# Patient Record
Sex: FEMALE | Race: WHITE | NOT HISPANIC OR LATINO | Employment: OTHER | URBAN - METROPOLITAN AREA
[De-identification: names, ages, dates, MRNs, and addresses within clinical notes are randomized per-mention and may not be internally consistent; named-entity substitution may affect disease eponyms.]

---

## 2017-02-07 ENCOUNTER — ALLSCRIPTS OFFICE VISIT (OUTPATIENT)
Dept: OTHER | Facility: OTHER | Age: 77
End: 2017-02-07

## 2017-03-06 ENCOUNTER — APPOINTMENT (OUTPATIENT)
Dept: LAB | Facility: HOSPITAL | Age: 77
End: 2017-03-06
Attending: FAMILY MEDICINE
Payer: MEDICARE

## 2017-03-06 ENCOUNTER — TRANSCRIBE ORDERS (OUTPATIENT)
Dept: ADMINISTRATIVE | Facility: HOSPITAL | Age: 77
End: 2017-03-06

## 2017-03-06 DIAGNOSIS — E03.9 UNSPECIFIED HYPOTHYROIDISM: Primary | ICD-10-CM

## 2017-03-06 DIAGNOSIS — E78.00 PURE HYPERCHOLESTEROLEMIA: ICD-10-CM

## 2017-03-06 DIAGNOSIS — E03.9 UNSPECIFIED HYPOTHYROIDISM: ICD-10-CM

## 2017-03-06 LAB
ALT SERPL W P-5'-P-CCNC: 22 U/L (ref 12–78)
TSH SERPL DL<=0.05 MIU/L-ACNC: 3.71 UIU/ML (ref 0.36–3.74)

## 2017-03-06 PROCEDURE — 84443 ASSAY THYROID STIM HORMONE: CPT

## 2017-03-06 PROCEDURE — 36415 COLL VENOUS BLD VENIPUNCTURE: CPT

## 2017-03-06 PROCEDURE — 84460 ALANINE AMINO (ALT) (SGPT): CPT

## 2017-12-05 ENCOUNTER — GENERIC CONVERSION - ENCOUNTER (OUTPATIENT)
Dept: OTHER | Facility: OTHER | Age: 77
End: 2017-12-05

## 2017-12-05 ENCOUNTER — TRANSCRIBE ORDERS (OUTPATIENT)
Dept: ADMINISTRATIVE | Facility: HOSPITAL | Age: 77
End: 2017-12-05

## 2017-12-05 ENCOUNTER — HOSPITAL ENCOUNTER (OUTPATIENT)
Dept: RADIOLOGY | Facility: HOSPITAL | Age: 77
Discharge: HOME | End: 2017-12-05
Attending: ALLERGY & IMMUNOLOGY
Payer: MEDICARE

## 2017-12-05 DIAGNOSIS — R05.9 COUGH: Primary | ICD-10-CM

## 2017-12-05 PROCEDURE — 71020 HB CHEST X-RAY 2VW FRONTAL&LATL: CPT

## 2017-12-11 ENCOUNTER — TRANSCRIBE ORDERS (OUTPATIENT)
Dept: ADMINISTRATIVE | Facility: HOSPITAL | Age: 77
End: 2017-12-11

## 2017-12-11 ENCOUNTER — LAB (OUTPATIENT)
Dept: LAB | Facility: HOSPITAL | Age: 77
End: 2017-12-11
Attending: FAMILY MEDICINE
Payer: MEDICARE

## 2017-12-11 DIAGNOSIS — Z11.59 SCREENING EXAMINATION FOR POLIOMYELITIS: ICD-10-CM

## 2017-12-11 DIAGNOSIS — Z13.9 SCREENING FOR CONDITION: ICD-10-CM

## 2017-12-11 DIAGNOSIS — Z13.9 SCREENING FOR CONDITION: Primary | ICD-10-CM

## 2017-12-11 LAB
25(OH)D3 SERPL-MCNC: 30.5 NG/ML (ref 30–100)
ALBUMIN SERPL BCP-MCNC: 3.3 G/DL (ref 3.5–5)
ALP SERPL-CCNC: 85 U/L (ref 46–116)
ALT SERPL W P-5'-P-CCNC: 37 U/L (ref 12–78)
ANION GAP SERPL CALCULATED.3IONS-SCNC: 11 MMOL/L (ref 4–13)
AST SERPL W P-5'-P-CCNC: 25 U/L (ref 5–45)
BASOPHILS # BLD AUTO: 0 THOUSANDS/ΜL (ref 0–0.1)
BASOPHILS NFR BLD AUTO: 1 % (ref 0–1)
BILIRUB SERPL-MCNC: 0.6 MG/DL (ref 0.2–1)
BUN SERPL-MCNC: 15 MG/DL (ref 5–25)
CALCIUM SERPL-MCNC: 8.6 MG/DL (ref 8.3–10.1)
CHLORIDE SERPL-SCNC: 106 MMOL/L (ref 100–108)
CHOLEST SERPL-MCNC: 161 MG/DL (ref 50–200)
CO2 SERPL-SCNC: 26 MMOL/L (ref 21–32)
CREAT SERPL-MCNC: 0.66 MG/DL (ref 0.6–1.3)
EOSINOPHIL # BLD AUTO: 0.4 THOUSAND/ΜL (ref 0–0.61)
EOSINOPHIL NFR BLD AUTO: 4 % (ref 0–6)
ERYTHROCYTE [DISTWIDTH] IN BLOOD BY AUTOMATED COUNT: 15.3 % (ref 11.6–15.1)
GFR SERPL CREATININE-BSD FRML MDRD: 87 ML/MIN/1.73SQ M
GLUCOSE P FAST SERPL-MCNC: 83 MG/DL (ref 65–99)
HCT VFR BLD AUTO: 43.4 % (ref 37–47)
HDLC SERPL-MCNC: 65 MG/DL (ref 40–60)
HGB BLD-MCNC: 14.2 G/DL (ref 12–16)
LDLC SERPL CALC-MCNC: 77 MG/DL (ref 0–100)
LYMPHOCYTES # BLD AUTO: 1.9 THOUSANDS/ΜL (ref 0.6–4.47)
LYMPHOCYTES NFR BLD AUTO: 22 % (ref 14–44)
MCH RBC QN AUTO: 29.5 PG (ref 27–31)
MCHC RBC AUTO-ENTMCNC: 32.7 G/DL (ref 31.4–37.4)
MCV RBC AUTO: 90 FL (ref 82–98)
MONOCYTES # BLD AUTO: 0.9 THOUSAND/ΜL (ref 0.17–1.22)
MONOCYTES NFR BLD AUTO: 10 % (ref 4–12)
NEUTROPHILS # BLD AUTO: 5.3 THOUSANDS/ΜL (ref 1.85–7.62)
NEUTS SEG NFR BLD AUTO: 63 % (ref 43–75)
NRBC BLD AUTO-RTO: 0 /100 WBCS
PLATELET # BLD AUTO: 244 THOUSANDS/UL (ref 130–400)
PMV BLD AUTO: 8.7 FL (ref 8.9–12.7)
POTASSIUM SERPL-SCNC: 3.8 MMOL/L (ref 3.5–5.3)
PROT SERPL-MCNC: 6.8 G/DL (ref 6.4–8.2)
RBC # BLD AUTO: 4.82 MILLION/UL (ref 4.2–5.4)
SODIUM SERPL-SCNC: 143 MMOL/L (ref 136–145)
TRIGL SERPL-MCNC: 94 MG/DL
WBC # BLD AUTO: 8.4 THOUSAND/UL (ref 4.8–10.8)

## 2017-12-11 PROCEDURE — 80053 COMPREHEN METABOLIC PANEL: CPT

## 2017-12-11 PROCEDURE — 80061 LIPID PANEL: CPT

## 2017-12-11 PROCEDURE — 82306 VITAMIN D 25 HYDROXY: CPT

## 2017-12-11 PROCEDURE — 36415 COLL VENOUS BLD VENIPUNCTURE: CPT

## 2017-12-11 PROCEDURE — 85025 COMPLETE CBC W/AUTO DIFF WBC: CPT

## 2017-12-11 PROCEDURE — 86803 HEPATITIS C AB TEST: CPT

## 2017-12-12 LAB — HCV AB SER QL: NORMAL

## 2018-01-13 VITALS
WEIGHT: 191 LBS | BODY MASS INDEX: 36.06 KG/M2 | DIASTOLIC BLOOD PRESSURE: 68 MMHG | HEART RATE: 46 BPM | TEMPERATURE: 98.2 F | SYSTOLIC BLOOD PRESSURE: 118 MMHG | RESPIRATION RATE: 12 BRPM | HEIGHT: 61 IN | OXYGEN SATURATION: 99 %

## 2018-03-19 ENCOUNTER — TELEPHONE (OUTPATIENT)
Dept: PULMONOLOGY | Facility: MEDICAL CENTER | Age: 78
End: 2018-03-19

## 2018-03-21 DIAGNOSIS — J45.41 MODERATE PERSISTENT ASTHMA WITH EXACERBATION: Primary | ICD-10-CM

## 2018-03-21 RX ORDER — BUDESONIDE 0.25 MG/2ML
1 INHALANT ORAL 2 TIMES DAILY
COMMUNITY
End: 2018-03-21 | Stop reason: SDUPTHER

## 2018-03-22 RX ORDER — BUDESONIDE 0.25 MG/2ML
0.25 INHALANT ORAL 2 TIMES DAILY
Qty: 120 ML | Refills: 5 | Status: SHIPPED | OUTPATIENT
Start: 2018-03-22 | End: 2019-04-22 | Stop reason: SDUPTHER

## 2018-04-10 ENCOUNTER — TRANSCRIBE ORDERS (OUTPATIENT)
Dept: ADMINISTRATIVE | Facility: HOSPITAL | Age: 78
End: 2018-04-10

## 2018-04-10 ENCOUNTER — APPOINTMENT (OUTPATIENT)
Dept: LAB | Facility: HOSPITAL | Age: 78
End: 2018-04-10
Attending: FAMILY MEDICINE
Payer: MEDICARE

## 2018-04-10 DIAGNOSIS — E03.4 IDIOPATHIC ATROPHIC HYPOTHYROIDISM: Primary | ICD-10-CM

## 2018-04-10 DIAGNOSIS — E03.4 IDIOPATHIC ATROPHIC HYPOTHYROIDISM: ICD-10-CM

## 2018-04-10 LAB — TSH SERPL DL<=0.05 MIU/L-ACNC: 1.3 UIU/ML (ref 0.36–3.74)

## 2018-04-10 PROCEDURE — 84443 ASSAY THYROID STIM HORMONE: CPT

## 2018-04-10 PROCEDURE — 36415 COLL VENOUS BLD VENIPUNCTURE: CPT

## 2018-05-06 ENCOUNTER — APPOINTMENT (EMERGENCY)
Dept: RADIOLOGY | Facility: HOSPITAL | Age: 78
End: 2018-05-06
Payer: MEDICARE

## 2018-05-06 ENCOUNTER — HOSPITAL ENCOUNTER (EMERGENCY)
Facility: HOSPITAL | Age: 78
Discharge: HOME | End: 2018-05-06
Attending: EMERGENCY MEDICINE | Admitting: EMERGENCY MEDICINE
Payer: MEDICARE

## 2018-05-06 VITALS
OXYGEN SATURATION: 98 % | SYSTOLIC BLOOD PRESSURE: 190 MMHG | TEMPERATURE: 98.1 F | DIASTOLIC BLOOD PRESSURE: 77 MMHG | RESPIRATION RATE: 20 BRPM | HEART RATE: 54 BPM

## 2018-05-06 DIAGNOSIS — M54.32 SCIATICA OF LEFT SIDE: Primary | ICD-10-CM

## 2018-05-06 DIAGNOSIS — S39.012A LOW BACK STRAIN: ICD-10-CM

## 2018-05-06 PROCEDURE — 99283 EMERGENCY DEPT VISIT LOW MDM: CPT

## 2018-05-06 RX ORDER — CYCLOBENZAPRINE HCL 10 MG
10 TABLET ORAL 2 TIMES DAILY PRN
Qty: 20 TABLET | Refills: 0 | Status: SHIPPED | OUTPATIENT
Start: 2018-05-06 | End: 2022-05-19

## 2018-05-06 RX ORDER — CYCLOBENZAPRINE HCL 10 MG
10 TABLET ORAL ONCE
Status: COMPLETED | OUTPATIENT
Start: 2018-05-06 | End: 2018-05-06

## 2018-05-06 RX ORDER — LIDOCAINE 50 MG/G
1 PATCH TOPICAL ONCE
Status: DISCONTINUED | OUTPATIENT
Start: 2018-05-06 | End: 2018-05-06 | Stop reason: HOSPADM

## 2018-05-06 RX ORDER — LIDOCAINE 50 MG/G
1 PATCH TOPICAL DAILY
Status: DISCONTINUED | OUTPATIENT
Start: 2018-05-06 | End: 2018-05-06

## 2018-05-06 RX ORDER — LIDOCAINE 50 MG/G
1 PATCH TOPICAL DAILY
Qty: 30 PATCH | Refills: 0 | Status: SHIPPED | OUTPATIENT
Start: 2018-05-06

## 2018-05-06 RX ORDER — NAPROXEN 500 MG/1
500 TABLET ORAL ONCE
Status: COMPLETED | OUTPATIENT
Start: 2018-05-06 | End: 2018-05-06

## 2018-05-06 RX ADMIN — CYCLOBENZAPRINE HYDROCHLORIDE 10 MG: 10 TABLET, FILM COATED ORAL at 07:58

## 2018-05-06 RX ADMIN — LIDOCAINE 1 PATCH: 50 PATCH TOPICAL at 07:57

## 2018-05-06 RX ADMIN — NAPROXEN 500 MG: 500 TABLET ORAL at 07:58

## 2018-05-06 NOTE — DISCHARGE INSTRUCTIONS
Low Back Strain   WHAT YOU NEED TO KNOW:   Low back strain is an injury to your lower back muscles or tendons  Tendons are strong tissues that connect muscles to bones  The lower back supports most of your body weight and helps you move, twist, and bend  DISCHARGE INSTRUCTIONS:   Seek care immediately if:   · You hear or feel a pop in your lower back  · You have increased swelling or pain in your lower back  · You have trouble moving your legs  · Your legs are numb  Contact your healthcare provider if:   · You have a fever  · Your pain does not go away, even after treatment  · You have questions or concerns about your condition or care  Medicines: The following medicines may be ordered by your healthcare provider:  · Acetaminophen decreases pain and fever  It is available without a doctor's order  Ask how much to take and how often to take it  Follow directions  Acetaminophen can cause liver damage if not taken correctly  · NSAIDs , such as ibuprofen, help decrease swelling, pain, and fever  This medicine is available with or without a doctor's order  NSAIDs can cause stomach bleeding or kidney problems in certain people  If you take blood thinner medicine, always ask your healthcare provider if NSAIDs are safe for you  Always read the medicine label and follow directions  · Muscle relaxers  help decrease pain and muscle spasms  · Prescription pain medicine  may be given  Ask how to take this medicine safely  · Take your medicine as directed  Contact your healthcare provider if you think your medicine is not helping or if you have side effects  Tell him or her if you are allergic to any medicine  Keep a list of the medicines, vitamins, and herbs you take  Include the amounts, and when and why you take them  Bring the list or the pill bottles to follow-up visits  Carry your medicine list with you in case of an emergency  Self-care:   · Rest  as directed   You may need to rest in bed for a period of time after your injury  Do not lift heavy objects  · Apply ice  on your back for 15 to 20 minutes every hour or as directed  Use an ice pack, or put crushed ice in a plastic bag  Cover it with a towel  Ice helps prevent tissue damage and decreases swelling and pain  · Apply heat  on your lower back for 20 to 30 minutes every 2 hours for as many days as directed  Heat helps decrease pain and muscle spasms  · Slowly start to increase your activity  as the pain decreases, or as directed  Prevent another low back strain:   · Use correct body movements  ¨ Bend at the hips and knees when you  objects  Do not bend from the waist  Use your leg muscles as you lift the load  Do not use your back  Keep the object close to your chest as you lift it  Try not to twist or lift anything above your waist     ¨ Change your position often when you stand for long periods of time  Rest one foot on a small box or footrest, and then switch to the other foot often  ¨ Try not to sit for long periods of time  When you do, sit in a straight-backed chair with your feet flat on the floor  ¨ Never reach, pull, or push while you are sitting  · Warm up before you exercise  Do exercises that strengthen your back muscles  Ask your healthcare provider about the best exercise plan for you  · Maintain a healthy weight  Ask your healthcare provider how much you should weigh  Ask him to help you create a weight loss plan if you are overweight  Follow up with your healthcare provider as directed:  Write down your questions so you remember to ask them during your visits  © 2017 [employer] Information is for End User's use only and may not be sold, redistributed or otherwise used for commercial purposes  All illustrations and images included in CareNotes® are the copyrighted property of [employer]  or Sunil [employer]    The above information is an  only  It is not intended as medical advice for individual conditions or treatments  Talk to your doctor, nurse or pharmacist before following any medical regimen to see if it is safe and effective for you  Sciatica   WHAT YOU NEED TO KNOW:   What is sciatica? Sciatica is a condition that causes pain along your sciatic nerve  The sciatic nerve runs from your spine through both sides of your buttocks  It then runs down the back of your thigh, into your lower leg and foot  Any place along your sciatic nerve may be compressed, inflamed, irritated, or stretched and cause symptoms  What causes sciatica? Sciatica may be related to certain activities, poor posture, and physical or psychological stress  Any of the following may cause or increase your risk of sciatica:  · Disc problems:  A slipped disc (soft cushion in between the bones of the spine) is the most common cause of sciatica  The disc may press on the sciatic nerve  One bone in your spine may slip over another, or you may have narrowing of the spinal column  · Muscle injury: This may happen after you twist or lift a heavy object  Swelling from sprained or irritated muscles in the buttocks, thighs, or legs press on the sciatic nerve  · Obesity or pregnancy:  Extra weight increases pressure on your back and legs  · Trauma:  Direct blows on the buttocks, thighs, or legs, car accidents, or falls may injure the sciatic nerve  · Diseases of the spine:  Arthritis, osteoporosis, cancer, or infection of the spine may also affect the sciatic nerve  What are the signs and symptoms of sciatica?   The symptoms of sciatic may be short-term or long-term:  · Pain that goes from the lower back into your buttocks and down the back of your thigh    · Numbness or tingling in your buttocks and legs    · Muscle weakness, difficulty moving or controlling your leg or foot    · Leg pain that increases with standing, sitting, or squatting  How is sciatica diagnosed? Your healthcare provider will ask about other health conditions you may have  He may ask you about your job, history of back pain, diseases, or surgeries you have had  He will examine you and move your legs to see what increases pain  You may also need any of the following:  · X-rays: This is a picture of the bones and tissues in your back, hip, thigh, or leg  This test may show other problems, such as fractures (broken bones)  · CT scan: This test is also called a CAT scan  An x-ray machine uses a computer to take pictures of your hips, thighs, and legs  The pictures may show your sciatic nerve, muscles, and blood vessels  You may be given a dye before the pictures are taken to help healthcare providers see the pictures better  Tell the healthcare provider if you have ever had an allergic reaction to contrast dye  · MRI:  This scan uses powerful magnets and a computer to take pictures of your hips, thighs, and legs  An MRI may show damaged nerves, muscles, bones, and blood vessels  You may be given dye to help the pictures show up better  Tell the healthcare provider if you have ever had an allergic reaction to contrast dye  Do not enter the MRI room with anything metal  Metal can cause serious injury  Tell the healthcare provider if you have any metal in or on your body  · An electromyography (EMG)  test measures the electrical activity of your muscles at rest and with movement  · Nerve conduction tests: These tests check how surface nerves and related muscles respond to stimulation  Electrodes with wires or tiny needles are placed on certain areas, such as the buttocks and legs  How is sciatica treated? · NSAIDs:  These medicines decrease swelling and pain  NSAIDs are available without a doctor's order  Ask your healthcare provider which medicine is right for you  Ask how much to take and when to take it  Take as directed   NSAIDs can cause stomach bleeding or kidney problems if not taken correctly  · Acetaminophen: This medicine decreases pain  Acetaminophen is available without a doctor's order  Ask how much to take and how often to take it  Follow directions  Acetaminophen can cause liver damage if not taken correctly  · Muscle relaxers  help decrease pain and muscle spasms  · Epidural steroid medicine: This may include both an anesthetic (numbing medicine) and a steroid, which may decrease swelling and relieve pain  It is given as a shot close to the spine in the area where you have pain  · Chemonucleolysis: This is an injection given into the damaged disc to soften or shrink the disc  · Surgery: This may be done to correct problems such as a damaged disc, or a tumor in your spine  It may be done to decrease the pressure on the sciatic nerve  Healthcare providers may also release the muscle that may be pressing into your sciatic nerve  How can I help manage sciatica? · Ultrasound therapy: This is a machine that uses sound waves to decrease pain  Topical medicines may be added to help decrease pain and inflammation  · Physical therapy:  A physical therapist teaches you exercises to help improve movement and strength, and to decrease pain  An occupational therapist teaches you skills to help with your daily activities  · Assistive devices: You may need to wear back support, such as a back brace  You may need crutches, a cane, or a walker to decrease stress on your lower back and leg muscles  Ask your healthcare provider for more information about assistive devices and how to use them correctly  How can sciatica be prevented? · Avoid pressure on your back and legs:  Do not  lift heavy objects, or stand or sit for long periods of time  · Lift objects safely:  Keep your back straight and bend your knees when you  an object  Do not bend or twist your back when you lift      · Maintain a healthy weight:  Ask your healthcare provider how much you should weigh  Ask him to help you create a weight loss plan if you are overweight  · Exercise:  Ask your healthcare provider about the best stretching, warmup, and exercise plan for you  What are the risks of sciatica? An epidural steroid injection can lead to pain disorders or paralysis if it is placed incorrectly  It may also cause headaches, leg pain, and blockage of blood flow to the spinal cord  Surgery may cause you to bleed or get an infection  If not treated, your muscles and nerves may become damaged permanently  You may have decreased strength  You may not be able to move your leg or control when you urinate or have bowel movements  When should I contact my healthcare provider? · You have pain in your lower back at night or when resting  · You have pain in your lower back with numbness below the knee  · You have weakness in one leg only  · You have questions or concerns about your condition or care  When should I seek immediate care or call 911? · You have trouble holding back your urine or bowel movements  · You have weakness in both legs  · You have numbness in your groin or buttocks  CARE AGREEMENT:   You have the right to help plan your care  Learn about your health condition and how it may be treated  Discuss treatment options with your caregivers to decide what care you want to receive  You always have the right to refuse treatment  The above information is an  only  It is not intended as medical advice for individual conditions or treatments  Talk to your doctor, nurse or pharmacist before following any medical regimen to see if it is safe and effective for you  © 2017 [employer] Information is for End User's use only and may not be sold, redistributed or otherwise used for commercial purposes  All illustrations and images included in CareNotes® are the copyrighted property of [employer]  or Sunil [employer]

## 2018-05-09 NOTE — ED PROVIDER NOTES
History  Chief Complaint   Patient presents with    Buttocks Pain     pt lifted something heavy a couple days back now c/o l buttock pain traveling to l knee pain  [facility] given Flexeril and methyprednisone pack no relief    Knee Pain       History provided by:  Patient   used: No    Back Pain   Location:  Lumbar spine  Quality:  Aching  Radiates to:  L thigh and L knee  Pain severity:  Moderate  Pain is:  Same all the time  Onset quality:  Gradual  Timing:  Constant  Progression:  Waxing and waning  Chronicity:  Recurrent  Context: lifting heavy objects    Relieved by:  NSAIDs (mildly)  Worsened by:  Bending, sitting, touching, twisting and movement  Ineffective treatments:  None tried  Associated symptoms: no abdominal pain, no abdominal swelling, no bladder incontinence, no bowel incontinence, no chest pain, no dysuria, no fever, no headaches, no leg pain, no numbness, no paresthesias, no pelvic pain, no perianal numbness, no tingling, no weakness and no weight loss    Risk factors: menopause    Risk factors: no hx of cancer, no hx of osteoporosis, no lack of exercise, not obese, not pregnant, no recent surgery, no steroid use and no vascular disease    Risk factors comment:  Elderly age      Prior to Admission Medications   Prescriptions Last Dose Informant Patient Reported? Taking? Cholecalciferol (VITAMIN D) 2000 UNITS tablet   Yes Yes   Sig: Take 2,000 Units by mouth daily  UNKNOWN TO PATIENT   Yes Yes   Sig: Allegra daily po  Stool softener bid po  Tumeric 0 500 daily po   albuterol (ACCUNEB) 1 25 MG/3ML nebulizer solution   Yes Yes   Sig: Take 1 ampule by nebulization every 6 (six) hours as needed for wheezing  aspirin 81 MG tablet   Yes Yes   Sig: Take 81 mg by mouth daily     budesonide (PULMICORT) 0 25 mg/2 mL nebulizer solution   No Yes   Sig: Take 2 mL (0 25 mg total) by nebulization 2 (two) times a day   levothyroxine (SYNTHROID) 112 mcg tablet Yes Yes   Sig: Take 112 mcg by mouth daily  montelukast (SINGULAIR) 10 mg tablet   Yes Yes   Sig: Take 10 mg by mouth daily at bedtime  nitroglycerin (NITROSTAT) 0 4 mg SL tablet   Yes No   Sig: Place 0 4 mg under the tongue every 5 (five) minutes as needed for chest pain  simvastatin (ZOCOR) 10 mg tablet   Yes Yes   Sig: Take 10 mg by mouth daily at bedtime  Facility-Administered Medications: None       Past Medical History:   Diagnosis Date    Asthma     Cardiac disease     heart attack    Disease of thyroid gland     Hyperlipidemia        Past Surgical History:   Procedure Laterality Date    APPENDECTOMY      BREAST LUMPECTOMY Bilateral     several    CARDIAC SURGERY      CORONARY STENT PLACEMENT      NASAL POLYP SURGERY         No family history on file  I have reviewed and agree with the history as documented  Social History   Substance Use Topics    Smoking status: Never Smoker    Smokeless tobacco: Not on file    Alcohol use No        Review of Systems   Constitutional: Negative for chills, fatigue, fever, unexpected weight change and weight loss  HENT: Negative for sore throat  Respiratory: Negative for cough, chest tightness and shortness of breath  Cardiovascular: Negative for chest pain  Gastrointestinal: Negative for abdominal distention, abdominal pain, bowel incontinence, nausea and vomiting  Genitourinary: Negative for bladder incontinence, difficulty urinating, dysuria, flank pain, hematuria and pelvic pain  Musculoskeletal: Positive for back pain  Negative for neck pain and neck stiffness  Skin: Negative for color change, rash and wound  Allergic/Immunologic: Negative for immunocompromised state  Neurological: Negative for dizziness, tingling, syncope, weakness, numbness, headaches and paresthesias  Psychiatric/Behavioral: The patient is nervous/anxious          Physical Exam  ED Triage Vitals [05/06/18 0713]   Temperature Pulse Respirations Blood Pressure SpO2   98 1 °F (36 7 °C) (!) 54 20 (!) 190/77 98 %      Temp Source Heart Rate Source Patient Position - Orthostatic VS BP Location FiO2 (%)   Tympanic Monitor Sitting Left arm --      Pain Score       --           Orthostatic Vital Signs  Vitals:    05/06/18 0713   BP: (!) 190/77   Pulse: (!) 54   Patient Position - Orthostatic VS: Sitting       Physical Exam   Constitutional: She is oriented to person, place, and time  She appears well-developed and well-nourished  No distress  HENT:   Head: Normocephalic and atraumatic  Mouth/Throat: Oropharynx is clear and moist    Eyes: EOM are normal    Neck: Normal range of motion  Neck supple  Cardiovascular: Normal rate, regular rhythm and intact distal pulses  Pulmonary/Chest: Effort normal and breath sounds normal    Abdominal: Soft  She exhibits no distension  There is no tenderness  Musculoskeletal: Normal range of motion  She exhibits tenderness  Lt paralumbar and Lt buttock ttp w/e e/e/e/s/l/a/deformity, rash  (-) SLT   Neurological: She is alert and oriented to person, place, and time  No saddle anesthesia   Skin: Skin is warm and dry  She is not diaphoretic  Nursing note and vitals reviewed        ED Medications  Medications   naproxen (NAPROSYN) tablet 500 mg (500 mg Oral Given 5/6/18 0758)   cyclobenzaprine (FLEXERIL) tablet 10 mg (10 mg Oral Given 5/6/18 0758)       Diagnostic Studies  Results Reviewed     None                 No orders to display              Procedures  Procedures       Phone Contacts  ED Phone Contact    ED Course                               MDM  Number of Diagnoses or Management Options  Low back strain: new and does not require workup  Sciatica of left side: new and does not require workup  Diagnosis management comments: PRN analgesia/antiinflammatories/muscle relaxants       Amount and/or Complexity of Data Reviewed  Decide to obtain previous medical records or to obtain history from someone other than the patient: yes  Review and summarize past medical records: yes    Risk of Complications, Morbidity, and/or Mortality  Presenting problems: low  Diagnostic procedures: minimal  Management options: low    Patient Progress  Patient progress: stable    CritCare Time    Disposition  Final diagnoses:   Sciatica of left side   Low back strain     Time reflects when diagnosis was documented in both MDM as applicable and the Disposition within this note     Time User Action Codes Description Comment    5/6/2018  7:50 AM Phoenix Ebbing Add [M54 32] Sciatica of left side     5/6/2018  7:50 AM Phoenix Ebbing Add [S39 012A] Low back strain       ED Disposition     ED Disposition Condition Comment    Discharge  [address] discharge to home/self care  Condition at discharge: Good        Follow-up Information     Follow up With Specialties Details Why Contact Info    Judith Contreras,  Family Medicine Schedule an appointment as soon as possible for a visit  [address]  [address] [facility]  [phone]          Discharge Medication List as of 5/6/2018  7:52 AM      START taking these medications    Details   cyclobenzaprine (FLEXERIL) 10 mg tablet Take 1 tablet (10 mg total) by mouth 2 (two) times a day as needed for muscle spasms, Starting Sun 5/6/2018, Print      lidocaine (LIDODERM) 5 % Place 1 patch on the skin daily Remove & Discard patch within 12 hours or as directed by MD, Starting [place] 5/6/2018, Print         CONTINUE these medications which have NOT CHANGED    Details   albuterol (ACCUNEB) 1 25 MG/3ML nebulizer solution Take 1 ampule by nebulization every 6 (six) hours as needed for wheezing , Until Discontinued, Historical Med      aspirin 81 MG tablet Take 81 mg by mouth daily  , Until Discontinued, Historical Med      budesonide (PULMICORT) 0 25 mg/2 mL nebulizer solution Take 2 mL (0 25 mg total) by nebulization 2 (two) times a day, Starting Thu 3/22/2018, Normal      Cholecalciferol (VITAMIN D) 2000 UNITS tablet Take 2,000 Units by mouth daily  , Until Discontinued, Historical Med      levothyroxine (SYNTHROID) 112 mcg tablet Take 112 mcg by mouth daily  , Until Discontinued, Historical Med      montelukast (SINGULAIR) 10 mg tablet Take 10 mg by mouth daily at bedtime  , Until Discontinued, Historical Med      simvastatin (ZOCOR) 10 mg tablet Take 10 mg by mouth daily at bedtime  , Until Discontinued, Historical Med      UNKNOWN TO PATIENT Allegra daily po  Stool softener bid po  Tumeric 0 500 daily po, Until Discontinued, Historical Med      nitroglycerin (NITROSTAT) 0 4 mg SL tablet Place 0 4 mg under the tongue every 5 (five) minutes as needed for chest pain , Until Discontinued, Historical Med           No discharge procedures on file      ED Provider  Electronically Signed by           Leena Fuentes MD  05/09/18 2319

## 2018-05-17 ENCOUNTER — OFFICE VISIT (OUTPATIENT)
Dept: OBGYN CLINIC | Facility: CLINIC | Age: 78
End: 2018-05-17
Payer: MEDICARE

## 2018-05-17 VITALS
WEIGHT: 188 LBS | BODY MASS INDEX: 34.6 KG/M2 | SYSTOLIC BLOOD PRESSURE: 117 MMHG | DIASTOLIC BLOOD PRESSURE: 74 MMHG | HEART RATE: 60 BPM | HEIGHT: 62 IN

## 2018-05-17 DIAGNOSIS — M17.12 PRIMARY OSTEOARTHRITIS OF LEFT KNEE: Primary | ICD-10-CM

## 2018-05-17 DIAGNOSIS — G57.02 PIRIFORMIS SYNDROME, LEFT: ICD-10-CM

## 2018-05-17 PROCEDURE — 99214 OFFICE O/P EST MOD 30 MIN: CPT | Performed by: ORTHOPAEDIC SURGERY

## 2018-05-17 PROCEDURE — 20610 DRAIN/INJ JOINT/BURSA W/O US: CPT | Performed by: ORTHOPAEDIC SURGERY

## 2018-05-17 RX ORDER — NAPROXEN 500 MG/1
500 TABLET ORAL 2 TIMES DAILY WITH MEALS
Qty: 60 TABLET | Refills: 0 | Status: SHIPPED | OUTPATIENT
Start: 2018-05-17 | End: 2020-06-23 | Stop reason: ALTCHOICE

## 2018-05-17 RX ORDER — SIMVASTATIN 80 MG
TABLET ORAL
COMMUNITY
End: 2022-05-19

## 2018-05-17 RX ORDER — ALBUTEROL SULFATE 2.5 MG/3ML
1 SOLUTION RESPIRATORY (INHALATION) 4 TIMES DAILY
COMMUNITY
Start: 2017-02-07 | End: 2019-06-11

## 2018-05-17 RX ORDER — METHYLPREDNISOLONE 4 MG/1
TABLET ORAL
Refills: 0 | COMMUNITY
Start: 2018-05-01 | End: 2022-05-19

## 2018-05-17 RX ORDER — DEXAMETHASONE SODIUM PHOSPHATE 100 MG/10ML
40 INJECTION INTRAMUSCULAR; INTRAVENOUS
Status: COMPLETED | OUTPATIENT
Start: 2018-05-17 | End: 2018-05-17

## 2018-05-17 RX ORDER — LIDOCAINE HYDROCHLORIDE 10 MG/ML
4 INJECTION, SOLUTION INFILTRATION; PERINEURAL
Status: COMPLETED | OUTPATIENT
Start: 2018-05-17 | End: 2018-05-17

## 2018-05-17 RX ORDER — LEVOTHYROXINE SODIUM 0.1 MG/1
TABLET ORAL
COMMUNITY
End: 2022-05-19

## 2018-05-17 RX ORDER — FEXOFENADINE HYDROCHLORIDE 60 MG/1
TABLET, FILM COATED ORAL
COMMUNITY

## 2018-05-17 RX ORDER — MONTELUKAST SODIUM 10 MG/1
TABLET ORAL
COMMUNITY
End: 2022-05-19

## 2018-05-17 RX ORDER — ASPIRIN 81 MG/1
TABLET ORAL
COMMUNITY
End: 2022-05-19

## 2018-05-17 RX ORDER — BUDESONIDE AND FORMOTEROL FUMARATE DIHYDRATE 160; 4.5 UG/1; UG/1
AEROSOL RESPIRATORY (INHALATION)
Refills: 0 | COMMUNITY
Start: 2018-04-28

## 2018-05-17 RX ADMIN — LIDOCAINE HYDROCHLORIDE 4 ML: 10 INJECTION, SOLUTION INFILTRATION; PERINEURAL at 09:32

## 2018-05-17 RX ADMIN — DEXAMETHASONE SODIUM PHOSPHATE 40 MG: 100 INJECTION INTRAMUSCULAR; INTRAVENOUS at 09:32

## 2018-05-17 NOTE — PROGRESS NOTES
Assessment/Plan:  1  Primary osteoarthritis of left knee     2  Piriformis syndrome, left         Scribe Attestation    I,:   Ansley Kellogg am acting as a scribe while in the presence of the attending physician :        I,:   Jacky Gomez, DO personally performed the services described in this documentation    as scribed in my presence :              Roberto Lobo has left-sided knee pain consistent with primary left knee osteoarthritis as well as left-sided piriformis syndrome  She should continue working with Jesus Alberto Weaver at [employer] on her stretches and exercises  She was given a handout with home exercises and stretches that she can work on at [employer] as well  She was given a cortisone injection in her left knee today which she tolerated well  I will also send over a prescription for naproxen to her [facility] pharmacy  She can continue to ice the knee as needed  We discussed starting physical therapy at this time but she would like to hold off  If she changes her mind she can call the office and I will send over prescription for physical therapy  She will follow up in 6 weeks if she is still experiencing pain  Subjective:   Dinorah Gonzalez is a 78 y o  female who presents to the office today for left sided buttocks to knee pain  About 1 month ago she went to lift something heavy and believes this started her pain  She presented to the doctors in where they prescribed her muscle relaxer and prednisone  Neither of these things help so she went to the ER about a week and half ago because she was still in pain  The ER prescribed her more muscle relaxers and naproxen and instructed her to follow up with Orthopedics  At today's appointment she states her pain is a constant pain with intermittent shooting down to her left knee  Nothing seems to make this pain better or worse  She has been icing, using a lidocaine patch and taking muscle relaxers as needed  She has been doing stretching exercises at the gym    She denies any numbness or tingling at this time  She does experiencing radiating pain from her hip to her knee  Review of Systems   Musculoskeletal: Positive for arthralgias, back pain and myalgias  Past Medical History:   Diagnosis Date    Asthma     Cardiac disease     heart attack    Disease of thyroid gland     Hyperlipidemia        Past Surgical History:   Procedure Laterality Date    APPENDECTOMY      BREAST LUMPECTOMY Bilateral     several    CARDIAC SURGERY      CORONARY STENT PLACEMENT      NASAL POLYP SURGERY         No family history on file  Social History     Occupational History    Not on file  Social History Main Topics    Smoking status: Never Smoker    Smokeless tobacco: Never Used    Alcohol use No    Drug use: No    Sexual activity: Not on file         Current Outpatient Prescriptions:     albuterol (2 5 mg/3 mL) 0 083 % nebulizer solution, Inhale 1 each 4 (four) times a day, Disp: , Rfl:     albuterol (ACCUNEB) 1 25 MG/3ML nebulizer solution, Take 1 ampule by nebulization every 6 (six) hours as needed for wheezing , Disp: , Rfl:     aspirin (ASPIR-81) 81 mg EC tablet, Take by mouth, Disp: , Rfl:     budesonide (PULMICORT) 0 25 mg/2 mL nebulizer solution, Take 2 mL (0 25 mg total) by nebulization 2 (two) times a day, Disp: 120 mL, Rfl: 5    Cholecalciferol (VITAMIN D) 2000 UNITS tablet, Take 2,000 Units by mouth daily  , Disp: , Rfl:     Ergocalciferol (VITAMIN D2 PO), Take by mouth, Disp: , Rfl:     fexofenadine (ALLEGRA) 60 MG tablet, Take by mouth, Disp: , Rfl:     levothyroxine (SYNTHROID) 100 mcg tablet, Take by mouth, Disp: , Rfl:     levothyroxine (SYNTHROID) 112 mcg tablet, Take 112 mcg by mouth daily  , Disp: , Rfl:     lidocaine (LIDODERM) 5 %, Place 1 patch on the skin daily Remove & Discard patch within 12 hours or as directed by MD, Disp: 30 patch, Rfl: 0    Methylprednisolone 4 MG TBPK, Take as directed, Disp: , Rfl: 0    montelukast (SINGULAIR) 10 mg tablet, Take 10 mg by mouth daily at bedtime  , Disp: , Rfl:     simvastatin (ZOCOR) 10 mg tablet, Take 10 mg by mouth daily at bedtime  , Disp: , Rfl:     simvastatin (ZOCOR) 80 mg tablet, Take by mouth, Disp: , Rfl:     SYMBICORT 160-4 5 MCG/ACT inhaler, , Disp: , Rfl: 0    UNKNOWN TO PATIENT, Allegra daily po Stool softener bid po Tumeric 0 500 daily po, Disp: , Rfl:     aspirin 81 MG tablet, Take 81 mg by mouth daily  , Disp: , Rfl:     cyclobenzaprine (FLEXERIL) 10 mg tablet, Take 1 tablet (10 mg total) by mouth 2 (two) times a day as needed for muscle spasms, Disp: 20 tablet, Rfl: 0    montelukast (SINGULAIR) 10 mg tablet, Take by mouth, Disp: , Rfl:     nitroglycerin (NITROSTAT) 0 4 mg SL tablet, Place 0 4 mg under the tongue every 5 (five) minutes as needed for chest pain , Disp: , Rfl:     Allergies   Allergen Reactions    Penicillins        Objective:  Vitals:    05/17/18 0834   BP: 117/74   Pulse: 60       Left Knee Exam     Tenderness   The patient is experiencing tenderness in the medial joint line  Range of Motion   The patient has normal left knee ROM  Muscle Strength     The patient has normal left knee strength  Other   Swelling: mild      Left Hip Exam     Tenderness   The patient is experiencing tenderness in the greater trochanter  Range of Motion   The patient has normal left hip ROM  Muscle Strength   The patient has normal left hip strength  Tests   SREEKANTH: positive    Other   Sensation: normal    Comments:  (+) Piriformis tenderness   (+) IT Band tenderness      Back Exam     Tenderness   The patient is experiencing no tenderness  Range of Motion   The patient has normal back ROM  Muscle Strength   The patient has normal back strength  Tests   Straight leg raise right: negative  Straight leg raise left: negative    Other   Sensation: normal            Physical Exam   Constitutional: She is oriented to person, place, and time   She appears well-developed and well-nourished  HENT:   Head: Normocephalic and atraumatic  Eyes: Conjunctivae are normal    Neck: Neck supple  Cardiovascular: Intact distal pulses  Pulmonary/Chest: Effort normal    Neurological: She is alert and oriented to person, place, and time  Skin: Skin is warm and dry  Psychiatric: She has a normal mood and affect  Her behavior is normal    Vitals reviewed  I have personally reviewed pertinent films in PACS and my interpretation is as follows: Two view left knee x-ray taken in 2016 shows osteoarthritis of the left knee      Large joint arthrocentesis  Date/Time: 5/17/2018 9:32 AM  Consent given by: patient  Site marked: site marked  Timeout: Immediately prior to procedure a time out was called to verify the correct patient, procedure, equipment, support staff and site/side marked as required   Supporting Documentation  Indications: pain   Procedure Details  Location: knee - L knee  Preparation: Patient was prepped and draped in the usual sterile fashion  Needle size: 22 G  Ultrasound guidance: no  Approach: anterolateral  Medications administered: 4 mL lidocaine 1 %; 40 mg dexamethasone 100 mg/10 mL    Patient tolerance: patient tolerated the procedure well with no immediate complications  Dressing:  Sterile dressing applied

## 2018-12-12 ENCOUNTER — TRANSCRIBE ORDERS (OUTPATIENT)
Dept: ADMINISTRATIVE | Facility: HOSPITAL | Age: 78
End: 2018-12-12

## 2018-12-12 ENCOUNTER — APPOINTMENT (OUTPATIENT)
Dept: LAB | Facility: HOSPITAL | Age: 78
End: 2018-12-12
Attending: FAMILY MEDICINE
Payer: MEDICARE

## 2018-12-12 ENCOUNTER — HOSPITAL ENCOUNTER (OUTPATIENT)
Dept: RADIOLOGY | Facility: HOSPITAL | Age: 78
Discharge: HOME | End: 2018-12-12
Attending: FAMILY MEDICINE
Payer: MEDICARE

## 2018-12-12 DIAGNOSIS — I10 ESSENTIAL HYPERTENSION, MALIGNANT: ICD-10-CM

## 2018-12-12 DIAGNOSIS — M54.40 ACUTE BILATERAL LOW BACK PAIN WITH SCIATICA, SCIATICA LATERALITY UNSPECIFIED: ICD-10-CM

## 2018-12-12 DIAGNOSIS — I51.9 MYXEDEMA HEART DISEASE: ICD-10-CM

## 2018-12-12 DIAGNOSIS — M51.36 DEGENERATION OF LUMBAR INTERVERTEBRAL DISC: ICD-10-CM

## 2018-12-12 DIAGNOSIS — E03.9 MYXEDEMA HEART DISEASE: ICD-10-CM

## 2018-12-12 DIAGNOSIS — M54.40 ACUTE BILATERAL LOW BACK PAIN WITH SCIATICA, SCIATICA LATERALITY UNSPECIFIED: Primary | ICD-10-CM

## 2018-12-12 DIAGNOSIS — E55.9 VITAMIN D DEFICIENCY: ICD-10-CM

## 2018-12-12 DIAGNOSIS — E78.00 PURE HYPERCHOLESTEROLEMIA: ICD-10-CM

## 2018-12-12 DIAGNOSIS — E55.9 VITAMIN D DEFICIENCY: Primary | ICD-10-CM

## 2018-12-12 LAB
25(OH)D3 SERPL-MCNC: 53.9 NG/ML (ref 30–100)
ALBUMIN SERPL BCP-MCNC: 3.7 G/DL (ref 3.5–5)
ALP SERPL-CCNC: 98 U/L (ref 46–116)
ALT SERPL W P-5'-P-CCNC: 24 U/L (ref 12–78)
ANION GAP SERPL CALCULATED.3IONS-SCNC: 8 MMOL/L (ref 4–13)
AST SERPL W P-5'-P-CCNC: 27 U/L (ref 5–45)
BASOPHILS # BLD AUTO: 0.07 THOUSANDS/ΜL (ref 0–0.1)
BASOPHILS NFR BLD AUTO: 1 % (ref 0–1)
BILIRUB SERPL-MCNC: 0.5 MG/DL (ref 0.2–1)
BUN SERPL-MCNC: 19 MG/DL (ref 5–25)
CALCIUM SERPL-MCNC: 9 MG/DL (ref 8.3–10.1)
CHLORIDE SERPL-SCNC: 106 MMOL/L (ref 100–108)
CHOLEST SERPL-MCNC: 140 MG/DL (ref 50–200)
CO2 SERPL-SCNC: 27 MMOL/L (ref 21–32)
CREAT SERPL-MCNC: 0.69 MG/DL (ref 0.6–1.3)
EOSINOPHIL # BLD AUTO: 0.44 THOUSAND/ΜL (ref 0–0.61)
EOSINOPHIL NFR BLD AUTO: 7 % (ref 0–6)
ERYTHROCYTE [DISTWIDTH] IN BLOOD BY AUTOMATED COUNT: 14 % (ref 11.6–15.1)
GFR SERPL CREATININE-BSD FRML MDRD: 85 ML/MIN/1.73SQ M
GLUCOSE P FAST SERPL-MCNC: 95 MG/DL (ref 65–99)
HCT VFR BLD AUTO: 46.5 % (ref 34.8–46.1)
HDLC SERPL-MCNC: 61 MG/DL (ref 40–60)
HGB BLD-MCNC: 14.5 G/DL (ref 11.5–15.4)
IMM GRANULOCYTES # BLD AUTO: 0.01 THOUSAND/UL (ref 0–0.2)
IMM GRANULOCYTES NFR BLD AUTO: 0 % (ref 0–2)
LDLC SERPL CALC-MCNC: 66 MG/DL (ref 0–100)
LYMPHOCYTES # BLD AUTO: 1.42 THOUSANDS/ΜL (ref 0.6–4.47)
LYMPHOCYTES NFR BLD AUTO: 24 % (ref 14–44)
MCH RBC QN AUTO: 28.4 PG (ref 26.8–34.3)
MCHC RBC AUTO-ENTMCNC: 31.2 G/DL (ref 31.4–37.4)
MCV RBC AUTO: 91 FL (ref 82–98)
MONOCYTES # BLD AUTO: 0.66 THOUSAND/ΜL (ref 0.17–1.22)
MONOCYTES NFR BLD AUTO: 11 % (ref 4–12)
NEUTROPHILS # BLD AUTO: 3.31 THOUSANDS/ΜL (ref 1.85–7.62)
NEUTS SEG NFR BLD AUTO: 57 % (ref 43–75)
NONHDLC SERPL-MCNC: 79 MG/DL
NRBC BLD AUTO-RTO: 0 /100 WBCS
PLATELET # BLD AUTO: 256 THOUSANDS/UL (ref 149–390)
PMV BLD AUTO: 11 FL (ref 8.9–12.7)
POTASSIUM SERPL-SCNC: 3.9 MMOL/L (ref 3.5–5.3)
PROT SERPL-MCNC: 7.5 G/DL (ref 6.4–8.2)
RBC # BLD AUTO: 5.1 MILLION/UL (ref 3.81–5.12)
SODIUM SERPL-SCNC: 141 MMOL/L (ref 136–145)
TRIGL SERPL-MCNC: 64 MG/DL
TSH SERPL DL<=0.05 MIU/L-ACNC: 0.8 UIU/ML (ref 0.36–3.74)
WBC # BLD AUTO: 5.91 THOUSAND/UL (ref 4.31–10.16)

## 2018-12-12 PROCEDURE — 82306 VITAMIN D 25 HYDROXY: CPT

## 2018-12-12 PROCEDURE — 80061 LIPID PANEL: CPT

## 2018-12-12 PROCEDURE — 85025 COMPLETE CBC W/AUTO DIFF WBC: CPT | Performed by: FAMILY MEDICINE

## 2018-12-12 PROCEDURE — 84443 ASSAY THYROID STIM HORMONE: CPT

## 2018-12-12 PROCEDURE — 72110 X-RAY EXAM L-2 SPINE 4/>VWS: CPT

## 2018-12-12 PROCEDURE — 80053 COMPREHEN METABOLIC PANEL: CPT | Performed by: FAMILY MEDICINE

## 2018-12-12 PROCEDURE — 36415 COLL VENOUS BLD VENIPUNCTURE: CPT | Performed by: FAMILY MEDICINE

## 2019-04-22 DIAGNOSIS — J45.41 MODERATE PERSISTENT ASTHMA WITH EXACERBATION: ICD-10-CM

## 2019-04-22 RX ORDER — BUDESONIDE 0.25 MG/2ML
0.25 INHALANT ORAL 2 TIMES DAILY
Qty: 120 ML | Refills: 5 | Status: SHIPPED | OUTPATIENT
Start: 2019-04-22 | End: 2019-06-11 | Stop reason: SDUPTHER

## 2019-06-07 ENCOUNTER — TRANSCRIBE ORDERS (OUTPATIENT)
Dept: ADMINISTRATIVE | Facility: HOSPITAL | Age: 79
End: 2019-06-07

## 2019-06-07 ENCOUNTER — APPOINTMENT (OUTPATIENT)
Dept: LAB | Facility: HOSPITAL | Age: 79
End: 2019-06-07
Attending: FAMILY MEDICINE
Payer: MEDICARE

## 2019-06-07 DIAGNOSIS — E03.4 IDIOPATHIC ATROPHIC HYPOTHYROIDISM: Primary | ICD-10-CM

## 2019-06-07 DIAGNOSIS — E03.4 IDIOPATHIC ATROPHIC HYPOTHYROIDISM: ICD-10-CM

## 2019-06-07 LAB
ANION GAP SERPL CALCULATED.3IONS-SCNC: 10 MMOL/L (ref 4–13)
BUN SERPL-MCNC: 17 MG/DL (ref 5–25)
CALCIUM SERPL-MCNC: 8.9 MG/DL (ref 8.3–10.1)
CHLORIDE SERPL-SCNC: 107 MMOL/L (ref 100–108)
CO2 SERPL-SCNC: 26 MMOL/L (ref 21–32)
CREAT SERPL-MCNC: 0.69 MG/DL (ref 0.6–1.3)
GFR SERPL CREATININE-BSD FRML MDRD: 85 ML/MIN/1.73SQ M
GLUCOSE P FAST SERPL-MCNC: 88 MG/DL (ref 65–99)
POTASSIUM SERPL-SCNC: 3.9 MMOL/L (ref 3.5–5.3)
SODIUM SERPL-SCNC: 143 MMOL/L (ref 136–145)
TSH SERPL DL<=0.05 MIU/L-ACNC: 0.03 UIU/ML (ref 0.36–3.74)

## 2019-06-07 PROCEDURE — 36415 COLL VENOUS BLD VENIPUNCTURE: CPT | Performed by: FAMILY MEDICINE

## 2019-06-07 PROCEDURE — 84443 ASSAY THYROID STIM HORMONE: CPT

## 2019-06-07 PROCEDURE — 80048 BASIC METABOLIC PNL TOTAL CA: CPT | Performed by: FAMILY MEDICINE

## 2019-06-11 ENCOUNTER — OFFICE VISIT (OUTPATIENT)
Dept: PULMONOLOGY | Facility: MEDICAL CENTER | Age: 79
End: 2019-06-11
Payer: MEDICARE

## 2019-06-11 VITALS
RESPIRATION RATE: 12 BRPM | HEIGHT: 60 IN | OXYGEN SATURATION: 97 % | DIASTOLIC BLOOD PRESSURE: 82 MMHG | WEIGHT: 176 LBS | BODY MASS INDEX: 34.55 KG/M2 | SYSTOLIC BLOOD PRESSURE: 142 MMHG | HEART RATE: 72 BPM

## 2019-06-11 DIAGNOSIS — J45.41 MODERATE PERSISTENT ASTHMA WITH EXACERBATION: Primary | ICD-10-CM

## 2019-06-11 DIAGNOSIS — J30.1 ALLERGIC RHINITIS DUE TO POLLEN, UNSPECIFIED SEASONALITY: ICD-10-CM

## 2019-06-11 DIAGNOSIS — J45.30 MILD PERSISTENT ASTHMA WITHOUT COMPLICATION: ICD-10-CM

## 2019-06-11 PROBLEM — J30.9 ALLERGIC RHINITIS: Status: ACTIVE | Noted: 2019-06-11

## 2019-06-11 PROCEDURE — 99214 OFFICE O/P EST MOD 30 MIN: CPT | Performed by: NURSE PRACTITIONER

## 2019-06-11 PROCEDURE — 94010 BREATHING CAPACITY TEST: CPT | Performed by: NURSE PRACTITIONER

## 2019-06-11 RX ORDER — ALBUTEROL SULFATE 90 UG/1
2 AEROSOL, METERED RESPIRATORY (INHALATION) 4 TIMES DAILY
Qty: 8.5 G | Refills: 3 | Status: SHIPPED | OUTPATIENT
Start: 2019-06-11 | End: 2022-01-03 | Stop reason: SDUPTHER

## 2019-06-11 RX ORDER — ALBUTEROL SULFATE 2.5 MG/3ML
2.5 SOLUTION RESPIRATORY (INHALATION) 2 TIMES DAILY
Qty: 180 ML | Refills: 3 | Status: SHIPPED | OUTPATIENT
Start: 2019-06-11 | End: 2019-06-13 | Stop reason: SDUPTHER

## 2019-06-11 RX ORDER — LEVOTHYROXINE SODIUM 125 UG/1
TABLET ORAL
Refills: 0 | COMMUNITY
Start: 2019-05-29

## 2019-06-11 RX ORDER — BUDESONIDE 0.25 MG/2ML
0.25 INHALANT ORAL 2 TIMES DAILY
Qty: 120 ML | Refills: 5 | Status: SHIPPED | OUTPATIENT
Start: 2019-06-11 | End: 2020-03-09 | Stop reason: SDUPTHER

## 2019-06-13 DIAGNOSIS — J45.30 MILD PERSISTENT ASTHMA WITHOUT COMPLICATION: ICD-10-CM

## 2019-06-13 RX ORDER — ALBUTEROL SULFATE 2.5 MG/3ML
2.5 SOLUTION RESPIRATORY (INHALATION) 2 TIMES DAILY
Qty: 60 VIAL | Refills: 0 | Status: SHIPPED | OUTPATIENT
Start: 2019-06-13

## 2019-06-25 ENCOUNTER — TELEPHONE (OUTPATIENT)
Dept: PULMONOLOGY | Facility: MEDICAL CENTER | Age: 79
End: 2019-06-25

## 2019-07-09 ENCOUNTER — TRANSCRIBE ORDERS (OUTPATIENT)
Dept: ADMINISTRATIVE | Facility: HOSPITAL | Age: 79
End: 2019-07-09

## 2019-07-09 ENCOUNTER — APPOINTMENT (OUTPATIENT)
Dept: LAB | Facility: HOSPITAL | Age: 79
End: 2019-07-09
Attending: FAMILY MEDICINE
Payer: MEDICARE

## 2019-07-09 DIAGNOSIS — E03.9 MYXEDEMA HEART DISEASE: ICD-10-CM

## 2019-07-09 DIAGNOSIS — I51.9 MYXEDEMA HEART DISEASE: ICD-10-CM

## 2019-07-09 DIAGNOSIS — E03.9 MYXEDEMA HEART DISEASE: Primary | ICD-10-CM

## 2019-07-09 DIAGNOSIS — I51.9 MYXEDEMA HEART DISEASE: Primary | ICD-10-CM

## 2019-07-09 LAB — TSH SERPL DL<=0.05 MIU/L-ACNC: 0.09 UIU/ML (ref 0.36–3.74)

## 2019-07-09 PROCEDURE — 36415 COLL VENOUS BLD VENIPUNCTURE: CPT

## 2019-07-09 PROCEDURE — 84443 ASSAY THYROID STIM HORMONE: CPT

## 2019-08-06 ENCOUNTER — TRANSCRIBE ORDERS (OUTPATIENT)
Dept: ADMINISTRATIVE | Facility: HOSPITAL | Age: 79
End: 2019-08-06

## 2019-08-06 ENCOUNTER — APPOINTMENT (OUTPATIENT)
Dept: LAB | Facility: HOSPITAL | Age: 79
End: 2019-08-06
Attending: FAMILY MEDICINE
Payer: MEDICARE

## 2019-08-06 DIAGNOSIS — E03.4 IDIOPATHIC ATROPHIC HYPOTHYROIDISM: Primary | ICD-10-CM

## 2019-08-06 LAB — TSH SERPL DL<=0.05 MIU/L-ACNC: 0.13 UIU/ML (ref 0.36–3.74)

## 2019-08-06 PROCEDURE — 36415 COLL VENOUS BLD VENIPUNCTURE: CPT | Performed by: FAMILY MEDICINE

## 2019-08-06 PROCEDURE — 84443 ASSAY THYROID STIM HORMONE: CPT | Performed by: FAMILY MEDICINE

## 2019-09-03 ENCOUNTER — TRANSCRIBE ORDERS (OUTPATIENT)
Dept: ADMINISTRATIVE | Facility: HOSPITAL | Age: 79
End: 2019-09-03

## 2019-09-03 ENCOUNTER — APPOINTMENT (OUTPATIENT)
Dept: LAB | Facility: HOSPITAL | Age: 79
End: 2019-09-03
Attending: FAMILY MEDICINE
Payer: MEDICARE

## 2019-09-03 DIAGNOSIS — I51.9 MYXEDEMA HEART DISEASE: ICD-10-CM

## 2019-09-03 DIAGNOSIS — E03.9 MYXEDEMA HEART DISEASE: Primary | ICD-10-CM

## 2019-09-03 DIAGNOSIS — E03.9 MYXEDEMA HEART DISEASE: ICD-10-CM

## 2019-09-03 DIAGNOSIS — I51.9 MYXEDEMA HEART DISEASE: Primary | ICD-10-CM

## 2019-09-03 LAB — TSH SERPL DL<=0.05 MIU/L-ACNC: 0.67 UIU/ML (ref 0.36–3.74)

## 2019-09-03 PROCEDURE — 36415 COLL VENOUS BLD VENIPUNCTURE: CPT

## 2019-09-03 PROCEDURE — 84443 ASSAY THYROID STIM HORMONE: CPT

## 2019-11-26 ENCOUNTER — TRANSCRIBE ORDERS (OUTPATIENT)
Dept: ADMINISTRATIVE | Facility: HOSPITAL | Age: 79
End: 2019-11-26

## 2019-11-26 ENCOUNTER — APPOINTMENT (OUTPATIENT)
Dept: LAB | Facility: HOSPITAL | Age: 79
End: 2019-11-26
Attending: FAMILY MEDICINE
Payer: MEDICARE

## 2019-11-26 DIAGNOSIS — E03.4 IDIOPATHIC ATROPHIC HYPOTHYROIDISM: Primary | ICD-10-CM

## 2019-11-26 DIAGNOSIS — E03.4 IDIOPATHIC ATROPHIC HYPOTHYROIDISM: ICD-10-CM

## 2019-11-26 LAB — TSH SERPL DL<=0.05 MIU/L-ACNC: 1.57 UIU/ML (ref 0.36–3.74)

## 2019-11-26 PROCEDURE — 84443 ASSAY THYROID STIM HORMONE: CPT

## 2019-11-26 PROCEDURE — 36415 COLL VENOUS BLD VENIPUNCTURE: CPT

## 2020-01-20 ENCOUNTER — APPOINTMENT (OUTPATIENT)
Dept: LAB | Facility: HOSPITAL | Age: 80
End: 2020-01-20
Attending: FAMILY MEDICINE
Payer: MEDICARE

## 2020-01-20 ENCOUNTER — TRANSCRIBE ORDERS (OUTPATIENT)
Dept: ADMINISTRATIVE | Facility: HOSPITAL | Age: 80
End: 2020-01-20

## 2020-01-20 DIAGNOSIS — I51.9 MYXEDEMA HEART DISEASE: ICD-10-CM

## 2020-01-20 DIAGNOSIS — Z13.9 SCREENING FOR UNSPECIFIED CONDITION: ICD-10-CM

## 2020-01-20 DIAGNOSIS — E03.9 MYXEDEMA HEART DISEASE: ICD-10-CM

## 2020-01-20 DIAGNOSIS — E78.00 PURE HYPERCHOLESTEROLEMIA: ICD-10-CM

## 2020-01-20 DIAGNOSIS — E78.00 PURE HYPERCHOLESTEROLEMIA: Primary | ICD-10-CM

## 2020-01-20 LAB
ALBUMIN SERPL BCP-MCNC: 3.6 G/DL (ref 3.5–5)
ALP SERPL-CCNC: 99 U/L (ref 46–116)
ALT SERPL W P-5'-P-CCNC: 21 U/L (ref 12–78)
ANION GAP SERPL CALCULATED.3IONS-SCNC: 4 MMOL/L (ref 4–13)
AST SERPL W P-5'-P-CCNC: 23 U/L (ref 5–45)
BILIRUB SERPL-MCNC: 0.4 MG/DL (ref 0.2–1)
BUN SERPL-MCNC: 14 MG/DL (ref 5–25)
CALCIUM SERPL-MCNC: 9.1 MG/DL (ref 8.3–10.1)
CHLORIDE SERPL-SCNC: 104 MMOL/L (ref 100–108)
CHOLEST SERPL-MCNC: 147 MG/DL (ref 50–200)
CO2 SERPL-SCNC: 31 MMOL/L (ref 21–32)
CREAT SERPL-MCNC: 0.71 MG/DL (ref 0.6–1.3)
GFR SERPL CREATININE-BSD FRML MDRD: 82 ML/MIN/1.73SQ M
GLUCOSE P FAST SERPL-MCNC: 90 MG/DL (ref 65–99)
HDLC SERPL-MCNC: 67 MG/DL
LDLC SERPL CALC-MCNC: 68 MG/DL (ref 0–100)
NONHDLC SERPL-MCNC: 80 MG/DL
POTASSIUM SERPL-SCNC: 4 MMOL/L (ref 3.5–5.3)
PROT SERPL-MCNC: 7.3 G/DL (ref 6.4–8.2)
SODIUM SERPL-SCNC: 139 MMOL/L (ref 136–145)
TRIGL SERPL-MCNC: 60 MG/DL
TSH SERPL DL<=0.05 MIU/L-ACNC: 0.38 UIU/ML (ref 0.36–3.74)

## 2020-01-20 PROCEDURE — 84443 ASSAY THYROID STIM HORMONE: CPT

## 2020-01-20 PROCEDURE — 80061 LIPID PANEL: CPT

## 2020-01-20 PROCEDURE — 36415 COLL VENOUS BLD VENIPUNCTURE: CPT | Performed by: FAMILY MEDICINE

## 2020-01-20 PROCEDURE — 80053 COMPREHEN METABOLIC PANEL: CPT | Performed by: FAMILY MEDICINE

## 2020-02-25 ENCOUNTER — APPOINTMENT (OUTPATIENT)
Dept: LAB | Facility: HOSPITAL | Age: 80
End: 2020-02-25
Attending: ALLERGY & IMMUNOLOGY
Payer: MEDICARE

## 2020-02-25 ENCOUNTER — TRANSCRIBE ORDERS (OUTPATIENT)
Dept: ADMINISTRATIVE | Facility: HOSPITAL | Age: 80
End: 2020-02-25

## 2020-02-25 DIAGNOSIS — J30.9 ALLERGIC RHINITIS, UNSPECIFIED SEASONALITY, UNSPECIFIED TRIGGER: ICD-10-CM

## 2020-02-25 DIAGNOSIS — E03.9 MYXEDEMA HEART DISEASE: ICD-10-CM

## 2020-02-25 DIAGNOSIS — I51.9 MYXEDEMA HEART DISEASE: ICD-10-CM

## 2020-02-25 DIAGNOSIS — Z91.19 PERSONAL HISTORY OF NONCOMPLIANCE WITH MEDICAL TREATMENT, PRESENTING HAZARDS TO HEALTH: ICD-10-CM

## 2020-02-25 DIAGNOSIS — I15.9 SECONDARY HYPERTENSION: ICD-10-CM

## 2020-02-25 DIAGNOSIS — J45.52 SEVERE PERSISTENT ASTHMA WITH STATUS ASTHMATICUS: ICD-10-CM

## 2020-02-25 DIAGNOSIS — J33.0 POLYP OF NASAL CAVITY: ICD-10-CM

## 2020-02-25 DIAGNOSIS — J32.0 CHRONIC MAXILLARY SINUSITIS: ICD-10-CM

## 2020-02-25 LAB
BASOPHILS # BLD AUTO: 0.09 THOUSANDS/ΜL (ref 0–0.1)
BASOPHILS NFR BLD AUTO: 1 % (ref 0–1)
EOSINOPHIL # BLD AUTO: 0.5 THOUSAND/ΜL (ref 0–0.61)
EOSINOPHIL NFR BLD AUTO: 7 % (ref 0–6)
ERYTHROCYTE [DISTWIDTH] IN BLOOD BY AUTOMATED COUNT: 13.7 % (ref 11.6–15.1)
HCT VFR BLD AUTO: 43.5 % (ref 34.8–46.1)
HGB BLD-MCNC: 14 G/DL (ref 11.5–15.4)
IMM GRANULOCYTES # BLD AUTO: 0.06 THOUSAND/UL (ref 0–0.2)
IMM GRANULOCYTES NFR BLD AUTO: 1 % (ref 0–2)
LYMPHOCYTES # BLD AUTO: 1.91 THOUSANDS/ΜL (ref 0.6–4.47)
LYMPHOCYTES NFR BLD AUTO: 25 % (ref 14–44)
MCH RBC QN AUTO: 29 PG (ref 26.8–34.3)
MCHC RBC AUTO-ENTMCNC: 32.2 G/DL (ref 31.4–37.4)
MCV RBC AUTO: 90 FL (ref 82–98)
MONOCYTES # BLD AUTO: 0.99 THOUSAND/ΜL (ref 0.17–1.22)
MONOCYTES NFR BLD AUTO: 13 % (ref 4–12)
NEUTROPHILS # BLD AUTO: 4.17 THOUSANDS/ΜL (ref 1.85–7.62)
NEUTS SEG NFR BLD AUTO: 53 % (ref 43–75)
NRBC BLD AUTO-RTO: 0 /100 WBCS
PLATELET # BLD AUTO: 275 THOUSANDS/UL (ref 149–390)
PMV BLD AUTO: 10.3 FL (ref 8.9–12.7)
RBC # BLD AUTO: 4.82 MILLION/UL (ref 3.81–5.12)
WBC # BLD AUTO: 7.72 THOUSAND/UL (ref 4.31–10.16)

## 2020-02-25 PROCEDURE — 85025 COMPLETE CBC W/AUTO DIFF WBC: CPT

## 2020-02-25 PROCEDURE — 36415 COLL VENOUS BLD VENIPUNCTURE: CPT

## 2020-03-09 DIAGNOSIS — J45.41 MODERATE PERSISTENT ASTHMA WITH EXACERBATION: ICD-10-CM

## 2020-03-09 RX ORDER — BUDESONIDE 0.25 MG/2ML
0.25 INHALANT ORAL 2 TIMES DAILY
Qty: 120 ML | Refills: 5 | Status: SHIPPED | OUTPATIENT
Start: 2020-03-09 | End: 2020-04-01 | Stop reason: SDUPTHER

## 2020-04-01 ENCOUNTER — TELEMEDICINE (OUTPATIENT)
Dept: PULMONOLOGY | Facility: MEDICAL CENTER | Age: 80
End: 2020-04-01

## 2020-04-01 DIAGNOSIS — J45.41 MODERATE PERSISTENT ASTHMA WITH EXACERBATION: ICD-10-CM

## 2020-04-01 DIAGNOSIS — J45.30 MILD PERSISTENT ASTHMA WITHOUT COMPLICATION: Primary | ICD-10-CM

## 2020-04-01 PROBLEM — J30.9 ALLERGIC RHINITIS: Status: RESOLVED | Noted: 2019-06-11 | Resolved: 2020-04-01

## 2020-04-01 RX ORDER — BUDESONIDE 0.25 MG/2ML
0.25 INHALANT ORAL 2 TIMES DAILY
Qty: 120 ML | Refills: 5 | Status: SHIPPED | OUTPATIENT
Start: 2020-04-01 | End: 2020-04-13 | Stop reason: SDUPTHER

## 2020-04-01 NOTE — PROGRESS NOTES
Virtual Brief Visit    Problem List Items Addressed This Visit          Respiratory    Mild persistent asthma without complication - Primary     Other Visit Diagnoses       Moderate persistent asthma with exacerbation              Mild persistent asthma.  Patient has been stable.  PFT's were done at last visit.  Flow volume loop was normal. She has rescue inhalation is used twice monthly.  Otherwise, she has no current short of breath.        Reason for visit is asthma, follow up    Encounter provider NA Comer    Provider located at [facility] PULMONARY [address] [id number]      Recent Visits  No visits were found meeting these conditions.  Showing recent visits within past 7 days and meeting all other requirements  Today's Visits  Date Type Provider Dept   03/07/24 Office Visit Angelina Magaña DO [facility]   Showing today's visits and meeting all other requirements  Future Appointments  No visits were found meeting these conditions.  Showing future appointments within next 150 days and meeting all other requirements         After connecting through telephone and patient was informed that this is not a secure, HIPAA-complaint platform. She agrees to proceed., the patient was identified by name and date of birth. Kathryn Drake was informed that this is a telemedicine visit and that the visit is being conducted through telephones and patient was informed that this is not a secure, HIPAA-complaint platform. She agrees to proceed..  My office door was closed. No one else was in the room.  She acknowledged consent and understanding of privacy and security of the video platform. The patient has agreed to participate and understands they can discontinue the visit at any time.    Patient is aware this is a billable service.     Subjective  Kathryn Drake is a 83 y.o. female with history of mild asthma.  She was last seen in the  office in June of 2019.   Patient has history of mild persistent asthma without complication.  Her PFT was done at her last visit.  Flow volume loop she was normal forced vital capacity was 2.10 L or 91% of predicted FEV1 was 1.5 L or 91% of predicted obstruction ratio is 74%.  Patient does have the availability of budesonide via nebulizer but also has Symbicort 160/4.5 mcg 2 puffs in the morning only  She is using pulmicort 0.25mg BID when not using Symbicort.  Last chest xray was done12/17 and active pulmonary disease.   Plan is for follow up in around 6 months and PFT will be done in the future.     Allergic rhinitis    Patient has history of allergy and rhinitis.  She takes Singular 10 mg daily and allegra 60 mg daily.  She is asymptomatic at this time.      Past Medical History:   Diagnosis Date    Asthma     Cardiac disease     heart attack    Disease of thyroid gland     Hyperlipidemia        Past Surgical History:   Procedure Laterality Date    APPENDECTOMY      BREAST LUMPECTOMY Bilateral     several    CARDIAC SURGERY      CATARACT EXTRACTION, BILATERAL      CORONARY STENT PLACEMENT      EPIDURAL BLOCK INJECTION Bilateral 10/25/2023    Procedure: bilateral L4-L5  TRANSFORAMINAL epidural steroid injection (48246);  Surgeon: Ariel Heredia DO;  Location: [facility] MAIN OR;  Service: Pain Management     NASAL POLYP SURGERY         Current Outpatient Medications   Medication Sig Dispense Refill    albuterol (2.5 mg/3 mL) 0.083 % nebulizer solution Take 3 mL (2.5 mg total) by nebulization every 6 (six) hours as needed for wheezing or shortness of breath 360 mL 5    albuterol (PROVENTIL HFA,VENTOLIN HFA) 90 mcg/act inhaler inhale 2 puffs by mouth and INTO THE LUNGS every 4 hours if needed for wheezing 8.5 g 8    amLODIPine (NORVASC) 2.5 mg tablet Take 1 tablet (2.5 mg total) by mouth daily 90 tablet 1    ascorbic acid (VITAMIN C) 500 MG tablet Take 500 mg by mouth daily      aspirin 81 MG tablet Take 81 mg by  mouth daily        budesonide (PULMICORT) 0.25 mg/2 mL nebulizer solution Take 2 mL (0.25 mg total) by nebulization 2 (two) times a day Rinse mouth after use.      calcium carbonate-vitamin D 500 mg-5 mcg per tablet Take 1 tablet by mouth 2 (two) times a day with meals      Cholecalciferol (VITAMIN D) 2000 UNITS tablet Take 4,000 Units by mouth daily      EPINEPHrine (EPIPEN) 0.3 mg/0.3 mL SOAJ Inject 0.3 mL (0.3 mg total) into a muscle once for 1 dose 0.6 mL 0    levothyroxine 75 mcg tablet take 1 tablet by mouth once daily 90 tablet 1    pregabalin (LYRICA) 50 mg capsule Take 1 capsule in the a.m. 2 capsules at bedtime 90 capsule 5    simvastatin (ZOCOR) 10 mg tablet Take 1 tablet (10 mg total) by mouth daily at bedtime 90 tablet 1    Symbicort 160-4.5 MCG/ACT inhaler Inhale 2 puffs 2 (two) times a day 10.2 g 3     No current facility-administered medications for this visit.        Allergies   Allergen Reactions    Penicillins     Latex Rash       Review of Systems   Constitutional: Negative.    Eyes: Negative.    Respiratory:  Positive for shortness of breath.    Cardiovascular: Negative.    Musculoskeletal: Negative.    Skin: Negative.    Allergic/Immunologic: Negative.    Neurological: Negative.    Hematological: Negative.          I spent I have personally spent 5 minutes reviewing the chart and imaging prior to the visit.    I have personally spent 15minutes on the phone with the patient.     I have personally spent 20 minutes reviewing imaging, laboratory results and other testing results with the patient.   minutes with the patient during this visit.

## 2020-04-13 DIAGNOSIS — J45.41 MODERATE PERSISTENT ASTHMA WITH EXACERBATION: ICD-10-CM

## 2020-04-13 RX ORDER — BUDESONIDE 0.25 MG/2ML
0.25 INHALANT ORAL 2 TIMES DAILY
Qty: 120 ML | Refills: 5 | Status: SHIPPED | OUTPATIENT
Start: 2020-04-13 | End: 2021-02-25

## 2020-06-23 ENCOUNTER — APPOINTMENT (OUTPATIENT)
Dept: RADIOLOGY | Facility: CLINIC | Age: 80
End: 2020-06-23
Payer: MEDICARE

## 2020-06-23 ENCOUNTER — OFFICE VISIT (OUTPATIENT)
Dept: OBGYN CLINIC | Facility: CLINIC | Age: 80
End: 2020-06-23
Payer: MEDICARE

## 2020-06-23 VITALS
HEIGHT: 61 IN | WEIGHT: 170 LBS | HEART RATE: 67 BPM | DIASTOLIC BLOOD PRESSURE: 90 MMHG | BODY MASS INDEX: 32.1 KG/M2 | TEMPERATURE: 99 F | SYSTOLIC BLOOD PRESSURE: 150 MMHG

## 2020-06-23 DIAGNOSIS — M25.552 PAIN IN LEFT HIP: ICD-10-CM

## 2020-06-23 DIAGNOSIS — M17.12 PRIMARY LOCALIZED OSTEOARTHRITIS OF LEFT KNEE: Primary | ICD-10-CM

## 2020-06-23 DIAGNOSIS — M70.62 TROCHANTERIC BURSITIS OF LEFT HIP: ICD-10-CM

## 2020-06-23 DIAGNOSIS — M25.562 LEFT KNEE PAIN, UNSPECIFIED CHRONICITY: ICD-10-CM

## 2020-06-23 PROCEDURE — 20610 DRAIN/INJ JOINT/BURSA W/O US: CPT | Performed by: ORTHOPAEDIC SURGERY

## 2020-06-23 PROCEDURE — 73502 X-RAY EXAM HIP UNI 2-3 VIEWS: CPT

## 2020-06-23 PROCEDURE — 99213 OFFICE O/P EST LOW 20 MIN: CPT | Performed by: ORTHOPAEDIC SURGERY

## 2020-06-23 PROCEDURE — 73562 X-RAY EXAM OF KNEE 3: CPT

## 2020-06-23 RX ORDER — NAPROXEN 500 MG/1
500 TABLET ORAL 2 TIMES DAILY WITH MEALS
Qty: 60 TABLET | Refills: 0 | Status: SHIPPED | OUTPATIENT
Start: 2020-06-23 | End: 2020-07-02 | Stop reason: HOSPADM

## 2020-06-23 RX ORDER — DEXAMETHASONE SODIUM PHOSPHATE 100 MG/10ML
40 INJECTION INTRAMUSCULAR; INTRAVENOUS
Status: COMPLETED | OUTPATIENT
Start: 2020-06-23 | End: 2020-06-23

## 2020-06-23 RX ORDER — LIDOCAINE HYDROCHLORIDE 10 MG/ML
4 INJECTION, SOLUTION INFILTRATION; PERINEURAL
Status: COMPLETED | OUTPATIENT
Start: 2020-06-23 | End: 2020-06-23

## 2020-06-23 RX ADMIN — LIDOCAINE HYDROCHLORIDE 4 ML: 10 INJECTION, SOLUTION INFILTRATION; PERINEURAL at 16:31

## 2020-06-23 RX ADMIN — DEXAMETHASONE SODIUM PHOSPHATE 40 MG: 100 INJECTION INTRAMUSCULAR; INTRAVENOUS at 16:31

## 2020-07-02 ENCOUNTER — OFFICE VISIT (OUTPATIENT)
Dept: OBGYN CLINIC | Facility: CLINIC | Age: 80
End: 2020-07-02
Payer: MEDICARE

## 2020-07-02 VITALS
BODY MASS INDEX: 33.38 KG/M2 | DIASTOLIC BLOOD PRESSURE: 90 MMHG | SYSTOLIC BLOOD PRESSURE: 150 MMHG | WEIGHT: 170 LBS | HEART RATE: 60 BPM | TEMPERATURE: 98.3 F | HEIGHT: 60 IN

## 2020-07-02 DIAGNOSIS — M70.62 TROCHANTERIC BURSITIS OF LEFT HIP: Primary | ICD-10-CM

## 2020-07-02 DIAGNOSIS — M17.12 PRIMARY LOCALIZED OSTEOARTHRITIS OF LEFT KNEE: ICD-10-CM

## 2020-07-02 PROCEDURE — 99213 OFFICE O/P EST LOW 20 MIN: CPT | Performed by: ORTHOPAEDIC SURGERY

## 2020-07-02 PROCEDURE — 20610 DRAIN/INJ JOINT/BURSA W/O US: CPT | Performed by: ORTHOPAEDIC SURGERY

## 2020-07-02 RX ORDER — CELECOXIB 100 MG/1
100 CAPSULE ORAL 2 TIMES DAILY
Qty: 60 CAPSULE | Refills: 0 | Status: SHIPPED | OUTPATIENT
Start: 2020-07-02 | End: 2022-05-19

## 2020-07-02 RX ORDER — LIDOCAINE HYDROCHLORIDE 10 MG/ML
2 INJECTION, SOLUTION INFILTRATION; PERINEURAL
Status: COMPLETED | OUTPATIENT
Start: 2020-07-02 | End: 2020-07-02

## 2020-07-02 RX ORDER — TRIAMCINOLONE ACETONIDE 40 MG/ML
40 INJECTION, SUSPENSION INTRA-ARTICULAR; INTRAMUSCULAR
Status: COMPLETED | OUTPATIENT
Start: 2020-07-02 | End: 2020-07-02

## 2020-07-02 RX ORDER — BUPIVACAINE HYDROCHLORIDE 5 MG/ML
2 INJECTION, SOLUTION EPIDURAL; INTRACAUDAL
Status: COMPLETED | OUTPATIENT
Start: 2020-07-02 | End: 2020-07-02

## 2020-07-02 RX ADMIN — BUPIVACAINE HYDROCHLORIDE 2 ML: 5 INJECTION, SOLUTION EPIDURAL; INTRACAUDAL at 17:41

## 2020-07-02 RX ADMIN — LIDOCAINE HYDROCHLORIDE 2 ML: 10 INJECTION, SOLUTION INFILTRATION; PERINEURAL at 17:41

## 2020-07-02 RX ADMIN — TRIAMCINOLONE ACETONIDE 40 MG: 40 INJECTION, SUSPENSION INTRA-ARTICULAR; INTRAMUSCULAR at 17:41

## 2020-07-02 NOTE — PROGRESS NOTES
Assessment/Plan:  1  Trochanteric bursitis of left hip  celecoxib (CeleBREX) 100 mg capsule    Large joint arthrocentesis: L greater trochanteric bursa   2  Primary localized osteoarthritis of left knee         Kathryn tolerated left trochanteric bursa injection the office today  This will hopefully give her relief going forward  I would like to see her back in 3-4 weeks for repeat evaluation we can also discuss her left knee at that time  We did switch her medication to Celebrex to try to spare the stomach symptoms  Subjective:   Bishop Corey is a 70 y o  female who presents to the office for follow-up for left-sided hip pain  She was last seen 1 week ago with discomfort in her knee and her hip we decided to break up the injections so she was not getting to injections in 1 day  She states the left knee injection did not really help and she still experiencing discomfort  She also has been experiencing adverse affects from the naproxen which is causing her to have nausea and abdominal pain  She is requesting a new medication  She has pain that is sharp and stabbing in nature over the lateral left hip and she is requesting an injection in the trochanteric bursa  Review of Systems   Constitutional: Negative for chills, fever and unexpected weight change  HENT: Negative for hearing loss, nosebleeds and sore throat  Eyes: Negative for pain, redness and visual disturbance  Respiratory: Negative for cough, shortness of breath and wheezing  Cardiovascular: Negative for chest pain, palpitations and leg swelling  Gastrointestinal: Positive for nausea  Negative for abdominal pain and vomiting  Endocrine: Negative for polydipsia and polyuria  Genitourinary: Negative for dysuria and hematuria  Musculoskeletal:        See HPI   Skin: Negative for rash and wound  Neurological: Negative for dizziness, numbness and headaches     Psychiatric/Behavioral: Negative for decreased concentration and suicidal ideas  The patient is not nervous/anxious  Past Medical History:   Diagnosis Date    Asthma     Cardiac disease     heart attack    Disease of thyroid gland     Hyperlipidemia        Past Surgical History:   Procedure Laterality Date    APPENDECTOMY      BREAST LUMPECTOMY Bilateral     several    CARDIAC SURGERY      CORONARY STENT PLACEMENT      NASAL POLYP SURGERY         Family History   Problem Relation Age of Onset    ALS Father     Cancer Brother     Heart disease Family     Asthma Family        Social History     Occupational History    Not on file   Tobacco Use    Smoking status: Never Smoker    Smokeless tobacco: Never Used   Substance and Sexual Activity    Alcohol use: Yes     Frequency: Monthly or less    Drug use: No    Sexual activity: Not on file         Current Outpatient Medications:     albuterol (ACCUNEB) 1 25 MG/3ML nebulizer solution, Take 1 ampule by nebulization every 6 (six) hours as needed for wheezing , Disp: , Rfl:     aspirin (ASPIR-81) 81 mg EC tablet, Take by mouth, Disp: , Rfl:     aspirin 81 MG tablet, Take 81 mg by mouth daily  , Disp: , Rfl:     Cholecalciferol (VITAMIN D) 2000 UNITS tablet, Take 2,000 Units by mouth daily  , Disp: , Rfl:     Ergocalciferol (VITAMIN D2 PO), Take by mouth, Disp: , Rfl:     fexofenadine (ALLEGRA) 60 MG tablet, Take by mouth, Disp: , Rfl:     levothyroxine (SYNTHROID) 100 mcg tablet, Take by mouth, Disp: , Rfl:     levothyroxine (SYNTHROID) 112 mcg tablet, Take 112 mcg by mouth daily  , Disp: , Rfl:     Methylprednisolone 4 MG TBPK, Take as directed, Disp: , Rfl: 0    montelukast (SINGULAIR) 10 mg tablet, Take 10 mg by mouth daily at bedtime  , Disp: , Rfl:     montelukast (SINGULAIR) 10 mg tablet, Take by mouth, Disp: , Rfl:     nitroglycerin (NITROSTAT) 0 4 mg SL tablet, Place 0 4 mg under the tongue every 5 (five) minutes as needed for chest pain , Disp: , Rfl:     simvastatin (ZOCOR) 10 mg tablet, Take 10 mg by mouth daily at bedtime  , Disp: , Rfl:     simvastatin (ZOCOR) 80 mg tablet, Take by mouth, Disp: , Rfl:     SYMBICORT 160-4 5 MCG/ACT inhaler, , Disp: , Rfl: 0    SYNTHROID 125 MCG tablet, take 1 tablet by mouth daily 1 hour before meals or 2 hours after meals, Disp: , Rfl: 0    UNKNOWN TO PATIENT, Allegra daily po Stool softener bid po Tumeric 0 500 daily po, Disp: , Rfl:     albuterol (2 5 mg/3 mL) 0 083 % nebulizer solution, Take 1 vial (2 5 mg total) by nebulization 2 (two) times a day (Patient not taking: Reported on 6/23/2020), Disp: 60 vial, Rfl: 0    albuterol (PROAIR HFA) 90 mcg/act inhaler, Inhale 2 puffs 4 (four) times a day (Patient not taking: Reported on 6/23/2020), Disp: 8 5 g, Rfl: 3    budesonide (PULMICORT) 0 25 mg/2 mL nebulizer solution, Take 1 vial (0 25 mg total) by nebulization 2 (two) times a day, Disp: 120 mL, Rfl: 5    celecoxib (CeleBREX) 100 mg capsule, Take 1 capsule (100 mg total) by mouth 2 (two) times a day, Disp: 60 capsule, Rfl: 0    cyclobenzaprine (FLEXERIL) 10 mg tablet, Take 1 tablet (10 mg total) by mouth 2 (two) times a day as needed for muscle spasms (Patient not taking: Reported on 6/11/2019), Disp: 20 tablet, Rfl: 0    lidocaine (LIDODERM) 5 %, Place 1 patch on the skin daily Remove & Discard patch within 12 hours or as directed by MD (Patient not taking: Reported on 6/11/2019), Disp: 30 patch, Rfl: 0    Allergies   Allergen Reactions    Penicillins     Latex Rash       Objective:  Vitals:    07/02/20 0800   BP: 150/90   Pulse: 60   Temp: 98 3 °F (36 8 °C)       Left Hip Exam     Tenderness   The patient is experiencing tenderness in the greater trochanter  Other   Erythema: absent  Sensation: normal  Pulse: present              Physical Exam   Constitutional: She is oriented to person, place, and time  She appears well-developed and well-nourished  HENT:   Head: Normocephalic and atraumatic     Eyes: Pupils are equal, round, and reactive to light  Conjunctivae are normal    Neck: Normal range of motion  Neck supple  Cardiovascular: Normal rate and intact distal pulses  Pulmonary/Chest: Effort normal  No respiratory distress  Musculoskeletal:   As noted in HPI   Neurological: She is alert and oriented to person, place, and time  Skin: Skin is warm and dry  Psychiatric: She has a normal mood and affect  Her behavior is normal    Nursing note and vitals reviewed        Large joint arthrocentesis: L greater trochanteric bursa  Date/Time: 7/2/2020 5:41 PM  Consent given by: patient  Site marked: site marked  Timeout: Immediately prior to procedure a time out was called to verify the correct patient, procedure, equipment, support staff and site/side marked as required   Supporting Documentation  Indications: pain and joint swelling   Procedure Details  Location: hip - L greater trochanteric bursa  Preparation: Patient was prepped and draped in the usual sterile fashion  Needle size: 18 G  Ultrasound guidance: no  Approach: lateral  Medications administered: 2 mL lidocaine 1 %; 40 mg triamcinolone acetonide 40 mg/mL; 2 mL bupivacaine (PF) 0 5 %    Patient tolerance: patient tolerated the procedure well with no immediate complications  Dressing:  Sterile dressing applied

## 2020-08-06 ENCOUNTER — OFFICE VISIT (OUTPATIENT)
Dept: OBGYN CLINIC | Facility: CLINIC | Age: 80
End: 2020-08-06
Payer: MEDICARE

## 2020-08-06 ENCOUNTER — TELEPHONE (OUTPATIENT)
Dept: PAIN MEDICINE | Facility: CLINIC | Age: 80
End: 2020-08-06

## 2020-08-06 VITALS
TEMPERATURE: 97.6 F | BODY MASS INDEX: 32.1 KG/M2 | DIASTOLIC BLOOD PRESSURE: 79 MMHG | HEART RATE: 53 BPM | WEIGHT: 170 LBS | SYSTOLIC BLOOD PRESSURE: 154 MMHG | HEIGHT: 61 IN

## 2020-08-06 DIAGNOSIS — M70.62 TROCHANTERIC BURSITIS OF LEFT HIP: ICD-10-CM

## 2020-08-06 DIAGNOSIS — M16.12 PRIMARY OSTEOARTHRITIS OF ONE HIP, LEFT: ICD-10-CM

## 2020-08-06 DIAGNOSIS — M17.12 PRIMARY LOCALIZED OSTEOARTHRITIS OF LEFT KNEE: Primary | ICD-10-CM

## 2020-08-06 PROCEDURE — 99213 OFFICE O/P EST LOW 20 MIN: CPT | Performed by: ORTHOPAEDIC SURGERY

## 2020-08-07 ENCOUNTER — PROCEDURE VISIT (OUTPATIENT)
Dept: PAIN MEDICINE | Facility: CLINIC | Age: 80
End: 2020-08-07
Payer: MEDICARE

## 2020-08-07 DIAGNOSIS — M16.12 PRIMARY OSTEOARTHRITIS OF LEFT HIP: Primary | ICD-10-CM

## 2020-08-07 PROCEDURE — 20610 DRAIN/INJ JOINT/BURSA W/O US: CPT | Performed by: ORTHOPAEDIC SURGERY

## 2020-08-07 PROCEDURE — 20611 DRAIN/INJ JOINT/BURSA W/US: CPT | Performed by: ANESTHESIOLOGY

## 2020-08-07 RX ORDER — BUPIVACAINE HYDROCHLORIDE 2.5 MG/ML
10 INJECTION, SOLUTION EPIDURAL; INFILTRATION; INTRACAUDAL ONCE
Status: COMPLETED | OUTPATIENT
Start: 2020-08-07 | End: 2020-08-07

## 2020-08-07 RX ORDER — HYALURONATE SODIUM 10 MG/ML
20 SYRINGE (ML) INTRAARTICULAR
Status: COMPLETED | OUTPATIENT
Start: 2020-08-07 | End: 2020-08-07

## 2020-08-07 RX ORDER — METHYLPREDNISOLONE ACETATE 80 MG/ML
80 INJECTION, SUSPENSION INTRA-ARTICULAR; INTRALESIONAL; INTRAMUSCULAR; SOFT TISSUE ONCE
Status: COMPLETED | OUTPATIENT
Start: 2020-08-07 | End: 2020-08-07

## 2020-08-07 RX ADMIN — BUPIVACAINE HYDROCHLORIDE 10 ML: 2.5 INJECTION, SOLUTION EPIDURAL; INFILTRATION; INTRACAUDAL at 15:43

## 2020-08-07 RX ADMIN — METHYLPREDNISOLONE ACETATE 80 MG: 80 INJECTION, SUSPENSION INTRA-ARTICULAR; INTRALESIONAL; INTRAMUSCULAR; SOFT TISSUE at 15:43

## 2020-08-07 RX ADMIN — Medication 20 MG: at 12:34

## 2020-08-07 NOTE — PROGRESS NOTES
ATTENDING PHYSICIAN:  Ligia Bernal MD      PREPROCEDURE DIAGNOSIS:  Left hip pain  POSTPROCEDURE DIAGNOSIS: Left hip pain  PROCEDURE: Left intraarticular hip injection under ultrasound guidance  ANESTHESIA:  Local     ESTIMATED BLOOD LOSS:  Minimal     COMPLICATIONS:  None  LOCATION:  [facility], [address] [place] [address]  CONSENT:  Today's procedure, its risks, benefits, and alternatives were explained in detail to the patient  Risks include, but are not limited bleeding, infection, hematoma formation, abscess formation, weakness, nerve irritation or damage, failure of the pain to improve, or potential worsening of the pain  The patient verbalized understanding and wished to proceed with the procedure  Written informed consent was thereby obtained  DESCRIPTION OF THE PROCEDURE:  After written informed consent was obtained, the patient was taken to the ultrasound suite and placed in the supine position  Anatomical landmarks were identified by way of ultrasound guidance  The patient's hip region was prepped using antiseptic solution and draped in the usual sterile fashion  Strict aseptic technique was utilized  A 25 gauge 2 inch needle was then advanced incrementally under ultrasound guidance towards the femoral neck until os was contacted and the joint space was entered  Subsequently, a solution consisting of 4 ml of 0 25% Bupivacaine mixed with 1 ml of Depo-Medrol 80 mg/ml was injected slowly  All needles were removed with the tips intact and hemostasis was maintained throughout  The patient tolerated the procedure well, and there were no apparent paresthesias or complications noted during or following this procedure  The antiseptic was wiped clean and Band-Aids were placed as appropriate    The patient was observed for an appropriate period of time during which the patient remained hemodynamically stable and neurovascularly intact, as the patient was prior to the procedure  The patient was subsequently discharged to home in stable condition with supervision  The patient was instructed to call the office in a few days for an update  Discharge instructions were provided  I was present and participated in all key and critical portions of this procedure      Lana Dugan MD  8/7/2020  2:36 PM

## 2020-08-07 NOTE — PROGRESS NOTES
Assessment/Plan:  1  Primary localized osteoarthritis of left knee  Large joint arthrocentesis: L knee   2  Primary osteoarthritis of one hip, left     3  Trochanteric bursitis of left hip         Elige Romberg continues to have left-sided hip pain  The trochanteric bursa injection did not significantly reduce her discomfort and she did have degenerative changes on her x-ray and exam concerning for increasing pain relative to her hip osteoarthritis  I discussed discussed with her the possibility of a guided left hip cortisone injection under ultrasound with Dr Shanon Richardson  She was agreeable to this  I did set her up for 1 of these injections with Dr Shanon Richardson under ultrasound in our office next week  She will follow up with me after the injection  She also has left knee pain consistent with osteoarthritis that has not responded yet to conservative measures of cortisone injection  I did discuss with her the possibility of proceeding with viscosupplementation in her left knee which she was agreeable to and we started her first injection today which she tolerated well  She will follow up next week for the second injection  Subjective:   Sahara Mccain is a 70 y o  female who presents to the office for follow-up for left-sided hip and knee pain  She has had injections of her left knee for knee osteoarthritis and a trochanteric bursa injection her left hip over the past several office visits  She states that she continues to have discomfort from the anterior aspect of her hip radiating posteriorly  She did have x-rays of her hip previously showing hip osteoarthritis  She would like to discuss other treatment options  She denies any new injury  She only experience minimal relief from her trochanteric bursa injection  She is not interested in physical therapy  Review of Systems   Constitutional: Negative for chills, fever and unexpected weight change     HENT: Negative for hearing loss, nosebleeds and sore throat  Eyes: Negative for pain, redness and visual disturbance  Respiratory: Negative for cough, shortness of breath and wheezing  Cardiovascular: Negative for chest pain, palpitations and leg swelling  Gastrointestinal: Negative for abdominal pain, nausea and vomiting  Endocrine: Negative for polydipsia and polyuria  Genitourinary: Negative for dysuria and hematuria  Musculoskeletal:        See HPI   Skin: Negative for rash and wound  Neurological: Negative for dizziness, numbness and headaches  Psychiatric/Behavioral: Negative for decreased concentration and suicidal ideas  The patient is not nervous/anxious  Past Medical History:   Diagnosis Date    Asthma     Cardiac disease     heart attack    Disease of thyroid gland     Hyperlipidemia        Past Surgical History:   Procedure Laterality Date    APPENDECTOMY      BREAST LUMPECTOMY Bilateral     several    CARDIAC SURGERY      CORONARY STENT PLACEMENT      NASAL POLYP SURGERY         Family History   Problem Relation Age of Onset    ALS Father     Cancer Brother     Heart disease Family     Asthma Family        Social History     Occupational History    Not on file   Tobacco Use    Smoking status: Never Smoker    Smokeless tobacco: Never Used   Substance and Sexual Activity    Alcohol use: Yes     Frequency: Monthly or less    Drug use: No    Sexual activity: Not on file         Current Outpatient Medications:     albuterol (ACCUNEB) 1 25 MG/3ML nebulizer solution, Take 1 ampule by nebulization every 6 (six) hours as needed for wheezing , Disp: , Rfl:     aspirin (ASPIR-81) 81 mg EC tablet, Take by mouth, Disp: , Rfl:     aspirin 81 MG tablet, Take 81 mg by mouth daily  , Disp: , Rfl:     celecoxib (CeleBREX) 100 mg capsule, Take 1 capsule (100 mg total) by mouth 2 (two) times a day, Disp: 60 capsule, Rfl: 0    Cholecalciferol (VITAMIN D) 2000 UNITS tablet, Take 2,000 Units by mouth daily  , Disp: , Rfl:   Ergocalciferol (VITAMIN D2 PO), Take by mouth, Disp: , Rfl:     fexofenadine (ALLEGRA) 60 MG tablet, Take by mouth, Disp: , Rfl:     levothyroxine (SYNTHROID) 100 mcg tablet, Take by mouth, Disp: , Rfl:     levothyroxine (SYNTHROID) 112 mcg tablet, Take 112 mcg by mouth daily  , Disp: , Rfl:     Methylprednisolone 4 MG TBPK, Take as directed, Disp: , Rfl: 0    montelukast (SINGULAIR) 10 mg tablet, Take 10 mg by mouth daily at bedtime  , Disp: , Rfl:     montelukast (SINGULAIR) 10 mg tablet, Take by mouth, Disp: , Rfl:     nitroglycerin (NITROSTAT) 0 4 mg SL tablet, Place 0 4 mg under the tongue every 5 (five) minutes as needed for chest pain , Disp: , Rfl:     simvastatin (ZOCOR) 10 mg tablet, Take 10 mg by mouth daily at bedtime  , Disp: , Rfl:     simvastatin (ZOCOR) 80 mg tablet, Take by mouth, Disp: , Rfl:     SYMBICORT 160-4 5 MCG/ACT inhaler, , Disp: , Rfl: 0    SYNTHROID 125 MCG tablet, take 1 tablet by mouth daily 1 hour before meals or 2 hours after meals, Disp: , Rfl: 0    UNKNOWN TO PATIENT, Allegra daily po Stool softener bid po Tumeric 0 500 daily po, Disp: , Rfl:     albuterol (2 5 mg/3 mL) 0 083 % nebulizer solution, Take 1 vial (2 5 mg total) by nebulization 2 (two) times a day (Patient not taking: Reported on 6/23/2020), Disp: 60 vial, Rfl: 0    albuterol (PROAIR HFA) 90 mcg/act inhaler, Inhale 2 puffs 4 (four) times a day (Patient not taking: Reported on 6/23/2020), Disp: 8 5 g, Rfl: 3    budesonide (PULMICORT) 0 25 mg/2 mL nebulizer solution, Take 1 vial (0 25 mg total) by nebulization 2 (two) times a day, Disp: 120 mL, Rfl: 5    cyclobenzaprine (FLEXERIL) 10 mg tablet, Take 1 tablet (10 mg total) by mouth 2 (two) times a day as needed for muscle spasms (Patient not taking: Reported on 6/11/2019), Disp: 20 tablet, Rfl: 0    lidocaine (LIDODERM) 5 %, Place 1 patch on the skin daily Remove & Discard patch within 12 hours or as directed by MD (Patient not taking: Reported on 6/11/2019), Disp: 30 patch, Rfl: 0    Allergies   Allergen Reactions    Penicillins     Latex Rash       Objective:  Vitals:    08/06/20 0824   BP: 154/79   Pulse: (!) 53   Temp: 97 6 °F (36 4 °C)       Left Knee Exam     Tenderness   The patient is experiencing tenderness in the medial joint line  Range of Motion   Extension: normal   Flexion: normal     Other   Erythema: absent  Sensation: normal  Pulse: present  Swelling: none  Effusion: no effusion present      Left Hip Exam     Tenderness   The patient is experiencing tenderness in the anterior and greater trochanter  Range of Motion   External rotation:  60 abnormal   Internal rotation: 15 abnormal     Tests   SREEKANTH: positive    Other   Erythema: absent  Sensation: normal  Pulse: present          Observations   Left Knee   Negative for effusion  Physical Exam  Vitals signs and nursing note reviewed  Constitutional:       Appearance: She is well-developed  HENT:      Head: Normocephalic and atraumatic  Eyes:      Conjunctiva/sclera: Conjunctivae normal       Pupils: Pupils are equal, round, and reactive to light  Neck:      Musculoskeletal: Normal range of motion and neck supple  Cardiovascular:      Rate and Rhythm: Normal rate  Pulmonary:      Effort: Pulmonary effort is normal  No respiratory distress  Musculoskeletal:      Left knee: She exhibits no effusion  Comments: As noted in HPI   Skin:     General: Skin is warm and dry  Neurological:      Mental Status: She is alert and oriented to person, place, and time  Psychiatric:         Behavior: Behavior normal          I have personally reviewed pertinent films in PACS and my interpretation is as follows: Three-view x-rays of the left hip from 6/23/2020 demonstrate moderate left hip osteoarthritis      Large joint arthrocentesis: L knee  Date/Time: 8/7/2020 12:34 PM  Consent given by: patient  Site marked: site marked  Supporting Documentation  Indications: pain Procedure Details  Location: knee - L knee  Needle size: 22 G  Ultrasound guidance: no  Approach: anterolateral  Medications administered: 20 mg Sodium Hyaluronate 20 MG/2ML    Patient tolerance: patient tolerated the procedure well with no immediate complications  Dressing:  Sterile dressing applied

## 2020-08-11 ENCOUNTER — TRANSCRIBE ORDERS (OUTPATIENT)
Dept: ADMINISTRATIVE | Facility: HOSPITAL | Age: 80
End: 2020-08-11

## 2020-08-11 ENCOUNTER — APPOINTMENT (OUTPATIENT)
Dept: LAB | Facility: HOSPITAL | Age: 80
End: 2020-08-11
Attending: FAMILY MEDICINE
Payer: MEDICARE

## 2020-08-11 DIAGNOSIS — E55.9 VITAMIN D DEFICIENCY: ICD-10-CM

## 2020-08-11 DIAGNOSIS — Z13.9 SCREENING FOR UNSPECIFIED CONDITION: ICD-10-CM

## 2020-08-11 DIAGNOSIS — E10.9 INSULIN DEPENDENT DIABETES MELLITUS TYPE IA (HCC): ICD-10-CM

## 2020-08-11 DIAGNOSIS — E10.9 INSULIN DEPENDENT DIABETES MELLITUS TYPE IA (HCC): Primary | ICD-10-CM

## 2020-08-11 LAB
25(OH)D3 SERPL-MCNC: 68.2 NG/ML (ref 30–100)
ALBUMIN SERPL BCP-MCNC: 3.5 G/DL (ref 3.5–5)
ALP SERPL-CCNC: 97 U/L (ref 46–116)
ALT SERPL W P-5'-P-CCNC: 23 U/L (ref 12–78)
ANION GAP SERPL CALCULATED.3IONS-SCNC: 8 MMOL/L (ref 4–13)
AST SERPL W P-5'-P-CCNC: 24 U/L (ref 5–45)
BILIRUB SERPL-MCNC: 0.4 MG/DL (ref 0.2–1)
BUN SERPL-MCNC: 16 MG/DL (ref 5–25)
CALCIUM SERPL-MCNC: 8.7 MG/DL (ref 8.3–10.1)
CHLORIDE SERPL-SCNC: 105 MMOL/L (ref 100–108)
CO2 SERPL-SCNC: 27 MMOL/L (ref 21–32)
CREAT SERPL-MCNC: 0.6 MG/DL (ref 0.6–1.3)
ERYTHROCYTE [DISTWIDTH] IN BLOOD BY AUTOMATED COUNT: 14.6 % (ref 11.6–15.1)
GFR SERPL CREATININE-BSD FRML MDRD: 88 ML/MIN/1.73SQ M
GLUCOSE P FAST SERPL-MCNC: 88 MG/DL (ref 65–99)
HCT VFR BLD AUTO: 44.6 % (ref 34.8–46.1)
HGB BLD-MCNC: 14.4 G/DL (ref 11.5–15.4)
MCH RBC QN AUTO: 30.1 PG (ref 26.8–34.3)
MCHC RBC AUTO-ENTMCNC: 32.3 G/DL (ref 31.4–37.4)
MCV RBC AUTO: 93 FL (ref 82–98)
PLATELET # BLD AUTO: 288 THOUSANDS/UL (ref 149–390)
PMV BLD AUTO: 10.5 FL (ref 8.9–12.7)
POTASSIUM SERPL-SCNC: 3.8 MMOL/L (ref 3.5–5.3)
PROT SERPL-MCNC: 7.3 G/DL (ref 6.4–8.2)
RBC # BLD AUTO: 4.78 MILLION/UL (ref 3.81–5.12)
SODIUM SERPL-SCNC: 140 MMOL/L (ref 136–145)
WBC # BLD AUTO: 6.64 THOUSAND/UL (ref 4.31–10.16)

## 2020-08-11 PROCEDURE — 80053 COMPREHEN METABOLIC PANEL: CPT | Performed by: FAMILY MEDICINE

## 2020-08-11 PROCEDURE — 36415 COLL VENOUS BLD VENIPUNCTURE: CPT | Performed by: FAMILY MEDICINE

## 2020-08-11 PROCEDURE — 85027 COMPLETE CBC AUTOMATED: CPT

## 2020-08-11 PROCEDURE — 82306 VITAMIN D 25 HYDROXY: CPT

## 2020-08-13 ENCOUNTER — OFFICE VISIT (OUTPATIENT)
Dept: OBGYN CLINIC | Facility: CLINIC | Age: 80
End: 2020-08-13
Payer: MEDICARE

## 2020-08-13 VITALS — TEMPERATURE: 98.8 F

## 2020-08-13 DIAGNOSIS — M17.12 PRIMARY LOCALIZED OSTEOARTHRITIS OF LEFT KNEE: Primary | ICD-10-CM

## 2020-08-13 PROCEDURE — 20610 DRAIN/INJ JOINT/BURSA W/O US: CPT | Performed by: ORTHOPAEDIC SURGERY

## 2020-08-13 RX ORDER — HYALURONATE SODIUM 10 MG/ML
20 SYRINGE (ML) INTRAARTICULAR
Status: COMPLETED | OUTPATIENT
Start: 2020-08-13 | End: 2020-08-13

## 2020-08-13 RX ADMIN — Medication 20 MG: at 09:00

## 2020-08-13 NOTE — PROGRESS NOTES
Assessment/Plan:  1  Primary localized osteoarthritis of left knee         Bowen Diaz tolerated her second Euflexxa injection left knee today  She will follow up in 1 week for the next injection    Subjective:   Braeden Turner is a 70 y o  female who presents to the office for her second Euflexxa injection left knee  Past Medical History:   Diagnosis Date    Asthma     Cardiac disease     heart attack    Disease of thyroid gland     Hyperlipidemia        Past Surgical History:   Procedure Laterality Date    APPENDECTOMY      BREAST LUMPECTOMY Bilateral     several    CARDIAC SURGERY      CORONARY STENT PLACEMENT      NASAL POLYP SURGERY         Family History   Problem Relation Age of Onset    ALS Father     Cancer Brother     Heart disease Family     Asthma Family        Social History     Occupational History    Not on file   Tobacco Use    Smoking status: Never Smoker    Smokeless tobacco: Never Used   Substance and Sexual Activity    Alcohol use: Yes     Frequency: Monthly or less    Drug use: No    Sexual activity: Not on file         Current Outpatient Medications:     albuterol (ACCUNEB) 1 25 MG/3ML nebulizer solution, Take 1 ampule by nebulization every 6 (six) hours as needed for wheezing , Disp: , Rfl:     aspirin (ASPIR-81) 81 mg EC tablet, Take by mouth, Disp: , Rfl:     aspirin 81 MG tablet, Take 81 mg by mouth daily  , Disp: , Rfl:     celecoxib (CeleBREX) 100 mg capsule, Take 1 capsule (100 mg total) by mouth 2 (two) times a day, Disp: 60 capsule, Rfl: 0    Cholecalciferol (VITAMIN D) 2000 UNITS tablet, Take 2,000 Units by mouth daily  , Disp: , Rfl:     Ergocalciferol (VITAMIN D2 PO), Take by mouth, Disp: , Rfl:     fexofenadine (ALLEGRA) 60 MG tablet, Take by mouth, Disp: , Rfl:     levothyroxine (SYNTHROID) 100 mcg tablet, Take by mouth, Disp: , Rfl:     levothyroxine (SYNTHROID) 112 mcg tablet, Take 112 mcg by mouth daily  , Disp: , Rfl:     Methylprednisolone 4 MG TBPK, Take as directed, Disp: , Rfl: 0    montelukast (SINGULAIR) 10 mg tablet, Take 10 mg by mouth daily at bedtime  , Disp: , Rfl:     montelukast (SINGULAIR) 10 mg tablet, Take by mouth, Disp: , Rfl:     nitroglycerin (NITROSTAT) 0 4 mg SL tablet, Place 0 4 mg under the tongue every 5 (five) minutes as needed for chest pain , Disp: , Rfl:     simvastatin (ZOCOR) 10 mg tablet, Take 10 mg by mouth daily at bedtime  , Disp: , Rfl:     simvastatin (ZOCOR) 80 mg tablet, Take by mouth, Disp: , Rfl:     SYMBICORT 160-4 5 MCG/ACT inhaler, , Disp: , Rfl: 0    SYNTHROID 125 MCG tablet, take 1 tablet by mouth daily 1 hour before meals or 2 hours after meals, Disp: , Rfl: 0    UNKNOWN TO PATIENT, Allegra daily po Stool softener bid po Tumeric 0 500 daily po, Disp: , Rfl:     albuterol (2 5 mg/3 mL) 0 083 % nebulizer solution, Take 1 vial (2 5 mg total) by nebulization 2 (two) times a day (Patient not taking: Reported on 6/23/2020), Disp: 60 vial, Rfl: 0    albuterol (PROAIR HFA) 90 mcg/act inhaler, Inhale 2 puffs 4 (four) times a day (Patient not taking: Reported on 6/23/2020), Disp: 8 5 g, Rfl: 3    budesonide (PULMICORT) 0 25 mg/2 mL nebulizer solution, Take 1 vial (0 25 mg total) by nebulization 2 (two) times a day, Disp: 120 mL, Rfl: 5    cyclobenzaprine (FLEXERIL) 10 mg tablet, Take 1 tablet (10 mg total) by mouth 2 (two) times a day as needed for muscle spasms (Patient not taking: Reported on 6/11/2019), Disp: 20 tablet, Rfl: 0    lidocaine (LIDODERM) 5 %, Place 1 patch on the skin daily Remove & Discard patch within 12 hours or as directed by MD (Patient not taking: Reported on 6/11/2019), Disp: 30 patch, Rfl: 0    Allergies   Allergen Reactions    Penicillins     Latex Rash       Objective:  Vitals:    08/13/20 0756   Temp: 98 8 °F (37 1 °C)       Ortho Exam    Physical Exam  Vitals signs and nursing note reviewed  Constitutional:       Appearance: She is well-developed     HENT:      Head: Normocephalic and atraumatic  Eyes:      Conjunctiva/sclera: Conjunctivae normal       Pupils: Pupils are equal, round, and reactive to light  Neck:      Musculoskeletal: Normal range of motion and neck supple  Cardiovascular:      Rate and Rhythm: Normal rate  Pulmonary:      Effort: Pulmonary effort is normal  No respiratory distress  Musculoskeletal:      Comments: As noted in HPI   Skin:     General: Skin is warm and dry  Neurological:      Mental Status: She is alert and oriented to person, place, and time     Psychiatric:         Behavior: Behavior normal          Large joint arthrocentesis: L knee  Date/Time: 8/13/2020 9:00 AM  Consent given by: patient  Site marked: site marked  Supporting Documentation  Indications: pain   Procedure Details  Location: knee - L knee  Needle size: 22 G  Ultrasound guidance: no  Approach: anterolateral  Medications administered: 20 mg Sodium Hyaluronate 20 MG/2ML    Patient tolerance: patient tolerated the procedure well with no immediate complications  Dressing:  Sterile dressing applied

## 2020-08-20 ENCOUNTER — OFFICE VISIT (OUTPATIENT)
Dept: OBGYN CLINIC | Facility: CLINIC | Age: 80
End: 2020-08-20
Payer: MEDICARE

## 2020-08-20 VITALS — TEMPERATURE: 98.4 F

## 2020-08-20 DIAGNOSIS — M17.12 PRIMARY LOCALIZED OSTEOARTHRITIS OF LEFT KNEE: Primary | ICD-10-CM

## 2020-08-20 PROCEDURE — 20610 DRAIN/INJ JOINT/BURSA W/O US: CPT | Performed by: ORTHOPAEDIC SURGERY

## 2020-08-20 RX ORDER — HYALURONATE SODIUM 10 MG/ML
20 SYRINGE (ML) INTRAARTICULAR
Status: COMPLETED | OUTPATIENT
Start: 2020-08-20 | End: 2020-08-20

## 2020-08-20 RX ADMIN — Medication 20 MG: at 09:02

## 2020-08-20 NOTE — PROGRESS NOTES
Assessment/Plan:  1  Primary localized osteoarthritis of left knee         Mildred Baker tolerated her third Euflexxa injection left knee today  She may receive these injections in 6 months if clinically indicated  Subjective:   Jana Lyman is a 70 y o  female who presents to the office for her third Euflexxa injection left knee today  Past Medical History:   Diagnosis Date    Asthma     Cardiac disease     heart attack    Disease of thyroid gland     Hyperlipidemia        Past Surgical History:   Procedure Laterality Date    APPENDECTOMY      BREAST LUMPECTOMY Bilateral     several    CARDIAC SURGERY      CORONARY STENT PLACEMENT      NASAL POLYP SURGERY         Family History   Problem Relation Age of Onset    ALS Father     Cancer Brother     Heart disease Family     Asthma Family        Social History     Occupational History    Not on file   Tobacco Use    Smoking status: Never Smoker    Smokeless tobacco: Never Used   Substance and Sexual Activity    Alcohol use: Yes     Frequency: Monthly or less    Drug use: No    Sexual activity: Not on file         Current Outpatient Medications:     albuterol (ACCUNEB) 1 25 MG/3ML nebulizer solution, Take 1 ampule by nebulization every 6 (six) hours as needed for wheezing , Disp: , Rfl:     aspirin (ASPIR-81) 81 mg EC tablet, Take by mouth, Disp: , Rfl:     aspirin 81 MG tablet, Take 81 mg by mouth daily  , Disp: , Rfl:     celecoxib (CeleBREX) 100 mg capsule, Take 1 capsule (100 mg total) by mouth 2 (two) times a day, Disp: 60 capsule, Rfl: 0    Cholecalciferol (VITAMIN D) 2000 UNITS tablet, Take 2,000 Units by mouth daily  , Disp: , Rfl:     Ergocalciferol (VITAMIN D2 PO), Take by mouth, Disp: , Rfl:     fexofenadine (ALLEGRA) 60 MG tablet, Take by mouth, Disp: , Rfl:     levothyroxine (SYNTHROID) 100 mcg tablet, Take by mouth, Disp: , Rfl:     levothyroxine (SYNTHROID) 112 mcg tablet, Take 112 mcg by mouth daily  , Disp: , Rfl:   Methylprednisolone 4 MG TBPK, Take as directed, Disp: , Rfl: 0    montelukast (SINGULAIR) 10 mg tablet, Take 10 mg by mouth daily at bedtime  , Disp: , Rfl:     montelukast (SINGULAIR) 10 mg tablet, Take by mouth, Disp: , Rfl:     nitroglycerin (NITROSTAT) 0 4 mg SL tablet, Place 0 4 mg under the tongue every 5 (five) minutes as needed for chest pain , Disp: , Rfl:     simvastatin (ZOCOR) 10 mg tablet, Take 10 mg by mouth daily at bedtime  , Disp: , Rfl:     simvastatin (ZOCOR) 80 mg tablet, Take by mouth, Disp: , Rfl:     SYMBICORT 160-4 5 MCG/ACT inhaler, , Disp: , Rfl: 0    SYNTHROID 125 MCG tablet, take 1 tablet by mouth daily 1 hour before meals or 2 hours after meals, Disp: , Rfl: 0    UNKNOWN TO PATIENT, Allegra daily po Stool softener bid po Tumeric 0 500 daily po, Disp: , Rfl:     albuterol (2 5 mg/3 mL) 0 083 % nebulizer solution, Take 1 vial (2 5 mg total) by nebulization 2 (two) times a day (Patient not taking: Reported on 6/23/2020), Disp: 60 vial, Rfl: 0    albuterol (PROAIR HFA) 90 mcg/act inhaler, Inhale 2 puffs 4 (four) times a day (Patient not taking: Reported on 6/23/2020), Disp: 8 5 g, Rfl: 3    budesonide (PULMICORT) 0 25 mg/2 mL nebulizer solution, Take 1 vial (0 25 mg total) by nebulization 2 (two) times a day, Disp: 120 mL, Rfl: 5    cyclobenzaprine (FLEXERIL) 10 mg tablet, Take 1 tablet (10 mg total) by mouth 2 (two) times a day as needed for muscle spasms (Patient not taking: Reported on 6/11/2019), Disp: 20 tablet, Rfl: 0    lidocaine (LIDODERM) 5 %, Place 1 patch on the skin daily Remove & Discard patch within 12 hours or as directed by MD (Patient not taking: Reported on 6/11/2019), Disp: 30 patch, Rfl: 0    Allergies   Allergen Reactions    Penicillins     Latex Rash       Objective:  Vitals:    08/20/20 0804   Temp: 98 4 °F (36 9 °C)       Ortho Exam    Physical Exam  Vitals signs and nursing note reviewed  Constitutional:       Appearance: She is well-developed  HENT:      Head: Normocephalic and atraumatic  Eyes:      Conjunctiva/sclera: Conjunctivae normal       Pupils: Pupils are equal, round, and reactive to light  Neck:      Musculoskeletal: Normal range of motion and neck supple  Cardiovascular:      Rate and Rhythm: Normal rate  Pulmonary:      Effort: Pulmonary effort is normal  No respiratory distress  Musculoskeletal:      Comments: As noted in HPI   Skin:     General: Skin is warm and dry  Neurological:      Mental Status: She is alert and oriented to person, place, and time     Psychiatric:         Behavior: Behavior normal          Large joint arthrocentesis: L knee  Date/Time: 8/20/2020 9:02 AM  Consent given by: patient  Site marked: site marked  Supporting Documentation  Indications: pain   Procedure Details  Location: knee - L knee  Needle size: 22 G  Ultrasound guidance: no  Approach: anterolateral  Medications administered: 20 mg Sodium Hyaluronate 20 MG/2ML    Patient tolerance: patient tolerated the procedure well with no immediate complications  Dressing:  Sterile dressing applied

## 2020-10-13 ENCOUNTER — OFFICE VISIT (OUTPATIENT)
Dept: PULMONOLOGY | Facility: MEDICAL CENTER | Age: 80
End: 2020-10-13
Payer: MEDICARE

## 2020-10-13 VITALS
WEIGHT: 179 LBS | HEIGHT: 61 IN | DIASTOLIC BLOOD PRESSURE: 82 MMHG | RESPIRATION RATE: 12 BRPM | BODY MASS INDEX: 33.79 KG/M2 | TEMPERATURE: 97.6 F | OXYGEN SATURATION: 97 % | SYSTOLIC BLOOD PRESSURE: 128 MMHG | HEART RATE: 63 BPM

## 2020-10-13 DIAGNOSIS — E66.09 CLASS 1 OBESITY DUE TO EXCESS CALORIES WITHOUT SERIOUS COMORBIDITY WITH BODY MASS INDEX (BMI) OF 33.0 TO 33.9 IN ADULT: ICD-10-CM

## 2020-10-13 DIAGNOSIS — I25.10 CORONARY ARTERY DISEASE INVOLVING NATIVE CORONARY ARTERY OF NATIVE HEART WITHOUT ANGINA PECTORIS: ICD-10-CM

## 2020-10-13 DIAGNOSIS — J45.30 MILD PERSISTENT ASTHMA WITHOUT COMPLICATION: Primary | ICD-10-CM

## 2020-10-13 PROBLEM — E66.811 CLASS 1 OBESITY DUE TO EXCESS CALORIES WITHOUT SERIOUS COMORBIDITY WITH BODY MASS INDEX (BMI) OF 33.0 TO 33.9 IN ADULT: Status: ACTIVE | Noted: 2020-10-13

## 2020-10-13 PROCEDURE — 99214 OFFICE O/P EST MOD 30 MIN: CPT | Performed by: INTERNAL MEDICINE

## 2020-10-13 RX ORDER — EPINEPHRINE 0.3 MG/.3ML
0.3 INJECTION SUBCUTANEOUS
COMMUNITY

## 2021-01-25 ENCOUNTER — LAB (OUTPATIENT)
Dept: LAB | Facility: HOSPITAL | Age: 81
End: 2021-01-25
Attending: FAMILY MEDICINE
Payer: MEDICARE

## 2021-01-25 ENCOUNTER — TRANSCRIBE ORDERS (OUTPATIENT)
Dept: ADMINISTRATIVE | Facility: HOSPITAL | Age: 81
End: 2021-01-25

## 2021-01-25 DIAGNOSIS — E78.00 PURE HYPERCHOLESTEROLEMIA: ICD-10-CM

## 2021-01-25 DIAGNOSIS — E78.00 PURE HYPERCHOLESTEROLEMIA: Primary | ICD-10-CM

## 2021-01-25 DIAGNOSIS — Z13.9 SCREENING FOR UNSPECIFIED CONDITION: ICD-10-CM

## 2021-01-25 DIAGNOSIS — E03.9 HYPOTHYROIDISM, UNSPECIFIED TYPE: ICD-10-CM

## 2021-01-25 LAB
CHOLEST SERPL-MCNC: 139 MG/DL (ref 50–200)
ERYTHROCYTE [DISTWIDTH] IN BLOOD BY AUTOMATED COUNT: 13.3 % (ref 11.6–15.1)
HCT VFR BLD AUTO: 44.6 % (ref 34.8–46.1)
HDLC SERPL-MCNC: 72 MG/DL
HGB BLD-MCNC: 13.8 G/DL (ref 11.5–15.4)
LDLC SERPL CALC-MCNC: 56 MG/DL (ref 0–100)
MCH RBC QN AUTO: 28.6 PG (ref 26.8–34.3)
MCHC RBC AUTO-ENTMCNC: 30.9 G/DL (ref 31.4–37.4)
MCV RBC AUTO: 93 FL (ref 82–98)
NONHDLC SERPL-MCNC: 67 MG/DL
PLATELET # BLD AUTO: 261 THOUSANDS/UL (ref 149–390)
PMV BLD AUTO: 10.3 FL (ref 8.9–12.7)
RBC # BLD AUTO: 4.82 MILLION/UL (ref 3.81–5.12)
TRIGL SERPL-MCNC: 57 MG/DL
TSH SERPL DL<=0.05 MIU/L-ACNC: 0.11 UIU/ML (ref 0.36–3.74)
VIT B12 SERPL-MCNC: 280 PG/ML (ref 100–900)
WBC # BLD AUTO: 5.9 THOUSAND/UL (ref 4.31–10.16)

## 2021-01-25 PROCEDURE — 82607 VITAMIN B-12: CPT

## 2021-01-25 PROCEDURE — 80061 LIPID PANEL: CPT | Performed by: FAMILY MEDICINE

## 2021-01-25 PROCEDURE — 36415 COLL VENOUS BLD VENIPUNCTURE: CPT | Performed by: FAMILY MEDICINE

## 2021-01-25 PROCEDURE — 84443 ASSAY THYROID STIM HORMONE: CPT

## 2021-01-25 PROCEDURE — 85027 COMPLETE CBC AUTOMATED: CPT

## 2021-02-18 DIAGNOSIS — J45.41 MODERATE PERSISTENT ASTHMA WITH EXACERBATION: ICD-10-CM

## 2021-02-25 RX ORDER — BUDESONIDE 0.25 MG/2ML
INHALANT ORAL
Qty: 60 VIAL | Refills: 11 | Status: SHIPPED | OUTPATIENT
Start: 2021-02-25 | End: 2021-12-30 | Stop reason: SDUPTHER

## 2021-03-23 ENCOUNTER — APPOINTMENT (OUTPATIENT)
Dept: RADIOLOGY | Facility: CLINIC | Age: 81
End: 2021-03-23
Payer: MEDICARE

## 2021-03-23 ENCOUNTER — OFFICE VISIT (OUTPATIENT)
Dept: OBGYN CLINIC | Facility: CLINIC | Age: 81
End: 2021-03-23
Payer: MEDICARE

## 2021-03-23 VITALS
SYSTOLIC BLOOD PRESSURE: 138 MMHG | HEART RATE: 67 BPM | DIASTOLIC BLOOD PRESSURE: 71 MMHG | HEIGHT: 61 IN | WEIGHT: 177 LBS | BODY MASS INDEX: 33.42 KG/M2

## 2021-03-23 DIAGNOSIS — M25.551 PAIN IN RIGHT HIP: ICD-10-CM

## 2021-03-23 DIAGNOSIS — M70.61 TROCHANTERIC BURSITIS, RIGHT HIP: Primary | ICD-10-CM

## 2021-03-23 PROCEDURE — 73502 X-RAY EXAM HIP UNI 2-3 VIEWS: CPT

## 2021-03-23 PROCEDURE — 20610 DRAIN/INJ JOINT/BURSA W/O US: CPT | Performed by: ORTHOPAEDIC SURGERY

## 2021-03-23 PROCEDURE — 99213 OFFICE O/P EST LOW 20 MIN: CPT | Performed by: ORTHOPAEDIC SURGERY

## 2021-03-23 RX ORDER — BUPIVACAINE HYDROCHLORIDE 5 MG/ML
2 INJECTION, SOLUTION EPIDURAL; INTRACAUDAL
Status: COMPLETED | OUTPATIENT
Start: 2021-03-23 | End: 2021-03-23

## 2021-03-23 RX ORDER — TRIAMCINOLONE ACETONIDE 40 MG/ML
80 INJECTION, SUSPENSION INTRA-ARTICULAR; INTRAMUSCULAR
Status: COMPLETED | OUTPATIENT
Start: 2021-03-23 | End: 2021-03-23

## 2021-03-23 RX ORDER — LIDOCAINE HYDROCHLORIDE 10 MG/ML
2 INJECTION, SOLUTION INFILTRATION; PERINEURAL
Status: COMPLETED | OUTPATIENT
Start: 2021-03-23 | End: 2021-03-23

## 2021-03-23 RX ADMIN — LIDOCAINE HYDROCHLORIDE 2 ML: 10 INJECTION, SOLUTION INFILTRATION; PERINEURAL at 12:04

## 2021-03-23 RX ADMIN — TRIAMCINOLONE ACETONIDE 80 MG: 40 INJECTION, SUSPENSION INTRA-ARTICULAR; INTRAMUSCULAR at 12:04

## 2021-03-23 RX ADMIN — BUPIVACAINE HYDROCHLORIDE 2 ML: 5 INJECTION, SOLUTION EPIDURAL; INTRACAUDAL at 12:04

## 2021-03-23 NOTE — PROGRESS NOTES
Assessment/Plan:  1  Trochanteric bursitis, right hip  XR hip/pelv 2-3 vws right if performed    Large joint arthrocentesis: R greater trochanteric bursa       Jac Mccain has right-sided hip pain consistent with trochanteric bursitis  She tolerated a right trochanteric bursa hip injection today  Hopefully this will continue to calm down her symptoms give her significant relief going forward like it has in the past   We could consider physical therapy or continued conservative treatment of ice and rest   She will follow up as needed regarding her right hip  Subjective:   Manuel Chaparro is a 68 y o  female who presents  To the office for evaluation for right-sided hip pain  She denies any particular injury or trauma  She has a history of scoliosis and intermittent hip pain and trochanteric bursitis in the past   One year ago she had left hip pain consistent with trochanteric bursitis and we gave her a cortisone injection which significantly helped  She states her pain feels similar and now on the right side  She denies any recent fall or trauma  She denies any pain radiating into her groin  She feels intermittent aching throbbing moderate pain on the  Outer aspect of her right hip  It worsens with walking or lying on her side does not radiate down her leg  Review of Systems   Constitutional: Negative for chills, fever and unexpected weight change  HENT: Negative for hearing loss, nosebleeds and sore throat  Eyes: Negative for pain, redness and visual disturbance  Respiratory: Negative for cough, shortness of breath and wheezing  Cardiovascular: Negative for chest pain, palpitations and leg swelling  Gastrointestinal: Negative for abdominal pain, nausea and vomiting  Endocrine: Negative for polydipsia and polyuria  Genitourinary: Negative for dysuria and hematuria  Musculoskeletal:        See HPI   Skin: Negative for rash and wound     Neurological: Negative for dizziness, numbness and headaches  Psychiatric/Behavioral: Negative for decreased concentration and suicidal ideas  The patient is not nervous/anxious  Past Medical History:   Diagnosis Date    Asthma     Cardiac disease     heart attack    Disease of thyroid gland     Hyperlipidemia        Past Surgical History:   Procedure Laterality Date    APPENDECTOMY      BREAST LUMPECTOMY Bilateral     several    CARDIAC SURGERY      CORONARY STENT PLACEMENT      NASAL POLYP SURGERY         Family History   Problem Relation Age of Onset    ALS Father     Cancer Brother     Heart disease Family     Asthma Family        Social History     Occupational History    Not on file   Tobacco Use    Smoking status: Never Smoker    Smokeless tobacco: Never Used   Substance and Sexual Activity    Alcohol use: Yes     Frequency: Monthly or less    Drug use: No    Sexual activity: Not on file         Current Outpatient Medications:     albuterol (ACCUNEB) 1 25 MG/3ML nebulizer solution, Take 1 ampule by nebulization every 6 (six) hours as needed for wheezing , Disp: , Rfl:     aspirin (ASPIR-81) 81 mg EC tablet, Take by mouth, Disp: , Rfl:     aspirin 81 MG tablet, Take 81 mg by mouth daily  , Disp: , Rfl:     budesonide (PULMICORT) 0 25 mg/2 mL nebulizer solution, USE 1 VIAL  IN  NEBULIZER TWICE  DAILY - Rinse mouth out after use, Disp: 60 vial, Rfl: 11    celecoxib (CeleBREX) 100 mg capsule, Take 1 capsule (100 mg total) by mouth 2 (two) times a day, Disp: 60 capsule, Rfl: 0    Cholecalciferol (VITAMIN D) 2000 UNITS tablet, Take 2,000 Units by mouth daily  , Disp: , Rfl:     EPINEPHrine (EPIPEN) 0 3 mg/0 3 mL SOAJ, Inject 0 3 mg into a muscle, Disp: , Rfl:     Ergocalciferol (VITAMIN D2 PO), Take by mouth, Disp: , Rfl:     fexofenadine (ALLEGRA) 60 MG tablet, Take by mouth, Disp: , Rfl:     levothyroxine (SYNTHROID) 100 mcg tablet, Take by mouth, Disp: , Rfl:     levothyroxine (SYNTHROID) 112 mcg tablet, Take 112 mcg by mouth daily  , Disp: , Rfl:     Methylprednisolone 4 MG TBPK, Take as directed, Disp: , Rfl: 0    montelukast (SINGULAIR) 10 mg tablet, Take 10 mg by mouth daily at bedtime  , Disp: , Rfl:     montelukast (SINGULAIR) 10 mg tablet, Take by mouth, Disp: , Rfl:     nitroglycerin (NITROSTAT) 0 4 mg SL tablet, Place 0 4 mg under the tongue every 5 (five) minutes as needed for chest pain , Disp: , Rfl:     simvastatin (ZOCOR) 10 mg tablet, Take 10 mg by mouth daily at bedtime  , Disp: , Rfl:     simvastatin (ZOCOR) 80 mg tablet, Take by mouth, Disp: , Rfl:     SYMBICORT 160-4 5 MCG/ACT inhaler, , Disp: , Rfl: 0    SYNTHROID 125 MCG tablet, take 1 tablet by mouth daily 1 hour before meals or 2 hours after meals, Disp: , Rfl: 0    UNKNOWN TO PATIENT, Allegra daily po Stool softener bid po Tumeric 0 500 daily po, Disp: , Rfl:     albuterol (2 5 mg/3 mL) 0 083 % nebulizer solution, Take 1 vial (2 5 mg total) by nebulization 2 (two) times a day (Patient not taking: Reported on 6/23/2020), Disp: 60 vial, Rfl: 0    albuterol (PROAIR HFA) 90 mcg/act inhaler, Inhale 2 puffs 4 (four) times a day (Patient not taking: Reported on 6/23/2020), Disp: 8 5 g, Rfl: 3    cyclobenzaprine (FLEXERIL) 10 mg tablet, Take 1 tablet (10 mg total) by mouth 2 (two) times a day as needed for muscle spasms (Patient not taking: Reported on 6/11/2019), Disp: 20 tablet, Rfl: 0    lidocaine (LIDODERM) 5 %, Place 1 patch on the skin daily Remove & Discard patch within 12 hours or as directed by MD (Patient not taking: Reported on 6/11/2019), Disp: 30 patch, Rfl: 0    Allergies   Allergen Reactions    Penicillins     Latex Rash       Objective:  Vitals:    03/23/21 1123   BP: 138/71   Pulse: 67       Right Hip Exam     Tenderness   The patient is experiencing tenderness in the greater trochanter      Range of Motion   Extension: normal   Flexion: normal   External rotation: normal   Internal rotation: normal     Muscle Strength   Abduction: 5/5 Adduction: 5/5   Flexion: 5/5     Tests   SREEKANTH: negative    Other   Erythema: absent  Sensation: normal  Pulse: present            Physical Exam  Vitals signs reviewed  Constitutional:       Appearance: She is well-developed  HENT:      Head: Normocephalic and atraumatic  Eyes:      Conjunctiva/sclera: Conjunctivae normal       Pupils: Pupils are equal, round, and reactive to light  Neck:      Musculoskeletal: Normal range of motion and neck supple  Cardiovascular:      Rate and Rhythm: Normal rate  Pulmonary:      Effort: Pulmonary effort is normal  No respiratory distress  Skin:     General: Skin is warm and dry  Neurological:      Mental Status: She is alert and oriented to person, place, and time  Psychiatric:         Behavior: Behavior normal          I have personally reviewed pertinent films in PACS and my interpretation is as follows: Three-view x-rays of the right hip demonstrate mild osteoarthritis without significant abnormality or fracture  Large joint arthrocentesis: R greater trochanteric bursa  Universal Protocol:  Consent: Verbal consent obtained    Risks and benefits: risks, benefits and alternatives were discussed  Consent given by: patient  Site marked: the operative site was marked  Supporting Documentation  Indications: pain and joint swelling   Procedure Details  Location: hip - R greater trochanteric bursa  Preparation: Patient was prepped and draped in the usual sterile fashion  Ultrasound guidance: no  Approach: lateral  Medications administered: 2 mL lidocaine 1 %; 2 mL bupivacaine (PF) 0 5 %; 80 mg triamcinolone acetonide 40 mg/mL    Patient tolerance: patient tolerated the procedure well with no immediate complications  Dressing:  Sterile dressing applied

## 2021-03-24 ENCOUNTER — IMMUNIZATIONS (OUTPATIENT)
Dept: FAMILY MEDICINE CLINIC | Facility: HOSPITAL | Age: 81
End: 2021-03-24

## 2021-03-24 DIAGNOSIS — Z23 ENCOUNTER FOR IMMUNIZATION: Primary | ICD-10-CM

## 2021-03-24 PROCEDURE — 0011A SARS-COV-2 / COVID-19 MRNA VACCINE (MODERNA) 100 MCG: CPT

## 2021-03-24 PROCEDURE — 91301 SARS-COV-2 / COVID-19 MRNA VACCINE (MODERNA) 100 MCG: CPT

## 2021-04-26 ENCOUNTER — IMMUNIZATIONS (OUTPATIENT)
Dept: FAMILY MEDICINE CLINIC | Facility: HOSPITAL | Age: 81
End: 2021-04-26

## 2021-04-26 DIAGNOSIS — Z23 ENCOUNTER FOR IMMUNIZATION: Primary | ICD-10-CM

## 2021-04-26 PROCEDURE — 0012A SARS-COV-2 / COVID-19 MRNA VACCINE (MODERNA) 100 MCG: CPT

## 2021-04-26 PROCEDURE — 91301 SARS-COV-2 / COVID-19 MRNA VACCINE (MODERNA) 100 MCG: CPT

## 2021-07-07 ENCOUNTER — APPOINTMENT (OUTPATIENT)
Dept: LAB | Facility: HOSPITAL | Age: 81
End: 2021-07-07
Attending: FAMILY MEDICINE
Payer: MEDICARE

## 2021-07-07 DIAGNOSIS — E03.4 IDIOPATHIC ATROPHIC HYPOTHYROIDISM: ICD-10-CM

## 2021-07-07 LAB — TSH SERPL DL<=0.05 MIU/L-ACNC: 0.06 UIU/ML (ref 0.36–3.74)

## 2021-07-07 PROCEDURE — 84443 ASSAY THYROID STIM HORMONE: CPT

## 2021-12-07 ENCOUNTER — APPOINTMENT (OUTPATIENT)
Dept: LAB | Facility: HOSPITAL | Age: 81
End: 2021-12-07
Attending: FAMILY MEDICINE
Payer: MEDICARE

## 2021-12-07 DIAGNOSIS — E03.4 IDIOPATHIC ATROPHIC HYPOTHYROIDISM: ICD-10-CM

## 2021-12-07 LAB
T4 FREE SERPL-MCNC: 1.54 NG/DL (ref 0.76–1.46)
TSH SERPL DL<=0.05 MIU/L-ACNC: 0.05 UIU/ML (ref 0.36–3.74)

## 2021-12-07 PROCEDURE — 36415 COLL VENOUS BLD VENIPUNCTURE: CPT

## 2021-12-07 PROCEDURE — 84439 ASSAY OF FREE THYROXINE: CPT

## 2021-12-07 PROCEDURE — 84443 ASSAY THYROID STIM HORMONE: CPT

## 2021-12-27 DIAGNOSIS — J45.41 MODERATE PERSISTENT ASTHMA WITH EXACERBATION: ICD-10-CM

## 2021-12-30 RX ORDER — BUDESONIDE 0.25 MG/2ML
INHALANT ORAL
Qty: 60 ML | Refills: 11 | Status: SHIPPED | OUTPATIENT
Start: 2021-12-30

## 2022-01-03 ENCOUNTER — OFFICE VISIT (OUTPATIENT)
Dept: PULMONOLOGY | Facility: MEDICAL CENTER | Age: 82
End: 2022-01-03
Payer: MEDICARE

## 2022-01-03 VITALS
BODY MASS INDEX: 33.99 KG/M2 | WEIGHT: 180 LBS | SYSTOLIC BLOOD PRESSURE: 166 MMHG | TEMPERATURE: 98.1 F | OXYGEN SATURATION: 97 % | HEIGHT: 61 IN | DIASTOLIC BLOOD PRESSURE: 76 MMHG | HEART RATE: 68 BPM | RESPIRATION RATE: 16 BRPM

## 2022-01-03 DIAGNOSIS — J45.30 MILD PERSISTENT ASTHMA WITHOUT COMPLICATION: ICD-10-CM

## 2022-01-03 DIAGNOSIS — J30.9 CHRONIC ALLERGIC RHINITIS: Primary | ICD-10-CM

## 2022-01-03 PROCEDURE — 99213 OFFICE O/P EST LOW 20 MIN: CPT | Performed by: INTERNAL MEDICINE

## 2022-01-03 RX ORDER — ALBUTEROL SULFATE 90 UG/1
2 AEROSOL, METERED RESPIRATORY (INHALATION) EVERY 4 HOURS PRN
Qty: 8.5 G | Refills: 8 | Status: SHIPPED | OUTPATIENT
Start: 2022-01-03

## 2022-01-03 NOTE — PATIENT INSTRUCTIONS
Call our back telephone number if she you do not get your script for the budesonide for your nebulizer    Back telephone line;  [phone]    Call our office if you do not get the budesonide since you from [facility] did place order for rescue ProAir inhaler he can take 2 puffs every 4 hours as needed    Peak flow rate  Today best was 270 liters/minute    Normal range is 310-390 liters/minute    Your normal range is probably 250 to 270 l/m

## 2022-01-03 NOTE — PROGRESS NOTES
Assessment/Plan        Problem List Items Addressed This Visit        Respiratory    Mild persistent asthma without complication     She will continue with nebulized budesonide 0 25 mg b i d  as this is less expensive for her than the use of Symbicort 160 mcg  Her she gets this medication through [facility]     I did tell use rescue ProAir inhaler 2 puffs 4 times a day as needed for rescue inhaler and not Symbicort    Peak flow measurement    Peak flow measurement today was 270 liters/minute  Normal range is 310-390 liters/minute for this patient    I did give her a peak flow meter for home use           Relevant Medications    albuterol (ProAir HFA) 90 mcg/act inhaler    Chronic allergic rhinitis - Primary     Continue montelukast 10 mg daily and Allegra as needed                 CC: DAKOTA Díaz presents for follow-up visit regarding her mild persistent asthma  She does have history of coronary disease and had CABG surgery in 2014  She presented cardiogenic shock that time and had LIMA to LAD bypass as well as SVG to RCA SVG to obtuse marginal   She has history of left bundle branch block and hypothyroidism  Echo from 03/12/2020 revealed normal LV systolic function and no evidence of any significant valvular heart disease  She did have COVID 19 vaccination 2nd dose being given 04/26/2021  She had Moderna vaccine    She told me that she was supposed to have cataract surgery by Dr Krissy Armstrong in the fall but had a disagreement she subsequently had bilateral cataract surgery done  She does had cataract surgery right eye 1st in early September 9, 2021 by Dr Trudy West then had left eye cataract removal on 09/29/2021  She states her vision is better not she had a cataract surgery  Also she is not really have much problem with her asthma  She did seem to be confused about inhaler therapy  She kept stain she uses her Symbicort as a rescue inhaler    She has a Symbicort 160 mcg inhaler home   She is using nebulizer budesonide 0 25 mg twice a day as this is less expensive for her  She was not sure she had a ProAir rescue inhaler but I did place order for this  She also takes singular 10 mg daily for chronic allergic rhinitis    Past Medical History:   Diagnosis Date    Asthma     Cardiac disease     heart attack    Disease of thyroid gland     Hyperlipidemia        Past Surgical History:   Procedure Laterality Date    APPENDECTOMY      BREAST LUMPECTOMY Bilateral     several    CARDIAC SURGERY      CORONARY STENT PLACEMENT      NASAL POLYP SURGERY           Current Outpatient Medications:     albuterol (2 5 mg/3 mL) 0 083 % nebulizer solution, Take 1 vial (2 5 mg total) by nebulization 2 (two) times a day, Disp: 60 vial, Rfl: 0    aspirin (ASPIR-81) 81 mg EC tablet, Take by mouth, Disp: , Rfl:     celecoxib (CeleBREX) 100 mg capsule, Take 1 capsule (100 mg total) by mouth 2 (two) times a day, Disp: 60 capsule, Rfl: 0    Cholecalciferol (VITAMIN D) 2000 UNITS tablet, Take 2,000 Units by mouth daily  , Disp: , Rfl:     EPINEPHrine (EPIPEN) 0 3 mg/0 3 mL SOAJ, Inject 0 3 mg into a muscle, Disp: , Rfl:     Ergocalciferol (VITAMIN D2 PO), Take by mouth, Disp: , Rfl:     fexofenadine (ALLEGRA) 60 MG tablet, Take by mouth, Disp: , Rfl:     levothyroxine (SYNTHROID) 112 mcg tablet, Take 112 mcg by mouth daily  , Disp: , Rfl:     montelukast (SINGULAIR) 10 mg tablet, Take 10 mg by mouth daily at bedtime  , Disp: , Rfl:     nitroglycerin (NITROSTAT) 0 4 mg SL tablet, Place 0 4 mg under the tongue every 5 (five) minutes as needed for chest pain , Disp: , Rfl:     simvastatin (ZOCOR) 10 mg tablet, Take 10 mg by mouth daily at bedtime  , Disp: , Rfl:     SYMBICORT 160-4 5 MCG/ACT inhaler, , Disp: , Rfl: 0    UNKNOWN TO PATIENT, Allegra daily po Stool softener bid po Tumeric 0 500 daily po, Disp: , Rfl:     albuterol (ACCUNEB) 1 25 MG/3ML nebulizer solution, Take 1 ampule by nebulization every 6 (six) hours as needed for wheezing  (Patient not taking: Reported on 1/3/2022 ), Disp: , Rfl:     albuterol (ProAir HFA) 90 mcg/act inhaler, Inhale 2 puffs every 4 (four) hours as needed for wheezing, Disp: 8 5 g, Rfl: 8    aspirin 81 MG tablet, Take 81 mg by mouth daily  (Patient not taking: Reported on 1/3/2022 ), Disp: , Rfl:     budesonide (PULMICORT) 0 25 mg/2 mL nebulizer solution, USE 1 VIAL  IN  NEBULIZER TWICE  DAILY - Rinse mouth out after use, Disp: 60 mL, Rfl: 11    cyclobenzaprine (FLEXERIL) 10 mg tablet, Take 1 tablet (10 mg total) by mouth 2 (two) times a day as needed for muscle spasms (Patient not taking: Reported on 6/11/2019), Disp: 20 tablet, Rfl: 0    levothyroxine (SYNTHROID) 100 mcg tablet, Take by mouth (Patient not taking: Reported on 1/3/2022 ), Disp: , Rfl:     lidocaine (LIDODERM) 5 %, Place 1 patch on the skin daily Remove & Discard patch within 12 hours or as directed by MD (Patient not taking: Reported on 1/3/2022 ), Disp: 30 patch, Rfl: 0    Methylprednisolone 4 MG TBPK, Take as directed (Patient not taking: Reported on 1/3/2022 ), Disp: , Rfl: 0    montelukast (SINGULAIR) 10 mg tablet, Take by mouth (Patient not taking: Reported on 1/3/2022 ), Disp: , Rfl:     simvastatin (ZOCOR) 80 mg tablet, Take by mouth (Patient not taking: Reported on 1/3/2022 ), Disp: , Rfl:     SYNTHROID 125 MCG tablet, take 1 tablet by mouth daily 1 hour before meals or 2 hours after meals (Patient not taking: Reported on 1/3/2022), Disp: , Rfl: 0    Allergies   Allergen Reactions    Penicillins     Latex Rash       Social History     Tobacco Use    Smoking status: Never Smoker    Smokeless tobacco: Never Used   Substance Use Topics    Alcohol use: Yes         Family History   Problem Relation Age of Onset    ALS Father     Cancer Brother     Heart disease Family     Asthma Family        Review of Systems   Constitutional: Negative for chills, fever and unexpected weight change  HENT: Negative for congestion, rhinorrhea and sore throat  Eyes: Negative for discharge and redness  Respiratory: Negative for cough and wheezing  Has some mild exertional shortness of breath   Cardiovascular: Negative for chest pain, palpitations and leg swelling  Gastrointestinal: Negative for abdominal distention, abdominal pain and nausea  Endocrine: Negative for polydipsia and polyphagia  Genitourinary: Negative for dysuria  Musculoskeletal: Negative for joint swelling and myalgias  Skin: Negative for rash  Neurological: Negative for light-headedness  Psychiatric/Behavioral: Negative for confusion  Vitals:    01/03/22 1100   BP: 166/76   Pulse: 68   Resp: 16   Temp: 98 1 °F (36 7 °C)   SpO2: 97%     Height 5 ft 1 in tall, weight 180 lb BMI 34 1      Physical Exam  Vitals reviewed  Constitutional:       General: She is not in acute distress  Appearance: Normal appearance  She is well-developed  She is obese  HENT:      Head: Normocephalic  Right Ear: External ear normal       Left Ear: External ear normal       Nose: Nose normal       Mouth/Throat:      Mouth: Mucous membranes are moist       Pharynx: Oropharynx is clear  No oropharyngeal exudate  Eyes:      Conjunctiva/sclera: Conjunctivae normal       Pupils: Pupils are equal, round, and reactive to light  Cardiovascular:      Rate and Rhythm: Normal rate and regular rhythm  Heart sounds: Normal heart sounds  Pulmonary:      Effort: Pulmonary effort is normal       Comments: Lung sounds are clear  No wheezes crackles or rhonchi  Abdominal:      General: There is no distension  Palpations: Abdomen is soft  Tenderness: There is no abdominal tenderness  Musculoskeletal:      Cervical back: Neck supple  Comments: No edema, cyanosis or clubbing   Lymphadenopathy:      Cervical: No cervical adenopathy  Skin:     General: Skin is warm and dry     Neurological:      Mental Status: She is alert and oriented to person, place, and time  Psychiatric:         Mood and Affect: Mood normal          Behavior: Behavior normal          Thought Content: Thought content normal          Peak flow measurement    Peak flow measurement today was 270 liters/minute    Normal range is 310-390 liters/minute for this patient

## 2022-01-04 NOTE — ASSESSMENT & PLAN NOTE
She will continue with nebulized budesonide 0 25 mg b i d  as this is less expensive for her than the use of Symbicort 160 mcg  Her she gets this medication through [facility]     I did tell use rescue ProAir inhaler 2 puffs 4 times a day as needed for rescue inhaler and not Symbicort    Peak flow measurement    Peak flow measurement today was 270 liters/minute    Normal range is 310-390 liters/minute for this patient    I did give her a peak flow meter for home use

## 2022-01-04 NOTE — ASSESSMENT & PLAN NOTE
Continue montelukast 10 mg daily and Allegra as needed Spine appears normal, range of motion is not limited, no muscle or joint tenderness

## 2022-03-08 ENCOUNTER — APPOINTMENT (OUTPATIENT)
Dept: LAB | Facility: HOSPITAL | Age: 82
End: 2022-03-08
Attending: FAMILY MEDICINE
Payer: MEDICARE

## 2022-03-08 DIAGNOSIS — Z13.9 SCREENING FOR UNSPECIFIED CONDITION: ICD-10-CM

## 2022-03-08 LAB
ERYTHROCYTE [DISTWIDTH] IN BLOOD BY AUTOMATED COUNT: 13.8 % (ref 11.6–15.1)
HCT VFR BLD AUTO: 44 % (ref 34.8–46.1)
HCV AB SER QL: NORMAL
HGB BLD-MCNC: 14 G/DL (ref 11.5–15.4)
MCH RBC QN AUTO: 28.7 PG (ref 26.8–34.3)
MCHC RBC AUTO-ENTMCNC: 31.8 G/DL (ref 31.4–37.4)
MCV RBC AUTO: 90 FL (ref 82–98)
PLATELET # BLD AUTO: 253 THOUSANDS/UL (ref 149–390)
PMV BLD AUTO: 10.4 FL (ref 8.9–12.7)
RBC # BLD AUTO: 4.87 MILLION/UL (ref 3.81–5.12)
WBC # BLD AUTO: 6.51 THOUSAND/UL (ref 4.31–10.16)

## 2022-03-08 PROCEDURE — 86803 HEPATITIS C AB TEST: CPT

## 2022-03-08 PROCEDURE — 85027 COMPLETE CBC AUTOMATED: CPT

## 2022-04-02 ENCOUNTER — OFFICE VISIT (OUTPATIENT)
Dept: OBGYN CLINIC | Facility: CLINIC | Age: 82
End: 2022-04-02
Payer: MEDICARE

## 2022-04-02 ENCOUNTER — APPOINTMENT (OUTPATIENT)
Dept: RADIOLOGY | Facility: CLINIC | Age: 82
End: 2022-04-02
Payer: MEDICARE

## 2022-04-02 VITALS
BODY MASS INDEX: 33.99 KG/M2 | SYSTOLIC BLOOD PRESSURE: 150 MMHG | HEART RATE: 64 BPM | WEIGHT: 180 LBS | HEIGHT: 61 IN | DIASTOLIC BLOOD PRESSURE: 80 MMHG

## 2022-04-02 DIAGNOSIS — Z01.89 ENCOUNTER FOR LOWER EXTREMITY COMPARISON IMAGING STUDY: ICD-10-CM

## 2022-04-02 DIAGNOSIS — M17.12 PRIMARY LOCALIZED OSTEOARTHRITIS OF LEFT KNEE: ICD-10-CM

## 2022-04-02 DIAGNOSIS — M70.62 TROCHANTERIC BURSITIS OF LEFT HIP: ICD-10-CM

## 2022-04-02 DIAGNOSIS — M70.61 TROCHANTERIC BURSITIS, RIGHT HIP: Primary | ICD-10-CM

## 2022-04-02 DIAGNOSIS — M16.12 PRIMARY OSTEOARTHRITIS OF ONE HIP, LEFT: ICD-10-CM

## 2022-04-02 PROCEDURE — 73560 X-RAY EXAM OF KNEE 1 OR 2: CPT

## 2022-04-02 PROCEDURE — 20610 DRAIN/INJ JOINT/BURSA W/O US: CPT | Performed by: ORTHOPAEDIC SURGERY

## 2022-04-02 PROCEDURE — 73562 X-RAY EXAM OF KNEE 3: CPT

## 2022-04-02 PROCEDURE — 99214 OFFICE O/P EST MOD 30 MIN: CPT | Performed by: ORTHOPAEDIC SURGERY

## 2022-04-02 RX ORDER — HYALURONATE SODIUM 10 MG/ML
20 SYRINGE (ML) INTRAARTICULAR
Status: COMPLETED | OUTPATIENT
Start: 2022-04-02 | End: 2022-04-02

## 2022-04-02 RX ORDER — TRIAMCINOLONE ACETONIDE 40 MG/ML
80 INJECTION, SUSPENSION INTRA-ARTICULAR; INTRAMUSCULAR
Status: COMPLETED | OUTPATIENT
Start: 2022-04-02 | End: 2022-04-02

## 2022-04-02 RX ORDER — LIDOCAINE HYDROCHLORIDE 10 MG/ML
2 INJECTION, SOLUTION INFILTRATION; PERINEURAL
Status: COMPLETED | OUTPATIENT
Start: 2022-04-02 | End: 2022-04-02

## 2022-04-02 RX ORDER — BUPIVACAINE HYDROCHLORIDE 5 MG/ML
2 INJECTION, SOLUTION EPIDURAL; INTRACAUDAL
Status: COMPLETED | OUTPATIENT
Start: 2022-04-02 | End: 2022-04-02

## 2022-04-02 RX ADMIN — TRIAMCINOLONE ACETONIDE 80 MG: 40 INJECTION, SUSPENSION INTRA-ARTICULAR; INTRAMUSCULAR at 09:08

## 2022-04-02 RX ADMIN — Medication 20 MG: at 09:08

## 2022-04-02 RX ADMIN — BUPIVACAINE HYDROCHLORIDE 2 ML: 5 INJECTION, SOLUTION EPIDURAL; INTRACAUDAL at 09:08

## 2022-04-02 RX ADMIN — LIDOCAINE HYDROCHLORIDE 2 ML: 10 INJECTION, SOLUTION INFILTRATION; PERINEURAL at 09:08

## 2022-04-02 NOTE — PROGRESS NOTES
Assessment/Plan:  1  Trochanteric bursitis, right hip  Large joint arthrocentesis: R greater trochanteric bursa   2  Trochanteric bursitis of left hip  Large joint arthrocentesis: L greater trochanteric bursa   3  Primary localized osteoarthritis of left knee  XR knee 3 vw left non injury    Injection Procedure Prior Authorization    Large joint arthrocentesis: L knee   4  Primary osteoarthritis of one hip, left       [place] has bilateral hip pain consistent with trochanteric bursitis  This is a recurrent issue for her and she does respond well to cortisone injections  We did provide bilateral trochanteric bursa cortisone injections today and she tolerated this very well  She also has increasing osteoarthritis in the left knee  We did repeat a series of Euflexxa injections in the left knee and gave her the first injection today  She will be scheduled for the following 2 injections in the coming weeks  She verbalized understanding this plan and she will follow up in 1-2 weeks for the second Euflexxa injection  Large joint arthrocentesis: R greater trochanteric bursa  Universal Protocol:  Consent: Verbal consent obtained  Risks and benefits: risks, benefits and alternatives were discussed  Consent given by: patient  Site marked: the operative site was marked  Supporting Documentation  Indications: pain and joint swelling   Procedure Details  Location: hip - R greater trochanteric bursa  Preparation: Patient was prepped and draped in the usual sterile fashion  Ultrasound guidance: no  Approach: lateral  Medications administered: 2 mL lidocaine 1 %; 2 mL bupivacaine (PF) 0 5 %; 80 mg triamcinolone acetonide 40 mg/mL    Patient tolerance: patient tolerated the procedure well with no immediate complications  Dressing:  Sterile dressing applied    Large joint arthrocentesis: L greater trochanteric bursa  Universal Protocol:  Consent: Verbal consent obtained    Risks and benefits: risks, benefits and alternatives were discussed  Consent given by: patient  Site marked: the operative site was marked  Supporting Documentation  Indications: pain and joint swelling   Procedure Details  Location: hip - L greater trochanteric bursa  Preparation: Patient was prepped and draped in the usual sterile fashion  Ultrasound guidance: no  Approach: lateral  Medications administered: 2 mL lidocaine 1 %; 2 mL bupivacaine (PF) 0 5 %; 80 mg triamcinolone acetonide 40 mg/mL    Patient tolerance: patient tolerated the procedure well with no immediate complications  Dressing:  Sterile dressing applied    Large joint arthrocentesis: L knee  Universal Protocol:  Consent: Verbal consent obtained  Risks and benefits: risks, benefits and alternatives were discussed  Consent given by: patient  Site marked: the operative site was marked  Supporting Documentation  Indications: pain   Procedure Details  Location: knee - L knee  Needle size: 22 G  Ultrasound guidance: no  Approach: anterolateral  Medications administered: 20 mg Sodium Hyaluronate 20 MG/2ML    Patient tolerance: patient tolerated the procedure well with no immediate complications  Dressing:  Sterile dressing applied            Subjective:   Rosalinda Lu is a 80 y o  female who presents to the office for follow-up for bilateral hip and bilateral knee pain  She has a history of trochanteric bursitis and bilateral hips that gives her recurrent pain secondary to her scoliosis  She has been in the office in the past over the last few years and received cortisone injections in the trochanteric bursa which has given her significant relief  The last time she had these injections was around 1 year ago in the right hip only  She states that she has been feeling increased discomfort in the right hip in the past several months  The pain is aching and throbbing and constant and bothers her more when she is sleeping and lying on that side    She is feeling similar pain in the left side but this only seems to bother her if she pushes on that side or lies on the left side  She also has a history of bilateral osteoarthritis and has undergone a series of Visco supplement injections in her left knee 2 years ago  She states that she is now feeling increased discomfort over the medial aspect of both knees  She denies any new injury or fall but she does state that her knee will buckle and give her lot of pain and feels like she is going to fall recently  She denies any swelling in her knees  Review of Systems   Constitutional: Negative for chills, fever and unexpected weight change  HENT: Negative for hearing loss, nosebleeds and sore throat  Eyes: Negative for pain, redness and visual disturbance  Respiratory: Negative for cough, shortness of breath and wheezing  Cardiovascular: Negative for chest pain, palpitations and leg swelling  Gastrointestinal: Negative for abdominal pain, nausea and vomiting  Endocrine: Negative for polydipsia and polyuria  Genitourinary: Negative for dysuria and hematuria  Musculoskeletal:        See HPI   Skin: Negative for rash and wound  Neurological: Negative for dizziness, numbness and headaches  Psychiatric/Behavioral: Negative for decreased concentration and suicidal ideas  The patient is not nervous/anxious            Past Medical History:   Diagnosis Date    Asthma     Cardiac disease     heart attack    Disease of thyroid gland     Hyperlipidemia        Past Surgical History:   Procedure Laterality Date    APPENDECTOMY      BREAST LUMPECTOMY Bilateral     several    CARDIAC SURGERY      CATARACT EXTRACTION, BILATERAL      CORONARY STENT PLACEMENT      NASAL POLYP SURGERY         Family History   Problem Relation Age of Onset    ALS Father     Cancer Brother     Heart disease Family     Asthma Family        Social History     Occupational History    Not on file   Tobacco Use    Smoking status: Never Smoker    Smokeless tobacco: Never Used   Vaping Use    Vaping Use: Never used   Substance and Sexual Activity    Alcohol use: Yes    Drug use: No    Sexual activity: Not on file         Current Outpatient Medications:     albuterol (2 5 mg/3 mL) 0 083 % nebulizer solution, Take 1 vial (2 5 mg total) by nebulization 2 (two) times a day, Disp: 60 vial, Rfl: 0    albuterol (ACCUNEB) 1 25 MG/3ML nebulizer solution, Take 1 ampule by nebulization every 6 (six) hours as needed for wheezing  (Patient not taking: Reported on 1/3/2022 ), Disp: , Rfl:     albuterol (ProAir HFA) 90 mcg/act inhaler, Inhale 2 puffs every 4 (four) hours as needed for wheezing, Disp: 8 5 g, Rfl: 8    aspirin (ASPIR-81) 81 mg EC tablet, Take by mouth, Disp: , Rfl:     aspirin 81 MG tablet, Take 81 mg by mouth daily  (Patient not taking: Reported on 1/3/2022 ), Disp: , Rfl:     budesonide (PULMICORT) 0 25 mg/2 mL nebulizer solution, USE 1 VIAL  IN  NEBULIZER TWICE  DAILY - Rinse mouth out after use, Disp: 60 mL, Rfl: 11    celecoxib (CeleBREX) 100 mg capsule, Take 1 capsule (100 mg total) by mouth 2 (two) times a day, Disp: 60 capsule, Rfl: 0    Cholecalciferol (VITAMIN D) 2000 UNITS tablet, Take 2,000 Units by mouth daily  , Disp: , Rfl:     cyclobenzaprine (FLEXERIL) 10 mg tablet, Take 1 tablet (10 mg total) by mouth 2 (two) times a day as needed for muscle spasms (Patient not taking: Reported on 6/11/2019), Disp: 20 tablet, Rfl: 0    EPINEPHrine (EPIPEN) 0 3 mg/0 3 mL SOAJ, Inject 0 3 mg into a muscle, Disp: , Rfl:     Ergocalciferol (VITAMIN D2 PO), Take by mouth, Disp: , Rfl:     fexofenadine (ALLEGRA) 60 MG tablet, Take by mouth, Disp: , Rfl:     levothyroxine (SYNTHROID) 100 mcg tablet, Take by mouth (Patient not taking: Reported on 1/3/2022 ), Disp: , Rfl:     levothyroxine (SYNTHROID) 112 mcg tablet, Take 112 mcg by mouth daily  , Disp: , Rfl:     lidocaine (LIDODERM) 5 %, Place 1 patch on the skin daily Remove & Discard patch within 12 hours or as directed by MD (Patient not taking: Reported on 1/3/2022 ), Disp: 30 patch, Rfl: 0    Methylprednisolone 4 MG TBPK, Take as directed (Patient not taking: Reported on 1/3/2022 ), Disp: , Rfl: 0    montelukast (SINGULAIR) 10 mg tablet, Take 10 mg by mouth daily at bedtime  , Disp: , Rfl:     montelukast (SINGULAIR) 10 mg tablet, Take by mouth (Patient not taking: Reported on 1/3/2022 ), Disp: , Rfl:     nitroglycerin (NITROSTAT) 0 4 mg SL tablet, Place 0 4 mg under the tongue every 5 (five) minutes as needed for chest pain , Disp: , Rfl:     simvastatin (ZOCOR) 10 mg tablet, Take 10 mg by mouth daily at bedtime  , Disp: , Rfl:     simvastatin (ZOCOR) 80 mg tablet, Take by mouth (Patient not taking: Reported on 1/3/2022 ), Disp: , Rfl:     SYMBICORT 160-4 5 MCG/ACT inhaler, , Disp: , Rfl: 0    SYNTHROID 125 MCG tablet, take 1 tablet by mouth daily 1 hour before meals or 2 hours after meals (Patient not taking: Reported on 1/3/2022), Disp: , Rfl: 0    UNKNOWN TO PATIENT, Allegra daily po Stool softener bid po Tumeric 0 500 daily po, Disp: , Rfl:     Allergies   Allergen Reactions    Penicillins     Latex Rash       Objective:  Vitals:    04/02/22 0812   BP: 150/80   Pulse: 64       Right Knee Exam     Tenderness   The patient is experiencing tenderness in the medial joint line  Range of Motion   Extension: normal   Flexion: normal     Other   Erythema: absent  Sensation: normal  Pulse: present  Swelling: none  Effusion: no effusion present      Left Knee Exam     Tenderness   The patient is experiencing tenderness in the medial joint line  Range of Motion   Extension: normal   Flexion: normal     Other   Erythema: absent  Sensation: normal  Pulse: present  Swelling: none  Effusion: no effusion present      Right Hip Exam     Tenderness   The patient is experiencing tenderness in the greater trochanter      Range of Motion   External rotation: normal   Internal rotation: normal     Other Erythema: absent  Sensation: normal  Pulse: present      Left Hip Exam     Tenderness   The patient is experiencing tenderness in the greater trochanter  Range of Motion   External rotation: normal   Internal rotation: normal     Other   Erythema: absent  Sensation: normal  Pulse: present          Observations   Left Knee   Negative for effusion  Right Knee   Negative for effusion  Physical Exam  Vitals and nursing note reviewed  Constitutional:       Appearance: She is well-developed  HENT:      Head: Normocephalic and atraumatic  Eyes:      General: No scleral icterus  Conjunctiva/sclera: Conjunctivae normal    Cardiovascular:      Rate and Rhythm: Normal rate  Pulmonary:      Effort: Pulmonary effort is normal  No respiratory distress  Musculoskeletal:      Cervical back: Normal range of motion and neck supple  Right knee: No effusion  Left knee: No effusion  Comments: As noted in HPI   Skin:     General: Skin is warm and dry  Neurological:      Mental Status: She is alert and oriented to person, place, and time     Psychiatric:         Behavior: Behavior normal

## 2022-04-14 ENCOUNTER — PROCEDURE VISIT (OUTPATIENT)
Dept: OBGYN CLINIC | Facility: CLINIC | Age: 82
End: 2022-04-14
Payer: MEDICARE

## 2022-04-14 VITALS
DIASTOLIC BLOOD PRESSURE: 73 MMHG | WEIGHT: 175.4 LBS | HEART RATE: 61 BPM | SYSTOLIC BLOOD PRESSURE: 129 MMHG | BODY MASS INDEX: 33.12 KG/M2 | HEIGHT: 61 IN

## 2022-04-14 DIAGNOSIS — M17.12 PRIMARY LOCALIZED OSTEOARTHRITIS OF LEFT KNEE: Primary | ICD-10-CM

## 2022-04-14 PROCEDURE — 20610 DRAIN/INJ JOINT/BURSA W/O US: CPT | Performed by: ORTHOPAEDIC SURGERY

## 2022-04-14 RX ORDER — HYALURONATE SODIUM 10 MG/ML
20 SYRINGE (ML) INTRAARTICULAR
Status: COMPLETED | OUTPATIENT
Start: 2022-04-14 | End: 2022-04-14

## 2022-04-14 RX ADMIN — Medication 20 MG: at 10:05

## 2022-04-14 NOTE — PROGRESS NOTES
Assessment/Plan:  1  Primary localized osteoarthritis of left knee       Luciana Peña tolerated her second Euflexxa injection left knee today  She will follow up in 1 week for the next injection  Subjective:   Teena Valderrama is a 80 y o  female who presents to the office for her second Euflexxa injection left knee  Past Medical History:   Diagnosis Date    Asthma     Cardiac disease     heart attack    Disease of thyroid gland     Hyperlipidemia        Past Surgical History:   Procedure Laterality Date    APPENDECTOMY      BREAST LUMPECTOMY Bilateral     several    CARDIAC SURGERY      CATARACT EXTRACTION, BILATERAL      CORONARY STENT PLACEMENT      NASAL POLYP SURGERY         Family History   Problem Relation Age of Onset    ALS Father     Cancer Brother     Heart disease Family     Asthma Family        Social History     Occupational History    Not on file   Tobacco Use    Smoking status: Never Smoker    Smokeless tobacco: Never Used   Vaping Use    Vaping Use: Never used   Substance and Sexual Activity    Alcohol use: Yes    Drug use: No    Sexual activity: Not on file         Current Outpatient Medications:     albuterol (2 5 mg/3 mL) 0 083 % nebulizer solution, Take 1 vial (2 5 mg total) by nebulization 2 (two) times a day, Disp: 60 vial, Rfl: 0    albuterol (ProAir HFA) 90 mcg/act inhaler, Inhale 2 puffs every 4 (four) hours as needed for wheezing, Disp: 8 5 g, Rfl: 8    aspirin (ASPIR-81) 81 mg EC tablet, Take by mouth, Disp: , Rfl:     aspirin 81 MG tablet, Take 81 mg by mouth daily  , Disp: , Rfl:     budesonide (PULMICORT) 0 25 mg/2 mL nebulizer solution, USE 1 VIAL  IN  NEBULIZER TWICE  DAILY - Rinse mouth out after use, Disp: 60 mL, Rfl: 11    celecoxib (CeleBREX) 100 mg capsule, Take 1 capsule (100 mg total) by mouth 2 (two) times a day, Disp: 60 capsule, Rfl: 0    Cholecalciferol (VITAMIN D) 2000 UNITS tablet, Take 2,000 Units by mouth daily  , Disp: , Rfl:   EPINEPHrine (EPIPEN) 0 3 mg/0 3 mL SOAJ, Inject 0 3 mg into a muscle, Disp: , Rfl:     Ergocalciferol (VITAMIN D2 PO), Take by mouth, Disp: , Rfl:     fexofenadine (ALLEGRA) 60 MG tablet, Take by mouth, Disp: , Rfl:     levothyroxine (SYNTHROID) 100 mcg tablet, Take by mouth  , Disp: , Rfl:     levothyroxine (SYNTHROID) 112 mcg tablet, Take 112 mcg by mouth daily  , Disp: , Rfl:     lidocaine (LIDODERM) 5 %, Place 1 patch on the skin daily Remove & Discard patch within 12 hours or as directed by MD, Disp: 30 patch, Rfl: 0    montelukast (SINGULAIR) 10 mg tablet, Take 10 mg by mouth daily at bedtime  , Disp: , Rfl:     nitroglycerin (NITROSTAT) 0 4 mg SL tablet, Place 0 4 mg under the tongue every 5 (five) minutes as needed for chest pain , Disp: , Rfl:     simvastatin (ZOCOR) 10 mg tablet, Take 10 mg by mouth daily at bedtime  , Disp: , Rfl:     SYMBICORT 160-4 5 MCG/ACT inhaler, , Disp: , Rfl: 0    UNKNOWN TO PATIENT, Allegra daily po Stool softener bid po Tumeric 0 500 daily po, Disp: , Rfl:     albuterol (ACCUNEB) 1 25 MG/3ML nebulizer solution, Take 1 ampule by nebulization every 6 (six) hours as needed for wheezing   (Patient not taking: Reported on 1/3/2022 ), Disp: , Rfl:     cyclobenzaprine (FLEXERIL) 10 mg tablet, Take 1 tablet (10 mg total) by mouth 2 (two) times a day as needed for muscle spasms (Patient not taking: Reported on 4/14/2022 ), Disp: 20 tablet, Rfl: 0    Methylprednisolone 4 MG TBPK, Take as directed (Patient not taking: Reported on 1/3/2022 ), Disp: , Rfl: 0    montelukast (SINGULAIR) 10 mg tablet, Take by mouth (Patient not taking: Reported on 1/3/2022 ), Disp: , Rfl:     simvastatin (ZOCOR) 80 mg tablet, Take by mouth (Patient not taking: Reported on 1/3/2022 ), Disp: , Rfl:     SYNTHROID 125 MCG tablet, take 1 tablet by mouth daily 1 hour before meals or 2 hours after meals (Patient not taking: Reported on 1/3/2022), Disp: , Rfl: 0    Allergies   Allergen Reactions    Penicillins     Latex Rash       Objective:  Vitals:    04/14/22 0924   BP: 129/73   Pulse: 61       Ortho Exam    Physical Exam  Vitals and nursing note reviewed  Constitutional:       Appearance: She is well-developed  HENT:      Head: Normocephalic and atraumatic  Eyes:      General: No scleral icterus  Conjunctiva/sclera: Conjunctivae normal    Cardiovascular:      Rate and Rhythm: Normal rate  Pulmonary:      Effort: Pulmonary effort is normal  No respiratory distress  Musculoskeletal:      Cervical back: Normal range of motion and neck supple  Comments: As noted in HPI   Skin:     General: Skin is warm and dry  Neurological:      Mental Status: She is alert and oriented to person, place, and time  Psychiatric:         Behavior: Behavior normal          Large joint arthrocentesis: L knee  Universal Protocol:  Consent: Verbal consent obtained    Risks and benefits: risks, benefits and alternatives were discussed  Consent given by: patient  Site marked: the operative site was marked  Supporting Documentation  Indications: pain   Procedure Details  Location: knee - L knee  Needle size: 22 G  Ultrasound guidance: no  Approach: anterolateral  Medications administered: 20 mg Sodium Hyaluronate 20 MG/2ML    Patient tolerance: patient tolerated the procedure well with no immediate complications  Dressing:  Sterile dressing applied

## 2022-04-21 ENCOUNTER — OFFICE VISIT (OUTPATIENT)
Dept: OBGYN CLINIC | Facility: CLINIC | Age: 82
End: 2022-04-21
Payer: MEDICARE

## 2022-04-21 ENCOUNTER — APPOINTMENT (OUTPATIENT)
Dept: RADIOLOGY | Facility: CLINIC | Age: 82
End: 2022-04-21
Payer: MEDICARE

## 2022-04-21 VITALS
SYSTOLIC BLOOD PRESSURE: 179 MMHG | HEIGHT: 61 IN | DIASTOLIC BLOOD PRESSURE: 74 MMHG | WEIGHT: 175 LBS | HEART RATE: 53 BPM | BODY MASS INDEX: 33.04 KG/M2

## 2022-04-21 DIAGNOSIS — M41.9 SCOLIOSIS OF LUMBAR SPINE, UNSPECIFIED SCOLIOSIS TYPE: ICD-10-CM

## 2022-04-21 DIAGNOSIS — M54.41 ACUTE RIGHT-SIDED LOW BACK PAIN WITH RIGHT-SIDED SCIATICA: Primary | ICD-10-CM

## 2022-04-21 DIAGNOSIS — M17.12 PRIMARY LOCALIZED OSTEOARTHRITIS OF LEFT KNEE: ICD-10-CM

## 2022-04-21 DIAGNOSIS — M54.50 LOW BACK PAIN, UNSPECIFIED BACK PAIN LATERALITY, UNSPECIFIED CHRONICITY, UNSPECIFIED WHETHER SCIATICA PRESENT: ICD-10-CM

## 2022-04-21 PROCEDURE — 72100 X-RAY EXAM L-S SPINE 2/3 VWS: CPT

## 2022-04-21 PROCEDURE — 20610 DRAIN/INJ JOINT/BURSA W/O US: CPT | Performed by: ORTHOPAEDIC SURGERY

## 2022-04-21 PROCEDURE — 99214 OFFICE O/P EST MOD 30 MIN: CPT | Performed by: ORTHOPAEDIC SURGERY

## 2022-04-21 RX ORDER — HYALURONATE SODIUM 10 MG/ML
20 SYRINGE (ML) INTRAARTICULAR
Status: COMPLETED | OUTPATIENT
Start: 2022-04-21 | End: 2022-04-21

## 2022-04-21 RX ADMIN — Medication 20 MG: at 09:21

## 2022-04-21 NOTE — PROGRESS NOTES
Assessment/Plan:  1  Acute right-sided low back pain with right-sided sciatica  XR spine lumbar 2 or 3 views injury    MRI lumbar spine wo contrast    Brace   2  Primary localized osteoarthritis of left knee     3  Scoliosis of lumbar spine, unspecified scoliosis type  Brace       Som Moscoso has low back pain and right lumbar radiculopathy which I do think is coming from her lumbar spine  She has severe degenerative changes in increasing scoliosis of the low back in comparison to her films 4 years ago  I do think it is a high likelihood she is experiencing some stenosis and radiculopathy on the right side  I would like an MRI of her lumbar spine further evaluation at this time  We also gave her a lumbar support orthosis brace which may give her some support throughout the day and relieve some of her pain with standing  She also was given her third Euflexxa injection left knee today tolerated this injection well we could repeat these Visco supplement injections in 6 months if clinically indicated  Large joint arthrocentesis: L knee  Universal Protocol:  Consent: Verbal consent obtained  Risks and benefits: risks, benefits and alternatives were discussed  Consent given by: patient  Site marked: the operative site was marked  Supporting Documentation  Indications: pain   Procedure Details  Location: knee - L knee  Needle size: 22 G  Ultrasound guidance: no  Approach: anterolateral  Medications administered: 20 mg Sodium Hyaluronate 20 MG/2ML    Patient tolerance: patient tolerated the procedure well with no immediate complications  Dressing:  Sterile dressing applied            Subjective:   Heather Albert is a 80 y o  female who presents to the office for follow-up for low back and right-sided hip pain  She was last seen in the office for her hip 3 weeks ago where she was given a trochanteric bursa cortisone injection    She states that this gave her some relief in the lateral portion of her right hip but she still has some pain in the posterior portion of her right buttock and the pain radiates down her right leg  She denies any recent injury or trauma or fall  She does have a prolonged history of low back pain and scoliosis  For last x-ray of her lumbar spine was in 2018  She denies any numbness or tingling down her legs  She denies any weakness  She feels discomfort trying to stand up straight and feels like back brace may help her  Review of Systems   Constitutional: Negative for chills, fever and unexpected weight change  HENT: Negative for hearing loss, nosebleeds and sore throat  Eyes: Negative for pain, redness and visual disturbance  Respiratory: Negative for cough, shortness of breath and wheezing  Cardiovascular: Negative for chest pain, palpitations and leg swelling  Gastrointestinal: Negative for abdominal pain, nausea and vomiting  Endocrine: Negative for polydipsia and polyuria  Genitourinary: Negative for dysuria and hematuria  Musculoskeletal:        See HPI   Skin: Negative for rash and wound  Neurological: Negative for dizziness, numbness and headaches  Psychiatric/Behavioral: Negative for decreased concentration and suicidal ideas  The patient is not nervous/anxious            Past Medical History:   Diagnosis Date    Asthma     Cardiac disease     heart attack    Disease of thyroid gland     Hyperlipidemia        Past Surgical History:   Procedure Laterality Date    APPENDECTOMY      BREAST LUMPECTOMY Bilateral     several    CARDIAC SURGERY      CATARACT EXTRACTION, BILATERAL      CORONARY STENT PLACEMENT      NASAL POLYP SURGERY         Family History   Problem Relation Age of Onset    ALS Father     Cancer Brother     Heart disease Family     Asthma Family        Social History     Occupational History    Not on file   Tobacco Use    Smoking status: Never Smoker    Smokeless tobacco: Never Used   Vaping Use    Vaping Use: Never used Substance and Sexual Activity    Alcohol use: Yes    Drug use: No    Sexual activity: Not on file         Current Outpatient Medications:     albuterol (2 5 mg/3 mL) 0 083 % nebulizer solution, Take 1 vial (2 5 mg total) by nebulization 2 (two) times a day, Disp: 60 vial, Rfl: 0    albuterol (ProAir HFA) 90 mcg/act inhaler, Inhale 2 puffs every 4 (four) hours as needed for wheezing, Disp: 8 5 g, Rfl: 8    aspirin (ASPIR-81) 81 mg EC tablet, Take by mouth, Disp: , Rfl:     aspirin 81 MG tablet, Take 81 mg by mouth daily  , Disp: , Rfl:     budesonide (PULMICORT) 0 25 mg/2 mL nebulizer solution, USE 1 VIAL  IN  NEBULIZER TWICE  DAILY - Rinse mouth out after use, Disp: 60 mL, Rfl: 11    celecoxib (CeleBREX) 100 mg capsule, Take 1 capsule (100 mg total) by mouth 2 (two) times a day, Disp: 60 capsule, Rfl: 0    Cholecalciferol (VITAMIN D) 2000 UNITS tablet, Take 2,000 Units by mouth daily  , Disp: , Rfl:     EPINEPHrine (EPIPEN) 0 3 mg/0 3 mL SOAJ, Inject 0 3 mg into a muscle, Disp: , Rfl:     Ergocalciferol (VITAMIN D2 PO), Take by mouth, Disp: , Rfl:     fexofenadine (ALLEGRA) 60 MG tablet, Take by mouth, Disp: , Rfl:     levothyroxine (SYNTHROID) 100 mcg tablet, Take by mouth  , Disp: , Rfl:     levothyroxine (SYNTHROID) 112 mcg tablet, Take 112 mcg by mouth daily  , Disp: , Rfl:     lidocaine (LIDODERM) 5 %, Place 1 patch on the skin daily Remove & Discard patch within 12 hours or as directed by MD, Disp: 30 patch, Rfl: 0    montelukast (SINGULAIR) 10 mg tablet, Take 10 mg by mouth daily at bedtime  , Disp: , Rfl:     nitroglycerin (NITROSTAT) 0 4 mg SL tablet, Place 0 4 mg under the tongue every 5 (five) minutes as needed for chest pain , Disp: , Rfl:     simvastatin (ZOCOR) 10 mg tablet, Take 10 mg by mouth daily at bedtime  , Disp: , Rfl:     SYMBICORT 160-4 5 MCG/ACT inhaler, , Disp: , Rfl: 0    UNKNOWN TO PATIENT, Allegra daily po Stool softener bid po Tumeric 0 500 daily po, Disp: , Rfl:    albuterol (ACCUNEB) 1 25 MG/3ML nebulizer solution, Take 1 ampule by nebulization every 6 (six) hours as needed for wheezing  (Patient not taking: Reported on 1/3/2022 ), Disp: , Rfl:     cyclobenzaprine (FLEXERIL) 10 mg tablet, Take 1 tablet (10 mg total) by mouth 2 (two) times a day as needed for muscle spasms (Patient not taking: Reported on 4/14/2022 ), Disp: 20 tablet, Rfl: 0    Methylprednisolone 4 MG TBPK, Take as directed (Patient not taking: Reported on 1/3/2022 ), Disp: , Rfl: 0    montelukast (SINGULAIR) 10 mg tablet, Take by mouth (Patient not taking: Reported on 1/3/2022 ), Disp: , Rfl:     simvastatin (ZOCOR) 80 mg tablet, Take by mouth (Patient not taking: Reported on 1/3/2022 ), Disp: , Rfl:     SYNTHROID 125 MCG tablet, take 1 tablet by mouth daily 1 hour before meals or 2 hours after meals (Patient not taking: Reported on 1/3/2022), Disp: , Rfl: 0    Allergies   Allergen Reactions    Penicillins     Latex Rash       Objective:  Vitals:    04/21/22 0812   BP: (!) 179/74   Pulse: (!) 53       Back Exam     Tenderness   The patient is experiencing tenderness in the lumbar  Range of Motion   Extension: normal   Flexion: normal     Muscle Strength   Right Quadriceps:  5/5   Left Quadriceps:  5/5   Right Hamstrings:  5/5   Left Hamstrings:  5/5     Tests   Straight leg raise right: negative  Straight leg raise left: negative    Other   Sensation: normal  Gait: normal   Erythema: no back redness            Physical Exam  Vitals and nursing note reviewed  Constitutional:       Appearance: She is well-developed  HENT:      Head: Normocephalic and atraumatic  Eyes:      General: No scleral icterus  Conjunctiva/sclera: Conjunctivae normal    Cardiovascular:      Rate and Rhythm: Normal rate  Pulmonary:      Effort: Pulmonary effort is normal  No respiratory distress  Musculoskeletal:      Cervical back: Normal range of motion and neck supple        Lumbar back: Negative right straight leg raise test and negative left straight leg raise test       Comments: As noted in HPI   Skin:     General: Skin is warm and dry  Neurological:      Mental Status: She is alert and oriented to person, place, and time  Psychiatric:         Behavior: Behavior normal          I have personally reviewed pertinent films in PACS and my interpretation is as follows:   Three-view x-rays of the lumbar spine demonstrate increasing scoliosis with convexity to the left

## 2022-05-13 ENCOUNTER — HOSPITAL ENCOUNTER (OUTPATIENT)
Dept: RADIOLOGY | Facility: HOSPITAL | Age: 82
Discharge: HOME | End: 2022-05-13
Attending: ORTHOPAEDIC SURGERY
Payer: MEDICARE

## 2022-05-13 DIAGNOSIS — M54.41 ACUTE RIGHT-SIDED LOW BACK PAIN WITH RIGHT-SIDED SCIATICA: ICD-10-CM

## 2022-05-13 PROCEDURE — 72148 MRI LUMBAR SPINE W/O DYE: CPT

## 2022-05-13 PROCEDURE — G1004 CDSM NDSC: HCPCS

## 2022-05-19 ENCOUNTER — OFFICE VISIT (OUTPATIENT)
Dept: OBGYN CLINIC | Facility: CLINIC | Age: 82
End: 2022-05-19
Payer: MEDICARE

## 2022-05-19 VITALS
BODY MASS INDEX: 33.04 KG/M2 | DIASTOLIC BLOOD PRESSURE: 75 MMHG | SYSTOLIC BLOOD PRESSURE: 139 MMHG | WEIGHT: 175 LBS | HEIGHT: 61 IN | HEART RATE: 64 BPM

## 2022-05-19 DIAGNOSIS — M41.9 SCOLIOSIS OF LUMBAR SPINE, UNSPECIFIED SCOLIOSIS TYPE: Primary | ICD-10-CM

## 2022-05-19 DIAGNOSIS — M48.061 SPINAL STENOSIS OF LUMBAR REGION WITHOUT NEUROGENIC CLAUDICATION: ICD-10-CM

## 2022-05-19 PROCEDURE — 99213 OFFICE O/P EST LOW 20 MIN: CPT | Performed by: ORTHOPAEDIC SURGERY

## 2022-05-19 RX ORDER — AMLODIPINE BESYLATE 2.5 MG/1
2.5 TABLET ORAL DAILY
COMMUNITY
Start: 2022-04-25

## 2022-05-19 NOTE — PROGRESS NOTES
Assessment/Plan:  1  Scoliosis of lumbar spine, unspecified scoliosis type  Ambulatory referral to Pain Management   2  Spinal stenosis of lumbar region without neurogenic claudication  Ambulatory referral to Pain Management     Estelita Ibanez has low back pain and an MRI demonstrating scoliosis and severe degenerative changes and multiple levels of spinal stenosis  I discussed with her that she could consider evaluation with pain management for potential injection in her lumbar spine  She states that right now she does not feel like she needs this but she will consider this if the pain increases in the future  Referral for pain management placed in the chart  Follow up as needed  Subjective:   Nita Sarkar is a 80 y o  female who presents to the office for follow-up for low back pain  She had been experiencing ongoing low back pain and intermittent sciatica down both sides  At last office visit she had severely increased back pain and had failed conservative measures and we placed her in a lumbar support back brace  We also ordered an MRI of her lumbar spine  She states that the back brace has been significantly helpful and she no longer has any pain  She no longer has any numbness or tingling down her legs  She likes to wear the brace but it does get uncomfortable at times  She denies any new injury  Review of Systems   Constitutional: Negative for chills, fever and unexpected weight change  HENT: Negative for hearing loss, nosebleeds and sore throat  Eyes: Negative for pain, redness and visual disturbance  Respiratory: Negative for cough, shortness of breath and wheezing  Cardiovascular: Negative for chest pain, palpitations and leg swelling  Gastrointestinal: Negative for abdominal pain, nausea and vomiting  Endocrine: Negative for polydipsia and polyuria  Genitourinary: Negative for dysuria and hematuria  Musculoskeletal:        See HPI   Skin: Negative for rash and wound  Neurological: Negative for dizziness, numbness and headaches  Psychiatric/Behavioral: Negative for decreased concentration and suicidal ideas  The patient is not nervous/anxious  Past Medical History:   Diagnosis Date    Asthma     Cardiac disease     heart attack    Disease of thyroid gland     Hyperlipidemia        Past Surgical History:   Procedure Laterality Date    APPENDECTOMY      BREAST LUMPECTOMY Bilateral     several    CARDIAC SURGERY      CATARACT EXTRACTION, BILATERAL      CORONARY STENT PLACEMENT      NASAL POLYP SURGERY         Family History   Problem Relation Age of Onset    ALS Father     Cancer Brother     Heart disease Family     Asthma Family        Social History     Occupational History    Not on file   Tobacco Use    Smoking status: Never Smoker    Smokeless tobacco: Never Used   Vaping Use    Vaping Use: Never used   Substance and Sexual Activity    Alcohol use: Yes    Drug use: No    Sexual activity: Not on file         Current Outpatient Medications:     albuterol (2 5 mg/3 mL) 0 083 % nebulizer solution, Take 1 vial (2 5 mg total) by nebulization 2 (two) times a day, Disp: 60 vial, Rfl: 0    albuterol (ProAir HFA) 90 mcg/act inhaler, Inhale 2 puffs every 4 (four) hours as needed for wheezing, Disp: 8 5 g, Rfl: 8    amLODIPine (NORVASC) 2 5 mg tablet, Take 2 5 mg by mouth in the morning , Disp: , Rfl:     aspirin 81 MG tablet, Take 81 mg by mouth daily  , Disp: , Rfl:     budesonide (PULMICORT) 0 25 mg/2 mL nebulizer solution, USE 1 VIAL  IN  NEBULIZER TWICE  DAILY - Rinse mouth out after use, Disp: 60 mL, Rfl: 11    Cholecalciferol (VITAMIN D) 2000 UNITS tablet, Take 2,000 Units by mouth daily  , Disp: , Rfl:     EPINEPHrine (EPIPEN) 0 3 mg/0 3 mL SOAJ, Inject 0 3 mg into a muscle, Disp: , Rfl:     Ergocalciferol (VITAMIN D2 PO), Take by mouth, Disp: , Rfl:     fexofenadine (ALLEGRA) 60 MG tablet, Take by mouth, Disp: , Rfl:     levothyroxine 112 mcg tablet, Take 112 mcg by mouth daily  , Disp: , Rfl:     lidocaine (LIDODERM) 5 %, Place 1 patch on the skin daily Remove & Discard patch within 12 hours or as directed by MD, Disp: 30 patch, Rfl: 0    montelukast (SINGULAIR) 10 mg tablet, Take 10 mg by mouth daily at bedtime  , Disp: , Rfl:     nitroglycerin (NITROSTAT) 0 4 mg SL tablet, Place 0 4 mg under the tongue every 5 (five) minutes as needed for chest pain , Disp: , Rfl:     simvastatin (ZOCOR) 10 mg tablet, Take 10 mg by mouth daily at bedtime  , Disp: , Rfl:     SYMBICORT 160-4 5 MCG/ACT inhaler, , Disp: , Rfl: 0    SYNTHROID 125 MCG tablet, take 1 tablet by mouth daily 1 hour before meals or 2 hours after meals, Disp: , Rfl: 0    UNKNOWN TO PATIENT, Allegra daily po Stool softener bid po Tumeric 0 500 daily po, Disp: , Rfl:     Allergies   Allergen Reactions    Penicillins     Latex Rash       Objective:  Vitals:    05/19/22 0807   BP: 139/75   Pulse: 64       Back Exam     Tenderness   The patient is experiencing no tenderness  Muscle Strength   Right Quadriceps:  5/5   Left Quadriceps:  5/5   Right Hamstrings:  5/5   Left Hamstrings:  5/5     Tests   Straight leg raise right: negative  Straight leg raise left: negative    Other   Sensation: normal  Gait: normal   Erythema: no back redness            Physical Exam  Vitals and nursing note reviewed  Constitutional:       Appearance: She is well-developed  HENT:      Head: Normocephalic and atraumatic  Eyes:      General: No scleral icterus  Conjunctiva/sclera: Conjunctivae normal    Cardiovascular:      Rate and Rhythm: Normal rate  Pulmonary:      Effort: Pulmonary effort is normal  No respiratory distress  Musculoskeletal:      Cervical back: Normal range of motion and neck supple  Lumbar back: Negative right straight leg raise test and negative left straight leg raise test       Comments: As noted in HPI   Skin:     General: Skin is warm and dry     Neurological: Mental Status: She is alert and oriented to person, place, and time  Psychiatric:         Behavior: Behavior normal          I have personally reviewed pertinent films in PACS and my interpretation is as follows:  MRI of the lumbar spine demonstrates multiple levels of spondylosis and spinal stenosis    Lumbar scoliosis

## 2022-06-13 ENCOUNTER — APPOINTMENT (OUTPATIENT)
Dept: LAB | Facility: HOSPITAL | Age: 82
End: 2022-06-13
Payer: MEDICARE

## 2022-06-13 DIAGNOSIS — E03.9 HYPOTHYROIDISM, UNSPECIFIED TYPE: ICD-10-CM

## 2022-06-13 LAB — TSH SERPL DL<=0.05 MIU/L-ACNC: 0.01 UIU/ML (ref 0.45–4.5)

## 2022-06-13 PROCEDURE — 84443 ASSAY THYROID STIM HORMONE: CPT

## 2022-06-13 PROCEDURE — 36415 COLL VENOUS BLD VENIPUNCTURE: CPT

## 2022-07-07 ENCOUNTER — APPOINTMENT (OUTPATIENT)
Dept: LAB | Facility: HOSPITAL | Age: 82
End: 2022-07-07
Payer: MEDICARE

## 2022-07-07 DIAGNOSIS — E03.4 IDIOPATHIC ATROPHIC HYPOTHYROIDISM: ICD-10-CM

## 2022-07-07 LAB — TSH SERPL DL<=0.05 MIU/L-ACNC: 0.01 UIU/ML (ref 0.45–4.5)

## 2022-07-07 PROCEDURE — 36415 COLL VENOUS BLD VENIPUNCTURE: CPT

## 2022-07-07 PROCEDURE — 84443 ASSAY THYROID STIM HORMONE: CPT

## 2022-09-12 ENCOUNTER — APPOINTMENT (OUTPATIENT)
Dept: LAB | Facility: HOSPITAL | Age: 82
End: 2022-09-12
Payer: MEDICARE

## 2022-09-12 DIAGNOSIS — E03.4 IDIOPATHIC ATROPHIC HYPOTHYROIDISM: ICD-10-CM

## 2022-09-12 LAB
ALBUMIN SERPL BCP-MCNC: 3.4 G/DL (ref 3.5–5)
ALP SERPL-CCNC: 95 U/L (ref 46–116)
ALT SERPL W P-5'-P-CCNC: 18 U/L (ref 12–78)
ANION GAP SERPL CALCULATED.3IONS-SCNC: 9 MMOL/L (ref 4–13)
AST SERPL W P-5'-P-CCNC: 24 U/L (ref 5–45)
BILIRUB SERPL-MCNC: 0.46 MG/DL (ref 0.2–1)
BUN SERPL-MCNC: 12 MG/DL (ref 5–25)
CALCIUM ALBUM COR SERPL-MCNC: 9.5 MG/DL (ref 8.3–10.1)
CALCIUM SERPL-MCNC: 9 MG/DL (ref 8.3–10.1)
CHLORIDE SERPL-SCNC: 108 MMOL/L (ref 96–108)
CO2 SERPL-SCNC: 26 MMOL/L (ref 21–32)
CREAT SERPL-MCNC: 0.58 MG/DL (ref 0.6–1.3)
ERYTHROCYTE [DISTWIDTH] IN BLOOD BY AUTOMATED COUNT: 13.4 % (ref 11.6–15.1)
GFR SERPL CREATININE-BSD FRML MDRD: 87 ML/MIN/1.73SQ M
GLUCOSE P FAST SERPL-MCNC: 96 MG/DL (ref 65–99)
HCT VFR BLD AUTO: 42.9 % (ref 34.8–46.1)
HGB BLD-MCNC: 13.7 G/DL (ref 11.5–15.4)
MCH RBC QN AUTO: 29 PG (ref 26.8–34.3)
MCHC RBC AUTO-ENTMCNC: 31.9 G/DL (ref 31.4–37.4)
MCV RBC AUTO: 91 FL (ref 82–98)
PLATELET # BLD AUTO: 266 THOUSANDS/UL (ref 149–390)
PMV BLD AUTO: 9.8 FL (ref 8.9–12.7)
POTASSIUM SERPL-SCNC: 3.7 MMOL/L (ref 3.5–5.3)
PROT SERPL-MCNC: 7.1 G/DL (ref 6.4–8.4)
RBC # BLD AUTO: 4.72 MILLION/UL (ref 3.81–5.12)
SODIUM SERPL-SCNC: 143 MMOL/L (ref 135–147)
TSH SERPL DL<=0.05 MIU/L-ACNC: 0.02 UIU/ML (ref 0.45–4.5)
WBC # BLD AUTO: 6.26 THOUSAND/UL (ref 4.31–10.16)

## 2022-09-12 PROCEDURE — 84443 ASSAY THYROID STIM HORMONE: CPT

## 2022-09-12 PROCEDURE — 36415 COLL VENOUS BLD VENIPUNCTURE: CPT

## 2022-09-12 PROCEDURE — 85027 COMPLETE CBC AUTOMATED: CPT

## 2022-09-12 PROCEDURE — 80053 COMPREHEN METABOLIC PANEL: CPT

## 2022-09-20 ENCOUNTER — HOSPITAL ENCOUNTER (OUTPATIENT)
Dept: RADIOLOGY | Facility: HOSPITAL | Age: 82
Discharge: HOME | End: 2022-09-20
Payer: MEDICARE

## 2022-09-20 DIAGNOSIS — M79.662 PAIN IN LEFT LOWER LEG: ICD-10-CM

## 2022-09-20 DIAGNOSIS — M79.89 OTHER SPECIFIED SOFT TISSUE DISORDERS: ICD-10-CM

## 2022-09-20 PROCEDURE — 93971 EXTREMITY STUDY: CPT

## 2022-09-20 PROCEDURE — 93971 EXTREMITY STUDY: CPT | Performed by: SURGERY

## 2022-09-29 ENCOUNTER — EVALUATION (OUTPATIENT)
Dept: PHYSICAL THERAPY | Facility: CLINIC | Age: 82
End: 2022-09-29
Payer: MEDICARE

## 2022-09-29 DIAGNOSIS — M54.16 LUMBAR RADICULOPATHY: Primary | ICD-10-CM

## 2022-09-29 PROCEDURE — 97161 PT EVAL LOW COMPLEX 20 MIN: CPT

## 2022-09-29 NOTE — PROGRESS NOTES
PT Evaluation     Today's date: 2022  Patient name: Nel Drake  : 1940  MRN: [id number]  Referring provider: Mariano Hameed MD  Dx:   Encounter Diagnosis     ICD-10-CM    1  Lumbar radiculopathy  M54 16          Assessment  Assessment details: Patient is a 80 y o  Female who presents to skilled outpatient PT with referring diagnosis of lumbar radiculopathy  Primary movement impairment diagnosis of hypomobility of lumbar spine, decreased endurance, decreased functional mobility resulting in pathoanatomical symptoms of L LE sensitivity due to cardiac surgery, and lumbar spine pain  The aforementioned impairments have limited the patient's ability to garden, stair navigate, sleep through the night due to lumbar spine pain, lift objects of weight, walk without stopping  Patient education performed during today's session included: HEP as noted on flow sheet, nature of lumbar radiculopathy, and postural education  Patient verbalized understanding of POC      Impairments: Abnormal or restricted ROM, Impaired physical strength, Lacks appropriate HEP, Poor posture, Poor body mechanics and Pain with function  Understanding of Dx/Px/POC: Excellent  Prognosis: Good      Plan  Patient would benefit from: PT Eval and Skilled PT  Planned modality interventions: Biofeedback, Cryotherapy, TENS, Thermotherapy: Hydrocollator Packs and Traction  Planned therapy interventions: ADL training, Balance, Balance/WB training, Body mechanics training, Coordination, Functional ROM exercises, Gait training, HEP, Joint mobilization, Manual therapy, Robert taping, Neuromuscular re-education, Patient education, Postural training, Strengthening, Stretching, Therapeutic activities, Therapeutic exercises, Therapeutic training, Transfer training and Activity modification  Frequency: 2x/wk  Duration in weeks: 12  Plan of Care beginning date: 2022  Plan of Care expiration date: 12 weeks - 2022  Treatment plan discussed with: Patient       Goals  Short Term Goals (4 weeks):    - Patient will be independent in basic HEP 2-3 weeks  - Patient will have 0/10 pain at rest  - Patient will demonstrate >1/3 improvement in MMT grade as applicable  - Patient functional goal: Patient will be able to walk around the block with no increased pain  Long Term Goals (8 weeks):  - Patient will be independent in a comprehensive home exercise program  - Patient FOTO score will improve to 68/100  - Patient will self-report >75% improvement in function  - Patient functional goal: Patient will garden with minimal lumbar spine pain  - Patient will perform floor to stand transfer independently  Subjective    History of Present Illness  - Mechanism of injury: Patient reports several year history of lumbar spine pain  She had MRI performed of her lumbar spine  She also has bursitis of B hips, B knee pain  She is receiving injections of her L knee, which helps with her pain  She recently had a fall when she was coming back from the garden, falling up the stairs  She has swelling of her L foot  She is finally able to put shoes on  Patient walks with her daughter every weekend and has to stop several times to get around the block  Patient reports radicular symptoms down into B LEs  She has a lumbar spine brace and has heard mixed opinions on whether she should wear it  She denies numbness/tingling of her LEs  She has radicular symptoms down past her knees  She has increased tenderness since her heart surgery in 2014, as they took the saphenous vein from her L LE  Patient denies night pain, bowel/bladder changes  She is able to take Advil PM to sleep at night      - Functional limitations: walking around the block, lifting objects of weight, standing greater than 20 minutes, stairs     - Patient goals: "Decrease some of the pain and be able to walk more " She walks the neighborhood with her daughter every weekend       Pain  - Current pain ratin/10  - At best pain ratin/10  - At worst pain ratin/10  - Location: lumbar spine   - Alleviating factors:  Advil PM to sleep, salon pas, injections     Social Support  - Steps to enter house: 3  - Stairs in house: 12   - Bedroom/bathroom set up: second floor   - Lives in: multi-level ome   - Lives with:       Objective   LE MMT  - L Hip Flexion: 3+/5   R Hip Flexion: 3+/5  - L Hip Extension: 3+/5  R Hip Extension: 3+/5  - L Hip Abduction: 3+/5  R Hip Abduction: 3+/5  - L Hip Adduction: 3+/5  R Hip Adduction: 3+/5  - L Hip IR: 3+/5   R Hip IR: 3+/5  - L Hip ER: 3+/5   R Hip ER: 3+/5  - L Knee Extension: 3/5  R Knee Extension: 3+/5  - L Knee Flexion: 3+/5   R Knee Flexion: 3+/5  - L Ankle DF: 3+/5   R Ankle DF: 3+/5  - L Ankle PF: 3+/5   R Ankle PF: 3+/5      Movement Loss Cort[place] Mod Min Nil Symptoms   Flexion    X    Extension   X     Side gliding R   X     Side gliding L    X    Other           Symptom response Mechanical Response    During testing After testing Effect (?? ROM or key functional test) No effect   Pretest symptoms in standing       FIS Increased  worse     RFIS       EIS Increased  centralized Increased lumbar spine ROM    VANIA              Pretest symptoms lying       TIANA       RFIL       EIL       REIL              Pretest symptoms       R SGIS       R RSGIS       L SGIS       L RSGIS              Other movements             Sensation  - Light touch: intact     Palpation   - L saphenous nerve tract, L adductor muscle group, TTP spinous processes L4-L5, B PSIS, B greater trochanter, L popliteal space       Special Testing L R   FABIR negative positive    FADIR negative negative   Hip Scour  negative negative   Thigh thrust negative negative   Flick test  negative negative   Standing flexion test  negative negative   Prone knee flexion test NT NT   Supine to long sit test NT NT   Neurodynamic assessment  (+) saphenous nerve  (-) globally          Precautions: LATEX ALLERGY     Past Medical History:   Diagnosis Date    Asthma     Cardiac disease     heart attack    Disease of thyroid gland     Hyperlipidemia          Date 9/29/2022        Visit Number IE                 Manual         P-A mobs         Desensitization of L saphenous nerve Performed with brush for HEP by patient                          Neuro Re-ed         TrA progression         Hip add squeeze         Clamshells          Bridge          Saphenous nerve sliders 10                  Thera Ex         VANIA/REIL 10 along counter        TB shoulder ext         TB shoulder row         Paloff press          Anti-rotation press                                    Thera Activity         Patient education         Lifting mechanics         Posture education                                                      Modalities                    Insurance:  AMA/CMS Eval/ Re-eval POC expires Doc Flatten #/ Referral # Total   Visits  Start date  Expiration date Extension  Visit limitation? PT only or  PT+OT?  Co-Insurance   MCR 9/29 12/12  No auth  9/29   BOMN PT Yes

## 2022-09-29 NOTE — LETTER
October 3, 2022    MD [place] [address]    Patient: Dorian Bowman   YOB: 1940   Date of Visit: 2022     Encounter Diagnosis     ICD-10-CM    1  Lumbar radiculopathy  M54 16        Dear Dr Garret Malik: Thank you for your recent referral of Dorian Bowman  Please review the attached evaluation summary from Kathryn's recent visit  Please verify that you agree with the plan of care by signing the attached order  If you have any questions or concerns, please do not hesitate to call  I sincerely appreciate the opportunity to share in the care of one of your patients and hope to have another opportunity to work with you in the near future  Sincerely,    Marley Montemayor, PT      Referring Provider:      I certify that I have read the below Plan of Care and certify the need for these services furnished under this plan of treatment while under my care  MD [place] [id number]  Via Fax: [phone]                 PT Evaluation     Today's date: 2022  Patient name: Dorian Bowman  : 1940  MRN: [id number]  Referring provider: Pina Gautam MD  Dx:   Encounter Diagnosis     ICD-10-CM    1  Lumbar radiculopathy  M54 16          Assessment  Assessment details: Patient is a 80 y o  Female who presents to skilled outpatient PT with referring diagnosis of lumbar radiculopathy  Primary movement impairment diagnosis of hypomobility of lumbar spine, decreased endurance, decreased functional mobility resulting in pathoanatomical symptoms of L LE sensitivity due to cardiac surgery, and lumbar spine pain  The aforementioned impairments have limited the patient's ability to garden, stair navigate, sleep through the night due to lumbar spine pain, lift objects of weight, walk without stopping   Patient education performed during today's session included: HEP as noted on flow sheet, nature of lumbar radiculopathy, and postural education  Patient verbalized understanding of POC  Impairments: Abnormal or restricted ROM, Impaired physical strength, Lacks appropriate HEP, Poor posture, Poor body mechanics and Pain with function  Understanding of Dx/Px/POC: Excellent  Prognosis: Good      Plan  Patient would benefit from: PT Eval and Skilled PT  Planned modality interventions: Biofeedback, Cryotherapy, TENS, Thermotherapy: Hydrocollator Packs and Traction  Planned therapy interventions: ADL training, Balance, Balance/WB training, Body mechanics training, Coordination, Functional ROM exercises, Gait training, HEP, Joint mobilization, Manual therapy, Robert taping, Neuromuscular re-education, Patient education, Postural training, Strengthening, Stretching, Therapeutic activities, Therapeutic exercises, Therapeutic training, Transfer training and Activity modification  Frequency: 2x/wk  Duration in weeks: 12  Plan of Care beginning date: 9/29/2022  Plan of Care expiration date: 12 weeks - 12/22/2022  Treatment plan discussed with: Patient       Goals  Short Term Goals (4 weeks):    - Patient will be independent in basic HEP 2-3 weeks  - Patient will have 0/10 pain at rest  - Patient will demonstrate >1/3 improvement in MMT grade as applicable  - Patient functional goal: Patient will be able to walk around the block with no increased pain  Long Term Goals (8 weeks):  - Patient will be independent in a comprehensive home exercise program  - Patient FOTO score will improve to 68/100  - Patient will self-report >75% improvement in function  - Patient functional goal: Patient will garden with minimal lumbar spine pain  - Patient will perform floor to stand transfer independently  Subjective    History of Present Illness  - Mechanism of injury: Patient reports several year history of lumbar spine pain  She had MRI performed of her lumbar spine   She also has bursitis of B hips, B knee pain  She is receiving injections of her L knee, which helps with her pain  She recently had a fall when she was coming back from the garden, falling up the stairs  She has swelling of her L foot  She is finally able to put shoes on  Patient walks with her daughter every weekend and has to stop several times to get around the block  Patient reports radicular symptoms down into B LEs  She has a lumbar spine brace and has heard mixed opinions on whether she should wear it  She denies numbness/tingling of her LEs  She has radicular symptoms down past her knees  She has increased tenderness since her heart surgery in , as they took the saphenous vein from her L LE  Patient denies night pain, bowel/bladder changes  She is able to take Advil PM to sleep at night      - Functional limitations: walking around the block, lifting objects of weight, standing greater than 20 minutes, stairs     - Patient goals: "Decrease some of the pain and be able to walk more " She walks the neighborhood with her daughter every weekend  Pain  - Current pain ratin/10  - At best pain ratin/10  - At worst pain ratin/10  - Location: lumbar spine   - Alleviating factors:  Advil PM to sleep, salon pas, injections     Social Support  - Steps to enter house: 3  - Stairs in house: 12   - Bedroom/bathroom set up: second floor   - Lives in: multi-level ome   - Lives with:       Objective   LE MMT  - L Hip Flexion: 3+/5   R Hip Flexion: 3+/5  - L Hip Extension: 3+/5  R Hip Extension: 3+/5  - L Hip Abduction: 3+/5  R Hip Abduction: 3+/5  - L Hip Adduction: 3+/5  R Hip Adduction: 3+/5  - L Hip IR: 3+/5   R Hip IR: 3+/5  - L Hip ER: 3+/5   R Hip ER: 3+/5  - L Knee Extension: 3/5  R Knee Extension: 3+/5  - L Knee Flexion: 3+/5   R Knee Flexion: 3+/5  - L Ankle DF: 3+/5   R Ankle DF: 3+/5  - L Ankle PF: 3+/5   R Ankle PF: 3+/5      Movement Loss [place] Mod Min Nil Symptoms   Flexion    X    Extension   X     Side gliding R X     Side gliding L    X    Other           Symptom response Mechanical Response    During testing After testing Effect (?? ROM or key functional test) No effect   Pretest symptoms in standing       FIS Increased  worse     RFIS       EIS Increased  centralized Increased lumbar spine ROM    VANIA              Pretest symptoms lying       TIANA       RFIL       EIL       REIL              Pretest symptoms       R SGIS       R RSGIS       L SGIS       L RSGIS              Other movements             Sensation  - Light touch: intact     Palpation   - L saphenous nerve tract, L adductor muscle group, TTP spinous processes L4-L5, B PSIS, B greater trochanter, L popliteal space       Special Testing L R   FABIR negative positive    FADIR negative negative   Hip Scour  negative negative   Thigh thrust negative negative   Flick test  negative negative   Standing flexion test  negative negative   Prone knee flexion test NT NT   Supine to long sit test NT NT   Neurodynamic assessment  (+) saphenous nerve  (-) globally          Precautions: LATEX ALLERGY     Past Medical History:   Diagnosis Date    Asthma     Cardiac disease     heart attack    Disease of thyroid gland     Hyperlipidemia          Date 9/29/2022        Visit Number IE                 Manual         P-A mobs         Desensitization of L saphenous nerve Performed with brush for HEP by patient                          Neuro Re-ed         TrA progression         Hip add squeeze         Clamshells          Bridge          Saphenous nerve sliders 10                  Thera Ex         VANIA/REIL 10 along counter        TB shoulder ext         TB shoulder row         Paloff press          Anti-rotation press                                    Thera Activity         Patient education         Lifting mechanics         Posture education                                                      Modalities                    Insurance:  AMA/CMS Eval/ Re-eval POC expires FOTO Auth #/ Referral # Total   Visits  Start date  Expiration date Extension  Visit limitation? PT only or  PT+OT?  Co-Insurance   MCR 9/29 12/12  No auth  9/29   BOMN PT Yes

## 2022-10-04 ENCOUNTER — OFFICE VISIT (OUTPATIENT)
Dept: PHYSICAL THERAPY | Facility: CLINIC | Age: 82
End: 2022-10-04
Payer: MEDICARE

## 2022-10-04 DIAGNOSIS — M54.16 LUMBAR RADICULOPATHY: Primary | ICD-10-CM

## 2022-10-04 PROCEDURE — 97112 NEUROMUSCULAR REEDUCATION: CPT

## 2022-10-04 PROCEDURE — 97110 THERAPEUTIC EXERCISES: CPT

## 2022-10-04 NOTE — PROGRESS NOTES
Daily Note     Today's date: 10/4/2022  Patient name: Vinod Quintana  : 1940  MRN: [id number]  Referring provider: Jos Russo MD  Dx:   Encounter Diagnosis     ICD-10-CM    1  Lumbar radiculopathy  M54 16        Start Time: 08  Stop Time: 06  Total time in clinic (min): 40 minutes    Subjective: Patient states her back is "okay today " Patient reports 5/10 pain in her lumbar spine today  She reports the pain on the medial aspect of her L LE is "better" but there are still some sore spots  Objective: See treatment diary below      Assessment: Tolerated treatment well  Patient notes increased soreness of B hip musculature at the end of today's session  No increased lumbar spine pain noted post tx  Discussed DOMs and affect of treatment session for the next several days  Patient verbalized understanding  Patient would benefit from continued PT      Plan: Continue per plan of care        Precautions: LATEX ALLERGY     Past Medical History:   Diagnosis Date    Asthma     Cardiac disease     heart attack    Disease of thyroid gland     Hyperlipidemia          Date 2022 10/4/2022       Visit Number IE 2                Manual         P-A mobs         Desensitization of L saphenous nerve Performed with brush for HEP by patient                          Neuro Re-ed         TrA progression         Hip add squeeze  20       Clamshells   Hooklying 2x10 GTB       Bridge          Saphenous nerve sliders 10  Performed by PT 2x30s L only                Thera Ex         VANIA/REIL 10 along counter 10 along plinth table       TB shoulder ext  10 GTB       TB shoulder row  10 GTB       Paloff press   10 B GTB       Step ups   4" x10 B       Lateral step ups  4" x10 B       SOC  x10       Side stepping  4x10'                 Thera Activity         Patient education         Lifting mechanics         Posture education                                                      Modalities Insurance:  AMA/CMS Eval/ Re-eval POC expires Bryant Andres #/ Referral # Total   Visits  Start date  Expiration date Extension  Visit limitation? PT only or  PT+OT?  Co-Insurance   MCR 9/29 12/12  No auth  9/29   BOMN PT Yes

## 2022-10-05 ENCOUNTER — APPOINTMENT (OUTPATIENT)
Dept: LAB | Facility: HOSPITAL | Age: 82
End: 2022-10-05
Payer: MEDICARE

## 2022-10-05 DIAGNOSIS — E03.4 IDIOPATHIC ATROPHIC HYPOTHYROIDISM: ICD-10-CM

## 2022-10-05 LAB — TSH SERPL DL<=0.05 MIU/L-ACNC: 0.14 UIU/ML (ref 0.45–4.5)

## 2022-10-05 PROCEDURE — 36415 COLL VENOUS BLD VENIPUNCTURE: CPT

## 2022-10-05 PROCEDURE — 84443 ASSAY THYROID STIM HORMONE: CPT

## 2022-10-06 ENCOUNTER — OFFICE VISIT (OUTPATIENT)
Dept: PHYSICAL THERAPY | Facility: CLINIC | Age: 82
End: 2022-10-06
Payer: MEDICARE

## 2022-10-06 DIAGNOSIS — M54.16 LUMBAR RADICULOPATHY: Primary | ICD-10-CM

## 2022-10-06 PROCEDURE — 97112 NEUROMUSCULAR REEDUCATION: CPT | Performed by: PHYSICAL THERAPIST

## 2022-10-06 PROCEDURE — 97110 THERAPEUTIC EXERCISES: CPT | Performed by: PHYSICAL THERAPIST

## 2022-10-06 NOTE — PROGRESS NOTES
Daily Note     Today's date: 10/6/2022  Patient name: Meghan Liu  : 1940  MRN: [id number]  Referring provider: Genna Mitchell MD  Dx:   Encounter Diagnosis     ICD-10-CM    1  Lumbar radiculopathy  M54 16        Start Time: 845  Stop Time: 930  Total time in clinic (min): 45 minutes    Subjective: Patient states she is having more pain today  "I have been in a recliner for 30 years and I can move my back forward like this but going back is what feels worse"     Objective: See treatment diary below    Assessment: Tolerated treatment well  Patient notes no increase in pain today post PT with therex rendered  Pt appears to respond to lumbar flexion to relieve symptoms correlating with spinal stenosis  Pt reporting she would like to improve on her walking tolerance as her main goal for PT  Plan: Continue per plan of care  Progress as per primary PT  Progress walking tolerance        Precautions: LATEX ALLERGY     Past Medical History:   Diagnosis Date    Asthma     Cardiac disease     heart attack    Disease of thyroid gland     Hyperlipidemia          Date 2022 10/4/2022 10/6      Visit Number IE 2 3               Manual         P-A mobs         Desensitization of L saphenous nerve Performed with brush for HEP by patient                    Hamstring stretch seated x10 ea hold 5 SOS      Neuro Re-ed         TrA progression         Hip add squeeze  20 x20 hold 5      Clamshells   Hooklying 2x10 GTB hooklying  2x1      Bridge          Saphenous nerve sliders 10  Performed by PT 2x30s L only -         Extension in sitting x 20 hold 5      Thera Ex         VANIA/REIL 10 along counter 10 along plinth table 2x10 along plinthe      TB shoulder ext  10 GTB x20 GTB      TB shoulder row  10 GTB x20 GTB      Paloff press   10 B GTB -      Step ups   4" x10 B -      Lateral step ups  4" x10 B -      SOC  x10 -      Side stepping  4x10'  2x10'         Flexion ball stretch x 10 hold 5      Thera Activity         Patient education   Pt education re: HEP       Lifting mechanics         Posture education                              Gait without AD with focus on TrA 30'x4                        Modalities                    Insurance:  AMA/CMS Eval/ Re-eval POC expires Cody Favor #/ Referral # Total   Visits  Start date  Expiration date Extension  Visit limitation? PT only or  PT+OT?  Co-Insurance   MCR 9/29 12/12  No auth  9/29   BOMN PT Yes

## 2022-10-11 ENCOUNTER — OFFICE VISIT (OUTPATIENT)
Dept: PHYSICAL THERAPY | Facility: CLINIC | Age: 82
End: 2022-10-11
Payer: MEDICARE

## 2022-10-11 DIAGNOSIS — M54.16 LUMBAR RADICULOPATHY: Primary | ICD-10-CM

## 2022-10-11 PROCEDURE — 97112 NEUROMUSCULAR REEDUCATION: CPT

## 2022-10-11 PROCEDURE — 97110 THERAPEUTIC EXERCISES: CPT

## 2022-10-11 NOTE — PROGRESS NOTES
Daily Note     Today's date: 10/11/2022  Patient name: Franc Simons  : 1940  MRN: [id number]  Referring provider: Triston Cheema MD  Dx:   Encounter Diagnosis     ICD-10-CM    1  Lumbar radiculopathy  M54 16        Start Time: 103  Stop Time: 930  Total time in clinic (min): 45 minutes    Subjective: Patient states she had increased pain over the weekend in her R LE, where she had to use a cane to walk with her daughter  Objective: See treatment diary below    Assessment: Tolerated treatment well  Patient notes slight increased lumbar spine pain today, with slight change in symptoms with VANIA  Will continue to progress strength as able  Plan: Continue per plan of care         Precautions: LATEX ALLERGY     Past Medical History:   Diagnosis Date   • Asthma    • Cardiac disease     heart attack   • Disease of thyroid gland    • Hyperlipidemia          Date 2022 10/4/2022 10/6 10/11     Visit Number IE 2 3 4              Manual         P-A mobs         Desensitization of L saphenous nerve Performed with brush for HEP by patient                    Hamstring stretch seated x10 ea hold 5 SOS Hamstring stretch seated x10 ea hold 5 SOS     Neuro Re-ed         TrA progression         Hip add squeeze  20 x20 hold 5 x20      Clamshells   Hooklying 2x10 GTB hooklying  2x10 hooklying  2x10     Bridge     Glute sets 20     Saphenous nerve sliders 10  Performed by PT 2x30s L only -         Extension in sitting x 20 hold 5 Seated sciativ nerve sliders x10 self     Thera Ex         VANIA/REIL 10 along counter 10 along plinth table 2x10 along plinthe Along counter 2x10      TB shoulder ext  10 GTB x20 GTB x20 GTB     TB shoulder row  10 GTB x20 GTB x20 GTB     Paloff press   10 B GTB - 10 B GTB     Step ups   4" x10 B - 4" x10 B     Lateral step ups  4" x10 B - 4" x10 B     SOC  x10 -      Hip abd/ext     x10 B      Side stepping  4x10'  2x10' 2x10'        Flexion ball stretch x 10 hold 5 Flexion ball stretch x 10 hold 5     Thera Activity         Patient education   Pt education re: HEP       Lifting mechanics         Posture education                              Gait without AD with focus on TrA 30'x4                        Modalities                    Insurance:  AMA/CMS Eval/ Re-eval POC expires Cody Favor #/ Referral # Total   Visits  Start date  Expiration date Extension  Visit limitation? PT only or  PT+OT?  Co-Insurance   MCR 9/29 12/12  No auth  9/29   BOMN PT Yes

## 2022-10-13 ENCOUNTER — OFFICE VISIT (OUTPATIENT)
Dept: PHYSICAL THERAPY | Facility: CLINIC | Age: 82
End: 2022-10-13
Payer: MEDICARE

## 2022-10-13 DIAGNOSIS — M54.16 LUMBAR RADICULOPATHY: Primary | ICD-10-CM

## 2022-10-13 PROCEDURE — 97112 NEUROMUSCULAR REEDUCATION: CPT

## 2022-10-13 PROCEDURE — 97110 THERAPEUTIC EXERCISES: CPT

## 2022-10-13 NOTE — PROGRESS NOTES
Daily Note     Today's date: 10/13/2022  Patient name: Alejandro Castano  : 1940  MRN: [id number]  Referring provider: Krystin Sandoval MD  Dx:   Encounter Diagnosis     ICD-10-CM    1  Lumbar radiculopathy  M54 16        Start Time: 0840  Stop Time: 3000  Total time in clinic (min): 45 minutes    Subjective: Patient reports L knee pain today, with minimal pain in her lumbar spine  Objective: See treatment diary below    Assessment: Tolerated treatment well  Added leg press today, as patient continues to note L LE weakness at times when she is standing and performing her household activities  SLB pose a challenge today, as she notes slight sway without UE support  She is progressing well at this time  Continue to progress strengthening and balancing activities as able to improve functional mobility  Plan: Continue per plan of care         Precautions: LATEX ALLERGY     Past Medical History:   Diagnosis Date   • Asthma    • Cardiac disease     heart attack   • Disease of thyroid gland    • Hyperlipidemia          Date 2022 10/4/2022 10/6 10/11 10/13    Visit Number IE 2 3 4 5             Manual         P-A mobs         Desensitization of L saphenous nerve Performed with brush for HEP by patient                    Hamstring stretch seated x10 ea hold 5 SOS Hamstring stretch seated x10 ea hold 5 SOS     Neuro Re-ed         TrA progression         Hip add squeeze  20 x20 hold 5 x20  x20    Clamshells   Hooklying 2x10 GTB hooklying  2x10 hooklying  2x10 hooklying  2x10 GTB    Bridge     Glute sets 20 Glute sets 20    Saphenous nerve sliders 10  Performed by PT 2x30s L only -      SLB on foam      5x10s B BUE support        Extension in sitting x 20 hold 5 Seated sciatic nerve sliders x10 self     Thera Ex         NuStep warmup     L1 5' B LE only    VANIA/REIL 10 along counter 10 along plinth table 2x10 along plinthe Along counter 2x10  x10 along counter     TB shoulder ext  10 GTB x20 GTB x20 GTB x20 GTB    TB shoulder row  10 GTB x20 GTB x20 GTB x20 GTB    Paloff press   10 B GTB - 10 B GTB 10 B GTB    Step ups   4" x10 B - 4" x10 B 4" x10 B    Lateral step ups  4" x10 B - 4" x10 B 4" x10 B    SOC  x10 -      LAQ     2x10     Hip abd/ext     x10 B  x15 B    Side stepping  4x10'  2x10' 2x10'     Leg press      2x10 45# DL       Flexion ball stretch x 10 hold 5 Flexion ball stretch x 10 hold 5     Thera Activity         Patient education   Pt education re: HEP       Lifting mechanics         Posture education                              Gait without AD with focus on TrA 30'x4                        Modalities                    Insurance:  AMA/CMS Eval/ Re-eval POC expires Doc Flatten #/ Referral # Total   Visits  Start date  Expiration date Extension  Visit limitation? PT only or  PT+OT?  Co-Insurance   MCR 9/29 12/12  No auth  9/29   BOMN PT Yes

## 2022-10-18 ENCOUNTER — OFFICE VISIT (OUTPATIENT)
Dept: PHYSICAL THERAPY | Facility: CLINIC | Age: 82
End: 2022-10-18
Payer: MEDICARE

## 2022-10-18 DIAGNOSIS — M54.16 LUMBAR RADICULOPATHY: Primary | ICD-10-CM

## 2022-10-18 PROCEDURE — 97112 NEUROMUSCULAR REEDUCATION: CPT

## 2022-10-18 PROCEDURE — 97110 THERAPEUTIC EXERCISES: CPT

## 2022-10-20 ENCOUNTER — OFFICE VISIT (OUTPATIENT)
Dept: PHYSICAL THERAPY | Facility: CLINIC | Age: 82
End: 2022-10-20
Payer: MEDICARE

## 2022-10-20 DIAGNOSIS — M54.16 LUMBAR RADICULOPATHY: Primary | ICD-10-CM

## 2022-10-20 DIAGNOSIS — M17.12 PRIMARY LOCALIZED OSTEOARTHRITIS OF LEFT KNEE: Primary | ICD-10-CM

## 2022-10-20 PROCEDURE — 97110 THERAPEUTIC EXERCISES: CPT

## 2022-10-20 PROCEDURE — 97112 NEUROMUSCULAR REEDUCATION: CPT

## 2022-10-20 NOTE — PROGRESS NOTES
Daily Note     Today's date: 10/20/2022  Patient name: Rajan Novoa  : 1940  MRN: [id number]  Referring provider: Samanta Chan MD  Dx:   Encounter Diagnosis     ICD-10-CM    1  Lumbar radiculopathy  M54 16        Start Time: 0840  Stop Time: 5078  Total time in clinic (min): 40 minutes    Subjective: Patient is having L knee pain today  She also is having difficult times breathing due to changes in the weather related to her asthma  Objective: See treatment diary below    Assessment: Tolerated treatment well  Patient reports increased L knee pain and fatigue throughout today's session  Discussed with patient importance of contacting MD regarding injections for L knee, as her pain continues to increase  Patient verbalized understanding  Will continue to progress as able  Plan: Continue per plan of care         Precautions: LATEX ALLERGY     Past Medical History:   Diagnosis Date   • Asthma    • Cardiac disease     heart attack   • Disease of thyroid gland    • Hyperlipidemia          Date 10/6 10/11 10/13 10/18 10/20/22   Visit Number 3 4 5 6 7           Manual        P-A mobs        Desensitization of L saphenous nerve                 Hamstring stretch seated x10 ea hold 5 SOS Hamstring stretch seated x10 ea hold 5 SOS      Neuro Re-ed        TrA progression        Hip add squeeze x20 hold 5 x20  x20 x30 x30   Clamshells  hooklying  2x10 hooklying  2x10 hooklying  2x10 GTB hooklying  2x10 GTB    Bridge   [employer] 20 Glute sets 20     Saphenous nerve sliders -       SLB on foam    5x10s B BUE support  5x10s B BUE support 5x10s B BUE support    Extension in sitting x 20 hold 5 Seated sciatic nerve sliders x10 self      Thera Ex        NuStep warmup   L1 5' B LE only L1 5' B LE only L1 5' B LE only   VANIA/REIL 2x10 along plinthe Along counter 2x10  x10 along counter      TB shoulder ext x20 GTB x20 GTB x20 GTB x20 BTB x20 BTB   TB shoulder row x20 GTB x20 GTB x20 GTB x20 BTB x20 BTB Paloff press  - 10 B GTB 10 B GTB x15 BTB B x15 BTB B   Step ups  - 4" x10 B 4" x10 B 4" x20 B 6" x10 B   Lateral step ups - 4" x10 B 4" x10 B 4" x10 B 6" x10 B    SOC -       LAQ   2x10   x10 2#    Hip abd/ext   x10 B  x15 B x15 B x15 B   Side stepping 2x10' 2x10'  4x10'  4x10'    Leg press    2x10 45# DL 2x10 45# DL x10 45# DL   Walking marches at // bars     2x15' unilateral UE support     Flexion ball stretch x 10 hold 5 Flexion ball stretch x 10 hold 5      Thera Activity        Patient education Pt education re: HEP        Lifting mechanics        Posture education                         Gait without AD with focus on TrA 30'x4                       Modalities                  Insurance:  AMA/CMS Eval/ Re-eval POC expires Constance Fairbanks #/ Referral # Total   Visits  Start date  Expiration date Extension  Visit limitation? PT only or  PT+OT?  Co-Insurance   MCR 9/29 12/12  No auth  9/29   BOMN PT Yes

## 2022-10-25 ENCOUNTER — OFFICE VISIT (OUTPATIENT)
Dept: PHYSICAL THERAPY | Facility: CLINIC | Age: 82
End: 2022-10-25
Payer: MEDICARE

## 2022-10-25 DIAGNOSIS — M54.16 LUMBAR RADICULOPATHY: Primary | ICD-10-CM

## 2022-10-25 PROCEDURE — 97112 NEUROMUSCULAR REEDUCATION: CPT

## 2022-10-25 PROCEDURE — 97110 THERAPEUTIC EXERCISES: CPT

## 2022-10-25 NOTE — PROGRESS NOTES
Daily Note     Today's date: 10/25/2022  Patient name: Paul Mir  : 1940  MRN: [id number]  Referring provider: Avanell Bence, MD  Dx:   Encounter Diagnosis     ICD-10-CM    1  Lumbar radiculopathy  M54 16        Start Time: 840  Stop Time:   Total time in clinic (min): 45 minutes    Subjective: Patient is having L knee pain today 2/10 and is ambulating with a SPC due to her pain  She is scheduled for her gel injections of her L knee over the course of the next several weeks  She was able to walk the stores with her daughter with several breaks  She notes knee buckling when she is walking by the end of the day  Objective: See treatment diary below    Assessment: Tolerated treatment well  Patient required frequent breaks throughout session today due to increased pain of L knee  She demonstrated slight buckling of her L knee when performing standing hip strengthening activities  Talked with Brandon Richards, PT, DPT regarding lymphedema care for her L LE  Patient agreeable to start treatment once cleared from cardiologist  Will continue to progress patient as able  Plan: Continue per plan of care         Precautions: LATEX ALLERGY     Past Medical History:   Diagnosis Date   • Asthma    • Cardiac disease     heart attack   • Disease of thyroid gland    • Hyperlipidemia          Date 10/6 10/11 10/13 10/18 10/20/22 10/25/22   Visit Number 3 4 5 6 7 8            Manual         P-A mobs         Desensitization of L saphenous nerve                   Hamstring stretch seated x10 ea hold 5 SOS Hamstring stretch seated x10 ea hold 5 SOS       Neuro Re-ed         TrA progression         Hip add squeeze x20 hold 5 x20  x20 x30 x30 x30   Clamshells  hooklying  2x10 hooklying  2x10 hooklying  2x10 GTB hooklying  2x10 GTB  Seated x10 GTB   Bridge   Glute sets 20 Glute sets 20      Saphenous nerve sliders -     Seated HS stretch with SOS 5x10s B   SLB on foam    5x10s B BUE support  5x10s B BUE support 5x10s B BUE support 5x10s B BUE support    Extension in sitting x 20 hold 5 Seated sciatic nerve sliders x10 self       Thera Ex         NuStep warmup   L1 5' B LE only L1 5' B LE only L1 5' B LE only L1 5' B LE only   VANIA/REIL 2x10 along plinthe Along counter 2x10  x10 along counter       TB shoulder ext x20 GTB x20 GTB x20 GTB x20 BTB x20 BTB x20 BTB   TB shoulder row x20 GTB x20 GTB x20 GTB x20 BTB x20 BTB x20 BTB   Paloff press  - 10 B GTB 10 B GTB x15 BTB B x15 BTB B x15 BTB B   Step ups  - 4" x10 B 4" x10 B 4" x20 B 6" x10 B 4" x20 B   Lateral step ups - 4" x10 B 4" x10 B 4" x10 B 6" x10 B  4" x20 B   SOC -        LAQ   2x10   x10 2#  x10 L   x10 R   Hip abd/ext   x10 B  x15 B x15 B x15 B x15 B   Side stepping 2x10' 2x10'  4x10'  4x10'  4x15'    Leg press    2x10 45# DL 2x10 45# DL x10 45# DL    Walking marches at // bars     2x15' unilateral UE support      Flexion ball stretch x 10 hold 5 Flexion ball stretch x 10 hold 5       Thera Activity         Patient education Pt education re: HEP         Lifting mechanics         Posture education                            Gait without AD with focus on TrA 30'x4                          Modalities                    Insurance:  AMA/CMS Eval/ Re-eval POC expires [place] #/ Referral # Total   Visits  Start date  Expiration date Extension  Visit limitation? PT only or  PT+OT?  Co-Insurance   MCR 9/29 12/12  No auth  9/29   BOMN PT Yes

## 2022-10-27 ENCOUNTER — EVALUATION (OUTPATIENT)
Dept: PHYSICAL THERAPY | Facility: CLINIC | Age: 82
End: 2022-10-27
Payer: MEDICARE

## 2022-10-27 DIAGNOSIS — M54.16 LUMBAR RADICULOPATHY: Primary | ICD-10-CM

## 2022-10-27 DIAGNOSIS — M79.89 LEFT LEG SWELLING: Primary | ICD-10-CM

## 2022-10-27 PROCEDURE — 97110 THERAPEUTIC EXERCISES: CPT

## 2022-10-27 PROCEDURE — 97112 NEUROMUSCULAR REEDUCATION: CPT

## 2022-10-27 NOTE — LETTER
2022    Jennifer Schmid, [address] [address]    Patient: Skye Llanos   YOB: 1940   Date of Visit: 10/27/2022     Encounter Diagnosis     ICD-10-CM    1  Left leg swelling  M79 89        Dear Dr Teena Contreras:    Thank you for your recent referral of Skye Llanos  Please review the attached evaluation summary from Kathryn's recent visit  Please verify that you agree with the plan of care by signing the attached order  If you have any questions or concerns, please do not hesitate to call  I sincerely appreciate the opportunity to share in the care of one of your patients and hope to have another opportunity to work with you in the near future  Sincerely,    Daniela Lopez, PT      Referring Provider:      I certify that I have read the below Plan of Care and certify the need for these services furnished under this plan of treatment while under my care  Jennifer Schmid MD  [facility] [address] [address]  Via Fax: [phone]          PT Evaluation     Today's date: 10/27/2022  Patient name: Skye Llanos  : 1940  MRN: [id number]  Referring provider: Azael Brown MD  Dx:   Encounter Diagnosis     ICD-10-CM    1  Left leg swelling  M79 89                   Assessment  Assessment details: Skye Llanos is a 80y o  year old female who presents to IE with:   Left leg swelling  (primary encounter diagnosis)    Aurora Rosales is a 80 y o  female presents to IE with symptoms consistent with lymphedema including: + stemmer's sign, skin fibrosis, mild pitting, and history of insult to lymphatic system due to vascular history of saphenous vein procedure    Skilled PT treatment is advised utilizing full CDT protocol to include MLD, exercise, and skin management to manage lymphedema and optimize patient's functional potential  Compression is also recommended as part of CDT protocol to maintain reduction in swelling and provide support for lymphatic system, PT recommending velcro compression for patient  Patient may also benefit from home compression pump in future  Impairments: abnormal gait, abnormal or restricted ROM, abnormal movement, activity intolerance, impaired balance, impaired physical strength and lacks appropriate home exercise program  Other impairment: increased swelling, lacks knowledge of lymphedema and proper skin care,   Functional limitations: Walking, getting up from a chair, Barriers to therapy: CAD, asthma, hyperlipidemia, chronic swelling, PMH saphenous vein surgery  Understanding of Dx/Px/POC: good   Prognosis: fair  Prognosis details: Estelle Degree presents to IE with secondary vascular L LE lymphedema  stGstrstastdstest:st st1st Fibrosis present: None    Goals  STG  - Patient and/or caregiver will understand lymphedema precautions to decrease risk of infection and exacerbation of lymphedema  - Patient will develop a tolerance for wearing multi-layer, short stretch bandages betweens sessions to facilitate limb decongestion  - Patient will experience decrease in pitting edema which will improve tissue health and decrease risk of infection    - Patient will perform HEP independently or with minimal assistance to help improve lymphatic flow and venous return    LTG  - Patient will experience increased ROM and functional mobility to aid with ADL's such as walking, standing at time of discharge  - Patient will demonstrate more normalized gait pattern and improved balance at time of discharge  - Patient and/or caregiver will be independent with donning and doffing of compression garments which will enable regular daily garment wear at time of discharge, skin maintenance, and self- MLD   - Patient and/or caregiver will be independent with HEP and lymphedema management to prevent relapse and reduce risk of infection and hospitalizations at time of discharge    Plan  Plan details:  Thank you for referring Angie Mccartney to Physical Therapy at [place] [address] and for the opportunity to coordinate care  Patient would benefit from: lymphedema eval, PT eval and skilled physical therapy  Planned therapy interventions: manual therapy, massage, muscle pump exercises, neuromuscular re-education, orthotic fitting/training, orthotic management and training, patient education, self care, strengthening, stretching, therapeutic activities, therapeutic exercise, graded activity, dressing changes, breathing training, balance, gait training and home exercise program  Other planned therapy interventions: Complete decongestive therapy: Manual lymphatic drainage, compression bandaging, exercise, self-maintenance, and education on bandaging, skin care, and self-bandaging  Frequency: 1x week  Duration in visits: 10  Plan of Care beginning date: 10/27/2022  Treatment plan discussed with: patient        Subjective Evaluation    History of Present Illness  Mechanism of injury: Lymphedema History  Patient referred to OP PT lymphedema services by PT Stacy Awad  Patient has history of chronic swelling in L LE  Patient reports history of saphenous vein surgery in 2014 with patient reporting "this leg was not right since that surgery " She reports swelling extending into L lower leg initially following surgery, though improved over the years  She complains of increased sensitivity in L lower leg compared to L upper thigh, though swelling most prominent in L upper thigh  Patient has follow-up with cardiology throughout the year  Patient has not worn compression reporting "I can't the leg can't take it "     IE 9/29/22  Patient reports several year history of lumbar spine pain  She had MRI performed of her lumbar spine  She also has bursitis of B hips, B knee pain  She is receiving injections of her L knee, which helps with her pain  She recently had a fall when she was coming back from the garden, falling up the stairs   She has swelling of her L foot  She is finally able to put shoes on  Patient walks with her daughter every weekend and has to stop several times to get around the block  Patient reports radicular symptoms down into B LEs  She has a lumbar spine brace and has heard mixed opinions on whether she should wear it  She denies numbness/tingling of her LEs  She has radicular symptoms down past her knees  She has increased tenderness since her heart surgery in , as they took the saphenous vein from her L LE  Patient denies night pain, bowel/bladder changes  She is able to take Advil PM to sleep at night       - Functional limitations: walking around the block, lifting objects of weight, standing greater than 20 minutes, stairs      - Patient goals: "Decrease some of the pain and be able to walk more " She walks the neighborhood with her daughter every weekend  Pain  Current pain rating: 3  At best pain ratin  At worst pain rating: 10          Objective     Observations   Left Knee   Positive for edema  Additional Observation Details  Palpation:   Skin Mobility: WNL  Skin temperature: WNL  Skin color: hemosiderin staining present in L lower leg  Pittin+ throughout L LE  Wound presence: no,  Stemmer's Sign: None  Fungal infections: no  Hyperkeratosis: no   Lymphorrhea/ weeping: no  Papillomas: no   Lymph fistulas: no   Lymph cysts: no   Cellulitis: no   Lipodermatosclerosis: no       General Comments: Ankle/Foot Comments   Gait: Patient ambulatory with SPC, L antalgic gait at this time  PT educated Cherylene Hait in regards to pathology of lymphedema, proper skin care, and components of complete decongestive therapy including manual lymph drainage, compression, exercise, and proper skin care with verbalized understanding  PT to reach out to cardiology for further clearance for patient to begin lymphedema services  Patient to begin 1 time per week if approved by cardiology and continue with orthopedic PT  LOWER EXTREMITY LEFT CALCULATIONS    Flowsheet Row Most Recent Value   Volume LE (mL) 8335   Difference from last visit (mL)  -8335   Difference from first visit (mL)  -8335      LOWER EXTREMITY RIGHT CALCULATIONS    Flowsheet Row Most Recent Value   Volume LE (mL) 7106   Difference from last visit (mL)  -7106   Difference from first visit (mL)  -7106                    Precautions: CAD, asthma, hyperlipidemia, chronic swelling, PMH saphenous vein surgery  HEP: review educational literature provided by PT, order short stretch bandages as highlighted by PT   Daily Treatment Diary     Manuals             L LE MLD sequence                                       Therapeutic Exercise             Bike              Hip abduction             Hip flexion             Heel raises             Toe raises             Lateral walking             Bridges                                                                                                        Self-care/ Home management             Lymphedema education

## 2022-10-27 NOTE — PROGRESS NOTES
PT Re-Evaluation     Today's date: 10/27/2022  Patient name: Nila Lorenzo  : 1940  MRN: [id number]  Referring provider: Yo Awad MD  Dx:   Encounter Diagnosis     ICD-10-CM    1  Lumbar radiculopathy  M54 16          Assessment  Assessment details: Patient is a 80 y o  Female who presents to skilled outpatient PT with referring diagnosis of lumbar radiculopathy  Patient demonstrates slight improvements in functional mobility, despite minimal changes in B LE strength  She notes 40% improvement in function since starting PT intervention  Sensitivity of her L LE has not improved since initiating PT  Discussed referral to lymphedema therapy upon cardiologist clearance  She continues to demonstrate the following limitations in her daily routine: garden, stair navigate, lift objects of weight, walk without stopping  Patient education performed during today's session included: HEP as noted on flow sheet, nature of lumbar radiculopathy, and postural education  Patient verbalized understanding of POC      Impairments: Abnormal or restricted ROM, Impaired physical strength, Lacks appropriate HEP, Poor posture, Poor body mechanics and Pain with function  Understanding of Dx/Px/POC: Excellent  Prognosis: Good      Plan  Patient would benefit from: PT Eval and Skilled PT  Planned modality interventions: Biofeedback, Cryotherapy, TENS, Thermotherapy: Hydrocollator Packs and Traction  Planned therapy interventions: ADL training, Balance, Balance/WB training, Body mechanics training, Coordination, Functional ROM exercises, Gait training, HEP, Joint mobilization, Manual therapy, Robert taping, Neuromuscular re-education, Patient education, Postural training, Strengthening, Stretching, Therapeutic activities, Therapeutic exercises, Therapeutic training, Transfer training and Activity modification  Frequency: 2x/wk  Duration in weeks: 12  Plan of Care beginning date: 2022  Plan of Care expiration date: 12 weeks - 1/19/2023  Treatment plan discussed with: Patient       Goals  Short Term Goals (4 weeks): PROGRESSING TOWARDS AS OF 10/27/2022   - Patient will be independent in basic HEP 2-3 weeks  - Patient will have 0/10 pain at rest  - Patient will demonstrate >1/3 improvement in MMT grade as applicable  - Patient functional goal: Patient will be able to walk around the block with no increased pain  Long Term Goals (8 weeks): PROGRESSING TOWARDS AS OF 10/27/2022   - Patient will be independent in a comprehensive home exercise program  - Patient FOTO score will improve to 68/100  - Patient will self-report >75% improvement in function  - Patient functional goal: Patient will garden with minimal lumbar spine pain  - Patient will perform floor to stand transfer independently  Subjective    History of Present Illness  - Mechanism of injury: Patient reports several year history of lumbar spine pain  She had MRI performed of her lumbar spine  She also has bursitis of B hips, B knee pain  She is receiving injections of her L knee, which helps with her pain  She recently had a fall when she was coming back from the garden, falling up the stairs  She has swelling of her L foot  She is finally able to put shoes on  Patient walks with her daughter every weekend and has to stop several times to get around the block  Patient reports radicular symptoms down into B LEs  She has a lumbar spine brace and has heard mixed opinions on whether she should wear it  She denies numbness/tingling of her LEs  She has radicular symptoms down past her knees  She has increased tenderness since her heart surgery in 2014, as they took the saphenous vein from her L LE  Patient denies night pain, bowel/bladder changes  She is able to take Advil PM to sleep at night  - Updated subjective 10/27/22: Patient notes improvements in her lumbar spine pain since starting PT intervention   She has L LE pain, since she has not had her injections at this point  She notes 40% improvement in function since starting PT intervention  She has not had to take a sleeping pill prior to bed since starting PT  She is able to walk with her daughter about 4 blocks, taking about 4 breaks throughout to straighten out her back  She continues to note difficulties with standing for prolonged periods of time  She is performing stairs non-reciprocally due to pain  The sensitivity of the medial aspect of her L leg has not changed since starting PT  She is amendable to lymphedema treatment with clearance from cardiologist      - Functional limitations: walking around the block, lifting objects of weight, standing greater than 20 minutes, stairs     - Patient goals: "Decrease some of the pain and be able to walk more " She walks the neighborhood with her daughter every weekend       Pain  - Current pain ratin/10  - At best pain ratin/10  - At worst pain ratin/10  - Location: lumbar spine   - Alleviating factors:salon pas, injections     Social Support  - Steps to enter house: 3  - Stairs in house: 12   - Bedroom/bathroom set up: second floor   - Lives in: multi-level ome   - Lives with:       Objective   LE MMT  - L Hip Flexion: 3+/5   R Hip Flexion: 4-/5  - L Hip Extension: 3+/5  R Hip Extension: 4-/5  - L Hip Abduction: 3+/5  R Hip Abduction: 4-/5  - L Hip Adduction: 3+/5  R Hip Adduction: 4-/5  - L Hip IR: 3+/5   R Hip IR: 3+/5  - L Hip ER: 3+/5   R Hip ER: 3+/5  - L Knee Extension: 3/5  R Knee Extension: 4-/5  - L Knee Flexion: 3+/5   R Knee Flexion: 3+/5  - L Ankle DF: 4/5   R Ankle DF: 4/5  - L Ankle PF: 4/5   R Ankle PF: 4/5      Movement Loss [place] Mod Min Nil Symptoms   Flexion    X    Extension   X     Side gliding R   X     Side gliding L    X    Other           Symptom response Mechanical Response    During testing After testing Effect (?? ROM or key functional test) No effect   Pretest symptoms in standing       FIS Increased  worse RFIS       EIS Increased  centralized Increased lumbar spine ROM    VANIA              Pretest symptoms lying       TIANA       RFIL       EIL       REIL              Pretest symptoms       R SGIS       R RSGIS       L SGIS       L RSGIS              Other movements             Sensation  - Light touch: intact     Palpation   - L saphenous nerve tract, L adductor muscle group, TTP spinous processes L4-L5, B PSIS, B greater trochanter, L popliteal space       Special Testing L R   FABIR negative positive    FADIR negative negative   Hip Scour  negative negative   Thigh thrust negative negative   Flick test  negative negative   Standing flexion test  negative negative   Prone knee flexion test NT NT   Supine to long sit test NT NT   Neurodynamic assessment  (+) saphenous nerve  (-) globally          Precautions: LATEX ALLERGY     Past Medical History:   Diagnosis Date   • Asthma    • Cardiac disease     heart attack   • Disease of thyroid gland    • Hyperlipidemia          Date 10/6 10/11 10/13 10/18 10/20/22 10/25/22 10/27/22   Visit Number [phone]             Manual          P-A mobs          Desensitization of L saphenous nerve                     Hamstring stretch seated x10 ea hold 5 SOS Hamstring stretch seated x10 ea hold 5 SOS        Neuro Re-ed          TrA progression          Hip add squeeze x20 hold 5 x20  x20 x30 x30 x30 x30   Clamshells  hooklying  2x10 hooklying  2x10 hooklying  2x10 GTB hooklying  2x10 GTB  Seated x10 GTB    Bridge   Glute sets 20 Glute sets 20       Saphenous nerve sliders -     Seated HS stretch with SOS 5x10s B    SLB on foam    5x10s B BUE support  5x10s B BUE support 5x10s B BUE support 5x10s B BUE support 5x10s B BUE support    Extension in sitting x 20 hold 5 Seated sciatic nerve sliders x10 self        Thera Ex          NuStep warmup   L1 5' B LE only L1 5' B LE only L1 5' B LE only L1 5' B LE only L1 5' B LE only   VANIA/REIL 2x10 along plinthe Along counter 2x10  x10 along counter     VANIA along counter x10    TB shoulder ext x20 GTB x20 GTB x20 GTB x20 BTB x20 BTB x20 BTB x20 BTB   TB shoulder row x20 GTB x20 GTB x20 GTB x20 BTB x20 BTB x20 BTB x20 BTB   Paloff press  - 10 B GTB 10 B GTB x15 BTB B x15 BTB B x15 BTB B x15 BTB B   Step ups  - 4" x10 B 4" x10 B 4" x20 B 6" x10 B 4" x20 B 4" x20 B   Lateral step ups - 4" x10 B 4" x10 B 4" x10 B 6" x10 B  4" x20 B 4" x20 B   SOC -         LAQ   2x10   x10 2#  x10 L   x10 R x10 B    Hip abd/ext   x10 B  x15 B x15 B x15 B x15 B x15 B   Side stepping 2x10' 2x10'  4x10'  4x10'  4x15'  4x15'    Leg press    2x10 45# DL 2x10 45# DL x10 45# DL  2x10 45# DL   Walking marches at // bars     2x15' unilateral UE support       Flexion ball stretch x 10 hold 5 Flexion ball stretch x 10 hold 5        Thera Activity          Patient education Pt education re: HEP          Lifting mechanics          Posture education                               Gait without AD with focus on TrA 30'x4                             Modalities                        Insurance:  AMA/CMS Eval/ Re-eval POC expires [facility] #/ Referral # Total   Visits  Start date  Expiration date Extension  Visit limitation? PT only or  PT+OT?  Co-Insurance   MCR 9/29 12/12  No auth  9/29   BOMN PT Yes

## 2022-10-27 NOTE — LETTER
2022    MD Reggie Murphy [facility] [address]    Patient: Hellen Dave   YOB: 1940   Date of Visit: 10/27/2022     Encounter Diagnosis     ICD-10-CM    1  Lumbar radiculopathy  M54 16        Dear Dr Morrow Arabia: Thank you for your recent referral of Hellen Dave  Please review the attached evaluation summary from Kathryn's recent visit  Please verify that you agree with the plan of care by signing the attached order  If you have any questions or concerns, please do not hesitate to call  I sincerely appreciate the opportunity to share in the care of one of your patients and hope to have another opportunity to work with you in the near future  Sincerely,    Zeny James, PT      Referring Provider:      I certify that I have read the below Plan of Care and certify the need for these services furnished under this plan of treatment while under my care  Los Chan MD  [facility] [address]  Via Fax: [phone]                 PT Re-Evaluation     Today's date: 10/27/2022  Patient name: Hellen Dave  : 1940  MRN: [id number]  Referring provider: Jevon Magana MD  Dx:   Encounter Diagnosis     ICD-10-CM    1  Lumbar radiculopathy  M54 16          Assessment  Assessment details: Patient is a 80 y o  Female who presents to skilled outpatient PT with referring diagnosis of lumbar radiculopathy  Patient demonstrates slight improvements in functional mobility, despite minimal changes in B LE strength  She notes 40% improvement in function since starting PT intervention  Sensitivity of her L LE has not improved since initiating PT  Discussed referral to lymphedema therapy upon cardiologist clearance  She continues to demonstrate the following limitations in her daily routine: garden, stair navigate, lift objects of weight, walk without stopping   Patient education performed during today's session included: HEP as noted on flow sheet, nature of lumbar radiculopathy, and postural education  Patient verbalized understanding of POC  Impairments: Abnormal or restricted ROM, Impaired physical strength, Lacks appropriate HEP, Poor posture, Poor body mechanics and Pain with function  Understanding of Dx/Px/POC: Excellent  Prognosis: Good      Plan  Patient would benefit from: PT Eval and Skilled PT  Planned modality interventions: Biofeedback, Cryotherapy, TENS, Thermotherapy: Hydrocollator Packs and Traction  Planned therapy interventions: ADL training, Balance, Balance/WB training, Body mechanics training, Coordination, Functional ROM exercises, Gait training, HEP, Joint mobilization, Manual therapy, Robert taping, Neuromuscular re-education, Patient education, Postural training, Strengthening, Stretching, Therapeutic activities, Therapeutic exercises, Therapeutic training, Transfer training and Activity modification  Frequency: 2x/wk  Duration in weeks: 12  Plan of Care beginning date: 9/29/2022  Plan of Care expiration date: 12 weeks - 1/19/2023  Treatment plan discussed with: Patient       Goals  Short Term Goals (4 weeks): PROGRESSING TOWARDS AS OF 10/27/2022   - Patient will be independent in basic HEP 2-3 weeks  - Patient will have 0/10 pain at rest  - Patient will demonstrate >1/3 improvement in MMT grade as applicable  - Patient functional goal: Patient will be able to walk around the block with no increased pain  Long Term Goals (8 weeks): PROGRESSING TOWARDS AS OF 10/27/2022   - Patient will be independent in a comprehensive home exercise program  - Patient FOTO score will improve to 68/100  - Patient will self-report >75% improvement in function  - Patient functional goal: Patient will garden with minimal lumbar spine pain  - Patient will perform floor to stand transfer independently         Subjective    History of Present Illness  - Mechanism of injury: Patient reports several year history of lumbar spine pain  She had MRI performed of her lumbar spine  She also has bursitis of B hips, B knee pain  She is receiving injections of her L knee, which helps with her pain  She recently had a fall when she was coming back from the garden, falling up the stairs  She has swelling of her L foot  She is finally able to put shoes on  Patient walks with her daughter every weekend and has to stop several times to get around the block  Patient reports radicular symptoms down into B LEs  She has a lumbar spine brace and has heard mixed opinions on whether she should wear it  She denies numbness/tingling of her LEs  She has radicular symptoms down past her knees  She has increased tenderness since her heart surgery in , as they took the saphenous vein from her L LE  Patient denies night pain, bowel/bladder changes  She is able to take Advil PM to sleep at night  - Updated subjective 10/27/22: Patient notes improvements in her lumbar spine pain since starting PT intervention  She has L LE pain, since she has not had her injections at this point  She notes 40% improvement in function since starting PT intervention  She has not had to take a sleeping pill prior to bed since starting PT  She is able to walk with her daughter about 4 blocks, taking about 4 breaks throughout to straighten out her back  She continues to note difficulties with standing for prolonged periods of time  She is performing stairs non-reciprocally due to pain  The sensitivity of the medial aspect of her L leg has not changed since starting PT  She is amendable to lymphedema treatment with clearance from cardiologist      - Functional limitations: walking around the block, lifting objects of weight, standing greater than 20 minutes, stairs     - Patient goals: "Decrease some of the pain and be able to walk more " She walks the neighborhood with her daughter every weekend       Pain  - Current pain ratin/10  - At best pain ratin/10  - At worst pain ratin/10  - Location: lumbar spine   - Alleviating factors:salon pas, injections     Social Support  - Steps to enter house: 3  - Stairs in house: 12   - Bedroom/bathroom set up: second floor   - Lives in: multi-level ome   - Lives with:       Objective   LE MMT  - L Hip Flexion: 3+/5   R Hip Flexion: 4-/5  - L Hip Extension: 3+/5  R Hip Extension: 4-/5  - L Hip Abduction: 3+/5  R Hip Abduction: 4-/5  - L Hip Adduction: 3+/5  R Hip Adduction: 4-/5  - L Hip IR: 3+/5   R Hip IR: 3+/5  - L Hip ER: 3+/5   R Hip ER: 3+/5  - L Knee Extension: 3/5  R Knee Extension: 4-/5  - L Knee Flexion: 3+/5   R Knee Flexion: 3+/5  - L Ankle DF: 4/5   R Ankle DF: 4/5  - L Ankle PF: 4/5   R Ankle PF: 4/5      Movement Loss Cortez Mod Min Nil Symptoms   Flexion    X    Extension   X     Side gliding R   X     Side gliding L    X    Other           Symptom response Mechanical Response    During testing After testing Effect (?? ROM or key functional test) No effect   Pretest symptoms in standing       FIS Increased  worse     RFIS       EIS Increased  centralized Increased lumbar spine ROM    VANIA              Pretest symptoms lying       TIANA       RFIL       EIL       REIL              Pretest symptoms       R SGIS       R RSGIS       L SGIS       L RSGIS              Other movements             Sensation  - Light touch: intact     Palpation   - L saphenous nerve tract, L adductor muscle group, TTP spinous processes L4-L5, B PSIS, B greater trochanter, L popliteal space       Special Testing L R   FABIR negative positive    FADIR negative negative   Hip Scour  negative negative   Thigh thrust negative negative   Flick test  negative negative   Standing flexion test  negative negative   Prone knee flexion test NT NT   Supine to long sit test NT NT   Neurodynamic assessment  (+) saphenous nerve  (-) globally          Precautions: LATEX ALLERGY     Past Medical History:   Diagnosis Date   • Asthma    • Cardiac disease     heart attack   • Disease of thyroid gland    • Hyperlipidemia          Date 10/6 10/11 10/13 10/18 10/20/22 10/25/22 10/27/22   Visit Number 3 [phone]             Manual          P-A mobs          Desensitization of L saphenous nerve                     Hamstring stretch seated x10 ea hold 5 SOS Hamstring stretch seated x10 ea hold 5 SOS        Neuro Re-ed          TrA progression          Hip add squeeze x20 hold 5 x20  x20 x30 x30 x30 x30   Clamshells  hooklying  2x10 hooklying  2x10 hooklying  2x10 GTB hooklying  2x10 GTB  Seated x10 GTB    Bridge   Glute sets 20 Glute sets 20       Saphenous nerve sliders -     Seated HS stretch with SOS 5x10s B    SLB on foam    5x10s B BUE support  5x10s B BUE support 5x10s B BUE support 5x10s B BUE support 5x10s B BUE support    Extension in sitting x 20 hold 5 Seated sciatic nerve sliders x10 self        Thera Ex          NuStep warmup   L1 5' B LE only L1 5' B LE only L1 5' B LE only L1 5' B LE only L1 5' B LE only   VANIA/REIL 2x10 along plinthe Along counter 2x10  x10 along counter     VANIA along counter x10    TB shoulder ext x20 GTB x20 GTB x20 GTB x20 BTB x20 BTB x20 BTB x20 BTB   TB shoulder row x20 GTB x20 GTB x20 GTB x20 BTB x20 BTB x20 BTB x20 BTB   Paloff press  - 10 B GTB 10 B GTB x15 BTB B x15 BTB B x15 BTB B x15 BTB B   Step ups  - 4" x10 B 4" x10 B 4" x20 B 6" x10 B 4" x20 B 4" x20 B   Lateral step ups - 4" x10 B 4" x10 B 4" x10 B 6" x10 B  4" x20 B 4" x20 B   SOC -         LAQ   2x10   x10 2#  x10 L   x10 R x10 B    Hip abd/ext   x10 B  x15 B x15 B x15 B x15 B x15 B   Side stepping 2x10' 2x10'  4x10'  4x10'  4x15'  4x15'    Leg press    2x10 45# DL 2x10 45# DL x10 45# DL  2x10 45# DL   Walking marches at // bars     2x15' unilateral UE support       Flexion ball stretch x 10 hold 5 Flexion ball stretch x 10 hold 5        Thera Activity          Patient education Pt education re: HEP          Lifting mechanics Posture education                               Gait without AD with focus on TrA 30'x4                             Modalities                        Insurance:  AMA/CMS Eval/ Re-eval POC expires Serge Camargo #/ Referral # Total   Visits  Start date  Expiration date Extension  Visit limitation? PT only or  PT+OT?  Co-Insurance   MCR 9/29 12/12  No auth  9/29   BOMN PT Yes

## 2022-10-27 NOTE — PROGRESS NOTES
PT Evaluation     Today's date: 10/27/2022  Patient name: Sheyla Rosario  : 1940  MRN: [id number]  Referring provider: Mir Watts MD  Dx:   Encounter Diagnosis     ICD-10-CM    1  Left leg swelling  M79 89                   Assessment  Assessment details: Sheyla Rosario is a 80y o  year old female who presents to IE with:   Left leg swelling  (primary encounter diagnosis)    Ayah Al is a 80 y o  female presents to IE with symptoms consistent with lymphedema including: + stemmer's sign, skin fibrosis, mild pitting, and history of insult to lymphatic system due to vascular history of saphenous vein procedure  Skilled PT treatment is advised utilizing full CDT protocol to include MLD, exercise, and skin management to manage lymphedema and optimize patient's functional potential  Compression is also recommended as part of CDT protocol to maintain reduction in swelling and provide support for lymphatic system, PT recommending velcro compression for patient  Patient may also benefit from home compression pump in future     Impairments: abnormal gait, abnormal or restricted ROM, abnormal movement, activity intolerance, impaired balance, impaired physical strength and lacks appropriate home exercise program  Other impairment: increased swelling, lacks knowledge of lymphedema and proper skin care,   Functional limitations: Walking, getting up from a chair, Barriers to therapy: CAD, asthma, hyperlipidemia, chronic swelling, PMH saphenous vein surgery  Understanding of Dx/Px/POC: good   Prognosis: fair  Prognosis details: Ayah Al presents to IE with secondary vascular L LE lymphedema  rdGrdrrdarddrderd:rd rd3rd Fibrosis present: None    Goals  STG  - Patient and/or caregiver will understand lymphedema precautions to decrease risk of infection and exacerbation of lymphedema  - Patient will develop a tolerance for wearing multi-layer, short stretch bandages betweens sessions to facilitate limb decongestion  - Patient will experience decrease in pitting edema which will improve tissue health and decrease risk of infection    - Patient will perform HEP independently or with minimal assistance to help improve lymphatic flow and venous return    LTG  - Patient will experience increased ROM and functional mobility to aid with ADL's such as walking, standing at time of discharge  - Patient will demonstrate more normalized gait pattern and improved balance at time of discharge  - Patient and/or caregiver will be independent with donning and doffing of compression garments which will enable regular daily garment wear at time of discharge, skin maintenance, and self- MLD   - Patient and/or caregiver will be independent with HEP and lymphedema management to prevent relapse and reduce risk of infection and hospitalizations at time of discharge    Plan  Plan details: Thank you for referring Miguel A Larson to Physical Therapy at [place] [address] and for the opportunity to coordinate care  Patient would benefit from: lymphedema eval, PT eval and skilled physical therapy  Planned therapy interventions: manual therapy, massage, muscle pump exercises, neuromuscular re-education, orthotic fitting/training, orthotic management and training, patient education, self care, strengthening, stretching, therapeutic activities, therapeutic exercise, graded activity, dressing changes, breathing training, balance, gait training and home exercise program  Other planned therapy interventions: Complete decongestive therapy: Manual lymphatic drainage, compression bandaging, exercise, self-maintenance, and education on bandaging, skin care, and self-bandaging  Frequency: 1x week  Duration in visits: 10  Plan of Care beginning date: 10/27/2022  Treatment plan discussed with: patient        Subjective Evaluation    History of Present Illness  Mechanism of injury: Lymphedema History  Patient referred to OP PT lymphedema services by ELIER Pereira  Patient has history of chronic swelling in L LE  Patient reports history of saphenous vein surgery in  with patient reporting "this leg was not right since that surgery " She reports swelling extending into L lower leg initially following surgery, though improved over the years  She complains of increased sensitivity in L lower leg compared to L upper thigh, though swelling most prominent in L upper thigh  Patient has follow-up with cardiology throughout the year  Patient has not worn compression reporting "I can't the leg can't take it "     IE 22  Patient reports several year history of lumbar spine pain  She had MRI performed of her lumbar spine  She also has bursitis of B hips, B knee pain  She is receiving injections of her L knee, which helps with her pain  She recently had a fall when she was coming back from the garden, falling up the stairs  She has swelling of her L foot  She is finally able to put shoes on  Patient walks with her daughter every weekend and has to stop several times to get around the block  Patient reports radicular symptoms down into B LEs  She has a lumbar spine brace and has heard mixed opinions on whether she should wear it  She denies numbness/tingling of her LEs  She has radicular symptoms down past her knees  She has increased tenderness since her heart surgery in , as they took the saphenous vein from her L LE  Patient denies night pain, bowel/bladder changes  She is able to take Advil PM to sleep at night       - Functional limitations: walking around the block, lifting objects of weight, standing greater than 20 minutes, stairs      - Patient goals: "Decrease some of the pain and be able to walk more " She walks the neighborhood with her daughter every weekend  Pain  Current pain rating: 3  At best pain ratin  At worst pain rating: 10          Objective     Observations   Left Knee   Positive for edema       Additional Observation Details  Palpation:   Skin Mobility: WNL  Skin temperature: WNL  Skin color: hemosiderin staining present in L lower leg  Pittin+ throughout L LE  Wound presence: no,  Stemmer's Sign: None  Fungal infections: no  Hyperkeratosis: no   Lymphorrhea/ weeping: no  Papillomas: no   Lymph fistulas: no   Lymph cysts: no   Cellulitis: no   Lipodermatosclerosis: no       General Comments: Ankle/Foot Comments   Gait: Patient ambulatory with SPC, L antalgic gait at this time  PT educated Aurora Rosales in regards to pathology of lymphedema, proper skin care, and components of complete decongestive therapy including manual lymph drainage, compression, exercise, and proper skin care with verbalized understanding  PT to reach out to cardiology for further clearance for patient to begin lymphedema services   Patient to begin 1 time per week if approved by cardiology and continue with orthopedic PT        LOWER EXTREMITY LEFT CALCULATIONS    Flowsheet Row Most Recent Value   Volume LE (mL) 8335   Difference from last visit (mL)  -8335   Difference from first visit (mL)  -8335      LOWER EXTREMITY RIGHT CALCULATIONS    Flowsheet Row Most Recent Value   Volume LE (mL) 7106   Difference from last visit (mL)  -7106   Difference from first visit (mL)  -7106                    Precautions: CAD, asthma, hyperlipidemia, chronic swelling, PMH saphenous vein surgery  HEP: review educational literature provided by PT, order short stretch bandages as highlighted by PT   Daily Treatment Diary     Manuals             L LE MLD sequence                                       Therapeutic Exercise             Bike              Hip abduction             Hip flexion             Heel raises             Toe raises             Lateral walking             Bridges                                                                                                        Self-care/ Home management             Lymphedema education

## 2022-11-01 ENCOUNTER — OFFICE VISIT (OUTPATIENT)
Dept: PHYSICAL THERAPY | Facility: CLINIC | Age: 82
End: 2022-11-01

## 2022-11-01 DIAGNOSIS — M54.16 LUMBAR RADICULOPATHY: Primary | ICD-10-CM

## 2022-11-01 NOTE — PROGRESS NOTES
Daily Note     Today's date: 2022  Patient name: Maryanne Rudolph  : 1940  MRN: [id number]  Referring provider: Rasheed Samayoa MD  Dx:   Encounter Diagnosis     ICD-10-CM    1  Lumbar radiculopathy  M54 16        Start Time: 08  Stop Time: 0363  Total time in clinic (min): 40 minutes    Subjective: Patient notes increased R hip pain after she was gardening yesterday  She notes 6/10 pain in her R hip when she is standing and walking  Objective: See treatment diary below      Assessment: Tolerated treatment fair  Slightly shortened session today due to increased lumbar spine and hip pain  She continues to demonstrate antalgic gait pattern due to weakness and pain of B LEs  Encouraged VANIA after sustained flexion throughout her day  Will continue to progress as able  Patient would benefit from continued PT      Plan: Continue per plan of care         Precautions: LATEX ALLERGY     Past Medical History:   Diagnosis Date   • Asthma    • Cardiac disease     heart attack   • Disease of thyroid gland    • Hyperlipidemia          Date 10/11 10/13 10/18 10/20/22 10/25/22 10/27/22 11/1/22   Visit Number 4 [phone] 10             Manual          P-A mobs          Desensitization of L saphenous nerve                 LAD B LEs 3x30s    Hamstring stretch seated x10 ea hold 5 SOS         Neuro Re-ed          TrA progression          Hip add squeeze x20  x20 x30 x30 x30 x30 x30   Clamshells  hooklying  2x10 hooklying  2x10 GTB hooklying  2x10 GTB  Seated x10 GTB     Bridge  Glute sets 20 Glute sets 20        Saphenous nerve sliders     Seated HS stretch with SOS 5x10s B     SLB on foam   5x10s B BUE support  5x10s B BUE support 5x10s B BUE support 5x10s B BUE support 5x10s B BUE support FTEO 5x10s on foam    Seated sciatic nerve sliders x10 self         Thera Ex          NuStep warmup  L1 5' B LE only L1 5' B LE only L1 5' B LE only L1 5' B LE only L1 5' B LE only L1 5' B LE only   VANIA/REIL Along counter 2x10  x10 along counter     VANIA along counter x10  Repeated lumbar flexion in seated with pball x10    TB shoulder ext x20 GTB x20 GTB x20 BTB x20 BTB x20 BTB x20 BTB x20 GTB   TB shoulder row x20 GTB x20 GTB x20 BTB x20 BTB x20 BTB x20 BTB x20 GTB   Paloff press  10 B GTB 10 B GTB x15 BTB B x15 BTB B x15 BTB B x15 BTB B x15 BTB B   Step ups  4" x10 B 4" x10 B 4" x20 B 6" x10 B 4" x20 B 4" x20 B    Lateral step ups 4" x10 B 4" x10 B 4" x10 B 6" x10 B  4" x20 B 4" x20 B    SOC          LAQ  2x10   x10 2#  x10 L   x10 R x10 B  x15 B   Hip abd/ext  x10 B  x15 B x15 B x15 B x15 B x15 B x15    Side stepping 2x10'  4x10'  4x10'  4x15'  4x15'  4x15'    Leg press   2x10 45# DL 2x10 45# DL x10 45# DL  2x10 45# DL    Walking marches at // bars    2x15' unilateral UE support        Flexion ball stretch x 10 hold 5         Thera Activity          Patient education          Lifting mechanics          Posture education                                                            Modalities                        Insurance:  AMA/CMS Eval/ Re-eval POC expires Anthony Passer #/ Referral # Total   Visits  Start date  Expiration date Extension  Visit limitation? PT only or  PT+OT?  Co-Insurance   MCR 9/29 12/12  No auth  9/29   BOMN PT Yes

## 2022-11-03 ENCOUNTER — OFFICE VISIT (OUTPATIENT)
Dept: PHYSICAL THERAPY | Facility: CLINIC | Age: 82
End: 2022-11-03

## 2022-11-03 ENCOUNTER — PROCEDURE VISIT (OUTPATIENT)
Dept: OBGYN CLINIC | Facility: CLINIC | Age: 82
End: 2022-11-03

## 2022-11-03 DIAGNOSIS — M17.12 PRIMARY LOCALIZED OSTEOARTHRITIS OF LEFT KNEE: Primary | ICD-10-CM

## 2022-11-03 DIAGNOSIS — M54.16 LUMBAR RADICULOPATHY: Primary | ICD-10-CM

## 2022-11-03 RX ORDER — HYALURONATE SODIUM 10 MG/ML
20 SYRINGE (ML) INTRAARTICULAR
Status: COMPLETED | OUTPATIENT
Start: 2022-11-03 | End: 2022-11-03

## 2022-11-03 RX ORDER — LEVOTHYROXINE SODIUM 50 MCG
50 TABLET ORAL DAILY
COMMUNITY
Start: 2022-10-14

## 2022-11-03 RX ADMIN — Medication 20 MG: at 13:27

## 2022-11-03 NOTE — PROGRESS NOTES
Daily Note     Today's date: 11/3/2022  Patient name: Miguel A Larson  : 1940  MRN: [id number]  Referring provider: Leanna Matos MD  Dx:   Encounter Diagnosis     ICD-10-CM    1  Lumbar radiculopathy  M54 16        Start Time: 0840  Stop Time: 1040  Total time in clinic (min): 45 minutes    Subjective: Patient notes increased R hip pain 5/10 after she was gardening on 10/31  She notes pain has improved since that day but still has some difficulty with weightbearing on R LE  Objective: See treatment diary below      Assessment: Tolerated treatment well  Pt able to progress through all therex today with increase weight on leg press  ADDED foam balance therex with eyes closed to increase proprioception B LEs  She continues to demonstrate antalgic gait pattern due to weakness and pain of B LEs  Encouraged VANIA after sustained flexion throughout her day  Will continue to progress as able  Patient would benefit from continued PT      Plan: Continue per plan of care  Progress as per primary PT        Precautions: LATEX ALLERGY     Past Medical History:   Diagnosis Date   • Asthma    • Cardiac disease     heart attack   • Disease of thyroid gland    • Hyperlipidemia          Date 10/11 10/13 10/18 10/20/22 10/25/22 10/27/22 11/1/22 11/3   Visit Number 4 5 6 7 8 9 10 11              Manual           P-A mobs           Desensitization of L saphenous nerve                  LAD B LEs 3x30s     Hamstring stretch seated x10 ea hold 5 SOS          Neuro Re-ed           TrA progression           Hip add squeeze x20  x20 x30 x30 x30 x30 x30 x30   Clamshells  hooklying  2x10 hooklying  2x10 GTB hooklying  2x10 GTB  Seated x10 GTB      Bridge  Glute sets 20 Glute sets 20         Saphenous nerve sliders     Seated HS stretch with SOS 5x10s B      SLB on foam   5x10s B BUE support  5x10s B BUE support 5x10s B BUE support 5x10s B BUE support 5x10s B BUE support FTEO 5x10s on foam FTEO 5x15 sec, FT EC on tandem beam x10 sec x 3 trials    Seated sciatic nerve sliders x10 self          Thera Ex           NuStep warmup  L1 5' B LE only L1 5' B LE only L1 5' B LE only L1 5' B LE only L1 5' B LE only L1 5' B LE only L1 5' B LE only   VANIA/REIL Along counter 2x10  x10 along counter     VANIA along counter x10  Repeated lumbar flexion in seated with pball x10  Repeated lumbar flexion in seated pball x20   TB shoulder ext x20 GTB x20 GTB x20 BTB x20 BTB x20 BTB x20 BTB x20 GTB x20 GTB   TB shoulder row x20 GTB x20 GTB x20 BTB x20 BTB x20 BTB x20 BTB x20 GTB x20 GT B   Paloff press  10 B GTB 10 B GTB x15 BTB B x15 BTB B x15 BTB B x15 BTB B x15 BTB B x20 GTB   Step ups  4" x10 B 4" x10 B 4" x20 B 6" x10 B 4" x20 B 4" x20 B  4" x20 B    Lateral step ups 4" x10 B 4" x10 B 4" x10 B 6" x10 B  4" x20 B 4" x20 B  4"x20 B   SOC           LAQ  2x10   x10 2#  x10 L   x10 R x10 B  x15 B x20 B    Hip abd/ext  x10 B  x15 B x15 B x15 B x15 B x15 B x15  x15   Side stepping 2x10'  4x10'  4x10'  4x15'  4x15'  4x15'  4x15'   Leg press   2x10 45# DL 2x10 45# DL x10 45# DL  2x10 45# DL  x30 55# DL   Walking marches at // bars    2x15' unilateral UE support         Flexion ball stretch x 10 hold 5          Thera Activity           Patient education           Lifting mechanics           Posture education                                                                  Modalities                          Insurance:  AMA/CMS Eval/ Re-eval POC expires Luz Elena Fernandez #/ Referral # Total   Visits  Start date  Expiration date Extension  Visit limitation? PT only or  PT+OT?  Co-Insurance   MCR 9/29 12/12  No auth  9/29   BOMN PT Yes

## 2022-11-03 NOTE — PROGRESS NOTES
Assessment/Plan:  1  Primary localized osteoarthritis of left knee       Orly Love tolerated her first Euflexxa injection in the left knee today  Follow-up in 1 week for the next injection    Subjective:   Ángel Washburn is a 80 y o  female who presents for her first Euflexxa injection left knee today  Past Medical History:   Diagnosis Date   • Asthma    • Cardiac disease     heart attack   • Disease of thyroid gland    • Hyperlipidemia        Past Surgical History:   Procedure Laterality Date   • APPENDECTOMY     • BREAST LUMPECTOMY Bilateral     several   • CARDIAC SURGERY     • CATARACT EXTRACTION, BILATERAL     • CORONARY STENT PLACEMENT     • NASAL POLYP SURGERY         Family History   Problem Relation Age of Onset   • ALS Father    • Cancer Brother    • Heart disease Family    • Asthma Family        Social History     Occupational History   • Not on file   Tobacco Use   • Smoking status: Never Smoker   • Smokeless tobacco: Never Used   Vaping Use   • Vaping Use: Never used   Substance and Sexual Activity   • Alcohol use: Yes   • Drug use: No   • Sexual activity: Not on file         Current Outpatient Medications:   •  albuterol (2 5 mg/3 mL) 0 083 % nebulizer solution, Take 1 vial (2 5 mg total) by nebulization 2 (two) times a day, Disp: 60 vial, Rfl: 0  •  albuterol (ProAir HFA) 90 mcg/act inhaler, Inhale 2 puffs every 4 (four) hours as needed for wheezing, Disp: 8 5 g, Rfl: 8  •  amLODIPine (NORVASC) 2 5 mg tablet, Take 2 5 mg by mouth in the morning , Disp: , Rfl:   •  aspirin 81 MG tablet, Take 81 mg by mouth daily  , Disp: , Rfl:   •  budesonide (PULMICORT) 0 25 mg/2 mL nebulizer solution, USE 1 VIAL  IN  NEBULIZER TWICE  DAILY - Rinse mouth out after use, Disp: 60 mL, Rfl: 11  •  Cholecalciferol (VITAMIN D) 2000 UNITS tablet, Take 2,000 Units by mouth daily  , Disp: , Rfl:   •  EPINEPHrine (EPIPEN) 0 3 mg/0 3 mL SOAJ, Inject 0 3 mg into a muscle, Disp: , Rfl:   •  Ergocalciferol (VITAMIN D2 PO), Take by mouth, Disp: , Rfl:   •  fexofenadine (ALLEGRA) 60 MG tablet, Take by mouth, Disp: , Rfl:   •  lidocaine (LIDODERM) 5 %, Place 1 patch on the skin daily Remove & Discard patch within 12 hours or as directed by MD, Disp: 30 patch, Rfl: 0  •  montelukast (SINGULAIR) 10 mg tablet, Take 10 mg by mouth daily at bedtime  , Disp: , Rfl:   •  nitroglycerin (NITROSTAT) 0 4 mg SL tablet, Place 0 4 mg under the tongue every 5 (five) minutes as needed for chest pain , Disp: , Rfl:   •  simvastatin (ZOCOR) 10 mg tablet, Take 10 mg by mouth daily at bedtime  , Disp: , Rfl:   •  SYMBICORT 160-4 5 MCG/ACT inhaler, , Disp: , Rfl: 0  •  Synthroid 50 MCG tablet, Take 50 mcg by mouth daily, Disp: , Rfl:   •  UNKNOWN TO PATIENT, Allegra daily po Stool softener bid po Tumeric 0 500 daily po, Disp: , Rfl:   •  levothyroxine 112 mcg tablet, Take 112 mcg by mouth daily  (Patient not taking: Reported on 11/3/2022), Disp: , Rfl:   •  SYNTHROID 125 MCG tablet, take 1 tablet by mouth daily 1 hour before meals or 2 hours after meals (Patient not taking: Reported on 11/3/2022), Disp: , Rfl: 0    Allergies   Allergen Reactions   • Penicillins    • Latex Rash       Objective: There were no vitals filed for this visit  Ortho Exam    Physical Exam    Large joint arthrocentesis: L knee  Universal Protocol:  Consent: Verbal consent obtained  Risks and benefits: risks, benefits and alternatives were discussed  Consent given by: patient  Site marked: the operative site was marked  Supporting Documentation  Indications: pain   Procedure Details  Location: knee - L knee  Needle size: 22 G  Ultrasound guidance: no  Approach: anterolateral  Medications administered: 20 mg Sodium Hyaluronate 20 MG/2ML    Patient tolerance: patient tolerated the procedure well with no immediate complications  Dressing:  Sterile dressing applied            This document was created using speech voice recognition software     Grammatical errors, random word insertions, pronoun errors, and incomplete sentences are an occasional consequence of this system due to software limitations, ambient noise, and hardware issues  Any formal questions or concerns about content, text, or information contained within the body of this dictation should be directly addressed to the provider for clarification  no

## 2022-11-07 ENCOUNTER — OFFICE VISIT (OUTPATIENT)
Dept: PHYSICAL THERAPY | Facility: CLINIC | Age: 82
End: 2022-11-07

## 2022-11-07 DIAGNOSIS — M54.16 LUMBAR RADICULOPATHY: Primary | ICD-10-CM

## 2022-11-07 DIAGNOSIS — M79.89 LEFT LEG SWELLING: ICD-10-CM

## 2022-11-07 NOTE — PROGRESS NOTES
Daily Note     Today's date: 2022  Patient name: Rosalinda Lu  : 1940  MRN: [id number]  Referring provider: Neftali Medrano MD  Dx:   Encounter Diagnosis     ICD-10-CM    1  Lumbar radiculopathy  M54 16    2  Left leg swelling  M79 89        Start Time: 0845  Stop Time: 0930  Total time in clinic (min): 45 minutes    Subjective: Patient notes increased R LE pain after working in her garden this weekend  She noted that her R LE gave out on her prior to PT today when she was walking in  She is worried about falling in her garden and being able to get up independently  Objective: See treatment diary below      Assessment: Tolerated treatment fair  Patient notes increased pain of her R knee throughout session  Added TKE in standing today to help with control of R LE in standing  Patient required several breaks throughout due to pain of her B knees  Patient would benefit from continued PT      Plan: Continue per plan of care  Progress as per primary PT        Precautions: LATEX ALLERGY     Past Medical History:   Diagnosis Date   • Asthma    • Cardiac disease     heart attack   • Disease of thyroid gland    • Hyperlipidemia          Date 11/7/22 10/13 10/18 10/20/22 10/25/22 10/27/22 11/1/22 11/3   Visit Number 12 5 6 7 8 9 10 11              Manual           P-A mobs           Desensitization of L saphenous nerve                  LAD B LEs 3x30s     Hamstring stretch seated x10 ea hold 5 SOS          Neuro Re-ed           TrA progression           Hip add squeeze x20  x20 x30 x30 x30 x30 x30 x30   Clamshells   hooklying  2x10 GTB hooklying  2x10 GTB  Seated x10 GTB      Bridge   Glute sets 20         Saphenous nerve sliders     Seated HS stretch with SOS 5x10s B      SLB on foam  FTEO 5x10s on foam 5x10s B BUE support  5x10s B BUE support 5x10s B BUE support 5x10s B BUE support 5x10s B BUE support FTEO 5x10s on foam FTEO 5x15 sec, FT EC on tandem beam x10 sec x 3 trials              Thera Ex NuStep warmup L1 5' B LE only L1 5' B LE only L1 5' B LE only L1 5' B LE only L1 5' B LE only L1 5' B LE only L1 5' B LE only L1 5' B LE only   VANIA/REIL  x10 along counter     VANIA along counter x10  Repeated lumbar flexion in seated with pball x10  Repeated lumbar flexion in seated pball x20   TB shoulder ext  x20 GTB x20 BTB x20 BTB x20 BTB x20 BTB x20 GTB x20 GTB   TB shoulder row  x20 GTB x20 BTB x20 BTB x20 BTB x20 BTB x20 GTB x20 GT B   Paloff press   10 B GTB x15 BTB B x15 BTB B x15 BTB B x15 BTB B x15 BTB B x20 GTB   Step ups  6" x10 B 4" x10 B 4" x20 B 6" x10 B 4" x20 B 4" x20 B  4" x20 B    Lateral step ups 4" x10 B 4" x10 B 4" x10 B 6" x10 B  4" x20 B 4" x20 B  4"x20 B   SOC           LAQ 2x10 2x10   x10 2#  x10 L   x10 R x10 B  x15 B x20 B    Hip abd/ext  x15 B  x15 B x15 B x15 B x15 B x15 B x15  x15   Side stepping 4x15'  4x10'  4x10'  4x15'  4x15'  4x15'  4x15'   Leg press  x30 65# DL 2x10 45# DL 2x10 45# DL x10 45# DL  2x10 45# DL  x30 55# DL   Walking marches at // bars    2x15' unilateral UE support        TKE at CC 3s x10 B 2 5#          Thera Activity           Patient education           Lifting mechanics           Posture education                                                                  Modalities                          Insurance:  AMA/CMS Eval/ Re-eval POC expires Chris Ballesteros #/ Referral # Total   Visits  Start date  Expiration date Extension  Visit limitation? PT only or  PT+OT?  Co-Insurance   MCR 9/29 12/12  No auth  9/29   BOMN PT Yes

## 2022-11-10 ENCOUNTER — PROCEDURE VISIT (OUTPATIENT)
Dept: OBGYN CLINIC | Facility: CLINIC | Age: 82
End: 2022-11-10

## 2022-11-10 ENCOUNTER — OFFICE VISIT (OUTPATIENT)
Dept: PHYSICAL THERAPY | Facility: CLINIC | Age: 82
End: 2022-11-10

## 2022-11-10 DIAGNOSIS — M70.61 TROCHANTERIC BURSITIS, RIGHT HIP: Primary | ICD-10-CM

## 2022-11-10 DIAGNOSIS — M17.12 PRIMARY LOCALIZED OSTEOARTHRITIS OF LEFT KNEE: ICD-10-CM

## 2022-11-10 DIAGNOSIS — M79.89 LEFT LEG SWELLING: Primary | ICD-10-CM

## 2022-11-10 RX ORDER — TRIAMCINOLONE ACETONIDE 40 MG/ML
80 INJECTION, SUSPENSION INTRA-ARTICULAR; INTRAMUSCULAR
Status: COMPLETED | OUTPATIENT
Start: 2022-11-10 | End: 2022-11-10

## 2022-11-10 RX ORDER — LIDOCAINE HYDROCHLORIDE 10 MG/ML
2 INJECTION, SOLUTION INFILTRATION; PERINEURAL
Status: COMPLETED | OUTPATIENT
Start: 2022-11-10 | End: 2022-11-10

## 2022-11-10 RX ORDER — BUPIVACAINE HYDROCHLORIDE 5 MG/ML
2 INJECTION, SOLUTION EPIDURAL; INTRACAUDAL
Status: COMPLETED | OUTPATIENT
Start: 2022-11-10 | End: 2022-11-10

## 2022-11-10 RX ORDER — HYALURONATE SODIUM 10 MG/ML
20 SYRINGE (ML) INTRAARTICULAR
Status: COMPLETED | OUTPATIENT
Start: 2022-11-10 | End: 2022-11-10

## 2022-11-10 RX ADMIN — Medication 20 MG: at 11:39

## 2022-11-10 RX ADMIN — BUPIVACAINE HYDROCHLORIDE 2 ML: 5 INJECTION, SOLUTION EPIDURAL; INTRACAUDAL at 10:54

## 2022-11-10 RX ADMIN — LIDOCAINE HYDROCHLORIDE 2 ML: 10 INJECTION, SOLUTION INFILTRATION; PERINEURAL at 10:54

## 2022-11-10 RX ADMIN — TRIAMCINOLONE ACETONIDE 80 MG: 40 INJECTION, SUSPENSION INTRA-ARTICULAR; INTRAMUSCULAR at 10:54

## 2022-11-10 NOTE — PROGRESS NOTES
Assessment/Plan:  1  Trochanteric bursitis, right hip  Large joint arthrocentesis: R greater trochanteric bursa   2  Primary localized osteoarthritis of left knee         Shannan Wayne has right-sided hip pain consistent with trochanteric bursitis  We did provide a right trochanteric bursa cortisone injection the office today  She tolerated the injection well  Hopefully of gives her significant relief like it did in the past   We also gave her second Euflexxa injection left knee today  Follow-up in 1 week for the next injection  Large joint arthrocentesis: R greater trochanteric bursa  Universal Protocol:  Consent: Verbal consent obtained  Risks and benefits: risks, benefits and alternatives were discussed  Consent given by: patient  Site marked: the operative site was marked  Supporting Documentation  Indications: pain and joint swelling   Procedure Details  Location: hip - R greater trochanteric bursa  Preparation: Patient was prepped and draped in the usual sterile fashion  Needle size: 22 G  Ultrasound guidance: no  Approach: lateral  Medications administered: 2 mL lidocaine 1 %; 2 mL bupivacaine (PF) 0 5 %; 80 mg triamcinolone acetonide 40 mg/mL    Patient tolerance: patient tolerated the procedure well with no immediate complications  Dressing:  Sterile dressing applied    Large joint arthrocentesis: L knee  Universal Protocol:  Consent: Verbal consent obtained    Risks and benefits: risks, benefits and alternatives were discussed  Consent given by: patient  Site marked: the operative site was marked  Supporting Documentation  Indications: pain   Procedure Details  Location: knee - L knee  Needle size: 22 G  Ultrasound guidance: no  Approach: anterolateral  Medications administered: 20 mg Sodium Hyaluronate 20 MG/2ML    Patient tolerance: patient tolerated the procedure well with no immediate complications  Dressing:  Sterile dressing applied          Subjective:   Romy Sheets is a 80 y o  female who presents to the office for evaluation for right-sided hip pain  She has a history of ongoing back and hip pain  She has a history of trochanteric bursitis in the right hip and did improve with a localized trochanteric bursa cortisone injection 1 year ago  She denies any recent injury or trauma  She has aching throbbing pain in the lateral portion of right hip that worsens when she lies on the right side  She is also here for her second Euflexxa injection left      Review of Systems   Constitutional: Negative for chills, fever and unexpected weight change  HENT: Negative for hearing loss, nosebleeds and sore throat  Eyes: Negative for pain, redness and visual disturbance  Respiratory: Negative for cough, shortness of breath and wheezing  Cardiovascular: Negative for chest pain, palpitations and leg swelling  Gastrointestinal: Negative for abdominal pain, nausea and vomiting  Endocrine: Negative for polydipsia and polyuria  Genitourinary: Negative for dysuria and hematuria  Musculoskeletal:        See HPI   Skin: Negative for rash and wound  Neurological: Negative for dizziness, numbness and headaches  Psychiatric/Behavioral: Negative for decreased concentration and suicidal ideas  The patient is not nervous/anxious            Past Medical History:   Diagnosis Date   • Asthma    • Cardiac disease     heart attack   • Disease of thyroid gland    • Hyperlipidemia        Past Surgical History:   Procedure Laterality Date   • APPENDECTOMY     • BREAST LUMPECTOMY Bilateral     several   • CARDIAC SURGERY     • CATARACT EXTRACTION, BILATERAL     • CORONARY STENT PLACEMENT     • NASAL POLYP SURGERY         Family History   Problem Relation Age of Onset   • ALS Father    • Cancer Brother    • Heart disease Family    • Asthma Family        Social History     Occupational History   • Not on file   Tobacco Use   • Smoking status: Never Smoker   • Smokeless tobacco: Never Used   Vaping Use   • Vaping Use: Never used   Substance and Sexual Activity   • Alcohol use: Yes   • Drug use: No   • Sexual activity: Not on file         Current Outpatient Medications:   •  albuterol (2 5 mg/3 mL) 0 083 % nebulizer solution, Take 1 vial (2 5 mg total) by nebulization 2 (two) times a day, Disp: 60 vial, Rfl: 0  •  albuterol (ProAir HFA) 90 mcg/act inhaler, Inhale 2 puffs every 4 (four) hours as needed for wheezing, Disp: 8 5 g, Rfl: 8  •  amLODIPine (NORVASC) 2 5 mg tablet, Take 2 5 mg by mouth in the morning , Disp: , Rfl:   •  aspirin 81 MG tablet, Take 81 mg by mouth daily  , Disp: , Rfl:   •  budesonide (PULMICORT) 0 25 mg/2 mL nebulizer solution, USE 1 VIAL  IN  NEBULIZER TWICE  DAILY - Rinse mouth out after use, Disp: 60 mL, Rfl: 11  •  Cholecalciferol (VITAMIN D) 2000 UNITS tablet, Take 2,000 Units by mouth daily  , Disp: , Rfl:   •  EPINEPHrine (EPIPEN) 0 3 mg/0 3 mL SOAJ, Inject 0 3 mg into a muscle, Disp: , Rfl:   •  Ergocalciferol (VITAMIN D2 PO), Take by mouth, Disp: , Rfl:   •  fexofenadine (ALLEGRA) 60 MG tablet, Take by mouth, Disp: , Rfl:   •  levothyroxine 112 mcg tablet, Take 112 mcg by mouth daily  (Patient not taking: Reported on 11/3/2022), Disp: , Rfl:   •  lidocaine (LIDODERM) 5 %, Place 1 patch on the skin daily Remove & Discard patch within 12 hours or as directed by MD, Disp: 30 patch, Rfl: 0  •  montelukast (SINGULAIR) 10 mg tablet, Take 10 mg by mouth daily at bedtime  , Disp: , Rfl:   •  nitroglycerin (NITROSTAT) 0 4 mg SL tablet, Place 0 4 mg under the tongue every 5 (five) minutes as needed for chest pain , Disp: , Rfl:   •  simvastatin (ZOCOR) 10 mg tablet, Take 10 mg by mouth daily at bedtime  , Disp: , Rfl:   •  SYMBICORT 160-4 5 MCG/ACT inhaler, , Disp: , Rfl: 0  •  SYNTHROID 125 MCG tablet, take 1 tablet by mouth daily 1 hour before meals or 2 hours after meals (Patient not taking: Reported on 11/3/2022), Disp: , Rfl: 0  •  Synthroid 50 MCG tablet, Take 50 mcg by mouth daily, Disp: , Rfl:   • UNKNOWN TO PATIENT, Allegra daily po Stool softener bid po Tumeric 0 500 daily po, Disp: , Rfl:     Allergies   Allergen Reactions   • Penicillins    • Latex Rash       Objective: There were no vitals filed for this visit  Right Hip Exam     Tenderness   The patient is experiencing tenderness in the greater trochanter  Range of Motion   External rotation: normal   Internal rotation: normal     Other   Erythema: absent  Sensation: normal  Pulse: present            Physical Exam  Vitals and nursing note reviewed  Constitutional:       Appearance: She is well-developed  HENT:      Head: Normocephalic and atraumatic  Eyes:      General: No scleral icterus  Extraocular Movements: Extraocular movements intact  Conjunctiva/sclera: Conjunctivae normal    Cardiovascular:      Rate and Rhythm: Normal rate  Pulmonary:      Effort: Pulmonary effort is normal  No respiratory distress  Musculoskeletal:      Cervical back: Normal range of motion and neck supple  Comments: As noted in HPI   Skin:     General: Skin is warm and dry  Neurological:      Mental Status: She is alert and oriented to person, place, and time  Psychiatric:         Behavior: Behavior normal              This document was created using speech voice recognition software  Grammatical errors, random word insertions, pronoun errors, and incomplete sentences are an occasional consequence of this system due to software limitations, ambient noise, and hardware issues  Any formal questions or concerns about content, text, or information contained within the body of this dictation should be directly addressed to the provider for clarification

## 2022-11-10 NOTE — PROGRESS NOTES
Daily Note     Today's date: 11/10/2022  Patient name: Vitor Carmona  : 1940  MRN: [id number]  Referring provider: Isauro Martínez MD  Dx:   Encounter Diagnosis     ICD-10-CM    1  Left leg swelling  M79 89                   Subjective: Federico Lester reports "leg is hurting today, I see the doctor later for the injections" upon arrival to therapy today  Objective: See treatment diary below  Manuals: 38 minutes- L LE MLD and lymphedema education    Assessment: Tolerated treatment fair  Patient would benefit from continued PT  PT reviewing pathology of lymphedema and chronic vascular swelling  Patient reporting some sensitivity with MLD over L lower leg, but demonstrating overall good tolerance  Plan: Continue per plan of care  Progress treatment as tolerated         Precautions: LATEX ALLERGY     Past Medical History:   Diagnosis Date   • Asthma    • Cardiac disease     heart attack   • Disease of thyroid gland    • Hyperlipidemia          Date 11/7/22 10/13 10/18 10/20/22 10/25/22 10/27/22 11/1/22 11/3   Visit Number 12 5 6 7 8 9 10 11              Manual           P-A mobs           Desensitization of L saphenous nerve                  LAD B LEs 3x30s     Hamstring stretch seated x10 ea hold 5 SOS          Neuro Re-ed           TrA progression           Hip add squeeze x20  x20 x30 x30 x30 x30 x30 x30   Clamshells   hooklying  2x10 GTB hooklying  2x10 GTB  Seated x10 GTB      Bridge   Glute sets 20         Saphenous nerve sliders     Seated HS stretch with SOS 5x10s B      SLB on foam  FTEO 5x10s on foam 5x10s B BUE support  5x10s B BUE support 5x10s B BUE support 5x10s B BUE support 5x10s B BUE support FTEO 5x10s on foam FTEO 5x15 sec, FT EC on tandem beam x10 sec x 3 trials              Thera Ex           NuStep warmup L1 5' B LE only L1 5' B LE only L1 5' B LE only L1 5' B LE only L1 5' B LE only L1 5' B LE only L1 5' B LE only L1 5' B LE only   VANIA/REIL  x10 along counter     VANIA along counter x10  Repeated lumbar flexion in seated with pball x10  Repeated lumbar flexion in seated pball x20   TB shoulder ext  x20 GTB x20 BTB x20 BTB x20 BTB x20 BTB x20 GTB x20 GTB   TB shoulder row  x20 GTB x20 BTB x20 BTB x20 BTB x20 BTB x20 GTB x20 GT B   Paloff press   10 B GTB x15 BTB B x15 BTB B x15 BTB B x15 BTB B x15 BTB B x20 GTB   Step ups  6" x10 B 4" x10 B 4" x20 B 6" x10 B 4" x20 B 4" x20 B  4" x20 B    Lateral step ups 4" x10 B 4" x10 B 4" x10 B 6" x10 B  4" x20 B 4" x20 B  4"x20 B   SOC           LAQ 2x10 2x10   x10 2#  x10 L   x10 R x10 B  x15 B x20 B    Hip abd/ext  x15 B  x15 B x15 B x15 B x15 B x15 B x15  x15   Side stepping 4x15'  4x10'  4x10'  4x15'  4x15'  4x15'  4x15'   Leg press  x30 65# DL 2x10 45# DL 2x10 45# DL x10 45# DL  2x10 45# DL  x30 55# DL   Walking marches at // bars    2x15' unilateral UE support        TKE at CC 3s x10 B 2 5#          Thera Activity           Patient education           Lifting mechanics           Posture education                                                                  Modalities                          Insurance:  AMA/CMS Eval/ Re-eval POC expires Mahi Julio #/ Referral # Total   Visits  Start date  Expiration date Extension  Visit limitation? PT only or  PT+OT?  Co-Insurance   MCR 9/29 12/12  No auth  9/29   BOMN PT Yes

## 2022-11-15 ENCOUNTER — OFFICE VISIT (OUTPATIENT)
Dept: PHYSICAL THERAPY | Facility: CLINIC | Age: 82
End: 2022-11-15

## 2022-11-15 DIAGNOSIS — M54.16 LUMBAR RADICULOPATHY: ICD-10-CM

## 2022-11-15 DIAGNOSIS — M79.89 LEFT LEG SWELLING: Primary | ICD-10-CM

## 2022-11-15 NOTE — PROGRESS NOTES
Daily Note     Today's date: 11/15/2022  Patient name: Connor Davies  : 1940  MRN: [id number]  Referring provider: Luis Cortes MD  Dx:   Encounter Diagnosis     ICD-10-CM    1  Left leg swelling  M79 89    2  Lumbar radiculopathy  M54 16        Start Time: 0845  Stop Time: 0930  Total time in clinic (min): 45 minutes    Subjective: Patient notes increased pain of her R hip and L knee today  She [place] the mall with her daughter this weekend and had difficulties with getting off the escalator due to loss of balance  Objective: See treatment diary below      Assessment: Tolerated treatment fair  Trial of descending escalator performed today, with slight increased pain of her L knee  Unilateral UE support required for descending treadmill  Patient notes increased fatigue of B LEs today  Will continue to progress as able  Patient would benefit from continued PT      Plan: Continue per plan of care          Precautions: LATEX ALLERGY     Past Medical History:   Diagnosis Date   • Asthma    • Cardiac disease     heart attack   • Disease of thyroid gland    • Hyperlipidemia          Date 11/7/22 11/15/22  10/20/22 10/25/22 10/27/22 11/1/22 11/3   Visit Number 12 13  7 8 9 10 11              Manual           P-A mobs           Desensitization of L saphenous nerve                  LAD B LEs 3x30s     Hamstring stretch seated x10 ea hold 5 SOS          Neuro Re-ed           TrA progression           Hip add squeeze x20  x20 x30 x30 x30 x30 x30 x30   Clamshells    hooklying  2x10 GTB  Seated x10 GTB      Bridge            Saphenous nerve sliders     Seated HS stretch with SOS 5x10s B      SLB on foam  FTEO 5x10s on foam 5x10s B BUE support  5x10s B BUE support 5x10s B BUE support 5x10s B BUE support 5x10s B BUE support FTEO 5x10s on foam FTEO 5x15 sec, FT EC on tandem beam x10 sec x 3 trials              Thera Ex           NuStep warmup L1 5' B LE only L1 5' B LE only L1 5' B LE only L1 5' B LE only L1 5' B LE only L1 5' B LE only L1 5' B LE only L1 5' B LE only   VANIA/REIL      VANIA along counter x10  Repeated lumbar flexion in seated with pball x10  Repeated lumbar flexion in seated pball x20   TB shoulder ext  x20 GTB x20 BTB x20 BTB x20 BTB x20 BTB x20 GTB x20 GTB   TB shoulder row  x20 GTB x20 BTB x20 BTB x20 BTB x20 BTB x20 GTB x20 GT B   Paloff press   10 B GTB x15 BTB B x15 BTB B x15 BTB B x15 BTB B x15 BTB B x20 GTB   Step ups  6" x10 B 4" x10 B 4" x20 B 6" x10 B 4" x20 B 4" x20 B  4" x20 B    Lateral step ups 4" x10 B 4" x10 B 4" x10 B 6" x10 B  4" x20 B 4" x20 B  4"x20 B   SOC           LAQ 2x10   x10 2#  x10 L   x10 R x10 B  x15 B x20 B    Hip abd/ext  x15 B  x15 B x15 B x15 B x15 B x15 B x15  x15   Side stepping 4x15' 6x15' 4x10'  4x10'  4x15'  4x15'  4x15'  4x15'   Leg press  x30 65# DL 2x10 45# DL 2x10 45# DL x10 45# DL  2x10 45# DL  x30 55# DL   Walking marches at // bars   Step downs onto foam 2x10  2x15' unilateral UE support        TKE at CC 3s x10 B 2 5#          Step ups/down on treadmill for escalator simulator  0 2- 5 mph step x10 each with L UE support         Thera Activity           Patient education           Lifting mechanics           Posture education                                                                  Modalities                          Insurance:  AMA/CMS Eval/ Re-eval POC expires Antonio Wagner #/ Referral # Total   Visits  Start date  Expiration date Extension  Visit limitation? PT only or  PT+OT?  Co-Insurance   MCR 9/29 12/12  No auth  9/29   BOMN PT Yes

## 2022-11-17 ENCOUNTER — OFFICE VISIT (OUTPATIENT)
Dept: PHYSICAL THERAPY | Facility: CLINIC | Age: 82
End: 2022-11-17

## 2022-11-17 ENCOUNTER — PROCEDURE VISIT (OUTPATIENT)
Dept: OBGYN CLINIC | Facility: CLINIC | Age: 82
End: 2022-11-17

## 2022-11-17 DIAGNOSIS — M79.89 LEFT LEG SWELLING: Primary | ICD-10-CM

## 2022-11-17 DIAGNOSIS — M17.12 PRIMARY LOCALIZED OSTEOARTHRITIS OF LEFT KNEE: Primary | ICD-10-CM

## 2022-11-17 RX ORDER — HYALURONATE SODIUM 10 MG/ML
20 SYRINGE (ML) INTRAARTICULAR
Status: COMPLETED | OUTPATIENT
Start: 2022-11-17 | End: 2022-11-17

## 2022-11-17 RX ADMIN — Medication 20 MG: at 14:18

## 2022-11-17 NOTE — PROGRESS NOTES
Daily Note     Today's date: 2022  Patient name: Dorian Bowman  : 1940  MRN: [id number]  Referring provider: Pina Gautam MD  Dx:   Encounter Diagnosis     ICD-10-CM    1  Left leg swelling  M79 89                      Subjective: Héctor Toledo reports "leg is hurting today, I see the doctor later for the injections" upon arrival to therapy today  Objective: See treatment diary below  Manuals: 38 minutes- L LE MLD and lymphedema education    Assessment: Tolerated treatment fair  Patient would benefit from continued PT  PT discussing compression options with patient including compression shorts for upper thigh management  Patient resistant to lower leg compression at this time  PT reviewing pathology and benefits of compression as part of lymphedema management  Patient wanting to discharge  next visit  Plan: Continue per plan of care  Progress treatment as tolerated         Precautions: LATEX ALLERGY     Past Medical History:   Diagnosis Date   • Asthma    • Cardiac disease     heart attack   • Disease of thyroid gland    • Hyperlipidemia          Date 11/7/22 10/13 10/18 10/20/22 10/25/22 10/27/22 11/1/22 11/3   Visit Number 12 5 6 7 8 9 10 11              Manual           P-A mobs           Desensitization of L saphenous nerve                  LAD B LEs 3x30s     Hamstring stretch seated x10 ea hold 5 SOS          Neuro Re-ed           TrA progression           Hip add squeeze x20  x20 x30 x30 x30 x30 x30 x30   Clamshells   hooklying  2x10 GTB hooklying  2x10 GTB  Seated x10 GTB      Bridge   Glute sets 20         Saphenous nerve sliders     Seated HS stretch with SOS 5x10s B      SLB on foam  FTEO 5x10s on foam 5x10s B BUE support  5x10s B BUE support 5x10s B BUE support 5x10s B BUE support 5x10s B BUE support FTEO 5x10s on foam FTEO 5x15 sec, FT EC on tandem beam x10 sec x 3 trials              Thera Ex           NuStep warmup L1 5' B LE only L1 5' B LE only L1 5' B LE only L1 5' B LE only L1 5' B LE only L1 5' B LE only L1 5' B LE only L1 5' B LE only   VANIA/REIL  x10 along counter     VANIA along counter x10  Repeated lumbar flexion in seated with pball x10  Repeated lumbar flexion in seated pball x20   TB shoulder ext  x20 GTB x20 BTB x20 BTB x20 BTB x20 BTB x20 GTB x20 GTB   TB shoulder row  x20 GTB x20 BTB x20 BTB x20 BTB x20 BTB x20 GTB x20 GT B   Paloff press   10 B GTB x15 BTB B x15 BTB B x15 BTB B x15 BTB B x15 BTB B x20 GTB   Step ups  6" x10 B 4" x10 B 4" x20 B 6" x10 B 4" x20 B 4" x20 B  4" x20 B    Lateral step ups 4" x10 B 4" x10 B 4" x10 B 6" x10 B  4" x20 B 4" x20 B  4"x20 B   SOC           LAQ 2x10 2x10   x10 2#  x10 L   x10 R x10 B  x15 B x20 B    Hip abd/ext  x15 B  x15 B x15 B x15 B x15 B x15 B x15  x15   Side stepping 4x15'  4x10'  4x10'  4x15'  4x15'  4x15'  4x15'   Leg press  x30 65# DL 2x10 45# DL 2x10 45# DL x10 45# DL  2x10 45# DL  x30 55# DL   Walking marches at // bars    2x15' unilateral UE support        TKE at CC 3s x10 B 2 5#          Thera Activity           Patient education           Lifting mechanics           Posture education                                                                  Modalities                          Insurance:  AMA/CMS Eval/ Re-eval POC expires Renay Barahona #/ Referral # Total   Visits  Start date  Expiration date Extension  Visit limitation? PT only or  PT+OT?  Co-Insurance   MCR 9/29 12/12  No auth  9/29   BOMN PT Yes

## 2022-11-17 NOTE — PROGRESS NOTES
Assessment/Plan:  1  Primary localized osteoarthritis of left knee          Tashi Cover tolerated her third Euflexxa injection left knee today  We could repeat these injections in 6 months if clinically indicated  Subjective:   Love Vuong is a [de-identified] y o  female who presents for her third Euflexxa  Injection left knee today  Past Medical History:   Diagnosis Date   • Asthma    • Cardiac disease     heart attack   • Disease of thyroid gland    • Hyperlipidemia        Past Surgical History:   Procedure Laterality Date   • APPENDECTOMY     • BREAST LUMPECTOMY Bilateral     several   • CARDIAC SURGERY     • CATARACT EXTRACTION, BILATERAL     • CORONARY STENT PLACEMENT     • NASAL POLYP SURGERY         Family History   Problem Relation Age of Onset   • ALS Father    • Cancer Brother    • Heart disease Family    • Asthma Family        Social History     Occupational History   • Not on file   Tobacco Use   • Smoking status: Never   • Smokeless tobacco: Never   Vaping Use   • Vaping Use: Never used   Substance and Sexual Activity   • Alcohol use: Yes   • Drug use: No   • Sexual activity: Not on file         Current Outpatient Medications:   •  albuterol (2 5 mg/3 mL) 0 083 % nebulizer solution, Take 1 vial (2 5 mg total) by nebulization 2 (two) times a day, Disp: 60 vial, Rfl: 0  •  albuterol (ProAir HFA) 90 mcg/act inhaler, Inhale 2 puffs every 4 (four) hours as needed for wheezing, Disp: 8 5 g, Rfl: 8  •  amLODIPine (NORVASC) 2 5 mg tablet, Take 2 5 mg by mouth in the morning , Disp: , Rfl:   •  aspirin 81 MG tablet, Take 81 mg by mouth daily  , Disp: , Rfl:   •  budesonide (PULMICORT) 0 25 mg/2 mL nebulizer solution, USE 1 VIAL  IN  NEBULIZER TWICE  DAILY - Rinse mouth out after use, Disp: 60 mL, Rfl: 11  •  Cholecalciferol (VITAMIN D) 2000 UNITS tablet, Take 2,000 Units by mouth daily  , Disp: , Rfl:   •  EPINEPHrine (EPIPEN) 0 3 mg/0 3 mL SOAJ, Inject 0 3 mg into a muscle, Disp: , Rfl:   • Ergocalciferol (VITAMIN D2 PO), Take by mouth, Disp: , Rfl:   •  fexofenadine (ALLEGRA) 60 MG tablet, Take by mouth, Disp: , Rfl:   •  levothyroxine 112 mcg tablet, Take 112 mcg by mouth daily  (Patient not taking: Reported on 11/3/2022), Disp: , Rfl:   •  lidocaine (LIDODERM) 5 %, Place 1 patch on the skin daily Remove & Discard patch within 12 hours or as directed by MD, Disp: 30 patch, Rfl: 0  •  montelukast (SINGULAIR) 10 mg tablet, Take 10 mg by mouth daily at bedtime  , Disp: , Rfl:   •  nitroglycerin (NITROSTAT) 0 4 mg SL tablet, Place 0 4 mg under the tongue every 5 (five) minutes as needed for chest pain , Disp: , Rfl:   •  simvastatin (ZOCOR) 10 mg tablet, Take 10 mg by mouth daily at bedtime  , Disp: , Rfl:   •  SYMBICORT 160-4 5 MCG/ACT inhaler, , Disp: , Rfl: 0  •  SYNTHROID 125 MCG tablet, take 1 tablet by mouth daily 1 hour before meals or 2 hours after meals (Patient not taking: Reported on 11/3/2022), Disp: , Rfl: 0  •  Synthroid 50 MCG tablet, Take 50 mcg by mouth daily, Disp: , Rfl:   •  UNKNOWN TO PATIENT, Allegra daily po Stool softener bid po Tumeric 0 500 daily po, Disp: , Rfl:     Allergies   Allergen Reactions   • Penicillins    • Latex Rash       Objective: There were no vitals filed for this visit  Ortho Exam    Physical Exam    Large joint arthrocentesis: L knee  Universal Protocol:  Consent: Verbal consent obtained  Risks and benefits: risks, benefits and alternatives were discussed  Consent given by: patient  Site marked: the operative site was marked  Supporting Documentation  Indications: pain   Procedure Details  Location: knee - L knee  Needle size: 22 G  Ultrasound guidance: no  Approach: anterolateral  Medications administered: 20 mg Sodium Hyaluronate 20 MG/2ML    Patient tolerance: patient tolerated the procedure well with no immediate complications  Dressing:  Sterile dressing applied            This document was created using speech voice recognition software     Grammatical errors, random word insertions, pronoun errors, and incomplete sentences are an occasional consequence of this system due to software limitations, ambient noise, and hardware issues  Any formal questions or concerns about content, text, or information contained within the body of this dictation should be directly addressed to the provider for clarification

## 2022-11-18 ENCOUNTER — OFFICE VISIT (OUTPATIENT)
Dept: ENDOCRINOLOGY | Facility: CLINIC | Age: 82
End: 2022-11-18

## 2022-11-18 VITALS
BODY MASS INDEX: 32.93 KG/M2 | WEIGHT: 174.4 LBS | TEMPERATURE: 98.5 F | HEART RATE: 57 BPM | HEIGHT: 61 IN | DIASTOLIC BLOOD PRESSURE: 102 MMHG | SYSTOLIC BLOOD PRESSURE: 144 MMHG

## 2022-11-18 DIAGNOSIS — E66.09 CLASS 1 OBESITY DUE TO EXCESS CALORIES WITHOUT SERIOUS COMORBIDITY WITH BODY MASS INDEX (BMI) OF 33.0 TO 33.9 IN ADULT: ICD-10-CM

## 2022-11-18 DIAGNOSIS — M85.80 OSTEOPENIA, UNSPECIFIED LOCATION: ICD-10-CM

## 2022-11-18 DIAGNOSIS — E03.9 HYPOTHYROIDISM, UNSPECIFIED TYPE: Primary | ICD-10-CM

## 2022-11-18 RX ORDER — LEVOTHYROXINE SODIUM 0.05 MG/1
50 TABLET ORAL DAILY
Qty: 180 TABLET | Refills: 1 | Status: SHIPPED | OUTPATIENT
Start: 2022-11-18 | End: 2022-11-25

## 2022-11-18 NOTE — PROGRESS NOTES
New consult note      CC:hypothyroidism    History of Present Illness:   [de-identified] female with hypothyroidism since 1996, HTN, HLD, CAD s/p CABG*3, Osteopenia, DJD requiring steroid injections in the hips and obesity  She presents for evaluation of hypothyroidism  She was previously managed by her primary care physician and was on a maximum dose of levothyroxine 125 mcg  Over the past several months her dose has been reduced progressively to levothyroxine 50 mcg daily  TSH has been suppressed over the last 1 year  No History of external radiation, recent Iodine loading or radiological diagnostic studies  No difficulty with swallowing or breathing or change in voice  Family Hx of thyroid:  No    Patient Active Problem List   Diagnosis   • Mild persistent asthma without complication   • Chronic allergic rhinitis   • Coronary artery disease involving native coronary artery of native heart without angina pectoris   • Class 1 obesity due to excess calories without serious comorbidity with body mass index (BMI) of 33 0 to 33 9 in adult     Past Medical History:   Diagnosis Date   • Asthma    • Cardiac disease     heart attack   • Disease of thyroid gland    • Hyperlipidemia       Past Surgical History:   Procedure Laterality Date   • APPENDECTOMY     • BREAST LUMPECTOMY Bilateral     several   • CARDIAC SURGERY     • CATARACT EXTRACTION, BILATERAL     • CORONARY STENT PLACEMENT     • NASAL POLYP SURGERY        Family History   Problem Relation Age of Onset   • ALS Father    • Cancer Brother    • Heart disease Family    • Asthma Family      Social History     Tobacco Use   • Smoking status: Never   • Smokeless tobacco: Never   Substance Use Topics   • Alcohol use: Yes     Allergies   Allergen Reactions   • Penicillins    • Latex Rash         Review of Systems   Constitutional: Positive for fatigue  HENT: Negative  Eyes: Negative  Respiratory: Negative  Cardiovascular: Negative      Gastrointestinal: Negative  Endocrine: Negative  Musculoskeletal: Negative  Skin: Negative  Allergic/Immunologic: Negative  Neurological: Negative  Hematological: Negative  Psychiatric/Behavioral: Negative  Current Outpatient Medications:   •  aspirin 81 MG tablet, Take 81 mg by mouth daily  , Disp: , Rfl:   •  budesonide (PULMICORT) 0 25 mg/2 mL nebulizer solution, USE 1 VIAL  IN  NEBULIZER TWICE  DAILY - Rinse mouth out after use, Disp: 60 mL, Rfl: 11  •  Cholecalciferol (VITAMIN D) 2000 UNITS tablet, Take 2,000 Units by mouth daily  , Disp: , Rfl:   •  Ergocalciferol (VITAMIN D2 PO), Take by mouth, Disp: , Rfl:   •  fexofenadine (ALLEGRA) 60 MG tablet, Take by mouth, Disp: , Rfl:   •  simvastatin (ZOCOR) 10 mg tablet, Take 10 mg by mouth daily at bedtime  , Disp: , Rfl:   •  SYMBICORT 160-4 5 MCG/ACT inhaler, , Disp: , Rfl: 0  •  Synthroid 50 MCG tablet, Take 50 mcg by mouth daily, Disp: , Rfl:   •  albuterol (2 5 mg/3 mL) 0 083 % nebulizer solution, Take 1 vial (2 5 mg total) by nebulization 2 (two) times a day (Patient not taking: Reported on 11/18/2022), Disp: 60 vial, Rfl: 0  •  albuterol (ProAir HFA) 90 mcg/act inhaler, Inhale 2 puffs every 4 (four) hours as needed for wheezing (Patient not taking: Reported on 11/18/2022), Disp: 8 5 g, Rfl: 8  •  amLODIPine (NORVASC) 2 5 mg tablet, Take 2 5 mg by mouth in the morning  (Patient not taking: Reported on 11/18/2022), Disp: , Rfl:   •  EPINEPHrine (EPIPEN) 0 3 mg/0 3 mL SOAJ, Inject 0 3 mg into a muscle (Patient not taking: Reported on 11/18/2022), Disp: , Rfl:   •  levothyroxine 112 mcg tablet, Take 112 mcg by mouth daily  (Patient not taking: Reported on 11/3/2022), Disp: , Rfl:   •  lidocaine (LIDODERM) 5 %, Place 1 patch on the skin daily Remove & Discard patch within 12 hours or as directed by MD (Patient not taking: Reported on 11/18/2022), Disp: 30 patch, Rfl: 0  •  montelukast (SINGULAIR) 10 mg tablet, Take 10 mg by mouth daily at bedtime  (Patient not taking: Reported on 11/18/2022), Disp: , Rfl:   •  nitroglycerin (NITROSTAT) 0 4 mg SL tablet, Place 0 4 mg under the tongue every 5 (five) minutes as needed for chest pain  (Patient not taking: Reported on 11/18/2022), Disp: , Rfl:   •  SYNTHROID 125 MCG tablet, take 1 tablet by mouth daily 1 hour before meals or 2 hours after meals (Patient not taking: Reported on 11/3/2022), Disp: , Rfl: 0  •  UNKNOWN TO PATIENT, Allegra daily po Stool softener bid po Tumeric 0 500 daily po (Patient not taking: Reported on 11/18/2022), Disp: , Rfl:   No current facility-administered medications for this visit  Physical Exam:  Body mass index is 32 95 kg/m²  BP (!) 144/102 (BP Location: Left arm, Patient Position: Sitting, Cuff Size: Standard)   Pulse 57   Temp 98 5 °F (36 9 °C) (Tympanic)   Ht 5' 1" (1 549 m)   Wt 79 1 kg (174 lb 6 4 oz)   BMI 32 95 kg/m²        Physical Exam  Constitutional:       Appearance: She is well-developed  HENT:      Head: Normocephalic  Mouth/Throat:      Mouth: Oropharynx is clear and moist    Eyes:      Pupils: Pupils are equal, round, and reactive to light  Neck:      Thyroid: No thyromegaly  Cardiovascular:      Rate and Rhythm: Normal rate  Heart sounds: Normal heart sounds  Pulmonary:      Effort: Pulmonary effort is normal       Breath sounds: Normal breath sounds  Abdominal:      General: Bowel sounds are normal       Palpations: Abdomen is soft  Musculoskeletal:         General: No deformity or edema  Cervical back: Normal range of motion  Skin:     Capillary Refill: Capillary refill takes less than 2 seconds  Coloration: Skin is not pale  Findings: No rash  Neurological:      Mental Status: She is alert and oriented to person, place, and time     Psychiatric:         Mood and Affect: Mood and affect normal          Labs:     Lab Results   Component Value Date    IQL1NGJQAAKX 0 138 (L) 10/05/2022       Lab Results   Component Value Date    CREATININE 0 58 (L) 09/12/2022    CREATININE 0 64 03/08/2022    CREATININE 0 69 07/07/2021    BUN 12 09/12/2022    K 3 7 09/12/2022     09/12/2022    CO2 26 09/12/2022     eGFR   Date Value Ref Range Status   09/12/2022 87 ml/min/1 73sq m Final       Lab Results   Component Value Date    ALT 18 09/12/2022    AST 24 09/12/2022    ALKPHOS 95 09/12/2022       Lab Results   Component Value Date    CHOLESTEROL 125 03/08/2022    CHOLESTEROL 139 01/25/2021    CHOLESTEROL 147 01/20/2020     Lab Results   Component Value Date    HDL 67 03/08/2022    HDL 72 01/25/2021    HDL 67 01/20/2020     Lab Results   Component Value Date    TRIG 42 03/08/2022    TRIG 57 01/25/2021    TRIG 60 01/20/2020     Lab Results   Component Value Date    NONHDLC 58 03/08/2022    [place] 67 01/25/2021    [place] 80 01/20/2020         Impression:  1  Hypothyroidism, unspecified type    2  Class 1 obesity due to excess calories without serious comorbidity with body mass index (BMI) of 33 0 to 33 9 in adult    3  Osteopenia, unspecified location         Plan:    Rita Ortiz was seen today for advice only and hypothyroidism  Diagnoses and all orders for this visit:    Hypothyroidism, unspecified type  She seems clinically euthyroid  She has an elevated free T4 last year and persistently suppressed TSH suggesting biochemical iatrogenic hyperthyroidism  Today we discussed all aspects of hypothyroidism including prevalence, usual symptoms, pathophysiology, complications of over treatment including osteoporosis, cardiac arrhythmia, sleep disturbance, weight loss and other effects  Explain normal physiology including normal reference range of labs for age  Will start with re-evaluation of her thyroid axis with a combination of TSH and free T4  For completion will also check antibodies  Will use this data to further to titration of levothyroxine therapy    Note regular steroid injections for degenerative joint disease can cause suppression of TSH  Will switch to generic as she wishes to reduce cost   Follow-up in 6 months  -     T4, free; Future  -     Thyroid Antibodies Panel; Future  -     TSH, 3rd generation; Future  -     levothyroxine (Synthroid) 50 mcg tablet; Take 1 tablet (50 mcg total) by mouth daily    Class 1 obesity due to excess calories without serious comorbidity with body mass index (BMI) of 33 0 to 33 9 in adult  Osteopenia, unspecified location  She reportedly had a DEXA bone scan in March 2021 but results are not available  Next DEXA is due March 2023  I have spent 50 minutes with patient today in which greater than 50% of this time was spent in counseling/coordination of care  All questioned fully answered  She will call me if any problems arise  Educated/ Counseled patient on diagnostic test results, prognosis, risk vs benefit of treatment options, importance of treatment compliance, healthy life and lifestyle choices        [address]

## 2022-11-21 ENCOUNTER — APPOINTMENT (OUTPATIENT)
Dept: LAB | Facility: HOSPITAL | Age: 82
End: 2022-11-21

## 2022-11-21 DIAGNOSIS — E03.9 HYPOTHYROIDISM, UNSPECIFIED TYPE: ICD-10-CM

## 2022-11-21 LAB
T4 FREE SERPL-MCNC: 0.99 NG/DL (ref 0.76–1.46)
TSH SERPL DL<=0.05 MIU/L-ACNC: 9.94 UIU/ML (ref 0.45–4.5)

## 2022-11-22 ENCOUNTER — OFFICE VISIT (OUTPATIENT)
Dept: PHYSICAL THERAPY | Facility: CLINIC | Age: 82
End: 2022-11-22

## 2022-11-22 DIAGNOSIS — M79.89 LEFT LEG SWELLING: Primary | ICD-10-CM

## 2022-11-22 LAB
THYROGLOB AB SERPL-ACNC: <1 IU/ML (ref 0–0.9)
THYROPEROXIDASE AB SERPL-ACNC: <9 IU/ML (ref 0–34)

## 2022-11-22 NOTE — PROGRESS NOTES
PT Discharge    Today's date: 2022  Patient name: Natty Bray  : 1940  MRN: [id number]  Referring provider: Shad Mathew MD  Dx:   Encounter Diagnosis     ICD-10-CM    1  Left leg swelling  M79 89                      Assessment  Assessment details: Natty Bray is a 80y o  year old female who presents to IE with:   Left leg swelling  (primary encounter diagnosis)    Shakira Abbott is a 80 y o  female presents to IE with symptoms consistent with lymphedema including: + stemmer's sign, skin fibrosis, mild pitting, and history of insult to lymphatic system due to vascular history of saphenous vein procedure  Patient seen total of 4 visits by lymphedema therapist with patient reporting "I want to do this at home " PT recommending compression shorts for upper thigh swelling  Patient encouraged to contact PT with questions or concerns in the future  Patient may also benefit from home compression pump in future     Impairments: abnormal gait, abnormal or restricted ROM, abnormal movement, activity intolerance, impaired balance, impaired physical strength and lacks appropriate home exercise program  Other impairment: increased swelling, lacks knowledge of lymphedema and proper skin care,   Functional limitations: Walking, getting up from a chair, Barriers to therapy: CAD, asthma, hyperlipidemia, chronic swelling, PMH saphenous vein surgery  Understanding of Dx/Px/POC: good   Prognosis: fair  Prognosis details: Shakira Abbott presents to IE with secondary vascular L LE lymphedema  stGstrstastdstest:st st1st Fibrosis present: None    Goals  STG  - Patient and/or caregiver will understand lymphedema precautions to decrease risk of infection and exacerbation of lymphedema -- Met  - Patient will develop a tolerance for wearing multi-layer, short stretch bandages betweens sessions to facilitate limb decongestion -- Not Met  - Patient will experience decrease in pitting edema which will improve tissue health and decrease risk of infection  -- partially met  - Patient will perform HEP independently or with minimal assistance to help improve lymphatic flow and venous return -- Met    LTG  - Patient will experience increased ROM and functional mobility to aid with ADL's such as walking, standing at time of discharge -- Partially met  - Patient will demonstrate more normalized gait pattern and improved balance at time of discharge  -- Partially met  - Patient and/or caregiver will be independent with donning and doffing of compression garments which will enable regular daily garment wear at time of discharge, skin maintenance, and self- MLD -- Partially met  - Patient and/or caregiver will be independent with HEP and lymphedema management to prevent relapse and reduce risk of infection and hospitalizations at time of discharge -- Met    Plan  Plan details: Thank you for referring Romy Sheets to Physical Therapy at [place] [address] and for the opportunity to coordinate care  Patient would benefit from: lymphedema eval, PT eval and skilled physical therapy  Planned therapy interventions: manual therapy, massage, muscle pump exercises, neuromuscular re-education, orthotic fitting/training, orthotic management and training, patient education, self care, strengthening, stretching, therapeutic activities, therapeutic exercise, graded activity, dressing changes, breathing training, balance, gait training and home exercise program  Other planned therapy interventions: Complete decongestive therapy: Manual lymphatic drainage, compression bandaging, exercise, self-maintenance, and education on bandaging, skin care, and self-bandaging  Frequency: 1x week  Treatment plan discussed with: patient        Subjective Evaluation    History of Present Illness  Mechanism of injury: RE 11/22/22     Shannan Wayne has been seen for total of 4 visits for OP PT for L LE lymphedema  Patient demonstrating 9% volume reduction in L LE   Patient reporting she wishes to continue with swelling management at home, instructed on self- MLD  PT advising patient on importance of compression with PT recommending compression shorts as patient reporting she does not want to wrap leg and she does not want compression on lower leg due to sensitivity  Patient continues to see ortho PT for bilateral hip pain  She reports to PT today that previous day, she fell down 3 stairs while vacuuming her stairs landing on R hip  She reports soreness, but denies any bruising at this time  Lymphedema History  Patient referred to OP PT lymphedema services by PT Juan Greer  Patient has history of chronic swelling in L LE  Patient reports history of saphenous vein surgery in 2014 with patient reporting "this leg was not right since that surgery " She reports swelling extending into L lower leg initially following surgery, though improved over the years  She complains of increased sensitivity in L lower leg compared to L upper thigh, though swelling most prominent in L upper thigh  Patient has follow-up with cardiology throughout the year  Patient has not worn compression reporting "I can't the leg can't take it "     IE 9/29/22  Patient reports several year history of lumbar spine pain  She had MRI performed of her lumbar spine  She also has bursitis of B hips, B knee pain  She is receiving injections of her L knee, which helps with her pain  She recently had a fall when she was coming back from the garden, falling up the stairs  She has swelling of her L foot  She is finally able to put shoes on  Patient walks with her daughter every weekend and has to stop several times to get around the block  Patient reports radicular symptoms down into B LEs  She has a lumbar spine brace and has heard mixed opinions on whether she should wear it  She denies numbness/tingling of her LEs  She has radicular symptoms down past her knees   She has increased tenderness since her heart surgery in 2014, as they took the saphenous vein from her L LE  Patient denies night pain, bowel/bladder changes  She is able to take Advil PM to sleep at night       - Functional limitations: walking around the block, lifting objects of weight, standing greater than 20 minutes, stairs      - Patient goals: "Decrease some of the pain and be able to walk more " She walks the neighborhood with her daughter every weekend  Pain  Current pain rating: 3  At best pain ratin  At worst pain rating: 10          Objective     Observations   Left Knee   Positive for edema  Additional Observation Details  Palpation:   Skin Mobility: WNL  Skin temperature: WNL  Skin color: hemosiderin staining present in L lower leg  Pittin+ throughout L LE  Wound presence: no,  Stemmer's Sign: None  Fungal infections: no  Hyperkeratosis: no   Lymphorrhea/ weeping: no  Papillomas: no   Lymph fistulas: no   Lymph cysts: no   Cellulitis: no   Lipodermatosclerosis: no       General Comments: Ankle/Foot Comments   Gait: Patient ambulatory with SPC, L antalgic gait at this time  PT educated Mary Anne Zarate in regards to pathology of lymphedema, proper skin care, and components of complete decongestive therapy including manual lymph drainage, compression, exercise, and proper skin care with verbalized understanding  PT to reach out to cardiology for further clearance for patient to begin lymphedema services   Patient to begin 1 time per week if approved by cardiology and continue with orthopedic PT        LOWER EXTREMITY LEFT CALCULATIONS    Flowsheet Row Most Recent Value   Volume LE (mL) 7580   Difference from last visit (mL)  755   Difference from first visit (mL)  755   Difference from last visit (%)  9   Difference from first visit (%)  9             Treatment Diary  Manuals and measurements of L LE- L MLD- 40 minutes total

## 2022-11-23 ENCOUNTER — OFFICE VISIT (OUTPATIENT)
Dept: PHYSICAL THERAPY | Facility: CLINIC | Age: 82
End: 2022-11-23

## 2022-11-23 DIAGNOSIS — M79.89 LEFT LEG SWELLING: Primary | ICD-10-CM

## 2022-11-23 DIAGNOSIS — M54.16 LUMBAR RADICULOPATHY: ICD-10-CM

## 2022-11-23 NOTE — PROGRESS NOTES
PT Discharge    Today's date: 2022  Patient name: Jessika Newsome  : 1940  MRN: [id number]  Referring provider: Kathy Stroud MD  Dx:   Encounter Diagnosis     ICD-10-CM    1  Left leg swelling  M79 89       2  Lumbar radiculopathy  M54 16             Assessment  Assessment details: Patient is a [de-identified] y o  Female who presents to skilled outpatient PT with referring diagnosis of lumbar radiculopathy  Patient demonstrates slight improvements in functional mobility, despite minimal changes in B LE strength  As per patient request, she is to be discharged at this time with comprehensive HEP to continue strengthening and improving mobility as able  She demonstrates improvements in balance confidence and strength since starting PT intervention despite recent fall  Encouraged HEP and contacting physician if weakness persists  Plan  Patient is to be discharged from formal PT intervention at this time with comprehensive HEP  Goals  Short Term Goals (4 weeks): PROGRESSING TOWARDS AS OF 10/27/2022   - Patient will be independent in basic HEP 2-3 weeks  - Patient will have 0/10 pain at rest  - Patient will demonstrate >1/3 improvement in MMT grade as applicable  - Patient functional goal: Patient will be able to walk around the block with no increased pain  Long Term Goals (8 weeks): PROGRESSING TOWARDS AS OF 10/27/2022   - Patient will be independent in a comprehensive home exercise program  - Patient FOTO score will improve to 68/100  - Patient will self-report >75% improvement in function  - Patient functional goal: Patient will garden with minimal lumbar spine pain  - Patient will perform floor to stand transfer independently  Subjective    History of Present Illness  - Mechanism of injury: Patient reports several year history of lumbar spine pain  She had MRI performed of her lumbar spine  She also has bursitis of B hips, B knee pain   She is receiving injections of her L knee, which helps with her pain  She recently had a fall when she was coming back from the garden, falling up the stairs  She has swelling of her L foot  She is finally able to put shoes on  Patient walks with her daughter every weekend and has to stop several times to get around the block  Patient reports radicular symptoms down into B LEs  She has a lumbar spine brace and has heard mixed opinions on whether she should wear it  She denies numbness/tingling of her LEs  She has radicular symptoms down past her knees  She has increased tenderness since her heart surgery in 2014, as they took the saphenous vein from her L LE  Patient denies night pain, bowel/bladder changes  She is able to take Advil PM to sleep at night  - Updated subjective 10/27/22: Patient notes improvements in her lumbar spine pain since starting PT intervention  She has L LE pain, since she has not had her injections at this point  She notes 40% improvement in function since starting PT intervention  She has not had to take a sleeping pill prior to bed since starting PT  She is able to walk with her daughter about 4 blocks, taking about 4 breaks throughout to straighten out her back  She continues to note difficulties with standing for prolonged periods of time  She is performing stairs non-reciprocally due to pain  The sensitivity of the medial aspect of her L leg has not changed since starting PT  She is amendable to lymphedema treatment with clearance from cardiologist      Updated 11/23/22: Patient wishes to be discharged from formal PT intervention at this time, as she believes she can continue strengthening at home  She is having a difficult time with stair navigation due to weakness   She had a fall down the stairs yesterday while vacuuming, but denies injury      - Functional limitations: walking around the block, lifting objects of weight, standing greater than 20 minutes, stairs     - Patient goals: "Decrease some of the pain and be able to walk more " She walks the neighborhood with her daughter every weekend       Pain  - Current pain ratin/10  - At best pain ratin/10  - At worst pain ratin/10  - Location: lumbar spine   - Alleviating factors:salon pas, injections     Social Support  - Steps to enter house: 3  - Stairs in house: 12   - Bedroom/bathroom set up: second floor   - Lives in: multi-level ome   - Lives with:       Objective   LE MMT  - L Hip Flexion: 3+/5   R Hip Flexion: 4-/5  - L Hip Extension: 3+/5  R Hip Extension: 4-/5  - L Hip Abduction: 3+/5  R Hip Abduction: 4-/5  - L Hip Adduction: 3+/5  R Hip Adduction: 4-/5  - L Hip IR: 3+/5   R Hip IR: 3+/5  - L Hip ER: 3+/5   R Hip ER: 3+/5  - L Knee Extension: 3/5  R Knee Extension: 4-/5  - L Knee Flexion: 3+/5   R Knee Flexion: 3+/5  - L Ankle DF: 4/5   R Ankle DF: 4/5  - L Ankle PF: 4/5   R Ankle PF: 4/5      Movement Loss Cortez Mod Min Nil Symptoms   Flexion    X    Extension   X     Side gliding R   X     Side gliding L    X    Other           Symptom response Mechanical Response    During testing After testing Effect (?? ROM or key functional test) No effect   Pretest symptoms in standing       FIS Increased  worse     RFIS       EIS Increased  centralized Increased lumbar spine ROM    VANIA              Pretest symptoms lying       TIANA       RFIL       EIL       REIL              Pretest symptoms       R SGIS       R RSGIS       L SGIS       L RSGIS              Other movements             Sensation  - Light touch: intact     Palpation   - L saphenous nerve tract, L adductor muscle group, TTP spinous processes L4-L5, B PSIS, B greater trochanter, L popliteal space       Special Testing L R   FABIR negative positive    FADIR negative negative   Hip Scour  negative negative   Thigh thrust negative negative   Flick test  negative negative   Standing flexion test  negative negative   Prone knee flexion test NT NT   Supine to long sit test NT NT   Neurodynamic assessment  (+) saphenous nerve  (-) globally          Precautions: LATEX ALLERGY     Past Medical History:   Diagnosis Date   • Asthma    • Cardiac disease     heart attack   • Disease of thyroid gland    • Hyperlipidemia          Date 11/7/22 11/15/22 11/23   Visit Number [id number]         Manual      P-A mobs      Desensitization of L saphenous nerve             Hamstring stretch seated x10 ea hold 5 SOS     Neuro Re-ed      TrA progression      Hip add squeeze x20  x20 x30   Clamshells    hooklying  2x10 GTB   Bridge       Saphenous nerve sliders      SLB on foam  FTEO 5x10s on foam 5x10s B BUE support  5x10s B BUE support         Thera Ex      NuStep warmup L1 5' B LE only L1 5' B LE only L1 5' B LE only   VANIA/REIL      TB shoulder ext  x20 GTB x20 BTB   TB shoulder row  x20 GTB x20 BTB   Paloff press   10 B GTB x15 BTB B   Step ups  6" x10 B 4" x10 B 4" x20 B   Lateral step ups 4" x10 B 4" x10 B 4" x10 B   SOC      LAQ 2x10     Hip abd/ext  x15 B  x15 B x15 B   Side stepping 4x15' 6x15' 4x10'    Leg press  x30 65# DL 2x10 45# DL 2x10 45# DL   Walking marches at // bars   Step downs onto foam 2x10  2x15' unilateral UE support   TKE at CC 3s x10 B 2 5#     Step ups/down on treadmill for escalator simulator  0 2- 5 mph step x10 each with L UE support    Thera Activity      Patient education      Lifting mechanics      Posture education                                    Modalities                    Insurance:  AMA/CMS Eval/ Re-eval POC expires Joclyde Dire #/ Referral # Total   Visits  Start date  Expiration date Extension  Visit limitation? PT only or  PT+OT?  Co-Insurance   MCR 9/29 12/12  No auth  9/29   BOMN PT Yes

## 2022-11-25 DIAGNOSIS — E03.9 HYPOTHYROIDISM, UNSPECIFIED TYPE: Primary | ICD-10-CM

## 2022-11-25 RX ORDER — LEVOTHYROXINE SODIUM 0.05 MG/1
TABLET ORAL
Qty: 180 TABLET | Refills: 0 | Status: SHIPPED | OUTPATIENT
Start: 2022-11-25 | End: 2022-12-01 | Stop reason: SDUPTHER

## 2022-11-29 ENCOUNTER — APPOINTMENT (OUTPATIENT)
Dept: PHYSICAL THERAPY | Facility: CLINIC | Age: 82
End: 2022-11-29

## 2022-12-01 ENCOUNTER — TELEPHONE (OUTPATIENT)
Dept: ENDOCRINOLOGY | Facility: CLINIC | Age: 82
End: 2022-12-01

## 2022-12-01 DIAGNOSIS — E03.9 HYPOTHYROIDISM, UNSPECIFIED TYPE: ICD-10-CM

## 2022-12-01 RX ORDER — LEVOTHYROXINE SODIUM 0.05 MG/1
TABLET ORAL
Qty: 180 TABLET | Refills: 0 | Status: SHIPPED | OUTPATIENT
Start: 2022-12-01 | End: 2022-12-05

## 2022-12-01 NOTE — TELEPHONE ENCOUNTER
Pt called because with the new dosage for this medicine she will need an additional 28 pills eventually  Stated that it is the generic form  Can a script be sent in for the additional pills? Please let pt know

## 2022-12-02 NOTE — TELEPHONE ENCOUNTER
Was able to get an over ride from [facility] and it has been sent to the pharmacy for her Synthroid

## 2022-12-05 DIAGNOSIS — E03.9 HYPOTHYROIDISM, UNSPECIFIED TYPE: ICD-10-CM

## 2022-12-05 RX ORDER — LEVOTHYROXINE SODIUM 0.05 MG/1
TABLET ORAL
Qty: 180 TABLET | Refills: 0 | Status: SHIPPED | OUTPATIENT
Start: 2022-12-05 | End: 2022-12-13 | Stop reason: SDUPTHER

## 2022-12-08 PROBLEM — Z95.1 HX OF CABG: Status: ACTIVE | Noted: 2021-02-09

## 2022-12-08 PROBLEM — J45.20 MILD INTERMITTENT ASTHMA WITHOUT COMPLICATION: Status: ACTIVE | Noted: 2020-02-04

## 2022-12-08 PROBLEM — E03.9 ACQUIRED HYPOTHYROIDISM: Status: ACTIVE | Noted: 2020-02-04

## 2022-12-13 ENCOUNTER — OFFICE VISIT (OUTPATIENT)
Dept: FAMILY MEDICINE CLINIC | Facility: CLINIC | Age: 82
End: 2022-12-13

## 2022-12-13 ENCOUNTER — TELEPHONE (OUTPATIENT)
Dept: ENDOCRINOLOGY | Facility: CLINIC | Age: 82
End: 2022-12-13

## 2022-12-13 VITALS
SYSTOLIC BLOOD PRESSURE: 142 MMHG | DIASTOLIC BLOOD PRESSURE: 74 MMHG | HEIGHT: 61 IN | WEIGHT: 175 LBS | RESPIRATION RATE: 18 BRPM | BODY MASS INDEX: 33.04 KG/M2

## 2022-12-13 DIAGNOSIS — I10 ESSENTIAL HYPERTENSION: ICD-10-CM

## 2022-12-13 DIAGNOSIS — E03.9 ACQUIRED HYPOTHYROIDISM: Primary | ICD-10-CM

## 2022-12-13 RX ORDER — LEVOTHYROXINE SODIUM 0.07 MG/1
75 TABLET ORAL DAILY
Qty: 30 TABLET | Refills: 3 | Status: SHIPPED | OUTPATIENT
Start: 2022-12-13

## 2022-12-13 NOTE — PROGRESS NOTES
Name: Josey Del Cid      : 1940      MRN: [id number]  Encounter Provider: Wilfredo Rodriguez DO  Encounter Date: 2022   Encounter department: [address] [place] [address]     1  Acquired hypothyroidism  Assessment & Plan:  Reviewed her thyroid dosing and looked at her weekly dose  We will have her change to levothyroxine 75 mcg daily and skip 1 day a week  She will get blood work again before her next appointment in 2 months and we will go over the results together      Orders:  -     levothyroxine 75 mcg tablet; Take 1 tablet (75 mcg total) by mouth daily  -     T4, free; Future  -     TSH, 3rd generation; Future    2  Essential hypertension  Assessment & Plan:  Not quite at goal,  But under stress from thyroid issues   Will continue amlodipine 2 5 mg daily at this time and reassess when she comes in for her follow-up appointment    Orders:  -     CBC; Future  -     Comprehensive metabolic panel; Future  -     Lipid Panel with Direct LDL reflex; Future        Depression Screening and Follow-up Plan: Patient was screened for depression during today's encounter  They screened negative with a PHQ-2 score of 0  Return in about 2 months (around 2023) for Annual Wellness Visit  Subjective     Patient is here to establish for her primary care  She has been having ongoing issues with her thyroid  Is been underactive for years and recently her dose has been gradually going down  She has been concerned about that  When it was first diagnosed it caused severe depression for her and it makes her very anxious that it may become uncontrolled again  The dosing was changing even when she was on the name brand medication  She also takes medicine for her blood pressure  She has been tolerating it well      Review of Systems    Past Medical History:   Diagnosis Date   • Asthma    • Cardiac disease     heart attack   • Disease of thyroid gland    • Hyperlipidemia      Past Surgical History:   Procedure Laterality Date   • APPENDECTOMY     • BREAST LUMPECTOMY Bilateral     several   • CARDIAC SURGERY     • CATARACT EXTRACTION, BILATERAL     • CORONARY STENT PLACEMENT     • NASAL POLYP SURGERY       Family History   Problem Relation Age of Onset   • ALS Father    • Cancer Brother    • Heart disease Family    • Asthma Family      Social History     Socioeconomic History   • Marital status: /Civil Union     Spouse name: None   • Number of children: None   • Years of education: None   • Highest education level: None   Occupational History   • None   Tobacco Use   • Smoking status: Never   • Smokeless tobacco: Never   Vaping Use   • Vaping Use: Never used   Substance and Sexual Activity   • Alcohol use: Yes     Comment: socially   • Drug use: No   • Sexual activity: None   Other Topics Concern   • None   Social History Narrative   • None     Social Determinants of Health     Financial Resource Strain: Not on file   Food Insecurity: Not on file   Transportation Needs: Not on file   Physical Activity: Not on file   Stress: Not on file   Social Connections: Not on file   Intimate Partner Violence: Not on file   Housing Stability: Not on file     Current Outpatient Medications on File Prior to Visit   Medication Sig   • aspirin 81 MG tablet Take 81 mg by mouth daily     • budesonide (PULMICORT) 0 25 mg/2 mL nebulizer solution USE 1 VIAL  IN  NEBULIZER TWICE  DAILY - Rinse mouth out after use   • Cholecalciferol (VITAMIN D) 2000 UNITS tablet Take 2,000 Units by mouth daily  • fexofenadine (ALLEGRA) 60 MG tablet Take by mouth   • lidocaine (LIDODERM) 5 % Place 1 patch on the skin daily Remove & Discard patch within 12 hours or as directed by MD   • simvastatin (ZOCOR) 10 mg tablet Take 10 mg by mouth daily at bedtime     • SYMBICORT 160-4 5 MCG/ACT inhaler    • [DISCONTINUED] levothyroxine 50 mcg tablet TAKE 1 TABLET BY MOUTH ON MONDAY THROUGH SATURDAY AND 2 TABS ON SUNDAYS   • albuterol (2 5 mg/3 mL) 0 083 % nebulizer solution Take 1 vial (2 5 mg total) by nebulization 2 (two) times a day (Patient not taking: Reported on 11/18/2022)   • albuterol (ProAir HFA) 90 mcg/act inhaler Inhale 2 puffs every 4 (four) hours as needed for wheezing (Patient not taking: Reported on 11/18/2022)   • amLODIPine (NORVASC) 2 5 mg tablet Take 2 5 mg by mouth in the morning  (Patient not taking: Reported on 11/18/2022)   • EPINEPHrine (EPIPEN) 0 3 mg/0 3 mL SOAJ Inject 0 3 mg into a muscle (Patient not taking: Reported on 11/18/2022)   • Ergocalciferol (VITAMIN D2 PO) Take by mouth (Patient not taking: Reported on 12/13/2022)   • montelukast (SINGULAIR) 10 mg tablet Take 10 mg by mouth daily at bedtime  (Patient not taking: Reported on 11/18/2022)   • nitroglycerin (NITROSTAT) 0 4 mg SL tablet Place 0 4 mg under the tongue every 5 (five) minutes as needed for chest pain  (Patient not taking: Reported on 11/18/2022)   • UNKNOWN TO PATIENT Allegra daily po  Stool softener bid po  Tumeric 0 500 daily po (Patient not taking: Reported on 11/18/2022)     Allergies   Allergen Reactions   • Penicillins    • Latex Rash     Immunization History   Administered Date(s) Administered   • COVID-19 MODERNA VACC 0 5 ML IM 03/24/2021, 04/26/2021   • Influenza, high dose seasonal 0 7 mL 09/06/2022   • Pneumococcal Conjugate 13-Valent 10/19/2015   • Pneumococcal Polysaccharide PPV23 05/20/2009       Objective     /74   Resp 18   Ht 5' 0 5" (1 537 m)   Wt 79 4 kg (175 lb)   BMI 33 61 kg/m²     Physical Exam  Vitals and nursing note reviewed  Constitutional:       Appearance: She is well-developed  HENT:      Head: Normocephalic and atraumatic  Right Ear: Tympanic membrane and external ear normal       Left Ear: Tympanic membrane and external ear normal    Cardiovascular:      Rate and Rhythm: Normal rate and regular rhythm  Heart sounds: Normal heart sounds  No murmur heard  No friction rub     Pulmonary:      Effort: Pulmonary effort is normal  No respiratory distress  Breath sounds: Normal breath sounds  No wheezing or rales  Musculoskeletal:      Right lower leg: No edema  Left lower leg: No edema         Trino Sagastume DO

## 2022-12-13 NOTE — ASSESSMENT & PLAN NOTE
Reviewed her thyroid dosing and looked at her weekly dose  We will have her change to levothyroxine 75 mcg daily and skip 1 day a week  She will get blood work again before her next appointment in 2 months and we will go over the results together

## 2022-12-13 NOTE — ASSESSMENT & PLAN NOTE
Not quite at goal,  But under stress from thyroid issues   Will continue amlodipine 2 5 mg daily at this time and reassess when she comes in for her follow-up appointment

## 2022-12-15 ENCOUNTER — APPOINTMENT (OUTPATIENT)
Dept: LAB | Facility: HOSPITAL | Age: 82
End: 2022-12-15

## 2022-12-15 DIAGNOSIS — E03.9 ACQUIRED HYPOTHYROIDISM: ICD-10-CM

## 2022-12-15 DIAGNOSIS — I10 ESSENTIAL HYPERTENSION: ICD-10-CM

## 2022-12-15 LAB
ALBUMIN SERPL BCP-MCNC: 3.5 G/DL (ref 3.5–5)
ALP SERPL-CCNC: 111 U/L (ref 46–116)
ALT SERPL W P-5'-P-CCNC: 23 U/L (ref 12–78)
ANION GAP SERPL CALCULATED.3IONS-SCNC: 4 MMOL/L (ref 4–13)
AST SERPL W P-5'-P-CCNC: 27 U/L (ref 5–45)
BILIRUB SERPL-MCNC: 0.48 MG/DL (ref 0.2–1)
BUN SERPL-MCNC: 13 MG/DL (ref 5–25)
CALCIUM SERPL-MCNC: 9.2 MG/DL (ref 8.3–10.1)
CHLORIDE SERPL-SCNC: 108 MMOL/L (ref 96–108)
CHOLEST SERPL-MCNC: 162 MG/DL
CO2 SERPL-SCNC: 28 MMOL/L (ref 21–32)
CREAT SERPL-MCNC: 0.6 MG/DL (ref 0.6–1.3)
ERYTHROCYTE [DISTWIDTH] IN BLOOD BY AUTOMATED COUNT: 15.8 % (ref 11.6–15.1)
GFR SERPL CREATININE-BSD FRML MDRD: 86 ML/MIN/1.73SQ M
GLUCOSE P FAST SERPL-MCNC: 96 MG/DL (ref 65–99)
HCT VFR BLD AUTO: 45.2 % (ref 34.8–46.1)
HDLC SERPL-MCNC: 81 MG/DL
HGB BLD-MCNC: 14.5 G/DL (ref 11.5–15.4)
LDLC SERPL CALC-MCNC: 69 MG/DL (ref 0–100)
MCH RBC QN AUTO: 29.2 PG (ref 26.8–34.3)
MCHC RBC AUTO-ENTMCNC: 32.1 G/DL (ref 31.4–37.4)
MCV RBC AUTO: 91 FL (ref 82–98)
PLATELET # BLD AUTO: 250 THOUSANDS/UL (ref 149–390)
PMV BLD AUTO: 9.7 FL (ref 8.9–12.7)
POTASSIUM SERPL-SCNC: 3.6 MMOL/L (ref 3.5–5.3)
PROT SERPL-MCNC: 7.5 G/DL (ref 6.4–8.4)
RBC # BLD AUTO: 4.96 MILLION/UL (ref 3.81–5.12)
SODIUM SERPL-SCNC: 140 MMOL/L (ref 135–147)
T4 FREE SERPL-MCNC: 1.14 NG/DL (ref 0.76–1.46)
TRIGL SERPL-MCNC: 61 MG/DL
TSH SERPL DL<=0.05 MIU/L-ACNC: 11.6 UIU/ML (ref 0.45–4.5)
WBC # BLD AUTO: 8 THOUSAND/UL (ref 4.31–10.16)

## 2022-12-16 ENCOUNTER — TELEPHONE (OUTPATIENT)
Dept: FAMILY MEDICINE CLINIC | Facility: CLINIC | Age: 82
End: 2022-12-16

## 2022-12-16 DIAGNOSIS — E03.9 ACQUIRED HYPOTHYROIDISM: Primary | ICD-10-CM

## 2022-12-16 NOTE — TELEPHONE ENCOUNTER
12/16/2022 9:15 AM spoke with patient regarding her thyroid labs and will have her take her levothyroxine 75 mcg every day and not skip a day      Arthur Raygoza,

## 2023-01-05 ENCOUNTER — OFFICE VISIT (OUTPATIENT)
Dept: PULMONOLOGY | Facility: MEDICAL CENTER | Age: 83
End: 2023-01-05

## 2023-01-05 VITALS
OXYGEN SATURATION: 99 % | SYSTOLIC BLOOD PRESSURE: 140 MMHG | HEART RATE: 72 BPM | HEIGHT: 61 IN | BODY MASS INDEX: 33.04 KG/M2 | DIASTOLIC BLOOD PRESSURE: 82 MMHG | WEIGHT: 175 LBS | TEMPERATURE: 97.8 F | RESPIRATION RATE: 12 BRPM

## 2023-01-05 DIAGNOSIS — Z23 NEED FOR PNEUMOCOCCAL VACCINATION: ICD-10-CM

## 2023-01-05 DIAGNOSIS — J45.30 MILD PERSISTENT ASTHMA WITHOUT COMPLICATION: Primary | ICD-10-CM

## 2023-01-05 DIAGNOSIS — J45.41 MODERATE PERSISTENT ASTHMA WITH EXACERBATION: ICD-10-CM

## 2023-01-05 DIAGNOSIS — J30.9 CHRONIC ALLERGIC RHINITIS: ICD-10-CM

## 2023-01-05 DIAGNOSIS — E66.09 CLASS 1 OBESITY DUE TO EXCESS CALORIES WITHOUT SERIOUS COMORBIDITY WITH BODY MASS INDEX (BMI) OF 33.0 TO 33.9 IN ADULT: ICD-10-CM

## 2023-01-05 RX ORDER — BUDESONIDE 0.25 MG/2ML
0.25 INHALANT ORAL 2 TIMES DAILY
Qty: 60 ML | Refills: 11 | Status: SHIPPED | OUTPATIENT
Start: 2023-01-05

## 2023-01-05 RX ORDER — BUDESONIDE 0.25 MG/2ML
0.25 INHALANT ORAL 2 TIMES DAILY
Qty: 60 ML | Refills: 11 | Status: SHIPPED | OUTPATIENT
Start: 2023-01-05 | End: 2023-01-05 | Stop reason: SDUPTHER

## 2023-01-05 NOTE — PROGRESS NOTES
Pulmonary Follow-Up Note   Brigid Cohen [de-identified] y o  female MRN: [id number]  1/5/2023      Assessment/Plan:    Problem List Items Addressed This Visit        Respiratory    Mild persistent asthma without complication - Primary     She will continue with budesonide nebulized twice daily due to cost   She does have Symbicort to use in place of her budesonide when she is not home  She is aware of proper use and technique  He has albuterol to use if needed  Our office called [address] [facility] pharmacy to determine cost for her  Relevant Medications    budesonide (PULMICORT) 0 25 mg/2 mL nebulizer solution    Chronic allergic rhinitis     She will continue with Allegra  Other    Class 1 obesity due to excess calories without serious comorbidity with body mass index (BMI) of 33 0 to 33 9 in adult     Lifestyle modifications and weight loss as able  Need for pneumococcal vaccination     We discussed indications, risks, and benefits of Prevnar 20 today  She is agreeable; however, we do not have stock in our office  I referred her to her PCP or local pharmacy to obtain this, or she can follow-up in our office with a nurse visit to obtain  She opted to go to her local pharmacy  Other Visit Diagnoses     Moderate persistent asthma with exacerbation        Relevant Medications    budesonide (PULMICORT) 0 25 mg/2 mL nebulizer solution          Education provided at this visit:   Need for Vaccination: Up to date with flu vaccine and last COVID booster 9/10/22; Prevnar 20 as above   Pulmonary Rehab: N/A   Smoking Cessation: Never smoker   Inhaler Use: Reiterated proper use and technique    Return in about 1 year (around 1/5/2024), or if symptoms worsen or fail to improve  All of Kathryn's questions were answered prior to leaving the office today  She will follow-up with Dr Rakan Farmer in 12 months or sooner should the need arise    She is aware to call our office with any further questions or concerns  History of Present Illness   Reason for Visit: Follow-up  Chief Complaint: "I think I am doing pretty well "  HPI: Steffen Berger is a [de-identified] y o  female who presents to the office today for follow-up of asthma  Overall, she feels she is doing well  She maintains on budesonide nebulized twice daily and uses Symbicort when she is on vacation or away from her house because she does not take her nebulizer better  She uses budesonide routinely however because of the cost of Symbicort  She does have albuterol to use as needed, but never requires it  She denies any cough, wheezing, or shortness of breath  She has no other complaints today  She is wondering the cost of her budesonide this year with her insurance  Review of Systems   All other systems reviewed and are negative  A full 12-point review of systems was completed and is negative except for those outlined in the HPI      Historical Information   Past Medical History:   Diagnosis Date   • Asthma    • Cardiac disease     heart attack   • Disease of thyroid gland    • Hyperlipidemia      Past Surgical History:   Procedure Laterality Date   • APPENDECTOMY     • BREAST LUMPECTOMY Bilateral     several   • CARDIAC SURGERY     • CATARACT EXTRACTION, BILATERAL     • CORONARY STENT PLACEMENT     • NASAL POLYP SURGERY       Family History   Problem Relation Age of Onset   • ALS Father    • Cancer Brother    • Heart disease Family    • Asthma Family      Social History   Social History     Substance and Sexual Activity   Alcohol Use Yes    Comment: socially     Social History     Substance and Sexual Activity   Drug Use No     Social History     Tobacco Use   Smoking Status Never   Smokeless Tobacco Never     E-Cigarette/Vaping   • E-Cigarette Use Never User      E-Cigarette/Vaping Substances   • Nicotine No    • THC No    • CBD No    • Flavoring No    • Other No    • Unknown No        Meds/Allergies     Current Outpatient Medications:   •  aspirin 81 MG tablet, Take 81 mg by mouth daily  , Disp: , Rfl:   •  budesonide (PULMICORT) 0 25 mg/2 mL nebulizer solution, Take 2 mL (0 25 mg total) by nebulization 2 (two) times a day Rinse mouth after use , Disp: 60 mL, Rfl: 11  •  Cholecalciferol (VITAMIN D) 2000 UNITS tablet, Take 4,000 Units by mouth daily, Disp: , Rfl:   •  fexofenadine (ALLEGRA) 60 MG tablet, Take by mouth, Disp: , Rfl:   •  levothyroxine 75 mcg tablet, Take 1 tablet (75 mcg total) by mouth daily, Disp: 30 tablet, Rfl: 3  •  lidocaine (LIDODERM) 5 %, Place 1 patch on the skin daily Remove & Discard patch within 12 hours or as directed by MD, Disp: 30 patch, Rfl: 0  •  simvastatin (ZOCOR) 10 mg tablet, Take 10 mg by mouth daily at bedtime  , Disp: , Rfl:   •  SYMBICORT 160-4 5 MCG/ACT inhaler, , Disp: , Rfl: 0  •  albuterol (2 5 mg/3 mL) 0 083 % nebulizer solution, Take 1 vial (2 5 mg total) by nebulization 2 (two) times a day (Patient not taking: Reported on 11/18/2022), Disp: 60 vial, Rfl: 0  •  albuterol (ProAir HFA) 90 mcg/act inhaler, Inhale 2 puffs every 4 (four) hours as needed for wheezing (Patient not taking: Reported on 11/18/2022), Disp: 8 5 g, Rfl: 8  •  amLODIPine (NORVASC) 2 5 mg tablet, Take 2 5 mg by mouth in the morning  (Patient not taking: Reported on 11/18/2022), Disp: , Rfl:   •  EPINEPHrine (EPIPEN) 0 3 mg/0 3 mL SOAJ, Inject 0 3 mg into a muscle (Patient not taking: Reported on 11/18/2022), Disp: , Rfl:   •  Ergocalciferol (VITAMIN D2 PO), Take by mouth (Patient not taking: Reported on 12/13/2022), Disp: , Rfl:   •  montelukast (SINGULAIR) 10 mg tablet, Take 10 mg by mouth daily at bedtime  (Patient not taking: Reported on 11/18/2022), Disp: , Rfl:   •  nitroglycerin (NITROSTAT) 0 4 mg SL tablet, Place 0 4 mg under the tongue every 5 (five) minutes as needed for chest pain   (Patient not taking: Reported on 11/18/2022), Disp: , Rfl:   •  UNKNOWN TO PATIENT, Allegra daily po Stool softener bid po Tumeric 0 500 daily po (Patient not taking: Reported on 11/18/2022), Disp: , Rfl:   Allergies   Allergen Reactions   • Penicillins    • Latex Rash       Vitals: Blood pressure 140/82, pulse 72, temperature 97 8 °F (36 6 °C), temperature source Temporal, resp  rate 12, height 5' 1" (1 549 m), weight 79 4 kg (175 lb), SpO2 99 %  Body mass index is 33 07 kg/m²  Oxygen Therapy  SpO2: 99 %    Physical Exam:  Physical Exam  Vitals reviewed  Constitutional:       General: She is not in acute distress  Appearance: She is well-developed  She is not toxic-appearing or diaphoretic  HENT:      Head: Normocephalic and atraumatic  Eyes:      General: No scleral icterus  Neck:      Trachea: No tracheal deviation  Cardiovascular:      Rate and Rhythm: Normal rate and regular rhythm  Heart sounds: S1 normal and S2 normal  No murmur heard  No friction rub  No gallop  Pulmonary:      Effort: Pulmonary effort is normal  No tachypnea, accessory muscle usage or respiratory distress  Breath sounds: Normal breath sounds  No stridor  No decreased breath sounds, wheezing, rhonchi or rales  Chest:      Chest wall: No tenderness  Abdominal:      General: Bowel sounds are normal  There is no distension  Palpations: Abdomen is soft  Tenderness: There is no abdominal tenderness  Musculoskeletal:      Cervical back: Neck supple  Skin:     General: Skin is warm and dry  Findings: No rash  Neurological:      Mental Status: She is alert and oriented to person, place, and time  GCS: GCS eye subscore is 4  GCS verbal subscore is 5  GCS motor subscore is 6  Psychiatric:         Speech: Speech normal          Behavior: Behavior normal  Behavior is cooperative  No new labs or diagnostic testing    NA Gandara  [facility]        Portions of the record may have been created with voice recognition software    Occasional wrong word or "sound a like" substitutions may have occurred due to the inherent limitations of voice recognition software  Read the chart carefully and recognize, using context, where substitutions have occurred or contact the dictating provider

## 2023-01-05 NOTE — ASSESSMENT & PLAN NOTE
She will continue with budesonide nebulized twice daily due to cost   She does have Symbicort to use in place of her budesonide when she is not home  She is aware of proper use and technique  He has albuterol to use if needed  Our office called [facility] pharmacy to determine cost for her

## 2023-01-05 NOTE — ASSESSMENT & PLAN NOTE
We discussed indications, risks, and benefits of Prevnar 20 today  She is agreeable; however, we do not have stock in our office  I referred her to her PCP or local pharmacy to obtain this, or she can follow-up in our office with a nurse visit to obtain  She opted to go to her local pharmacy

## 2023-02-08 ENCOUNTER — APPOINTMENT (OUTPATIENT)
Dept: LAB | Facility: HOSPITAL | Age: 83
End: 2023-02-08

## 2023-02-08 DIAGNOSIS — E03.9 ACQUIRED HYPOTHYROIDISM: ICD-10-CM

## 2023-02-08 LAB
T4 FREE SERPL-MCNC: 1.37 NG/DL (ref 0.76–1.46)
TSH SERPL DL<=0.05 MIU/L-ACNC: 2.11 UIU/ML (ref 0.45–4.5)

## 2023-02-13 DIAGNOSIS — I10 ESSENTIAL HYPERTENSION: Primary | ICD-10-CM

## 2023-02-14 RX ORDER — AMLODIPINE BESYLATE 2.5 MG/1
2.5 TABLET ORAL DAILY
Qty: 90 TABLET | Refills: 1 | Status: SHIPPED | OUTPATIENT
Start: 2023-02-14

## 2023-02-17 ENCOUNTER — RA CDI HCC (OUTPATIENT)
Dept: OTHER | Facility: HOSPITAL | Age: 83
End: 2023-02-17

## 2023-02-17 NOTE — PROGRESS NOTES
[address]  coding opportunities       Chart reviewed, no opportunity found: CHART REVIEWED, NO OPPORTUNITY FOUND        Patients Insurance     Medicare Insurance: Medicare

## 2023-02-28 ENCOUNTER — OFFICE VISIT (OUTPATIENT)
Dept: FAMILY MEDICINE CLINIC | Facility: CLINIC | Age: 83
End: 2023-02-28

## 2023-02-28 VITALS
WEIGHT: 175 LBS | TEMPERATURE: 98.2 F | HEIGHT: 60 IN | RESPIRATION RATE: 18 BRPM | SYSTOLIC BLOOD PRESSURE: 154 MMHG | BODY MASS INDEX: 34.36 KG/M2 | DIASTOLIC BLOOD PRESSURE: 66 MMHG | HEART RATE: 60 BPM

## 2023-02-28 DIAGNOSIS — Z00.00 MEDICARE ANNUAL WELLNESS VISIT, SUBSEQUENT: Primary | ICD-10-CM

## 2023-02-28 DIAGNOSIS — Z12.31 SCREENING MAMMOGRAM, ENCOUNTER FOR: ICD-10-CM

## 2023-02-28 DIAGNOSIS — Z78.0 POSTMENOPAUSAL: ICD-10-CM

## 2023-02-28 DIAGNOSIS — I10 ESSENTIAL HYPERTENSION: ICD-10-CM

## 2023-02-28 DIAGNOSIS — E03.9 ACQUIRED HYPOTHYROIDISM: ICD-10-CM

## 2023-02-28 PROBLEM — Z23 NEED FOR PNEUMOCOCCAL VACCINATION: Status: RESOLVED | Noted: 2023-01-05 | Resolved: 2023-02-28

## 2023-02-28 NOTE — PROGRESS NOTES
Assessment and Plan:     Problem List Items Addressed This Visit     Acquired hypothyroidism     Much improved with her new regimen  Continue 75 mcg a day         Relevant Orders    TSH, 3rd generation    T4, free    Essential hypertension    Relevant Orders    CBC    Comprehensive metabolic panel    Lipid Panel with Direct LDL reflex   Other Visit Diagnoses     Medicare annual wellness visit, subsequent    -  Primary    Screening mammogram, encounter for        Relevant Orders    Mammo screening bilateral w 3d & cad    Postmenopausal        Relevant Orders    DXA bone density spine hip and pelvis      Return in about 6 months (around 8/28/2023) for Next scheduled follow up  Depression Screening and Follow-up Plan: Patient's depression screening was positive with a PHQ-2 score of 3  Their PHQ-9 score was 10  Depression likely due to other medical condition  Will treat underlying condition  depressive symptoms are secondary to her pain  Preventive health issues were discussed with patient, and age appropriate screening tests were ordered as noted in patient's After Visit Summary  Personalized health advice and appropriate referrals for health education or preventive services given if needed, as noted in patient's After Visit Summary  History of Present Illness:     Patient presents for a Medicare Wellness Visit    She has had chest pain for years   She saw her asthma specialist and her cardiologist       Patient Care Team:  Joann Rousseau DO as PCP - General (Family Medicine)  Lachelle Dunne MD     Review of Systems:     Review of Systems     Problem List:     Patient Active Problem List   Diagnosis   • Mild persistent asthma without complication   • Chronic allergic rhinitis   • Coronary artery disease involving native coronary artery of native heart without angina pectoris   • Class 1 obesity due to excess calories without serious comorbidity with body mass index (BMI) of 33 0 to 33 9 in adult   • Acquired hypothyroidism   • Hx of CABG   • Mild intermittent asthma without complication   • Essential hypertension      Past Medical and Surgical History:     Past Medical History:   Diagnosis Date   • Asthma    • Cardiac disease     heart attack   • Disease of thyroid gland    • Hyperlipidemia      Past Surgical History:   Procedure Laterality Date   • APPENDECTOMY     • BREAST LUMPECTOMY Bilateral     several   • CARDIAC SURGERY     • CATARACT EXTRACTION, BILATERAL     • CORONARY STENT PLACEMENT     • NASAL POLYP SURGERY        Family History:     Family History   Problem Relation Age of Onset   • ALS Father    • Cancer Brother    • Heart disease Family    • Asthma Family       Social History:     Social History     Socioeconomic History   • Marital status: /Civil Union     Spouse name: None   • Number of children: None   • Years of education: None   • Highest education level: None   Occupational History   • None   Tobacco Use   • Smoking status: Never   • Smokeless tobacco: Never   Vaping Use   • Vaping Use: Never used   Substance and Sexual Activity   • Alcohol use: Yes     Comment: rare   • Drug use: No   • Sexual activity: None   Other Topics Concern   • None   Social History Narrative   • None     Social Determinants of Health     Financial Resource Strain: Medium Risk   • Difficulty of Paying Living Expenses: Somewhat hard   Food Insecurity: Not on file   Transportation Needs: No Transportation Needs   • Lack of Transportation (Medical): No   • Lack of Transportation (Non-Medical):  No   Physical Activity: Not on file   Stress: Not on file   Social Connections: Not on file   Intimate Partner Violence: Not on file   Housing Stability: Not on file      Medications and Allergies:     Current Outpatient Medications   Medication Sig Dispense Refill   • albuterol (2 5 mg/3 mL) 0 083 % nebulizer solution Take 1 vial (2 5 mg total) by nebulization 2 (two) times a day 60 vial 0   • albuterol (ProAir HFA) 90 mcg/act inhaler Inhale 2 puffs every 4 (four) hours as needed for wheezing 8 5 g 8   • amLODIPine (NORVASC) 2 5 mg tablet Take 1 tablet (2 5 mg total) by mouth daily 90 tablet 1   • aspirin 81 MG tablet Take 81 mg by mouth daily       • budesonide (PULMICORT) 0 25 mg/2 mL nebulizer solution Take 2 mL (0 25 mg total) by nebulization 2 (two) times a day Rinse mouth after use  60 mL 11   • Cholecalciferol (VITAMIN D) 2000 UNITS tablet Take 4,000 Units by mouth daily     • fexofenadine (ALLEGRA) 60 MG tablet Take by mouth     • levothyroxine 75 mcg tablet Take 1 tablet (75 mcg total) by mouth daily 30 tablet 3   • lidocaine (LIDODERM) 5 % Place 1 patch on the skin daily Remove & Discard patch within 12 hours or as directed by MD 30 patch 0   • simvastatin (ZOCOR) 10 mg tablet Take 10 mg by mouth daily at bedtime  • SYMBICORT 160-4 5 MCG/ACT inhaler   0   • UNKNOWN TO PATIENT Allegra daily po  Stool softener bid po       No current facility-administered medications for this visit  Allergies   Allergen Reactions   • Penicillins    • Latex Rash      Immunizations:     Immunization History   Administered Date(s) Administered   • COVID-19 MODERNA VACC 0 5 ML IM 03/24/2021, 04/26/2021   • COVID-19, unspecified 09/10/2022   • Influenza, high dose seasonal 0 7 mL 09/06/2022   • Pneumococcal Conjugate 13-Valent 10/19/2015   • Pneumococcal Polysaccharide PPV23 05/20/2009      Health Maintenance:         Topic Date Due   • DXA SCAN  03/04/2026   • Hepatitis C Screening  Completed         Topic Date Due   • COVID-19 Vaccine (4 - Booster for Bam Mendel series) 11/05/2022      Medicare Screening Tests and Risk Assessments:     Susan Perkins is here for her Subsequent Wellness visit  Health Risk Assessment:   Patient rates overall health as good  Patient feels that their physical health rating is same  Patient is satisfied with their life  Eyesight was rated as same   Patient feels that their emotional and mental health rating is same  Patients states they are never, rarely angry  Patient states they are sometimes unusually tired/fatigued  Pain experienced in the last 7 days has been a lot  Patient's pain rating has been 8/10  Patient states that she has experienced no weight loss or gain in last 6 months  Depression Screening:   PHQ-2 Score: 3  PHQ-9 Score: 10      Fall Risk Screening: In the past year, patient has experienced: history of falling in past year    Number of falls: 2 or more  Injured during fall?: Yes    Feels unsteady when standing or walking?: No    Worried about falling?: Yes      Urinary Incontinence Screening:   Patient has not leaked urine accidently in the last six months  Home Safety:  Patient has trouble with stairs inside or outside of their home  Patient has working smoke alarms and has working carbon monoxide detector  Home safety hazards include: none  Nutrition:   Current diet is No Added Salt  Medications:   Patient is currently taking over-the-counter supplements  OTC medications include: see medication list  Patient is able to manage medications  Activities of Daily Living (ADLs)/Instrumental Activities of Daily Living (IADLs):   Walk and transfer into and out of bed and chair?: Yes  Dress and groom yourself?: Yes    Bathe or shower yourself?: Yes    Feed yourself? Yes  Do your laundry/housekeeping?: Yes  Manage your money, pay your bills and track your expenses?: Yes  Make your own meals?: Yes    Do your own shopping?: Yes    Previous Hospitalizations:   Any hospitalizations or ED visits within the last 12 months?: No      Advance Care Planning:   Living will: Yes    Durable POA for healthcare:  Yes    Advanced directive: Yes    Advanced directive counseling given: Yes    End of Life Decisions reviewed with patient: Yes    Provider agrees with end of life decisions: Yes      Comments: Copy of living requested     Cognitive Screening:   Provider or family/friend/caregiver concerned regarding cognition?: No    PREVENTIVE SCREENINGS      Cardiovascular Screening:    General: Screening Current      Diabetes Screening:     General: Screening Current      Colorectal Cancer Screening:     General: Screening Current      Breast Cancer Screening:     General: Screening Current    Due for: Mammogram        Cervical Cancer Screening:    General: Screening Not Indicated      Osteoporosis Screening:    General: Screening Current      Abdominal Aortic Aneurysm (AAA) Screening:        General: Screening Not Indicated      Lung Cancer Screening:     General: Screening Not Indicated      Hepatitis C Screening:    General: Screening Current    Screening, Brief Intervention, and Referral to Treatment (SBIRT)    Screening  Typical number of drinks in a day: 0    AUDIT-C Screenin) How often did you have a drink containing alcohol in the past year? never  2) How many drinks did you have on a typical day when you were drinking in the past year? 0  3) How often did you have 6 or more drinks on one occasion in the past year? never    AUDIT-C Score: 0  Interpretation: Score 0-2 (female): Negative screen for alcohol misuse    Single Item Drug Screening:  How often have you used an illegal drug (including marijuana) or a prescription medication for non-medical reasons in the past year? never    Single Item Drug Screen Score: 0  Interpretation: Negative screen for possible drug use disorder    Brief Intervention  Alcohol & drug use screenings were reviewed  No concerns regarding substance use disorder identified  No results found  Physical Exam:     /66   Pulse 60   Temp 98 2 °F (36 8 °C)   Resp 18   Ht 5' (1 524 m)   Wt 79 4 kg (175 lb)   BMI 34 18 kg/m²     Physical Exam  Vitals and nursing note reviewed  Constitutional:       Appearance: She is well-developed  HENT:      Head: Normocephalic and atraumatic        Right Ear: External ear normal       Left Ear: External ear normal       Nose: Nose normal    Cardiovascular:      Rate and Rhythm: Normal rate and regular rhythm  Heart sounds: Normal heart sounds  No murmur heard  No friction rub  Pulmonary:      Effort: No respiratory distress  Breath sounds: Normal breath sounds  No wheezing or rales  Musculoskeletal:      Right lower leg: No edema  Left lower leg: No edema  Neurological:      Mental Status: She is oriented to person, place, and time  Cranial Nerves: No cranial nerve deficit            Mat Uribe DO

## 2023-02-28 NOTE — PATIENT INSTRUCTIONS
Recent Results (from the past 672 hour(s))   T4, free    Collection Time: 02/08/23  7:37 AM   Result Value Ref Range    Free T4 1 37 0 76 - 1 46 ng/dL   TSH, 3rd generation    Collection Time: 02/08/23  7:37 AM   Result Value Ref Range    TSH 3RD GENERATON 2 109 0 450 - 4 500 uIU/mL      Medicare Preventive Visit Patient Instructions  Thank you for completing your Welcome to Medicare Visit or Medicare Annual Wellness Visit today  Your next wellness visit will be due in one year (2/29/2024)  The screening/preventive services that you may require over the next 5-10 years are detailed below  Some tests may not apply to you based off risk factors and/or age  Screening tests ordered at today's visit but not completed yet may show as past due  Also, please note that scanned in results may not display below  Preventive Screenings:  Service Recommendations Previous Testing/Comments   Colorectal Cancer Screening  * Colonoscopy    * Fecal Occult Blood Test (FOBT)/Fecal Immunochemical Test (FIT)  * Fecal DNA/Cologuard Test  * Flexible Sigmoidoscopy Age: 39-70 years old   Colonoscopy: every 10 years (may be performed more frequently if at higher risk)  OR  FOBT/FIT: every 1 year  OR  Cologuard: every 3 years  OR  Sigmoidoscopy: every 5 years  Screening may be recommended earlier than age 39 if at higher risk for colorectal cancer  Also, an individualized decision between you and your healthcare provider will decide whether screening between the ages of 74-80 would be appropriate  Colonoscopy: 01/04/2021  FOBT/FIT: Not on file  Cologuard: 01/04/2021  Sigmoidoscopy: Not on file          Breast Cancer Screening Age: 36 years old  Frequency: every 1-2 years  Not required if history of left and right mastectomy Mammogram: Not on file        Cervical Cancer Screening Between the ages of 21-29, pap smear recommended once every 3 years  Between the ages of 33-67, can perform pap smear with HPV co-testing every 5 years  Recommendations may differ for women with a history of total hysterectomy, cervical cancer, or abnormal pap smears in past  Pap Smear: Not on file    Screening Not Indicated   Hepatitis C Screening Once for adults born between 1945 and 1965  More frequently in patients at high risk for Hepatitis C Hep C Antibody: 03/08/2022    Screening Current   Diabetes Screening 1-2 times per year if you're at risk for diabetes or have pre-diabetes Fasting glucose: 96 mg/dL (12/15/2022)  A1C: No results in last 5 years (No results in last 5 years)  Screening Current   Cholesterol Screening Once every 5 years if you don't have a lipid disorder  May order more often based on risk factors  Lipid panel: 12/15/2022    Screening Current     Other Preventive Screenings Covered by Medicare:  1  Abdominal Aortic Aneurysm (AAA) Screening: covered once if your at risk  You're considered to be at risk if you have a family history of AAA  2  Lung Cancer Screening: covers low dose CT scan once per year if you meet all of the following conditions: (1) Age 50-69; (2) No signs or symptoms of lung cancer; (3) Current smoker or have quit smoking within the last 15 years; (4) You have a tobacco smoking history of at least 20 pack years (packs per day multiplied by number of years you smoked); (5) You get a written order from a healthcare provider  3  Glaucoma Screening: covered annually if you're considered high risk: (1) You have diabetes OR (2) Family history of glaucoma OR (3)  aged 48 and older OR (3)  American aged 72 and older  3   Osteoporosis Screening: covered every 2 years if you meet one of the following conditions: (1) You're estrogen deficient and at risk for osteoporosis based off medical history and other findings; (2) Have a vertebral abnormality; (3) On glucocorticoid therapy for more than 3 months; (4) Have primary hyperparathyroidism; (5) On osteoporosis medications and need to assess response to drug therapy  · Last bone density test (DXA Scan): 03/04/2021   5  HIV Screening: covered annually if you're between the age of 15-65  Also covered annually if you are younger than 13 and older than 72 with risk factors for HIV infection  For pregnant patients, it is covered up to 3 times per pregnancy  Immunizations:  Immunization Recommendations   Influenza Vaccine Annual influenza vaccination during flu season is recommended for all persons aged >= 6 months who do not have contraindications   Pneumococcal Vaccine   * Pneumococcal conjugate vaccine = PCV13 (Prevnar 13), PCV15 (Vaxneuvance), PCV20 (Prevnar 20)  * Pneumococcal polysaccharide vaccine = PPSV23 (Pneumovax) Adults 25-60 years old: 1-3 doses may be recommended based on certain risk factors  Adults 72 years old: 1-2 doses may be recommended based off what pneumonia vaccine you previously received   Hepatitis B Vaccine 3 dose series if at intermediate or high risk (ex: diabetes, end stage renal disease, liver disease)   Tetanus (Td) Vaccine - COST NOT COVERED BY MEDICARE PART B Following completion of primary series, a booster dose should be given every 10 years to maintain immunity against tetanus  Td may also be given as tetanus wound prophylaxis  Tdap Vaccine - COST NOT COVERED BY MEDICARE PART B Recommended at least once for all adults  For pregnant patients, recommended with each pregnancy  Shingles Vaccine (Shingrix) - COST NOT COVERED BY MEDICARE PART B  2 shot series recommended in those aged 48 and above     Health Maintenance Due:      Topic Date Due   • DXA SCAN  03/04/2026   • Hepatitis C Screening  Completed     Immunizations Due:      Topic Date Due   • COVID-19 Vaccine (4 - Booster for Xochitl Roys series) 11/05/2022     Advance Directives   What are advance directives? Advance directives are legal documents that state your wishes and plans for medical care   These plans are made ahead of time in case you lose your ability to make decisions for yourself  Advance directives can apply to any medical decision, such as the treatments you want, and if you want to donate organs  What are the types of advance directives? There are many types of advance directives, and each state has rules about how to use them  You may choose a combination of any of the following:  · Living will: This is a written record of the treatment you want  You can also choose which treatments you do not want, which to limit, and which to stop at a certain time  This includes surgery, medicine, IV fluid, and tube feedings  · Durable power of  for healthcare [facility]): This is a written record that states who you want to make healthcare choices for you when you are unable to make them for yourself  This person, called a proxy, is usually a family member or a friend  You may choose more than 1 proxy  · Do not resuscitate (DNR) order:  A DNR order is used in case your heart stops beating or you stop breathing  It is a request not to have certain forms of treatment, such as CPR  A DNR order may be included in other types of advance directives  · Medical directive: This covers the care that you want if you are in a coma, near death, or unable to make decisions for yourself  You can list the treatments you want for each condition  Treatment may include pain medicine, surgery, blood transfusions, dialysis, IV or tube feedings, and a ventilator (breathing machine)  · Values history: This document has questions about your views, beliefs, and how you feel and think about life  This information can help others choose the care that you would choose  Why are advance directives important? An advance directive helps you control your care  Although spoken wishes may be used, it is better to have your wishes written down  Spoken wishes can be misunderstood, or not followed  Treatments may be given even if you do not want them   An advance directive may make it easier for your family to make difficult choices about your care  Depression   Depression  is a medical condition that causes feelings of sadness or hopelessness that do not go away  Depression may cause you to lose interest in things you used to enjoy  These feelings may interfere with your daily life  Call your local emergency number (911 in the [address] [place] [address] [address]) if:   · You think about harming yourself or someone else  · You have done something on purpose to hurt yourself  The following resources are available at any time to help you, if needed:   · [address] [facility]: 4-190.769.3625 (5-445-604-FORC)   · Suicide Hotline: 0-992.920.7806 (8-607-MTDWQCI)   · For a list of international numbers: https://save org/find-help/international-resources/  Treatment for depression may include medicine to relieve depression  Medicine is often used together with therapy  Therapy is a way for you to talk about your feelings and anything that may be causing depression  Therapy can be done alone or in a group  It may also be done with family members or a significant other  · Get regular physical activity  · Create a regular sleep schedule  · Eat a variety of healthy foods  · Do not drink alcohol or use drugs  Fall Prevention    Fall prevention  includes ways to make your home and other areas safer  It also includes ways you can move more carefully to prevent a fall  Health conditions that cause changes in your blood pressure, vision, or muscle strength and coordination may increase your risk for falls  Medicines may also increase your risk for falls if they make you dizzy, weak, or sleepy  Fall prevention tips:   · Stand or sit up slowly  · Use assistive devices as directed  · Wear shoes that fit well and have soles that   · Wear a personal alarm  · Stay active  · Manage your medical conditions  Home Safety Tips:  · Add items to prevent falls in the bathroom  · Keep paths clear      · Install bright lights in your home  · Keep items you use often on shelves within reach  · Paint or place reflective tape on the edges of your stairs  Weight Management   Why it is important to manage your weight:  Being overweight increases your risk of health conditions such as heart disease, high blood pressure, type 2 diabetes, and certain types of cancer  It can also increase your risk for osteoarthritis, sleep apnea, and other respiratory problems  Aim for a slow, steady weight loss  Even a small amount of weight loss can lower your risk of health problems  How to lose weight safely:  A safe and healthy way to lose weight is to eat fewer calories and get regular exercise  You can lose up about 1 pound a week by decreasing the number of calories you eat by 500 calories each day  Healthy meal plan for weight management:  A healthy meal plan includes a variety of foods, contains fewer calories, and helps you stay healthy  A healthy meal plan includes the following:  · Eat whole-grain foods more often  A healthy meal plan should contain fiber  Fiber is the part of grains, fruits, and vegetables that is not broken down by your body  Whole-grain foods are healthy and provide extra fiber in your diet  Some examples of whole-grain foods are whole-wheat breads and pastas, oatmeal, brown rice, and bulgur  · Eat a variety of vegetables every day  Include dark, leafy greens such as spinach, kale, dyan greens, and mustard greens  Eat yellow and orange vegetables such as carrots, sweet potatoes, and winter squash  · Eat a variety of fruits every day  Choose fresh or canned fruit (canned in its own juice or light syrup) instead of juice  Fruit juice has very little or no fiber  · Eat low-fat dairy foods  Drink fat-free (skim) milk or 1% milk  Eat fat-free yogurt and low-fat cottage cheese  Try low-fat cheeses such as mozzarella and other reduced-fat cheeses  · Choose meat and other protein foods that are low in fat  Choose beans or other legumes such as split peas or lentils  Choose fish, skinless poultry (chicken or turkey), or lean cuts of red meat (beef or pork)  Before you cook meat or poultry, cut off any visible fat  · Use less fat and oil  Try baking foods instead of frying them  Add less fat, such as margarine, sour cream, regular salad dressing and mayonnaise to foods  Eat fewer high-fat foods  Some examples of high-fat foods include french fries, doughnuts, ice cream, and cakes  · Eat fewer sweets  Limit foods and drinks that are high in sugar  This includes candy, cookies, regular soda, and sweetened drinks  Exercise:  Exercise at least 30 minutes per day on most days of the week  Some examples of exercise include walking, biking, dancing, and swimming  You can also fit in more physical activity by taking the stairs instead of the elevator or parking farther away from stores  Ask your healthcare provider about the best exercise plan for you  © Copyright [employer] 2018 Information is for End User's use only and may not be sold, redistributed or otherwise used for commercial purposes   All illustrations and images included in CareNotes® are the copyrighted property of A D A M , Inc  or [address] [place] [address] [facility] no

## 2023-03-06 DIAGNOSIS — I25.10 CORONARY ARTERY DISEASE INVOLVING NATIVE CORONARY ARTERY OF NATIVE HEART WITHOUT ANGINA PECTORIS: Primary | ICD-10-CM

## 2023-03-06 RX ORDER — SIMVASTATIN 10 MG
10 TABLET ORAL
Qty: 90 TABLET | Refills: 1 | Status: SHIPPED | OUTPATIENT
Start: 2023-03-06

## 2023-03-24 DIAGNOSIS — Z12.31 SCREENING MAMMOGRAM, ENCOUNTER FOR: ICD-10-CM

## 2023-03-24 DIAGNOSIS — Z78.0 POSTMENOPAUSAL: ICD-10-CM

## 2023-05-18 ENCOUNTER — OFFICE VISIT (OUTPATIENT)
Dept: OBGYN CLINIC | Facility: CLINIC | Age: 83
End: 2023-05-18

## 2023-05-18 VITALS
BODY MASS INDEX: 34.75 KG/M2 | DIASTOLIC BLOOD PRESSURE: 78 MMHG | HEIGHT: 60 IN | WEIGHT: 177 LBS | HEART RATE: 58 BPM | SYSTOLIC BLOOD PRESSURE: 186 MMHG

## 2023-05-18 DIAGNOSIS — M70.62 GREATER TROCHANTERIC BURSITIS OF LEFT HIP: Primary | ICD-10-CM

## 2023-05-18 DIAGNOSIS — M17.12 PRIMARY OSTEOARTHRITIS OF LEFT KNEE: ICD-10-CM

## 2023-05-18 RX ORDER — HYALURONATE SODIUM 10 MG/ML
20 SYRINGE (ML) INTRAARTICULAR
Status: COMPLETED | OUTPATIENT
Start: 2023-05-18 | End: 2023-05-18

## 2023-05-18 RX ORDER — TRIAMCINOLONE ACETONIDE 40 MG/ML
40 INJECTION, SUSPENSION INTRA-ARTICULAR; INTRAMUSCULAR
Status: COMPLETED | OUTPATIENT
Start: 2023-05-18 | End: 2023-05-18

## 2023-05-18 RX ADMIN — TRIAMCINOLONE ACETONIDE 40 MG: 40 INJECTION, SUSPENSION INTRA-ARTICULAR; INTRAMUSCULAR at 09:00

## 2023-05-18 RX ADMIN — Medication 20 MG: at 09:00

## 2023-05-18 NOTE — PROGRESS NOTES
"Assessment/Plan:  1  Greater trochanteric bursitis of left hip  Large joint arthrocentesis: L greater trochanteric bursa      2  Primary osteoarthritis of left knee  Large joint arthrocentesis: L knee        Scribe Attestation    I,:  Pako Roca Call am acting as a scribe while in the presence of the attending physician :       I,:  Nelson Santiago, DO personally performed the services described in this documentation    as scribed in my presence :       Large joint arthrocentesis: L knee  Universal Protocol:  Consent: Verbal consent obtained  Risks and benefits: risks, benefits and alternatives were discussed  Consent given by: patient  Time out: Immediately prior to procedure a \"time out\" was called to verify the correct patient, procedure, equipment, support staff and site/side marked as required  Patient understanding: patient states understanding of the procedure being performed  Patient consent: the patient's understanding of the procedure matches consent given  Patient identity confirmed: verbally with patient    Supporting Documentation  Indications: pain   Procedure Details  Location: knee - L knee  Needle size: 22 G  Ultrasound guidance: no  Approach: anterolateral  Medications administered: 20 mg Sodium Hyaluronate 20 MG/2ML    Patient tolerance: patient tolerated the procedure well with no immediate complications  Dressing:  Sterile dressing applied    Buy and Bill    Large joint arthrocentesis: L greater trochanteric bursa  Universal Protocol:  Consent: Verbal consent obtained  Risks and benefits: risks, benefits and alternatives were discussed  Consent given by: patient  Time out: Immediately prior to procedure a \"time out\" was called to verify the correct patient, procedure, equipment, support staff and site/side marked as required    Patient understanding: patient states understanding of the procedure being performed  Patient consent: the patient's understanding of the procedure matches consent " given    Supporting Documentation  Indications: pain   Procedure Details  Location: hip - L greater trochanteric bursa  Preparation: Patient was prepped and draped in the usual sterile fashion  Needle size: 22 G (22-gauge spinal)  Approach: lateral  Medications administered: 40 mg triamcinolone acetonide 40 mg/mL (4mL ropivicaine (Naropin)   5% injection)    Patient tolerance: patient tolerated the procedure well with no immediate complications  Dressing:  Sterile dressing applied            Raisa Capone I had a lengthy discussion today  In my opinion she is suffering from greater trochanteric bursitis of the left hip  She is specifically tender over the greater trochanter  She is unable to lie on the left side  We discussed treatment options moving forward such as steroid injection  We discussed the risks and benefits of the injection  She was agreeable to proceed  She tolerated the procedure well  Postinjection instructions were provided  Today's visit also included Euflexxa injection #1 for the left knee  She tolerated the procedure well  Postinjection instructions were provided  She will follow-up next week for injection #2  Raisa Capone is also experiencing significant pain in her lumbar spine with radicular symptoms in the right lower extremity  I would like her to see my colleague Dr Martha Aponte of pain management  A referral was provided today  Subjective:   Nannette Quintero is a [de-identified] y o  female who presents today for evaluation of her left hip  Raisa Capone has been experiencing a intermittent burning sensation to the lateral aspect of the left hip that can become a more deep ache  Lying on the left side will cause her pain to become more severe  Raisa Capone has a history of greater trochanteric bursitis on the left as well as the right  At last visit I provided her injection for the right  She states it helped somewhat and she would like to have an injection for the left today      Raisa Capone has the additional complaint of chronic lumbar pain with radicular symptoms into the right lower extremity  She has seen pain management for this previously  She is questioning if another provider is available  Sami Wynne is also visiting today for Euflexxa injection #1 for her left knee  Patient reports good relief from previous injection series  Patient report their pain score is a 7/10  I feel it is clinically indicated to restart viscosupplementation series in the office today to treat patients knee osteoarthritis  Review of Systems   Constitutional: Negative for chills, fever and unexpected weight change  HENT: Negative for hearing loss, nosebleeds and sore throat  Eyes: Negative for pain, redness and visual disturbance  Respiratory: Negative for cough, shortness of breath and wheezing  Cardiovascular: Negative for chest pain, palpitations and leg swelling  Gastrointestinal: Negative for abdominal pain, nausea and vomiting  Endocrine: Negative for polydipsia and polyuria  Genitourinary: Negative for dysuria and hematuria  Musculoskeletal:        See HPI   Skin: Negative for rash and wound  Neurological: Negative for dizziness, numbness and headaches  Psychiatric/Behavioral: Negative for decreased concentration and suicidal ideas  The patient is not nervous/anxious            Past Medical History:   Diagnosis Date   • Asthma    • Cardiac disease     heart attack   • Disease of thyroid gland    • Hyperlipidemia        Past Surgical History:   Procedure Laterality Date   • APPENDECTOMY     • BREAST LUMPECTOMY Bilateral     several   • CARDIAC SURGERY     • CATARACT EXTRACTION, BILATERAL     • CORONARY STENT PLACEMENT     • NASAL POLYP SURGERY         Family History   Problem Relation Age of Onset   • ALS Father    • Cancer Brother    • Heart disease Family    • Asthma Family        Social History     Occupational History   • Not on file   Tobacco Use   • Smoking status: Never   • Smokeless tobacco: Never   Vaping Use   • Vaping Use: Never used   Substance and Sexual Activity   • Alcohol use: Yes     Comment: rare   • Drug use: No   • Sexual activity: Not on file         Current Outpatient Medications:   •  albuterol (PROVENTIL HFA,VENTOLIN HFA) 90 mcg/act inhaler, inhale 2 puffs by mouth and INTO THE LUNGS every 4 hours if needed for wheezing, Disp: 8 5 g, Rfl: 8  •  amLODIPine (NORVASC) 2 5 mg tablet, Take 1 tablet (2 5 mg total) by mouth daily, Disp: 90 tablet, Rfl: 1  •  aspirin 81 MG tablet, Take 81 mg by mouth daily  , Disp: , Rfl:   •  budesonide (PULMICORT) 0 25 mg/2 mL nebulizer solution, Take 2 mL (0 25 mg total) by nebulization 2 (two) times a day Rinse mouth after use , Disp: 60 mL, Rfl: 11  •  Cholecalciferol (VITAMIN D) 2000 UNITS tablet, Take 4,000 Units by mouth daily, Disp: , Rfl:   •  fexofenadine (ALLEGRA) 60 MG tablet, Take by mouth, Disp: , Rfl:   •  levothyroxine 75 mcg tablet, take 1 tablet by mouth once daily, Disp: 30 tablet, Rfl: 3  •  lidocaine (LIDODERM) 5 %, Place 1 patch on the skin daily Remove & Discard patch within 12 hours or as directed by MD, Disp: 30 patch, Rfl: 0  •  simvastatin (ZOCOR) 10 mg tablet, Take 1 tablet (10 mg total) by mouth daily at bedtime, Disp: 90 tablet, Rfl: 1  •  SYMBICORT 160-4 5 MCG/ACT inhaler, , Disp: , Rfl: 0  •  UNKNOWN TO PATIENT, Allegra daily po Stool softener bid po, Disp: , Rfl:   •  albuterol (2 5 mg/3 mL) 0 083 % nebulizer solution, Take 1 vial (2 5 mg total) by nebulization 2 (two) times a day (Patient not taking: Reported on 5/18/2023), Disp: 60 vial, Rfl: 0    Allergies   Allergen Reactions   • Penicillins    • Latex Rash       Objective:  Vitals:    05/18/23 0808   BP: (!) 186/78   Pulse: 58     Pain Score:   7        Left Hip Exam     Tenderness   The patient is experiencing tenderness in the greater trochanter      Range of Motion   External rotation: 60   Internal rotation: 20     Muscle Strength   Abduction: 4/5 Adduction: 5/5   Flexion: 5/5     Other   Sensation: normal            Physical Exam  Vitals reviewed  Constitutional:       Appearance: She is well-developed  HENT:      Head: Normocephalic and atraumatic  Eyes:      General:         Right eye: No discharge  Left eye: No discharge  Conjunctiva/sclera: Conjunctivae normal    Cardiovascular:      Rate and Rhythm: Regular rhythm  Pulmonary:      Effort: Pulmonary effort is normal  No respiratory distress  Breath sounds: No stridor  Musculoskeletal:      Cervical back: Normal range of motion and neck supple  Skin:     General: Skin is warm and dry  Neurological:      Mental Status: She is alert and oriented to person, place, and time  Psychiatric:         Behavior: Behavior normal                  This document was created using speech voice recognition software  Grammatical errors, random word insertions, pronoun errors, and incomplete sentences are an occasional consequence of this system due to software limitations, ambient noise, and hardware issues  Any formal questions or concerns about content, text, or information contained within the body of this dictation should be directly addressed to the provider for clarification

## 2023-05-25 ENCOUNTER — PROCEDURE VISIT (OUTPATIENT)
Dept: OBGYN CLINIC | Facility: CLINIC | Age: 83
End: 2023-05-25

## 2023-05-25 DIAGNOSIS — M17.12 PRIMARY OSTEOARTHRITIS OF LEFT KNEE: Primary | ICD-10-CM

## 2023-05-25 RX ORDER — HYALURONATE SODIUM 10 MG/ML
20 SYRINGE (ML) INTRAARTICULAR
Status: COMPLETED | OUTPATIENT
Start: 2023-05-25 | End: 2023-05-25

## 2023-05-25 RX ADMIN — Medication 20 MG: at 08:00

## 2023-05-25 NOTE — PROGRESS NOTES
Assessment/Plan:  1  Primary osteoarthritis of left knee            Steven Mcdowell tolerated her second Euflexxa injection left knee today  Follow-up in 1 week for the next injection  Subjective:   sAhli Holliday is a [de-identified] y o  female who presents for her second Euflexxa injection in the left knee today           Past Medical History:   Diagnosis Date   • Asthma    • Cardiac disease     heart attack   • Disease of thyroid gland    • Hyperlipidemia        Past Surgical History:   Procedure Laterality Date   • APPENDECTOMY     • BREAST LUMPECTOMY Bilateral     several   • CARDIAC SURGERY     • CATARACT EXTRACTION, BILATERAL     • CORONARY STENT PLACEMENT     • NASAL POLYP SURGERY         Family History   Problem Relation Age of Onset   • ALS Father    • Cancer Brother    • Heart disease Family    • Asthma Family        Social History     Occupational History   • Not on file   Tobacco Use   • Smoking status: Never   • Smokeless tobacco: Never   Vaping Use   • Vaping Use: Never used   Substance and Sexual Activity   • Alcohol use: Yes     Comment: rare   • Drug use: No   • Sexual activity: Not on file         Current Outpatient Medications:   •  albuterol (2 5 mg/3 mL) 0 083 % nebulizer solution, Take 1 vial (2 5 mg total) by nebulization 2 (two) times a day (Patient not taking: Reported on 5/18/2023), Disp: 60 vial, Rfl: 0  •  albuterol (PROVENTIL HFA,VENTOLIN HFA) 90 mcg/act inhaler, inhale 2 puffs by mouth and INTO THE LUNGS every 4 hours if needed for wheezing, Disp: 8 5 g, Rfl: 8  •  amLODIPine (NORVASC) 2 5 mg tablet, Take 1 tablet (2 5 mg total) by mouth daily, Disp: 90 tablet, Rfl: 1  •  aspirin 81 MG tablet, Take 81 mg by mouth daily  , Disp: , Rfl:   •  budesonide (PULMICORT) 0 25 mg/2 mL nebulizer solution, Take 2 mL (0 25 mg total) by nebulization 2 (two) times a day Rinse mouth after use , Disp: 60 mL, Rfl: 11  •  Cholecalciferol (VITAMIN D) 2000 UNITS tablet, Take 4,000 Units by mouth daily, Disp: , Rfl: •  fexofenadine (ALLEGRA) 60 MG tablet, Take by mouth, Disp: , Rfl:   •  levothyroxine 75 mcg tablet, take 1 tablet by mouth once daily, Disp: 30 tablet, Rfl: 3  •  lidocaine (LIDODERM) 5 %, Place 1 patch on the skin daily Remove & Discard patch within 12 hours or as directed by MD, Disp: 30 patch, Rfl: 0  •  simvastatin (ZOCOR) 10 mg tablet, Take 1 tablet (10 mg total) by mouth daily at bedtime, Disp: 90 tablet, Rfl: 1  •  SYMBICORT 160-4 5 MCG/ACT inhaler, , Disp: , Rfl: 0  •  UNKNOWN TO PATIENT, Allegra daily po Stool softener bid po, Disp: , Rfl:     Allergies   Allergen Reactions   • Penicillins    • Latex Rash       Objective: There were no vitals filed for this visit  Ortho Exam    Physical Exam    Large joint arthrocentesis: L knee  Universal Protocol:  Consent: Verbal consent obtained  Risks and benefits: risks, benefits and alternatives were discussed  Consent given by: patient  Site marked: the operative site was marked  Supporting Documentation  Indications: pain   Procedure Details  Location: knee - L knee  Needle size: 22 G  Ultrasound guidance: no  Approach: anterolateral  Medications administered: 20 mg Sodium Hyaluronate 20 MG/2ML    Patient tolerance: patient tolerated the procedure well with no immediate complications  Dressing:  Sterile dressing applied            This document was created using speech voice recognition software  Grammatical errors, random word insertions, pronoun errors, and incomplete sentences are an occasional consequence of this system due to software limitations, ambient noise, and hardware issues  Any formal questions or concerns about content, text, or information contained within the body of this dictation should be directly addressed to the provider for clarification

## 2023-06-01 ENCOUNTER — PROCEDURE VISIT (OUTPATIENT)
Dept: OBGYN CLINIC | Facility: CLINIC | Age: 83
End: 2023-06-01

## 2023-06-01 VITALS
HEART RATE: 60 BPM | DIASTOLIC BLOOD PRESSURE: 70 MMHG | HEIGHT: 60 IN | WEIGHT: 177 LBS | BODY MASS INDEX: 34.75 KG/M2 | SYSTOLIC BLOOD PRESSURE: 140 MMHG

## 2023-06-01 DIAGNOSIS — M17.12 PRIMARY OSTEOARTHRITIS OF LEFT KNEE: Primary | ICD-10-CM

## 2023-06-01 RX ORDER — HYALURONATE SODIUM 10 MG/ML
20 SYRINGE (ML) INTRAARTICULAR
Status: COMPLETED | OUTPATIENT
Start: 2023-06-01 | End: 2023-06-01

## 2023-06-01 RX ADMIN — Medication 20 MG: at 08:00

## 2023-06-01 NOTE — PROGRESS NOTES
"Assessment/Plan:  1  Primary osteoarthritis of left knee  Large joint arthrocentesis: L knee        Scribe Attestation    I,:  Luana Love Call am acting as a scribe while in the presence of the attending physician :       I,:  Leandra Mejia DO personally performed the services described in this documentation    as scribed in my presence :         Large joint arthrocentesis: L knee  Universal Protocol:  Consent: Verbal consent obtained  Risks and benefits: risks, benefits and alternatives were discussed  Consent given by: patient  Time out: Immediately prior to procedure a \"time out\" was called to verify the correct patient, procedure, equipment, support staff and site/side marked as required  Patient understanding: patient states understanding of the procedure being performed  Patient consent: the patient's understanding of the procedure matches consent given  Patient identity confirmed: verbally with patient    Supporting Documentation  Indications: pain   Procedure Details  Location: knee - L knee  Needle size: 22 G  Approach: anterolateral  Medications administered: 20 mg Sodium Hyaluronate 20 MG/2ML    Patient tolerance: patient tolerated the procedure well with no immediate complications  Dressing:  Sterile dressing applied          Kathryn tolerated her injection well  Postinjection instructions were provided  She can follow-up with me as needed  Subjective:   Shayla Olsen is a [de-identified] y o  female who presents for Euflexxa injection #3 for her left knee        Review of Systems      Past Medical History:   Diagnosis Date   • Asthma    • Cardiac disease     heart attack   • Disease of thyroid gland    • Hyperlipidemia        Past Surgical History:   Procedure Laterality Date   • APPENDECTOMY     • BREAST LUMPECTOMY Bilateral     several   • CARDIAC SURGERY     • CATARACT EXTRACTION, BILATERAL     • CORONARY STENT PLACEMENT     • NASAL POLYP SURGERY         Family History   Problem Relation Age of Onset " • ALS Father    • Cancer Brother    • Heart disease Family    • Asthma Family        Social History     Occupational History   • Not on file   Tobacco Use   • Smoking status: Never   • Smokeless tobacco: Never   Vaping Use   • Vaping Use: Never used   Substance and Sexual Activity   • Alcohol use: Yes     Comment: rare   • Drug use: No   • Sexual activity: Not on file         Current Outpatient Medications:   •  albuterol (2 5 mg/3 mL) 0 083 % nebulizer solution, Take 1 vial (2 5 mg total) by nebulization 2 (two) times a day (Patient not taking: Reported on 5/18/2023), Disp: 60 vial, Rfl: 0  •  albuterol (PROVENTIL HFA,VENTOLIN HFA) 90 mcg/act inhaler, inhale 2 puffs by mouth and INTO THE LUNGS every 4 hours if needed for wheezing, Disp: 8 5 g, Rfl: 8  •  amLODIPine (NORVASC) 2 5 mg tablet, Take 1 tablet (2 5 mg total) by mouth daily, Disp: 90 tablet, Rfl: 1  •  aspirin 81 MG tablet, Take 81 mg by mouth daily  , Disp: , Rfl:   •  budesonide (PULMICORT) 0 25 mg/2 mL nebulizer solution, Take 2 mL (0 25 mg total) by nebulization 2 (two) times a day Rinse mouth after use , Disp: 60 mL, Rfl: 11  •  Cholecalciferol (VITAMIN D) 2000 UNITS tablet, Take 4,000 Units by mouth daily, Disp: , Rfl:   •  fexofenadine (ALLEGRA) 60 MG tablet, Take by mouth, Disp: , Rfl:   •  levothyroxine 75 mcg tablet, take 1 tablet by mouth once daily, Disp: 30 tablet, Rfl: 3  •  lidocaine (LIDODERM) 5 %, Place 1 patch on the skin daily Remove & Discard patch within 12 hours or as directed by MD, Disp: 30 patch, Rfl: 0  •  simvastatin (ZOCOR) 10 mg tablet, Take 1 tablet (10 mg total) by mouth daily at bedtime, Disp: 90 tablet, Rfl: 1  •  SYMBICORT 160-4 5 MCG/ACT inhaler, , Disp: , Rfl: 0  •  UNKNOWN TO PATIENT, Allegra daily po Stool softener bid po, Disp: , Rfl:     Allergies   Allergen Reactions   • Penicillins    • Latex Rash       Objective:  Vitals:    06/01/23 0804   BP: 140/70   Pulse: 60       Ortho Exam    Physical Exam          This document was created using speech voice recognition software  Grammatical errors, random word insertions, pronoun errors, and incomplete sentences are an occasional consequence of this system due to software limitations, ambient noise, and hardware issues  Any formal questions or concerns about content, text, or information contained within the body of this dictation should be directly addressed to the provider for clarification

## 2023-08-04 DIAGNOSIS — E03.9 ACQUIRED HYPOTHYROIDISM: ICD-10-CM

## 2023-08-04 DIAGNOSIS — I10 ESSENTIAL HYPERTENSION: ICD-10-CM

## 2023-08-04 RX ORDER — AMLODIPINE BESYLATE 2.5 MG/1
2.5 TABLET ORAL DAILY
Qty: 90 TABLET | Refills: 1 | Status: SHIPPED | OUTPATIENT
Start: 2023-08-04

## 2023-08-04 RX ORDER — LEVOTHYROXINE SODIUM 0.07 MG/1
TABLET ORAL
Qty: 30 TABLET | Refills: 3 | Status: SHIPPED | OUTPATIENT
Start: 2023-08-04

## 2023-08-09 ENCOUNTER — APPOINTMENT (OUTPATIENT)
Dept: LAB | Facility: HOSPITAL | Age: 83
End: 2023-08-09
Payer: MEDICARE

## 2023-08-09 DIAGNOSIS — E03.9 ACQUIRED HYPOTHYROIDISM: ICD-10-CM

## 2023-08-09 DIAGNOSIS — I10 ESSENTIAL HYPERTENSION: ICD-10-CM

## 2023-08-09 LAB
ALBUMIN SERPL BCP-MCNC: 3.9 G/DL (ref 3.5–5)
ALP SERPL-CCNC: 76 U/L (ref 34–104)
ALT SERPL W P-5'-P-CCNC: 11 U/L (ref 7–52)
ANION GAP SERPL CALCULATED.3IONS-SCNC: 7 MMOL/L
AST SERPL W P-5'-P-CCNC: 22 U/L (ref 13–39)
BILIRUB SERPL-MCNC: 0.46 MG/DL (ref 0.2–1)
BUN SERPL-MCNC: 12 MG/DL (ref 5–25)
CALCIUM SERPL-MCNC: 9.2 MG/DL (ref 8.4–10.2)
CHLORIDE SERPL-SCNC: 105 MMOL/L (ref 96–108)
CHOLEST SERPL-MCNC: 143 MG/DL
CO2 SERPL-SCNC: 28 MMOL/L (ref 21–32)
CREAT SERPL-MCNC: 0.54 MG/DL (ref 0.6–1.3)
ERYTHROCYTE [DISTWIDTH] IN BLOOD BY AUTOMATED COUNT: 13.7 % (ref 11.6–15.1)
GFR SERPL CREATININE-BSD FRML MDRD: 89 ML/MIN/1.73SQ M
GLUCOSE P FAST SERPL-MCNC: 93 MG/DL (ref 65–99)
HCT VFR BLD AUTO: 43.9 % (ref 34.8–46.1)
HDLC SERPL-MCNC: 63 MG/DL
HGB BLD-MCNC: 14.2 G/DL (ref 11.5–15.4)
LDLC SERPL CALC-MCNC: 65 MG/DL (ref 0–100)
MCH RBC QN AUTO: 30.2 PG (ref 26.8–34.3)
MCHC RBC AUTO-ENTMCNC: 32.3 G/DL (ref 31.4–37.4)
MCV RBC AUTO: 93 FL (ref 82–98)
PLATELET # BLD AUTO: 260 THOUSANDS/UL (ref 149–390)
PMV BLD AUTO: 9.8 FL (ref 8.9–12.7)
POTASSIUM SERPL-SCNC: 3.7 MMOL/L (ref 3.5–5.3)
PROT SERPL-MCNC: 6.9 G/DL (ref 6.4–8.4)
RBC # BLD AUTO: 4.7 MILLION/UL (ref 3.81–5.12)
SODIUM SERPL-SCNC: 140 MMOL/L (ref 135–147)
T4 FREE SERPL-MCNC: 1.2 NG/DL (ref 0.61–1.12)
TRIGL SERPL-MCNC: 73 MG/DL
TSH SERPL DL<=0.05 MIU/L-ACNC: 2.1 UIU/ML (ref 0.45–4.5)
WBC # BLD AUTO: 7.86 THOUSAND/UL (ref 4.31–10.16)

## 2023-08-09 PROCEDURE — 80053 COMPREHEN METABOLIC PANEL: CPT

## 2023-08-09 PROCEDURE — 36415 COLL VENOUS BLD VENIPUNCTURE: CPT

## 2023-08-09 PROCEDURE — 84443 ASSAY THYROID STIM HORMONE: CPT

## 2023-08-09 PROCEDURE — 80061 LIPID PANEL: CPT

## 2023-08-09 PROCEDURE — 84439 ASSAY OF FREE THYROXINE: CPT

## 2023-08-09 PROCEDURE — 85027 COMPLETE CBC AUTOMATED: CPT

## 2023-08-24 ENCOUNTER — RA CDI HCC (OUTPATIENT)
Dept: OTHER | Facility: HOSPITAL | Age: 83
End: 2023-08-24

## 2023-08-24 NOTE — PROGRESS NOTES
[facility] coding opportunities       Chart reviewed, no opportunity found: CHART REVIEWED, NO OPPORTUNITY FOUND        Patients Insurance     Medicare Insurance: Medicare

## 2023-08-29 ENCOUNTER — OFFICE VISIT (OUTPATIENT)
Dept: FAMILY MEDICINE CLINIC | Facility: CLINIC | Age: 83
End: 2023-08-29
Payer: MEDICARE

## 2023-08-29 VITALS
SYSTOLIC BLOOD PRESSURE: 138 MMHG | TEMPERATURE: 98.3 F | DIASTOLIC BLOOD PRESSURE: 74 MMHG | WEIGHT: 178.2 LBS | RESPIRATION RATE: 18 BRPM | OXYGEN SATURATION: 99 % | BODY MASS INDEX: 34.99 KG/M2 | HEIGHT: 60 IN | HEART RATE: 60 BPM

## 2023-08-29 DIAGNOSIS — I10 ESSENTIAL HYPERTENSION: ICD-10-CM

## 2023-08-29 DIAGNOSIS — E03.9 ACQUIRED HYPOTHYROIDISM: Primary | ICD-10-CM

## 2023-08-29 DIAGNOSIS — F33.9 RECURRENT DEPRESSION (HCC): ICD-10-CM

## 2023-08-29 DIAGNOSIS — J45.20 MILD INTERMITTENT ASTHMA WITHOUT COMPLICATION: ICD-10-CM

## 2023-08-29 DIAGNOSIS — I25.10 CORONARY ARTERY DISEASE INVOLVING NATIVE CORONARY ARTERY OF NATIVE HEART WITHOUT ANGINA PECTORIS: ICD-10-CM

## 2023-08-29 DIAGNOSIS — M85.80 LOW BONE MASS: ICD-10-CM

## 2023-08-29 PROCEDURE — 99214 OFFICE O/P EST MOD 30 MIN: CPT | Performed by: FAMILY MEDICINE

## 2023-08-29 RX ORDER — B-COMPLEX WITH VITAMIN C
1 TABLET ORAL 2 TIMES DAILY WITH MEALS
Start: 2023-08-29

## 2023-08-29 RX ORDER — ASCORBIC ACID 500 MG
500 TABLET ORAL DAILY
COMMUNITY

## 2023-08-29 RX ORDER — EPINEPHRINE 0.3 MG/.3ML
0.3 INJECTION SUBCUTANEOUS ONCE
Qty: 0.6 ML | Refills: 0 | Status: SHIPPED | OUTPATIENT
Start: 2023-08-29 | End: 2023-08-29

## 2023-08-29 NOTE — PATIENT INSTRUCTIONS
Recent Results (from the past 672 hour(s))   CBC    Collection Time: 08/09/23  7:10 AM   Result Value Ref Range    WBC 7.86 4.31 - 10.16 Thousand/uL    RBC 4.70 3.81 - 5.12 Million/uL    Hemoglobin 14.2 11.5 - 15.4 g/dL    Hematocrit 43.9 34.8 - 46.1 %    MCV 93 82 - 98 fL    MCH 30.2 26.8 - 34.3 pg    MCHC 32.3 31.4 - 37.4 g/dL    RDW 13.7 11.6 - 15.1 %    Platelets 320 679 - 722 Thousands/uL    MPV 9.8 8.9 - 12.7 fL   Comprehensive metabolic panel    Collection Time: 08/09/23  7:10 AM   Result Value Ref Range    Sodium 140 135 - 147 mmol/L    Potassium 3.7 3.5 - 5.3 mmol/L    Chloride 105 96 - 108 mmol/L    CO2 28 21 - 32 mmol/L    ANION GAP 7 mmol/L    BUN 12 5 - 25 mg/dL    Creatinine 0.54 (L) 0.60 - 1.30 mg/dL    Glucose, Fasting 93 65 - 99 mg/dL    Calcium 9.2 8.4 - 10.2 mg/dL    AST 22 13 - 39 U/L    ALT 11 7 - 52 U/L    Alkaline Phosphatase 76 34 - 104 U/L    Total Protein 6.9 6.4 - 8.4 g/dL    Albumin 3.9 3.5 - 5.0 g/dL    Total Bilirubin 0.46 0.20 - 1.00 mg/dL    eGFR 89 ml/min/1.73sq m   Lipid Panel with Direct LDL reflex    Collection Time: 08/09/23  7:10 AM   Result Value Ref Range    Cholesterol 143 See Comment mg/dL    Triglycerides 73 See Comment mg/dL    HDL, Direct 63 >=50 mg/dL    LDL Calculated 65 0 - 100 mg/dL   TSH, 3rd generation    Collection Time: 08/09/23  7:10 AM   Result Value Ref Range    TSH 3RD GENERATON 2.104 0.450 - 4.500 uIU/mL   T4, free    Collection Time: 08/09/23  7:10 AM   Result Value Ref Range    Free T4 1.20 (H) 0.61 - 1.12 ng/dL

## 2023-08-29 NOTE — ASSESSMENT & PLAN NOTE
We discussed recent DEXA results. Recommended that she take up to excuse me 1200 mg of calcium daily with vitamin D explained that vitamin C is not helpful for bones.

## 2023-08-29 NOTE — PROGRESS NOTES
Assessment/Plan:       1. Acquired hypothyroidism  Assessment & Plan:  TSH is at goal. Continue levothyroxine 75 mcg daily. Orders:  -     T4, free; Future; Expected date: 02/10/2024  -     TSH, 3rd generation; Future; Expected date: 02/10/2024    2. Essential hypertension  Assessment & Plan:  Well controlled. Continue amlodipine 2.5 mg daily. Orders:  -     CBC; Future; Expected date: 02/10/2024  -     Comprehensive metabolic panel; Future; Expected date: 02/10/2024  -     Lipid Panel with Direct LDL reflex; Future; Expected date: 02/10/2024    3. Coronary artery disease involving native coronary artery of native heart without angina pectoris  Assessment & Plan:  Stable  Continue simvastatin 10 mg a day       4. Recurrent depression ([address])  Assessment & Plan:  Declined medication      5. Low bone mass  Assessment & Plan:  We discussed recent DEXA results. Recommended that she take up to excuse me 1200 mg of calcium daily with vitamin D explained that vitamin C is not helpful for bones. Orders:  -     calcium carbonate-vitamin D 500 mg-5 mcg per tablet; Take 1 tablet by mouth 2 (two) times a day with meals    6. Mild intermittent asthma without complication  -     EPINEPHrine (EPIPEN) 0.3 mg/0.3 mL SOAJ; Inject 0.3 mL (0.3 mg total) into a muscle once for 1 dose        Prescription for EpiPen given as requested. She has had issues with mucous plugs that have caused her to be short of breath. Back pain  We discussed having clearance with Dr. Joseph Carrillo for the injection in her back and having talked about side effects of having an injection. We discussed waiting for the flu injection and having it by the end of 09/2023. We discussed having RSV vaccine. I will see her back in 6 months. Return in about 6 months (around 2/29/2024) for Annual Wellness Visit. Subjective:      Patient ID: Donavon Quiñonez is a 82 y.o. female.     Ms. Briseyda Baez is an 82-year-old female who presents today for blood pressure check. She consents to use JAMES today. She has back, knee and thigh pain. She sees Dr. Luci Murray for his steroid injection and manages her knee. He recommended that she could get an injection in her back. She had an injection in her groin. She has been feeling low self-esteem. She was tested for bone density. She is taking vitamin D supplements. She wants to have her flu injection at [address] [facility]. I        Review of Systems  Constitutional:       General: She is not in acute distress. Appearance: She is well-developed. HENT:      Head: Normocephalic and atraumatic. Eyes:      Conjunctiva/sclera: Conjunctivae normal.   Cardiovascular:      Rate and Rhythm: Normal rate and regular rhythm. Heart sounds: No murmur heard. Pulmonary:      Effort: Pulmonary effort is normal. No respiratory distress. Breath sounds: Normal breath sounds. Musculoskeletal:         General: No swelling. Cervical back: Neck supple. Lower extremity: positive for edema in the lower left leg. Skin:     General: Skin is warm and dry. Capillary Refill: Capillary refill takes less than 2 seconds. Neurological:      Mental Status: She is alert. Psychiatric:         Mood and Affect: Mood normal.      Objective:  /74   Pulse 60   Temp 98.3 °F (36.8 °C) (Temporal)   Resp 18   Ht 5' (1.524 m)   Wt 80.8 kg (178 lb 3.2 oz)   SpO2 99%   BMI 34.80 kg/m²          Physical Exam  Vitals and nursing note reviewed. Constitutional:       Appearance: She is well-developed. HENT:      Head: Normocephalic and atraumatic. Right Ear: Tympanic membrane and external ear normal.      Left Ear: Tympanic membrane and external ear normal.   Cardiovascular:      Rate and Rhythm: Normal rate and regular rhythm. Heart sounds: Normal heart sounds. No murmur heard. No friction rub.    Pulmonary:      Effort: Pulmonary effort is normal. No respiratory distress. Breath sounds: Normal breath sounds. No wheezing or rales. Musculoskeletal:      Right lower leg: No edema. Left lower leg: No edema. Gait: abnormal gait. Assistive device: Cane      Her blood pressure is within range. Her blood work is normal. Her kidney function test is normal. Her liver enzymes were normal. Her total cholesterol was 143 mg/dL. Her TSH was 2.1. Transcribed for General Rebecca DO, by Anish Moulton on 08/29/23 at 3:44 PM. Powered by [employer].

## 2023-09-01 ENCOUNTER — TELEPHONE (OUTPATIENT)
Dept: FAMILY MEDICINE CLINIC | Facility: CLINIC | Age: 83
End: 2023-09-01

## 2023-09-01 DIAGNOSIS — I25.10 CORONARY ARTERY DISEASE INVOLVING NATIVE CORONARY ARTERY OF NATIVE HEART WITHOUT ANGINA PECTORIS: ICD-10-CM

## 2023-09-01 RX ORDER — SIMVASTATIN 10 MG
10 TABLET ORAL
Qty: 90 TABLET | Refills: 1 | Status: SHIPPED | OUTPATIENT
Start: 2023-09-01

## 2023-09-01 NOTE — TELEPHONE ENCOUNTER
Melodie Yuan left a message, requesting a call back about refills but her message is not clear on what exactly she wants/need to clarify [facility]

## 2023-09-22 ENCOUNTER — TELEPHONE (OUTPATIENT)
Dept: PAIN MEDICINE | Facility: CLINIC | Age: 83
End: 2023-09-22

## 2023-09-22 ENCOUNTER — OFFICE VISIT (OUTPATIENT)
Dept: PAIN MEDICINE | Facility: CLINIC | Age: 83
End: 2023-09-22
Payer: MEDICARE

## 2023-09-22 VITALS
HEART RATE: 77 BPM | TEMPERATURE: 98.2 F | BODY MASS INDEX: 34.18 KG/M2 | SYSTOLIC BLOOD PRESSURE: 150 MMHG | DIASTOLIC BLOOD PRESSURE: 71 MMHG | WEIGHT: 175 LBS

## 2023-09-22 DIAGNOSIS — M51.16 LUMBAR DISC DISEASE WITH RADICULOPATHY: Primary | ICD-10-CM

## 2023-09-22 DIAGNOSIS — M48.062 SPINAL STENOSIS OF LUMBAR REGION WITH NEUROGENIC CLAUDICATION: ICD-10-CM

## 2023-09-22 PROCEDURE — 99204 OFFICE O/P NEW MOD 45 MIN: CPT | Performed by: PHYSICAL MEDICINE & REHABILITATION

## 2023-09-22 NOTE — TELEPHONE ENCOUNTER
Scheduled patient for TFESI 10/25/23  Patient is taking ASA 81mg  Nothing to eat or drink 1 hour prior to procedure  Needs to arrange transportation  Proper clothing for procedure  No vaccines 2 weeks prior or after procedure  If ill or place on antibiotics, please call to reschedule

## 2023-09-22 NOTE — PROGRESS NOTES
Assessment:  1. Lumbar disc disease with radiculopathy    2. Spinal stenosis of lumbar region with neurogenic claudication        Plan:      Ms. Aminah Parnell is a pleasant 19-year-old female significant past medical history of hypertension, asthma, hypothyroidism, depression presents to [facility] for initial evaluation and referral from Dr. Luci Murray regarding low back pain with rating symptoms into the right worse than left lower extremity. During today's evaluation she is demonstrating pain that is likely multifactorial in nature with the majority of her symptoms appear to be generating from the lumbar spine with MRI evidence of multilevel degenerative disc disease and varying degrees of moderate to severe foraminal and central narrowing most notable at L3-L4 and L4-L5. At this time interventional approaches will be beneficial and warranted. As such we will plan for bilateral L4 TFESI under fluoroscopy guidance. All questions answered, patient is agreeable with plan. Complete risks and benefits including bleeding, infection, tissue reaction, nerve injury and allergic reaction were discussed. The approach was demonstrated using models and literature was provided. Verbal and written consent was obtained. History of Present Illness:    Misael Hough is a 82 y.o. female who presents to [facility] for initial evaluation of the above stated pain complaints. The patient has a past medical and chronic pain history as outlined in the assessment section. She was referred by Estefany Olsen DO  [address] [place] [address]  [address] [place]   Patient presents as referral from Dr. Luci Murray regarding 8 years duration of low back and worsening right and left lower extremity pain. She follows with Dr. Luci Murray regarding the knees and has received Euflexxa injections as well as the hips receiving greater trochanteric bursa steroid injections.   She has been referred regarding suspected lumbar radiculopathy into the right lower extremity. Today patient reports moderate to severe pain rated 9 out of 10 and interfere with activities. Pain is constant 100% of the time that is present throughout the day and night. Describes symptoms as shooting, sharp, throbbing, pressure-like pain. Also reports lower extremity weakness but denies falls. Requires a cane for ambulation. Symptoms are worse with lying down, standing, bending, sitting, walking, exercise. Reports moderate relief with physical therapy, exercise and heat. Denies smoking, marijuana or alcohol use. Currently using over-the-counter Tylenol and Motrin with minimal relief. Presents today for initial evaluation. Review of Systems:    Review of Systems   Constitutional: Positive for unexpected weight change. Negative for chills and fatigue. HENT: Negative for ear pain, mouth sores and sinus pressure. Eyes: Negative for pain, redness and visual disturbance. Respiratory: Positive for cough, shortness of breath and wheezing. Cardiovascular: Positive for leg swelling. Negative for chest pain and palpitations. Gastrointestinal: Negative for abdominal pain and nausea. Endocrine: Negative for polyphagia. Musculoskeletal: Positive for back pain, gait problem and joint swelling. Negative for arthralgias and neck pain. Decreased ROM, muscle pain and pain in both hips and  B/l euflexxa shots from Dr. Claudia Sky   Skin: Negative for wound. Neurological: Positive for dizziness and weakness. Negative for seizures. Psychiatric/Behavioral: Positive for dysphoric mood and sleep disturbance.         Memory loss           Past Medical History:   Diagnosis Date   • Asthma    • Cardiac disease     heart attack   • Disease of thyroid gland    • Hyperlipidemia        Past Surgical History:   Procedure Laterality Date   • APPENDECTOMY     • BREAST LUMPECTOMY Bilateral     several   • CARDIAC SURGERY     • CATARACT EXTRACTION, BILATERAL     • CORONARY STENT PLACEMENT     • NASAL POLYP SURGERY         Family History   Problem Relation Age of Onset   • No Known Problems Mother    • ALS Father    • Cancer Brother    • Heart disease Family    • Asthma Family        Social History     Occupational History   • Not on file   Tobacco Use   • Smoking status: Never   • Smokeless tobacco: Never   Vaping Use   • Vaping Use: Never used   Substance and Sexual Activity   • Alcohol use: Yes     Comment: rare   • Drug use: No   • Sexual activity: Not on file         Current Outpatient Medications:   •  albuterol (PROVENTIL HFA,VENTOLIN HFA) 90 mcg/act inhaler, inhale 2 puffs by mouth and INTO THE LUNGS every 4 hours if needed for wheezing, Disp: 8.5 g, Rfl: 8  •  ascorbic acid (VITAMIN C) 500 MG tablet, Take 500 mg by mouth daily, Disp: , Rfl:   •  aspirin 81 MG tablet, Take 81 mg by mouth daily  , Disp: , Rfl:   •  budesonide (PULMICORT) 0.25 mg/2 mL nebulizer solution, Take 2 mL (0.25 mg total) by nebulization 2 (two) times a day Rinse mouth after use., Disp: 60 mL, Rfl: 11  •  calcium carbonate-vitamin D 500 mg-5 mcg per tablet, Take 1 tablet by mouth 2 (two) times a day with meals, Disp: , Rfl:   •  Cholecalciferol (VITAMIN D) 2000 UNITS tablet, Take 4,000 Units by mouth daily, Disp: , Rfl:   •  fexofenadine (ALLEGRA) 60 MG tablet, Take by mouth, Disp: , Rfl:   •  levothyroxine 75 mcg tablet, take 1 tablet by mouth once daily, Disp: 30 tablet, Rfl: 3  •  lidocaine (LIDODERM) 5 %, Place 1 patch on the skin daily Remove & Discard patch within 12 hours or as directed by MD, Disp: 30 patch, Rfl: 0  •  simvastatin (ZOCOR) 10 mg tablet, Take 1 tablet (10 mg total) by mouth daily at bedtime, Disp: 90 tablet, Rfl: 1  •  SYMBICORT 160-4.5 MCG/ACT inhaler, , Disp: , Rfl: 0  •  amLODIPine (NORVASC) 2.5 mg tablet, take 1 tablet by mouth once daily (Patient not taking: Reported on 9/22/2023), Disp: 90 tablet, Rfl: 1  •  EPINEPHrine (EPIPEN) 0.3 mg/0.3 mL SOAJ, Inject 0.3 mL (0.3 mg total) into a muscle once for 1 dose, Disp: 0.6 mL, Rfl: 0  •  UNKNOWN TO PATIENT, Allegra daily po Stool softener bid po, Disp: , Rfl:     Allergies   Allergen Reactions   • Penicillins    • Latex Rash       Physical Exam:    /71   Pulse 77   Temp 98.2 °F (36.8 °C)   Wt 79.4 kg (175 lb)   BMI 34.18 kg/m²     Constitutional: normal, well developed, well nourished, alert, in no distress and non-toxic and no overt pain behavior. Eyes: anicteric  HEENT: grossly intact  Neck: supple, symmetric, trachea midline and no masses   Pulmonary:even and unlabored  Cardiovascular:No edema or pitting edema present  Skin:Normal without rashes or lesions and well hydrated  Psychiatric:Mood and affect appropriate  Neurologic:Cranial Nerves II-XII grossly intact  Musculoskeletal: Antalgic gait, ambulates with single-point cane in the left upper extremity, tenderness to palpation bilateral lumbar paraspinals, decreased active and passive range of motion with lumbar flexion and extension limited by pain, MMT 5 out of 5 bilateral lower extremities, decree sensation to light touch in patchy distribution left lower extremity, DTRs hyporeflexic bilateral patellar and Achilles reflexes, positive straight leg raise in the supine position radicular pain into the left leg    Imaging   MRI lumbar spine wo contrast  Status: Final result     PACS Images     Show images for MRI lumbar spine wo contrast  Study Result    Narrative & Impression   MRI LUMBAR SPINE WITHOUT CONTRAST     INDICATION: M54.41: Lumbago with sciatica, right side.     COMPARISON:  X-rays dated 4/21/2022.     TECHNIQUE:  Sagittal T1, sagittal T2, sagittal inversion recovery, axial T1 and axial T2, coronal T2.    IMAGE QUALITY:  Diagnostic     FINDINGS:     VERTEBRAL BODIES:  There are 5 lumbar type vertebral bodies. There is lumbar levoscoliosis with apex at L2-3. Christiane Phalen Left lateral translation of L3 on L4.  Minimal degenerative retrolisthesis at L1-2. No spondylolysis. No compression. Multilevel Modic type I   endplate degenerative changes most pronounced at L2-3.     SACRUM:  Normal signal within the sacrum. No evidence of insufficiency or stress fracture.     DISTAL CORD AND CONUS:  Normal size and signal within the distal cord and conus.     PARASPINAL SOFT TISSUES:  Paraspinal soft tissues are unremarkable.     LOWER THORACIC DISC SPACES:  Right foraminal stenosis at T11-T12 due to facet arthropathy. No disc herniation or canal stenosis.     LUMBAR DISC SPACES:     L1-L2:  Small disc bulge. Mild facet arthropathy. No significant canal or left foraminal stenosis. Mild right foraminal stenosis.     L2-L3:  Disc bulge and marginal osteophyte. Facet arthropathy. Moderate canal stenosis. Lateral recess narrowing without definite nerve root impingement. Mild-to-moderate right foraminal stenosis. No significant left foraminal stenosis.     L3-L4:  Disc bulge and facet arthropathy. Moderate canal stenosis. Lateral recess narrowing without nerve root impingement. Moderate to severe right foraminal stenosis. Mild left foraminal stenosis.     L4-L5:  Disc bulge and facet arthropathy. Moderate canal stenosis. Lateral recess narrowing without definite nerve root impingement. Moderate left foraminal stenosis. No significant right foraminal stenosis.     L5-S1:  Small left foraminal /far lateral disc protrusion and marginal osteophyte that contacts the exiting nerve root. Facet arthropathy. No significant canal or right foraminal stenosis.  Mild to moderate left foraminal stenosis.     IMPRESSION:     Scoliosis and multilevel degenerative spondylosis as described.        Workstation performed: [id number]        Imaging    MRI lumbar spine wo contrast (Order: [id number]) - 5/13/2022    Result History    MRI lumbar spine wo contrast (Order #[id number]) on 5/16/2022 - Order Result History Report    Order Report     Order Details  Order Questions    Question Answer   What is the patient's sedation requirement? No Sedation   Metallic implants? No   Note: Answer Yes or No   Does this procedure require the 3T MRI at [facility] or [place]? No   Release to patient through [address] [place] [address] Immediate   Is order priority selected as STAT? No   Exam reason low back pain   Note: Enter reason for exam            Result Information    Status Priority Source   Final result (5/16/2022  9:53 AM) Routine      Reason for Exam    low back pain; Low back pain, symptoms persist with > 6wks conservative treatment; low back pain   Dx: Acute right-sided low back pain with right-sided sciatica [M54.41 (ICD-10-CM)]       All Reviewers List    Cesia Pill, DO on 5/16/2022 11:03 AM         MRI lumbar spine wo contrast: Patient Communication     Add Comments   Not seen         Signed by    Signed Date/Time  Phone Pager   Rejidwayne Dotye 5/16/2022 09:53 [phone]        Exam Information    Status Exam Begun  Exam Ended  Performing Tech   Final [99] 5/13/2022 06:33 5/13/2022 06:51 Jen Breaker       Screening Form Questions     important suggestion  Questionnaires are displayed in the order they were answered. Answer Comment   What are your symptoms? low back pain    Do you have a cardiac pacemaker or internal defibrillator? NA    Please list /make/model:     Have you had any HEART surgery? Cardiac stents    Please list make/model:      Heart surgery: If "other", please describe:     Have you had any BRAIN surgery? None    Please list  or describe implant:     Brain surgery: If "other", please describe:     Have you had any EYE surgery? None    Eye surgery: If "other", please describe:     Have you ever injured your eyes with metal or metal fragments (grinding,metallic slivers)? No    Eye injury: Please describe:     Have you had any EAR surgery? None    Ear surgery: If "other", please describe:      Do you have an electronic "stimulation" device?   None    Please provide  information:     Have you had any abdominal or pelvic surgery? Yes    Have you had any of the following abdominal or pelvic surgeries: Other    Abdominal/pelvic surgery: If "other", please describe: appy    Do you have any ORTHOPEDIC implants/devices? None    Do you have the following: None    Do you have liver disease? None    Do you have kidney disease? High blood pressure, being treated for    Have you had a problem with a previous MRI? No    Does the patient require pre-medication? Do you have a personal history of cancer? None    If "other", please specify:       Are you wearing medication patches?   No    Are you wearing any of the following: None    Date of last menstrual cycle:      Postmenopausal? Yes    Pregnant? Breastfeeding? Breast tissue expander? Do any of the following apply to you:     Please specify:     Did the patient provide this information? Yes    Who provided this information (if not the patient)? Relationship to the patient:     Contact number:     MRI STAFF ONLY- Prior imaging reviewed in PACS (initials): de    MRI STAFF ONLY- Epic chart reviewed (initials): de    MRI STAFF ONLY: The patient was scheduled to receive MRI contrast and has received the Medication Guide. No contrast given        External Results Report    Open External Results Report      Encounter    View Encounter                     Patient Care Timeline    No data selected in time range    Pre-op Summary    Pre-op             Recovery Summary    Recovery                 Routing History    None           No orders to display       No orders of the defined types were placed in this encounter.

## 2023-09-22 NOTE — ADDENDUM NOTE
Addended by: Kelly Spencer on: 9/22/2023 11:33 AM     Modules accepted: Orders, SmartSet
I have reviewed and confirmed nurses' notes...

## 2023-09-27 ENCOUNTER — CLINICAL SUPPORT (OUTPATIENT)
Dept: FAMILY MEDICINE CLINIC | Facility: CLINIC | Age: 83
End: 2023-09-27
Payer: MEDICARE

## 2023-09-27 DIAGNOSIS — Z23 NEED FOR INFLUENZA VACCINATION: Primary | ICD-10-CM

## 2023-09-27 PROCEDURE — 90662 IIV NO PRSV INCREASED AG IM: CPT

## 2023-09-27 PROCEDURE — G0008 ADMIN INFLUENZA VIRUS VAC: HCPCS

## 2023-09-30 ENCOUNTER — OFFICE VISIT (OUTPATIENT)
Dept: URGENT CARE | Facility: CLINIC | Age: 83
End: 2023-09-30
Payer: MEDICARE

## 2023-09-30 VITALS
DIASTOLIC BLOOD PRESSURE: 88 MMHG | OXYGEN SATURATION: 97 % | WEIGHT: 179 LBS | TEMPERATURE: 97.3 F | HEART RATE: 71 BPM | BODY MASS INDEX: 34.96 KG/M2 | RESPIRATION RATE: 20 BRPM | SYSTOLIC BLOOD PRESSURE: 165 MMHG

## 2023-09-30 DIAGNOSIS — U07.1 COVID: Primary | ICD-10-CM

## 2023-09-30 LAB
SARS-COV-2 AG UPPER RESP QL IA: POSITIVE
VALID CONTROL: ABNORMAL

## 2023-09-30 PROCEDURE — 87811 SARS-COV-2 COVID19 W/OPTIC: CPT | Performed by: FAMILY MEDICINE

## 2023-09-30 PROCEDURE — 99213 OFFICE O/P EST LOW 20 MIN: CPT | Performed by: FAMILY MEDICINE

## 2023-09-30 RX ORDER — AZITHROMYCIN 250 MG/1
TABLET, FILM COATED ORAL
Qty: 6 TABLET | Refills: 0 | Status: SHIPPED | OUTPATIENT
Start: 2023-09-30 | End: 2023-10-04

## 2023-09-30 RX ORDER — NITROGLYCERIN 0.4 MG/1
1 TABLET SUBLINGUAL
COMMUNITY

## 2023-09-30 NOTE — PROGRESS NOTES
[facility]        NAME: Rochelle Og is a 82 y.o. female  : 1940    MRN: [id number]  DATE: 2023  TIME: 10:30 AM    Assessment and Plan   COVID [U07.1]  1. COVID  Poct Covid 19 Rapid Antigen Test        Positive rapid COVID test.  Advised on isolation per CDC guidelines. We will treat with azithromycin and patient instructed to use Flonase on a nightly basis to help counteract postnasal drip. Advised on hydrating plenty of fluids and gargling with warm salt water. Patient Instructions     Follow up with PCP in 3-5 days. Proceed to  ER if symptoms worsen. Chief Complaint     Chief Complaint   Patient presents with   • Cold Like Symptoms     URI s/s. History of Present Illness     82-year-old female presents today with 2 to 3 days of symptoms including rhinorrhea, sinus pain and pressure, coughing and headaches. Reports that she had a sore throat which has been since resolved. Symptoms were preceded by reflux chest pain which progressed to coughing. Cough has affected her sleeping over the past 2 nights. Denies any obvious fevers, chills, abdominal symptoms or any obvious sick contacts. Review of Systems   Review of Systems   Constitutional: Negative for chills and fever. HENT: Positive for rhinorrhea, sinus pressure, sinus pain and sore throat (resolved). Negative for congestion. Respiratory: Positive for cough and shortness of breath. Cardiovascular: Positive for chest pain (reflux). Gastrointestinal: Negative for abdominal pain and nausea. Neurological: Positive for headaches. Psychiatric/Behavioral: Positive for sleep disturbance.      Current Medications       Current Outpatient Medications:   •  albuterol (PROVENTIL HFA,VENTOLIN HFA) 90 mcg/act inhaler, inhale 2 puffs by mouth and INTO THE LUNGS every 4 hours if needed for wheezing, Disp: 8.5 g, Rfl: 8  •  amLODIPine (NORVASC) 2.5 mg tablet, take 1 tablet by mouth once daily (Patient not taking: Reported on 9/22/2023), Disp: 90 tablet, Rfl: 1  •  ascorbic acid (VITAMIN C) 500 MG tablet, Take 500 mg by mouth daily, Disp: , Rfl:   •  aspirin 81 MG tablet, Take 81 mg by mouth daily  , Disp: , Rfl:   •  budesonide (PULMICORT) 0.25 mg/2 mL nebulizer solution, Take 2 mL (0.25 mg total) by nebulization 2 (two) times a day Rinse mouth after use., Disp: 60 mL, Rfl: 11  •  calcium carbonate-vitamin D 500 mg-5 mcg per tablet, Take 1 tablet by mouth 2 (two) times a day with meals, Disp: , Rfl:   •  Cholecalciferol (VITAMIN D) 2000 UNITS tablet, Take 4,000 Units by mouth daily, Disp: , Rfl:   •  EPINEPHrine (EPIPEN) 0.3 mg/0.3 mL SOAJ, Inject 0.3 mL (0.3 mg total) into a muscle once for 1 dose, Disp: 0.6 mL, Rfl: 0  •  fexofenadine (ALLEGRA) 60 MG tablet, Take by mouth, Disp: , Rfl:   •  levothyroxine 75 mcg tablet, take 1 tablet by mouth once daily, Disp: 30 tablet, Rfl: 3  •  lidocaine (LIDODERM) 5 %, Place 1 patch on the skin daily Remove & Discard patch within 12 hours or as directed by MD, Disp: 30 patch, Rfl: 0  •  nitroglycerin (NITROSTAT) 0.4 mg SL tablet, Place 1 tablet under the tongue every 5 (five) minutes as needed, Disp: , Rfl:   •  simvastatin (ZOCOR) 10 mg tablet, Take 1 tablet (10 mg total) by mouth daily at bedtime, Disp: 90 tablet, Rfl: 1  •  SYMBICORT 160-4.5 MCG/ACT inhaler, , Disp: , Rfl: 0  •  UNKNOWN TO PATIENT, Allegra daily po Stool softener bid po, Disp: , Rfl:     Current Allergies     Allergies as of 09/30/2023 - Reviewed 09/30/2023   Allergen Reaction Noted   • Penicillins  09/18/2013   • Latex Rash 03/03/2020            The following portions of the patient's history were reviewed and updated as appropriate: allergies, current medications, past family history, past medical history, past social history, past surgical history and problem list.     Past Medical History:   Diagnosis Date   • Asthma    • Cardiac disease     heart attack   • Disease of thyroid gland    • Hyperlipidemia Past Surgical History:   Procedure Laterality Date   • APPENDECTOMY     • BREAST LUMPECTOMY Bilateral     several   • CARDIAC SURGERY     • CATARACT EXTRACTION, BILATERAL     • CORONARY STENT PLACEMENT     • NASAL POLYP SURGERY         Family History   Problem Relation Age of Onset   • No Known Problems Mother    • ALS Father    • Cancer Brother    • Heart disease Family    • Asthma Family          Medications have been verified. Objective   /88   Pulse 71   Temp (!) 97.3 °F (36.3 °C)   Resp 20   Wt 81.2 kg (179 lb)   SpO2 97%   BMI 34.96 kg/m²   No LMP recorded. Patient is postmenopausal.       Physical Exam     Physical Exam  Vitals and nursing note reviewed. Constitutional:       General: She is in acute distress. Appearance: Normal appearance. She is normal weight. She is not ill-appearing, toxic-appearing or diaphoretic. HENT:      Head: Normocephalic and atraumatic. Nose: Rhinorrhea present. Eyes:      General:         Right eye: No discharge. Left eye: No discharge. Conjunctiva/sclera: Conjunctivae normal.   Cardiovascular:      Rate and Rhythm: Normal rate and regular rhythm. Pulmonary:      Effort: No respiratory distress. Breath sounds: Normal breath sounds. No wheezing, rhonchi or rales. Skin:     General: Skin is warm. Findings: No erythema. Neurological:      General: No focal deficit present. Mental Status: She is alert and oriented to person, place, and time. Psychiatric:         Mood and Affect: Mood normal.         Behavior: Behavior normal.         Thought Content:  Thought content normal.         Judgment: Judgment normal.

## 2023-10-06 ENCOUNTER — TELEPHONE (OUTPATIENT)
Dept: PULMONOLOGY | Facility: MEDICAL CENTER | Age: 83
End: 2023-10-06

## 2023-10-06 ENCOUNTER — APPOINTMENT (EMERGENCY)
Dept: RADIOLOGY | Facility: HOSPITAL | Age: 83
End: 2023-10-06
Payer: MEDICARE

## 2023-10-06 ENCOUNTER — HOSPITAL ENCOUNTER (EMERGENCY)
Facility: HOSPITAL | Age: 83
Discharge: HOME | End: 2023-10-06
Attending: EMERGENCY MEDICINE
Payer: MEDICARE

## 2023-10-06 VITALS
RESPIRATION RATE: 22 BRPM | BODY MASS INDEX: 34.36 KG/M2 | OXYGEN SATURATION: 97 % | TEMPERATURE: 98.2 F | DIASTOLIC BLOOD PRESSURE: 90 MMHG | HEART RATE: 75 BPM | WEIGHT: 175 LBS | HEIGHT: 60 IN | SYSTOLIC BLOOD PRESSURE: 172 MMHG

## 2023-10-06 DIAGNOSIS — J45.901 ASTHMA EXACERBATION: ICD-10-CM

## 2023-10-06 DIAGNOSIS — U07.1 COVID: Primary | ICD-10-CM

## 2023-10-06 LAB
ALBUMIN SERPL BCP-MCNC: 4.2 G/DL (ref 3.5–5)
ALP SERPL-CCNC: 80 U/L (ref 34–104)
ALT SERPL W P-5'-P-CCNC: 17 U/L (ref 7–52)
ANION GAP SERPL CALCULATED.3IONS-SCNC: 9 MMOL/L
AST SERPL W P-5'-P-CCNC: 29 U/L (ref 13–39)
BASOPHILS # BLD AUTO: 0.09 THOUSANDS/ÂΜL (ref 0–0.1)
BASOPHILS NFR BLD AUTO: 1 % (ref 0–1)
BILIRUB SERPL-MCNC: 0.4 MG/DL (ref 0.2–1)
BNP SERPL-MCNC: 145 PG/ML (ref 0–100)
BUN SERPL-MCNC: 15 MG/DL (ref 5–25)
CALCIUM SERPL-MCNC: 9.7 MG/DL (ref 8.4–10.2)
CHLORIDE SERPL-SCNC: 106 MMOL/L (ref 96–108)
CO2 SERPL-SCNC: 26 MMOL/L (ref 21–32)
CREAT SERPL-MCNC: 0.51 MG/DL (ref 0.6–1.3)
EOSINOPHIL # BLD AUTO: 0.65 THOUSAND/ÂΜL (ref 0–0.61)
EOSINOPHIL NFR BLD AUTO: 8 % (ref 0–6)
ERYTHROCYTE [DISTWIDTH] IN BLOOD BY AUTOMATED COUNT: 13.7 % (ref 11.6–15.1)
GFR SERPL CREATININE-BSD FRML MDRD: 91 ML/MIN/1.73SQ M
GLUCOSE SERPL-MCNC: 86 MG/DL (ref 65–140)
HCT VFR BLD AUTO: 43.1 % (ref 34.8–46.1)
HGB BLD-MCNC: 14.4 G/DL (ref 11.5–15.4)
IMM GRANULOCYTES # BLD AUTO: 0.04 THOUSAND/UL (ref 0–0.2)
IMM GRANULOCYTES NFR BLD AUTO: 1 % (ref 0–2)
LYMPHOCYTES # BLD AUTO: 1.44 THOUSANDS/ÂΜL (ref 0.6–4.47)
LYMPHOCYTES NFR BLD AUTO: 17 % (ref 14–44)
MCH RBC QN AUTO: 30.1 PG (ref 26.8–34.3)
MCHC RBC AUTO-ENTMCNC: 33.4 G/DL (ref 31.4–37.4)
MCV RBC AUTO: 90 FL (ref 82–98)
MONOCYTES # BLD AUTO: 0.81 THOUSAND/ÂΜL (ref 0.17–1.22)
MONOCYTES NFR BLD AUTO: 9 % (ref 4–12)
NEUTROPHILS # BLD AUTO: 5.67 THOUSANDS/ÂΜL (ref 1.85–7.62)
NEUTS SEG NFR BLD AUTO: 64 % (ref 43–75)
NRBC BLD AUTO-RTO: 0 /100 WBCS
PLATELET # BLD AUTO: 255 THOUSANDS/UL (ref 149–390)
PMV BLD AUTO: 9.8 FL (ref 8.9–12.7)
POTASSIUM SERPL-SCNC: 3.7 MMOL/L (ref 3.5–5.3)
PROT SERPL-MCNC: 7.6 G/DL (ref 6.4–8.4)
RBC # BLD AUTO: 4.78 MILLION/UL (ref 3.81–5.12)
SODIUM SERPL-SCNC: 141 MMOL/L (ref 135–147)
WBC # BLD AUTO: 8.7 THOUSAND/UL (ref 4.31–10.16)

## 2023-10-06 PROCEDURE — 71045 X-RAY EXAM CHEST 1 VIEW: CPT

## 2023-10-06 PROCEDURE — 94640 AIRWAY INHALATION TREATMENT: CPT

## 2023-10-06 PROCEDURE — 80053 COMPREHEN METABOLIC PANEL: CPT | Performed by: PHYSICIAN ASSISTANT

## 2023-10-06 PROCEDURE — 85025 COMPLETE CBC W/AUTO DIFF WBC: CPT | Performed by: PHYSICIAN ASSISTANT

## 2023-10-06 PROCEDURE — 36415 COLL VENOUS BLD VENIPUNCTURE: CPT | Performed by: PHYSICIAN ASSISTANT

## 2023-10-06 PROCEDURE — 83880 ASSAY OF NATRIURETIC PEPTIDE: CPT | Performed by: PHYSICIAN ASSISTANT

## 2023-10-06 PROCEDURE — 99285 EMERGENCY DEPT VISIT HI MDM: CPT

## 2023-10-06 RX ORDER — ALBUTEROL SULFATE 2.5 MG/3ML
2.5 SOLUTION RESPIRATORY (INHALATION) EVERY 6 HOURS PRN
Qty: 75 ML | Refills: 0 | Status: SHIPPED | OUTPATIENT
Start: 2023-10-06 | End: 2023-10-06 | Stop reason: SDUPTHER

## 2023-10-06 RX ORDER — ALBUTEROL SULFATE 2.5 MG/3ML
2.5 SOLUTION RESPIRATORY (INHALATION) ONCE
Status: COMPLETED | OUTPATIENT
Start: 2023-10-06 | End: 2023-10-06

## 2023-10-06 RX ORDER — BENZONATATE 100 MG/1
100 CAPSULE ORAL 3 TIMES DAILY PRN
Qty: 20 CAPSULE | Refills: 0 | Status: SHIPPED | OUTPATIENT
Start: 2023-10-06

## 2023-10-06 RX ORDER — IPRATROPIUM BROMIDE AND ALBUTEROL SULFATE 2.5; .5 MG/3ML; MG/3ML
3 SOLUTION RESPIRATORY (INHALATION) ONCE
Status: COMPLETED | OUTPATIENT
Start: 2023-10-06 | End: 2023-10-06

## 2023-10-06 RX ORDER — ALBUTEROL SULFATE 2.5 MG/3ML
2.5 SOLUTION RESPIRATORY (INHALATION) EVERY 6 HOURS PRN
Qty: 360 ML | Refills: 5 | Status: SHIPPED | OUTPATIENT
Start: 2023-10-06

## 2023-10-06 RX ORDER — PREDNISONE 20 MG/1
60 TABLET ORAL ONCE
Status: COMPLETED | OUTPATIENT
Start: 2023-10-06 | End: 2023-10-06

## 2023-10-06 RX ORDER — PREDNISONE 20 MG/1
40 TABLET ORAL DAILY
Qty: 8 TABLET | Refills: 0 | Status: SHIPPED | OUTPATIENT
Start: 2023-10-06 | End: 2023-10-10

## 2023-10-06 RX ORDER — GUAIFENESIN/DEXTROMETHORPHAN 100-10MG/5
10 SYRUP ORAL ONCE
Status: COMPLETED | OUTPATIENT
Start: 2023-10-06 | End: 2023-10-06

## 2023-10-06 RX ADMIN — GUAIFENESIN AND DEXTROMETHORPHAN 10 ML: 100; 10 SYRUP ORAL at 09:51

## 2023-10-06 RX ADMIN — IPRATROPIUM BROMIDE AND ALBUTEROL SULFATE 3 ML: .5; 2.5 SOLUTION RESPIRATORY (INHALATION) at 09:52

## 2023-10-06 RX ADMIN — PREDNISONE 60 MG: 20 TABLET ORAL at 09:50

## 2023-10-06 RX ADMIN — ALBUTEROL SULFATE 2.5 MG: 2.5 SOLUTION RESPIRATORY (INHALATION) at 09:52

## 2023-10-06 NOTE — ED PROVIDER NOTES
History  Chief Complaint   Patient presents with   • Shortness of Breath     Patient arrives ambulatory to ED, AO x 4 with c/o worsening SOB and productive cough x 9 days. +COVID 9/30/23. SOB with exertion noted. • Cough     82year-old female, history of asthma/CABG last EF 60% on 3/12/2020, presenting today with 9 days of cough, congestion testing positive for COVID about 7 days ago. States that she has had poor sleep with significant fatigue, shortness of breath worsens with laying down. She does have an extensive history of asthma with multiple admissions. Feels as though her nebulizer does not assist in her shortness of breath. States that when she is sitting still she does fine however with ambulation and exertion she becomes short of breath. Notes large amounts of production with cough. Was seen at an urgent care and started on azithromycin after being tested positive for COVID. Some diarrhea. Eating and drinking well. Has orthopnea at night with worsening cough. Denies nausea, vomiting, chest pain, abdominal pain, flank pain, fevers. Differential includes but is not limited to electrolyte abnormality, pneumonia, asthma exacerbation, continued COVID etc.          Prior to Admission Medications   Prescriptions Last Dose Informant Patient Reported? Taking?    Cholecalciferol (VITAMIN D) 2000 UNITS tablet  Self Yes No   Sig: Take 4,000 Units by mouth daily   EPINEPHrine (EPIPEN) 0.3 mg/0.3 mL SOAJ   No No   Sig: Inject 0.3 mL (0.3 mg total) into a muscle once for 1 dose   SYMBICORT 160-4.5 MCG/ACT inhaler  Self Yes No   UNKNOWN TO PATIENT  Self Yes No   Sig: Allegra daily po  Stool softener bid po   albuterol (PROVENTIL HFA,VENTOLIN HFA) 90 mcg/act inhaler   No No   Sig: inhale 2 puffs by mouth and INTO THE LUNGS every 4 hours if needed for wheezing   amLODIPine (NORVASC) 2.5 mg tablet   No No   Sig: take 1 tablet by mouth once daily   Patient not taking: Reported on 9/22/2023   ascorbic acid (VITAMIN C) 500 MG tablet   Yes No   Sig: Take 500 mg by mouth daily   aspirin 81 MG tablet  Self Yes No   Sig: Take 81 mg by mouth daily     budesonide (PULMICORT) 0.25 mg/2 mL nebulizer solution   No No   Sig: Take 2 mL (0.25 mg total) by nebulization 2 (two) times a day Rinse mouth after use. calcium carbonate-vitamin D 500 mg-5 mcg per tablet   No No   Sig: Take 1 tablet by mouth 2 (two) times a day with meals   fexofenadine (ALLEGRA) 60 MG tablet  Self Yes No   Sig: Take by mouth   levothyroxine 75 mcg tablet   No No   Sig: take 1 tablet by mouth once daily   lidocaine (LIDODERM) 5 %  Self No No   Sig: Place 1 patch on the skin daily Remove & Discard patch within 12 hours or as directed by MD   nitroglycerin (NITROSTAT) 0.4 mg SL tablet   Yes No   Sig: Place 1 tablet under the tongue every 5 (five) minutes as needed   simvastatin (ZOCOR) 10 mg tablet   No No   Sig: Take 1 tablet (10 mg total) by mouth daily at bedtime      Facility-Administered Medications: None       Past Medical History:   Diagnosis Date   • Asthma    • Cardiac disease     heart attack   • Disease of thyroid gland    • Hyperlipidemia        Past Surgical History:   Procedure Laterality Date   • APPENDECTOMY     • BREAST LUMPECTOMY Bilateral     several   • CARDIAC SURGERY     • CATARACT EXTRACTION, BILATERAL     • CORONARY STENT PLACEMENT     • NASAL POLYP SURGERY         Family History   Problem Relation Age of Onset   • No Known Problems Mother    • ALS Father    • Cancer Brother    • Heart disease Family    • Asthma Family      I have reviewed and agree with the history as documented. E-Cigarette/Vaping   • E-Cigarette Use Never User      E-Cigarette/Vaping Substances   • Nicotine No    • THC No    • CBD No    • Flavoring No    • Other No    • Unknown No      Social History     Tobacco Use   • Smoking status: Never   • Smokeless tobacco: Never   Vaping Use   • Vaping Use: Never used   Substance Use Topics   • Alcohol use:  Yes Comment: rare   • Drug use: No       Review of Systems   Constitutional: Negative. Negative for chills, fatigue and fever. HENT: Negative. Negative for congestion, postnasal drip, rhinorrhea and sore throat. Eyes: Negative. Respiratory: Positive for cough, shortness of breath and wheezing. Negative for apnea, choking, chest tightness and stridor. Cardiovascular: Negative. Gastrointestinal: Negative. Negative for abdominal pain, diarrhea, nausea and vomiting. Endocrine: Negative. Genitourinary: Negative. Musculoskeletal: Negative. Skin: Negative. Neurological: Negative. Hematological: Negative. Psychiatric/Behavioral: Negative. All other systems reviewed and are negative. Physical Exam  Physical Exam  Vitals and nursing note reviewed. Constitutional:       General: She is not in acute distress. Appearance: She is well-developed. She is not diaphoretic. HENT:      Head: Normocephalic and atraumatic. Right Ear: External ear normal.      Left Ear: External ear normal.      Nose: Nose normal.      Mouth/Throat:      Pharynx: No oropharyngeal exudate. Eyes:      General: No scleral icterus. Right eye: No discharge. Left eye: No discharge. Conjunctiva/sclera: Conjunctivae normal.      Pupils: Pupils are equal, round, and reactive to light. Cardiovascular:      Rate and Rhythm: Normal rate and regular rhythm. Pulses: Normal pulses. Heart sounds: Normal heart sounds. No murmur heard. No friction rub. No gallop. Pulmonary:      Effort: Respiratory distress present. Breath sounds: No stridor. Wheezing present. No rhonchi or rales. Comments: Patient speaking full sentence however intermittently short of breath, diffuse wheezing in all lung fields. Mildly productive cough on exam.  Chest:      Chest wall: No tenderness. Abdominal:      General: Bowel sounds are normal. There is no distension.       Palpations: Abdomen is soft. There is no mass. Tenderness: There is no abdominal tenderness. There is no guarding or rebound. Hernia: No hernia is present. Musculoskeletal:         General: No tenderness. Cervical back: Normal range of motion and neck supple. Right lower leg: No edema. Left lower leg: No edema. Lymphadenopathy:      Cervical: No cervical adenopathy. Skin:     General: Skin is warm and dry. Capillary Refill: Capillary refill takes less than 2 seconds. Coloration: Skin is not pale. Findings: No erythema or rash. Neurological:      General: No focal deficit present. Mental Status: She is alert and oriented to person, place, and time. Mental status is at baseline.          Vital Signs  ED Triage Vitals [10/06/23 0915]   Temperature Pulse Respirations Blood Pressure SpO2   98.2 °F (36.8 °C) 75 22 (!) 172/90 97 %      Temp Source Heart Rate Source Patient Position - Orthostatic VS BP Location FiO2 (%)   Oral Monitor Sitting Right arm --      Pain Score       9           Vitals:    10/06/23 0915   BP: (!) 172/90   Pulse: 75   Patient Position - Orthostatic VS: Sitting         Visual Acuity  Visual Acuity    Flowsheet Row Most Recent Value   L Pupil Size (mm) 4   R Pupil Size (mm) 4          ED Medications  Medications   predniSONE tablet 60 mg (60 mg Oral Given 10/6/23 0950)   ipratropium-albuterol (DUO-NEB) 0.5-2.5 mg/3 mL inhalation solution 3 mL (3 mL Nebulization Given 10/6/23 0952)   ipratropium-albuterol (DUO-NEB) 0.5-2.5 mg/3 mL inhalation solution 3 mL (3 mL Nebulization Given 10/6/23 0952)   albuterol inhalation solution 2.5 mg (2.5 mg Nebulization Given 10/6/23 0952)   dextromethorphan-guaiFENesin (ROBITUSSIN DM) oral syrup 10 mL (10 mL Oral Given 10/6/23 0951)       Diagnostic Studies  Results Reviewed     Procedure Component Value Units Date/Time    B-Type Natriuretic Peptide(BNP) [[id number]]  (Abnormal) Collected: 10/06/23 1003    Lab Status: Final result Specimen: Blood from Arm, Right Updated: 10/06/23 1032      pg/mL     Comprehensive metabolic panel [[id number]]  (Abnormal) Collected: 10/06/23 1003    Lab Status: Final result Specimen: Blood from Arm, Right Updated: 10/06/23 1027     Sodium 141 mmol/L      Potassium 3.7 mmol/L      Chloride 106 mmol/L      CO2 26 mmol/L      ANION GAP 9 mmol/L      BUN 15 mg/dL      Creatinine 0.51 mg/dL      Glucose 86 mg/dL      Calcium 9.7 mg/dL      AST 29 U/L      ALT 17 U/L      Alkaline Phosphatase 80 U/L      Total Protein 7.6 g/dL      Albumin 4.2 g/dL      Total Bilirubin 0.40 mg/dL      eGFR 91 ml/min/1.73sq m     Narrative:      [place] guidelines for Chronic Kidney Disease (CKD):   •  Stage 1 with normal or high GFR (GFR > 90 mL/min/1.73 square meters)  •  Stage 2 Mild CKD (GFR = 60-89 mL/min/1.73 square meters)  •  Stage 3A Moderate CKD (GFR = 45-59 mL/min/1.73 square meters)  •  Stage 3B Moderate CKD (GFR = 30-44 mL/min/1.73 square meters)  •  Stage 4 Severe CKD (GFR = 15-29 mL/min/1.73 square meters)  •  Stage 5 End Stage CKD (GFR <15 mL/min/1.73 square meters)  Note: GFR calculation is accurate only with a steady state creatinine    CBC and differential [[id number]]  (Abnormal) Collected: 10/06/23 1003    Lab Status: Final result Specimen: Blood from Arm, Right Updated: 10/06/23 1009     WBC 8.70 Thousand/uL      RBC 4.78 Million/uL      Hemoglobin 14.4 g/dL      Hematocrit 43.1 %      MCV 90 fL      MCH 30.1 pg      MCHC 33.4 g/dL      RDW 13.7 %      MPV 9.8 fL      Platelets 777 Thousands/uL      nRBC 0 /100 WBCs      Neutrophils Relative 64 %      Immat GRANS % 1 %      Lymphocytes Relative 17 %      Monocytes Relative 9 %      Eosinophils Relative 8 %      Basophils Relative 1 %      Neutrophils Absolute 5.67 Thousands/µL      Immature Grans Absolute 0.04 Thousand/uL      Lymphocytes Absolute 1.44 Thousands/µL      Monocytes Absolute 0.81 Thousand/µL      Eosinophils Absolute 0.65 Thousand/µL      Basophils Absolute 0.09 Thousands/µL                  XR chest 1 view portable   Final Result by Mabel Aiken MD (10/06 1047)      No pneumonia. Question small loculated left effusion. Workstation performed: [id number]                    Procedures  Procedures         ED Course                               SBIRT 20yo+    Flowsheet Row Most Recent Value   Initial Alcohol Screen: US AUDIT-C     1. How often do you have a drink containing alcohol? 0 Filed at: 10/06/2023 0917   2. How many drinks containing alcohol do you have on a typical day you are drinking? 0 Filed at: 10/06/2023 0917   3b. FEMALE Any Age, or MALE 65+: How often do you have 4 or more drinks on one occassion? 0 Filed at: 10/06/2023 0917   Audit-C Score 0 Filed at: 10/06/2023 4368   BRUCE: How many times in the past year have you. .. Used an illegal drug or used a prescription medication for non-medical reasons? Never Filed at: 10/06/2023 4316                    Medical Decision Making  This is the first time that patient has contracted COVID. She is anxious at times and SOB is very sporadic, usually when anxious- however, she does have wheezing and h/o asthma which has flaired. Speaking full sentences and generally appears very well. .  Breath sounds with nebulization and prednisone. Patient states that she has tolerated prednisone very well before in the past. Last admission for asthma was in 2005. Discussed labs and imaging with the patient. Slightly elevated BNP at 145, EF was 60% in 2020. Patient feels comfortable being discharged at this point in time. Patient is informed to return to the emergency department for worsening of symptoms and was given proper education regarding their diagnosis and symptoms. Otherwise the patient is informed to follow up with their primary care doctor for re-evaluation. The patient verbalizes understanding and agrees with above assessment and plan.  All questions were answered. Please Note: Fluency Direct voice recognition software may have been used in the creation of this document. Wrong words or sound a like substitutions may have occurred due to the inherent limitations of the voice software. Asthma exacerbation: acute illness or injury  COVID: acute illness or injury  Amount and/or Complexity of Data Reviewed  Labs: ordered. Radiology: ordered. Risk  OTC drugs. Prescription drug management. Disposition  Final diagnoses:   COVID   Asthma exacerbation     Time reflects when diagnosis was documented in both MDM as applicable and the Disposition within this note     Time User Action Codes Description Comment    10/6/2023 11:01 AM Shaquille Para Add [U07.1] COVID     10/6/2023 11:02 AM Shaquille Para Add [Y41.918] Asthma exacerbation       ED Disposition     ED Disposition   Discharge    Condition   Stable    Date/Time   Fri Oct 6, 2023 11:01 AM    Comment   Frantz Drake discharge to home/self care.                Follow-up Information     Follow up With Specialties Details Why Contact Info Additional [id number] [facility] Emergency Department Emergency Medicine Go to  If symptoms worsen, otherwise please follow up with your family doctor [facility] [facility] Emergency Department, [address] [place] [address], [phone]          Patient's Medications   Discharge Prescriptions    ALBUTEROL (2.5 MG/3 ML) 0.083 % NEBULIZER SOLUTION    Take 3 mL (2.5 mg total) by nebulization every 6 (six) hours as needed for wheezing or shortness of breath       Start Date: 10/6/2023 End Date: --       Order Dose: 2.5 mg       Quantity: 75 mL    Refills: 0    BENZONATATE (TESSALON PERLES) 100 MG CAPSULE    Take 1 capsule (100 mg total) by mouth 3 (three) times a day as needed for cough       Start Date: 10/6/2023 End Date: --       Order Dose: 100 mg Quantity: 20 capsule    Refills: 0    PREDNISONE 20 MG TABLET    Take 2 tablets (40 mg total) by mouth daily for 4 days       Start Date: 10/6/2023 End Date: 10/10/2023       Order Dose: 40 mg       Quantity: 8 tablet    Refills: 0       No discharge procedures on file.     PDMP Review     None          ED Provider  Electronically Signed by           Aubrie Aguilar PA-C  10/06/23 1101

## 2023-10-09 DIAGNOSIS — J45.901 ASTHMA EXACERBATION: ICD-10-CM

## 2023-10-09 RX ORDER — PREDNISONE 10 MG/1
TABLET ORAL
Qty: 30 TABLET | Refills: 0 | Status: SHIPPED | OUTPATIENT
Start: 2023-10-09

## 2023-10-09 RX ORDER — ALBUTEROL SULFATE 2.5 MG/3ML
2.5 SOLUTION RESPIRATORY (INHALATION) EVERY 6 HOURS PRN
Qty: 360 ML | Refills: 5 | Status: SHIPPED | OUTPATIENT
Start: 2023-10-09

## 2023-10-09 RX ORDER — GUAIFENESIN/DEXTROMETHORPHAN 100-10MG/5
5 SYRUP ORAL 3 TIMES DAILY PRN
Qty: 118 ML | Refills: 0 | Status: SHIPPED | OUTPATIENT
Start: 2023-10-09

## 2023-10-09 NOTE — PROGRESS NOTES
I spoke with Dean So. She reports that she was diagnosed with COVID about a week and a half ago. She was in the emergency department for asthma exacerbation and has been on steroids for 3 days with Tessalon. She feels she is wheezing regularly and short of breath and having limited improvement. I have prescribed her cough syrup, a longer prednisone taper, and refills for albuterol. If no improvement, she will need to come in for a sick visit. All questions answered.

## 2023-10-25 ENCOUNTER — HOSPITAL ENCOUNTER (OUTPATIENT)
Dept: RADIOLOGY | Facility: HOSPITAL | Age: 83
Setting detail: OUTPATIENT SURGERY
Discharge: HOME | End: 2023-10-25
Payer: MEDICARE

## 2023-10-25 ENCOUNTER — HOSPITAL ENCOUNTER (OUTPATIENT)
Facility: AMBULARY SURGERY CENTER | Age: 83
Setting detail: OUTPATIENT SURGERY
Discharge: HOME | End: 2023-10-25
Attending: PHYSICAL MEDICINE & REHABILITATION | Admitting: PHYSICAL MEDICINE & REHABILITATION
Payer: MEDICARE

## 2023-10-25 VITALS
TEMPERATURE: 97.6 F | HEART RATE: 59 BPM | DIASTOLIC BLOOD PRESSURE: 80 MMHG | RESPIRATION RATE: 18 BRPM | SYSTOLIC BLOOD PRESSURE: 168 MMHG | OXYGEN SATURATION: 100 %

## 2023-10-25 DIAGNOSIS — Z92.241 S/P EPIDURAL STEROID INJECTION: ICD-10-CM

## 2023-10-25 PROBLEM — M54.16 LUMBAR RADICULOPATHY: Status: ACTIVE | Noted: 2023-10-25

## 2023-10-25 PROCEDURE — 64483 NJX AA&/STRD TFRM EPI L/S 1: CPT | Performed by: PHYSICAL MEDICINE & REHABILITATION

## 2023-10-25 RX ORDER — METHYLPREDNISOLONE ACETATE 40 MG/ML
INJECTION, SUSPENSION INTRA-ARTICULAR; INTRALESIONAL; INTRAMUSCULAR; SOFT TISSUE AS NEEDED
Status: DISCONTINUED | OUTPATIENT
Start: 2023-10-25 | End: 2023-10-25 | Stop reason: HOSPADM

## 2023-10-25 RX ORDER — BUPIVACAINE HYDROCHLORIDE 2.5 MG/ML
INJECTION, SOLUTION EPIDURAL; INFILTRATION; INTRACAUDAL AS NEEDED
Status: DISCONTINUED | OUTPATIENT
Start: 2023-10-25 | End: 2023-10-25 | Stop reason: HOSPADM

## 2023-10-25 RX ORDER — LIDOCAINE HYDROCHLORIDE 10 MG/ML
INJECTION, SOLUTION EPIDURAL; INFILTRATION; INTRACAUDAL; PERINEURAL AS NEEDED
Status: DISCONTINUED | OUTPATIENT
Start: 2023-10-25 | End: 2023-10-25 | Stop reason: HOSPADM

## 2023-10-25 NOTE — OP NOTE
OPERATIVE REPORT  PATIENT NAME: Samia Lopez    :  1940  MRN: [id number]  Pt Location: [facility] MINOR/PAIN ROOM 01    SURGERY DATE: 10/25/2023    Surgeon(s) and Role:     * Aris Rodriguez DO - Primary    Preop Diagnosis:  Lumbar disc disease with radiculopathy [M51.16]    Post-Op Diagnosis Codes:     * Lumbar disc disease with radiculopathy [M51.16]    Procedure(s):  Bilateral - bilateral L4-L5  TRANSFORAMINAL epidural steroid injection (66750)    Indication: Leg pain  Preoperative diagnosis: Lumbar radiculitis  Postoperative diagnosis: Lumbar radiculitis  Procedure: Fluoroscopically-guided bilateral L4-L5 transforaminal epidural steroid injection under fluoroscopy  EBL: none  Specimens: not applicable  After discussing the risks, benefits, and alternatives to the procedure, the patient expressed understanding and wished to proceed. The patient was brought to the fluoroscopy suite and placed in the prone position. A procedural pause was conducted to verify: correct patient identity, procedure to be performed and as applicable, correct side and site, correct patient position, and availability of implants, special equipment and special requirements. After identifying the right L4 pedicle fluoroscopically with an oblique view, the skin was sterilely prepped and draped in the usual fashion using Chloraprep skin prep. The skin and subcutaneous tissues were anesthetized with 1% lidocaine. A 5 inch 22-gauge  spinal needle was then advanced under fluoroscopic guidance to the neural foramen. Appropriate foraminal depth was determined with a lateral fluoroscopic view, and AP visualization confirmed needle positioning at approximately the 6 o'clock position relative to the pedicle. After negative aspiration, Omnipaque 240 contrast was injected using live fluoroscopy confirming appropriate transforaminal spread without evidence of intravascular or intrathecal uptake.   Next, a 1.5 ml solution consisting of 20 mg Depo-Medrol and 0.25% bupivacaine was injected slowly and incrementally into the epidural space. Following the injection the needle was withdrawn slightly and flushed with lidocaine as it was fully extracted. The procedure was then repeated in the exact same way on the opposite side at the same level. The patient tolerated the procedure well and there were no apparent complications. The patient did not develop any new neurologic deficits. After appropriate observation, the patient was dismissed from the clinic in good condition under their own power.           SIGNATURE: Waymon Holter,   DATE: October 25, 2023  TIME: 12:01 PM

## 2023-10-25 NOTE — DISCHARGE INSTRUCTIONS
Epidural Steroid Injection   WHAT YOU NEED TO KNOW:   An epidural steroid injection (YAAKOV) is a procedure to inject steroid medicine into the epidural space. The epidural space is between your spinal cord and vertebrae. Steroids reduce inflammation and fluid buildup in your spine that may be causing pain. You may be given pain medicine along with the steroids. ACTIVITY  Do not drive or operate machinery today. No strenuous activity today - bending, lifting, etc.  You may resume normal activites starting tomorrow - start slowly and as tolerated. You may shower today, but no tub baths or hot tubs. You may have numbness for several hours from the local anesthetic. Please use caution and common sense, especially with weight-bearing activities. CARE OF THE INJECTION SITE  If you have soreness or pain, apply ice to the area today (20 minutes on/20 minutes off). Starting tomorrow, you may use warm, moist heat or ice if needed. You may have an increase or change in your discomfort for 36-48 hours after your treatment. Apply ice and continue with any pain medication you have been prescribed. Notify the [facility] if you have any of the following: redness, drainage, swelling, headache, stiff neck or fever above 100°F.    SPECIAL INSTRUCTIONS  Our office will contact you in approximately 7 days for a progress report. MEDICATIONS  Continue to take all routine medications. Our office may have instructed you to hold some medications. As no general anesthesia was used in today's procedure, you should not experience any side effects related to anesthesia. If you are diabetic, the steroids used in today's injection may temporarily increase your blood sugar levels after the first few days after your injection. Please keep a close eye on your sugars and alert the doctor who manages your diabetes if your sugars are significantly high from your baseline or you are symptomatic.      If you have a problem specifically related to your procedure, please call our office at [phone]. Problems not related to your procedure should be directed to your primary care physician.

## 2023-10-25 NOTE — H&P
History of Present Illness: The patient is a 82 y.o. female who presents with complaints of low back pain    Past Medical History:   Diagnosis Date    Asthma     Cardiac disease     heart attack    Disease of thyroid gland     Hyperlipidemia        Past Surgical History:   Procedure Laterality Date    APPENDECTOMY      BREAST LUMPECTOMY Bilateral     several    CARDIAC SURGERY      CATARACT EXTRACTION, BILATERAL      CORONARY STENT PLACEMENT      NASAL POLYP SURGERY         No current facility-administered medications for this encounter.     Allergies   Allergen Reactions    Penicillins     Latex Rash       Physical Exam:   Vitals:    10/25/23 1113   BP: (!) 176/80   Pulse: 62   Resp: 18   Temp: 97.6 °F (36.4 °C)   SpO2: 100%     General: Awake, Alert, Oriented x 3, Mood and affect appropriate  Respiratory: Respirations even and unlabored  Cardiovascular: Peripheral pulses intact; no edema  Musculoskeletal Exam: Tenderness palpation bilateral lumbar paraspinals    ASA Score: 2    Patient/Chart Verification  Patient ID Verified: Verbal, Armband  ID Band Applied: Yes  Consents Confirmed: Procedural  H&P( within 30 days) Verified: Yes  Interval H&P(within 24 hr) Complete (required for Outpatients and Surgery Admit only): Yes  Beta Blocker given : No  Pre-op Lab/Test Results Available: N/A  Pregnancy Lab Collected: N/A comment  Does Patient Have a Prosthetic Device/Implant: No    Assessment: Lumbar radiculopathy    Plan: Bilateral L4-L5 TFESI

## 2023-11-01 ENCOUNTER — TELEPHONE (OUTPATIENT)
Dept: PAIN MEDICINE | Facility: CLINIC | Age: 83
End: 2023-11-01

## 2023-11-01 NOTE — TELEPHONE ENCOUNTER
Pt reports no improvement post inj   Pain level 8/10   Pt aware I will call next week for an update

## 2023-11-02 DIAGNOSIS — M62.838 MUSCLE SPASM: ICD-10-CM

## 2023-11-02 DIAGNOSIS — M54.16 LUMBAR RADICULOPATHY: Primary | ICD-10-CM

## 2023-11-02 RX ORDER — METHOCARBAMOL 500 MG/1
500 TABLET, FILM COATED ORAL 2 TIMES DAILY PRN
Qty: 40 TABLET | Refills: 0 | Status: SHIPPED | OUTPATIENT
Start: 2023-11-02 | End: 2023-11-20

## 2023-11-02 NOTE — TELEPHONE ENCOUNTER
S/W pt and advised of previous, pt verbalized understanding and stated she will  robaxin and try that, if no relief she will report to ER tomorrow.  Pt appreciative of call.

## 2023-11-02 NOTE — TELEPHONE ENCOUNTER
Given age and comorbidities we are limited on what we can and cannot give  Would agreed due to the severity of her pain to go to ER or urgent care for further work-up, however, in the interim have sent Robaxin 500 mg twice a day as needed  She also needs to give the injection more time  Please advise  Thank you

## 2023-11-02 NOTE — TELEPHONE ENCOUNTER
Caller: Kathryn    Doctor: Giovanna    Reason for call: Pt is having a lot of pain rating it at a 20 out of 10 and feeling a lot of discomfort and she is feeling nauseas when she walks or does any movement hasn't thrown up she used a ice pack and stated it de la torre it worst she used a heating pad and it was getting warm and she doesn't know if it was good or not.     Please advise    Call back#: [phone]

## 2023-11-02 NOTE — TELEPHONE ENCOUNTER
"RN s/w pt and pt's  regarding previous.  Per pt yesterday was one week since her bilat TFESI and yesterday her pain was the normal 8/10.  Today she woke up with excruciating left low back pain made worse by trying to stand up straight. Per pt it makes her feel like she will throw up when she stands up straight.  Pt took 2 advil this AM without relief and had \"maybe\" some relief from heat but ice did not help at all.    No fever, no pain when voiding.    RN advised ER if in excruciating pain. Pt and pt's  are against going to the ER, they want to speak with the doctor and would like him to order something that she can take for this pain.    --please advise thank you--  "

## 2023-11-03 ENCOUNTER — HOSPITAL ENCOUNTER (OUTPATIENT)
Facility: HOSPITAL | Age: 83
Setting detail: OBSERVATION
Discharge: HOME | End: 2023-11-04
Attending: EMERGENCY MEDICINE | Admitting: FAMILY MEDICINE
Payer: MEDICARE

## 2023-11-03 ENCOUNTER — APPOINTMENT (EMERGENCY)
Dept: RADIOLOGY | Facility: HOSPITAL | Age: 83
End: 2023-11-03
Payer: MEDICARE

## 2023-11-03 ENCOUNTER — APPOINTMENT (OUTPATIENT)
Dept: RADIOLOGY | Facility: HOSPITAL | Age: 83
End: 2023-11-03
Payer: MEDICARE

## 2023-11-03 DIAGNOSIS — J45.20 MILD INTERMITTENT ASTHMA WITHOUT COMPLICATION: ICD-10-CM

## 2023-11-03 DIAGNOSIS — R26.2 AMBULATORY DYSFUNCTION: ICD-10-CM

## 2023-11-03 DIAGNOSIS — M54.9 BACK PAIN: Primary | ICD-10-CM

## 2023-11-03 PROBLEM — M54.50 ACUTE LEFT-SIDED LOW BACK PAIN: Status: ACTIVE | Noted: 2023-11-03

## 2023-11-03 LAB
ALBUMIN SERPL BCP-MCNC: 3.7 G/DL (ref 3.5–5)
ALP SERPL-CCNC: 64 U/L (ref 34–104)
ALT SERPL W P-5'-P-CCNC: 9 U/L (ref 7–52)
ANION GAP SERPL CALCULATED.3IONS-SCNC: 11 MMOL/L
AST SERPL W P-5'-P-CCNC: 18 U/L (ref 13–39)
BASOPHILS # BLD AUTO: 0.06 THOUSANDS/ÂΜL (ref 0–0.1)
BASOPHILS NFR BLD AUTO: 1 % (ref 0–1)
BILIRUB SERPL-MCNC: 0.47 MG/DL (ref 0.2–1)
BUN SERPL-MCNC: 14 MG/DL (ref 5–25)
CALCIUM SERPL-MCNC: 9 MG/DL (ref 8.4–10.2)
CARDIAC TROPONIN I PNL SERPL HS: 6 NG/L (ref 8–18)
CHLORIDE SERPL-SCNC: 106 MMOL/L (ref 96–108)
CO2 SERPL-SCNC: 22 MMOL/L (ref 21–32)
CREAT SERPL-MCNC: 0.5 MG/DL (ref 0.6–1.3)
EOSINOPHIL # BLD AUTO: 0.14 THOUSAND/ÂΜL (ref 0–0.61)
EOSINOPHIL NFR BLD AUTO: 2 % (ref 0–6)
ERYTHROCYTE [DISTWIDTH] IN BLOOD BY AUTOMATED COUNT: 14.8 % (ref 11.6–15.1)
FLUAV RNA RESP QL NAA+PROBE: NEGATIVE
FLUBV RNA RESP QL NAA+PROBE: NEGATIVE
GFR SERPL CREATININE-BSD FRML MDRD: 91 ML/MIN/1.73SQ M
GLUCOSE SERPL-MCNC: 87 MG/DL (ref 65–140)
GLUCOSE SERPL-MCNC: 87 MG/DL (ref 65–140)
HCT VFR BLD AUTO: 43.3 % (ref 34.8–46.1)
HGB BLD-MCNC: 14.2 G/DL (ref 11.5–15.4)
IMM GRANULOCYTES # BLD AUTO: 0.02 THOUSAND/UL (ref 0–0.2)
IMM GRANULOCYTES NFR BLD AUTO: 0 % (ref 0–2)
LYMPHOCYTES # BLD AUTO: 1.38 THOUSANDS/ÂΜL (ref 0.6–4.47)
LYMPHOCYTES NFR BLD AUTO: 17 % (ref 14–44)
MCH RBC QN AUTO: 29.6 PG (ref 26.8–34.3)
MCHC RBC AUTO-ENTMCNC: 32.8 G/DL (ref 31.4–37.4)
MCV RBC AUTO: 90 FL (ref 82–98)
MONOCYTES # BLD AUTO: 0.86 THOUSAND/ÂΜL (ref 0.17–1.22)
MONOCYTES NFR BLD AUTO: 11 % (ref 4–12)
NEUTROPHILS # BLD AUTO: 5.46 THOUSANDS/ÂΜL (ref 1.85–7.62)
NEUTS SEG NFR BLD AUTO: 69 % (ref 43–75)
NRBC BLD AUTO-RTO: 0 /100 WBCS
PLATELET # BLD AUTO: 224 THOUSANDS/UL (ref 149–390)
PMV BLD AUTO: 10 FL (ref 8.9–12.7)
POTASSIUM SERPL-SCNC: 3.3 MMOL/L (ref 3.5–5.3)
PROT SERPL-MCNC: 6.6 G/DL (ref 6.4–8.4)
RBC # BLD AUTO: 4.79 MILLION/UL (ref 3.81–5.12)
RSV RNA RESP QL NAA+PROBE: NEGATIVE
SARS-COV-2 RNA RESP QL NAA+PROBE: NEGATIVE
SODIUM SERPL-SCNC: 139 MMOL/L (ref 135–147)
WBC # BLD AUTO: 7.92 THOUSAND/UL (ref 4.31–10.16)

## 2023-11-03 PROCEDURE — 94640 AIRWAY INHALATION TREATMENT: CPT

## 2023-11-03 PROCEDURE — 96375 TX/PRO/DX INJ NEW DRUG ADDON: CPT

## 2023-11-03 PROCEDURE — 82948 REAGENT STRIP/BLOOD GLUCOSE: CPT

## 2023-11-03 PROCEDURE — 74177 CT ABD & PELVIS W/CONTRAST: CPT

## 2023-11-03 PROCEDURE — 71260 CT THORAX DX C+: CPT

## 2023-11-03 PROCEDURE — 94668 MNPJ CHEST WALL SBSQ: CPT

## 2023-11-03 PROCEDURE — 96376 TX/PRO/DX INJ SAME DRUG ADON: CPT

## 2023-11-03 PROCEDURE — 36415 COLL VENOUS BLD VENIPUNCTURE: CPT | Performed by: EMERGENCY MEDICINE

## 2023-11-03 PROCEDURE — G1004 CDSM NDSC: HCPCS

## 2023-11-03 PROCEDURE — 71045 X-RAY EXAM CHEST 1 VIEW: CPT

## 2023-11-03 PROCEDURE — 85025 COMPLETE CBC W/AUTO DIFF WBC: CPT | Performed by: EMERGENCY MEDICINE

## 2023-11-03 PROCEDURE — 99284 EMERGENCY DEPT VISIT MOD MDM: CPT

## 2023-11-03 PROCEDURE — 99285 EMERGENCY DEPT VISIT HI MDM: CPT | Performed by: EMERGENCY MEDICINE

## 2023-11-03 PROCEDURE — 99223 1ST HOSP IP/OBS HIGH 75: CPT | Performed by: INTERNAL MEDICINE

## 2023-11-03 PROCEDURE — 96374 THER/PROPH/DIAG INJ IV PUSH: CPT

## 2023-11-03 PROCEDURE — 0241U HB NFCT DS VIR RESP RNA 4 TRGT: CPT | Performed by: EMERGENCY MEDICINE

## 2023-11-03 PROCEDURE — 84484 ASSAY OF TROPONIN QUANT: CPT | Performed by: NURSE PRACTITIONER

## 2023-11-03 PROCEDURE — 94760 N-INVAS EAR/PLS OXIMETRY 1: CPT

## 2023-11-03 PROCEDURE — 80053 COMPREHEN METABOLIC PANEL: CPT | Performed by: EMERGENCY MEDICINE

## 2023-11-03 PROCEDURE — 93005 ELECTROCARDIOGRAM TRACING: CPT

## 2023-11-03 RX ORDER — IPRATROPIUM BROMIDE AND ALBUTEROL SULFATE 2.5; .5 MG/3ML; MG/3ML
3 SOLUTION RESPIRATORY (INHALATION)
Status: DISCONTINUED | OUTPATIENT
Start: 2023-11-03 | End: 2023-11-03

## 2023-11-03 RX ORDER — HYDROMORPHONE HCL/PF 1 MG/ML
0.5 SYRINGE (ML) INJECTION
Status: DISCONTINUED | OUTPATIENT
Start: 2023-11-03 | End: 2023-11-04 | Stop reason: HOSPADM

## 2023-11-03 RX ORDER — POTASSIUM CHLORIDE 20 MEQ/1
40 TABLET, EXTENDED RELEASE ORAL ONCE
Status: COMPLETED | OUTPATIENT
Start: 2023-11-03 | End: 2023-11-03

## 2023-11-03 RX ORDER — PRAVASTATIN SODIUM 20 MG
20 TABLET ORAL
Status: DISCONTINUED | OUTPATIENT
Start: 2023-11-03 | End: 2023-11-04 | Stop reason: HOSPADM

## 2023-11-03 RX ORDER — IPRATROPIUM BROMIDE AND ALBUTEROL SULFATE 2.5; .5 MG/3ML; MG/3ML
SOLUTION RESPIRATORY (INHALATION)
Status: COMPLETED
Start: 2023-11-03 | End: 2023-11-03

## 2023-11-03 RX ORDER — LORATADINE 10 MG/1
5 TABLET ORAL DAILY
Status: DISCONTINUED | OUTPATIENT
Start: 2023-11-04 | End: 2023-11-04 | Stop reason: HOSPADM

## 2023-11-03 RX ORDER — BUDESONIDE AND FORMOTEROL FUMARATE DIHYDRATE 160; 4.5 UG/1; UG/1
2 AEROSOL RESPIRATORY (INHALATION) 2 TIMES DAILY
Status: DISCONTINUED | OUTPATIENT
Start: 2023-11-03 | End: 2023-11-04 | Stop reason: HOSPADM

## 2023-11-03 RX ORDER — LEVALBUTEROL INHALATION SOLUTION 0.63 MG/3ML
0.31 SOLUTION RESPIRATORY (INHALATION)
Status: DISCONTINUED | OUTPATIENT
Start: 2023-11-03 | End: 2023-11-03

## 2023-11-03 RX ORDER — AMLODIPINE BESYLATE 2.5 MG/1
2.5 TABLET ORAL DAILY
COMMUNITY
End: 2023-11-13 | Stop reason: SDUPTHER

## 2023-11-03 RX ORDER — DOCUSATE SODIUM 100 MG/1
100 CAPSULE, LIQUID FILLED ORAL 2 TIMES DAILY
Status: DISCONTINUED | OUTPATIENT
Start: 2023-11-03 | End: 2023-11-04 | Stop reason: HOSPADM

## 2023-11-03 RX ORDER — LEVOTHYROXINE SODIUM 0.07 MG/1
75 TABLET ORAL DAILY
Status: DISCONTINUED | OUTPATIENT
Start: 2023-11-04 | End: 2023-11-04 | Stop reason: HOSPADM

## 2023-11-03 RX ORDER — EPINEPHRINE IN SOD CHLOR,ISO 1 MG/10 ML
SYRINGE (ML) INTRAVENOUS CODE/TRAUMA/SEDATION MEDICATION
Status: COMPLETED | OUTPATIENT
Start: 2023-11-03 | End: 2023-11-03

## 2023-11-03 RX ORDER — ALBUTEROL SULFATE 90 UG/1
1 AEROSOL, METERED RESPIRATORY (INHALATION) EVERY 6 HOURS PRN
Status: DISCONTINUED | OUTPATIENT
Start: 2023-11-03 | End: 2023-11-04 | Stop reason: HOSPADM

## 2023-11-03 RX ORDER — HEPARIN SODIUM 5000 [USP'U]/ML
5000 INJECTION, SOLUTION INTRAVENOUS; SUBCUTANEOUS EVERY 8 HOURS SCHEDULED
Status: DISCONTINUED | OUTPATIENT
Start: 2023-11-03 | End: 2023-11-04 | Stop reason: HOSPADM

## 2023-11-03 RX ORDER — LEVALBUTEROL INHALATION SOLUTION 0.63 MG/3ML
0.63 SOLUTION RESPIRATORY (INHALATION)
Status: DISCONTINUED | OUTPATIENT
Start: 2023-11-04 | End: 2023-11-04 | Stop reason: HOSPADM

## 2023-11-03 RX ORDER — METHYLPREDNISOLONE SODIUM SUCCINATE 125 MG/2ML
125 INJECTION, POWDER, LYOPHILIZED, FOR SOLUTION INTRAMUSCULAR; INTRAVENOUS ONCE
Status: COMPLETED | OUTPATIENT
Start: 2023-11-03 | End: 2023-11-03

## 2023-11-03 RX ORDER — ASPIRIN 81 MG/1
81 TABLET, CHEWABLE ORAL DAILY
Status: DISCONTINUED | OUTPATIENT
Start: 2023-11-04 | End: 2023-11-04 | Stop reason: HOSPADM

## 2023-11-03 RX ORDER — PREDNISONE 20 MG/1
40 TABLET ORAL DAILY
Status: DISCONTINUED | OUTPATIENT
Start: 2023-11-03 | End: 2023-11-04 | Stop reason: HOSPADM

## 2023-11-03 RX ORDER — GUAIFENESIN 600 MG/1
600 TABLET, EXTENDED RELEASE ORAL EVERY 12 HOURS SCHEDULED
Status: DISCONTINUED | OUTPATIENT
Start: 2023-11-03 | End: 2023-11-04 | Stop reason: HOSPADM

## 2023-11-03 RX ORDER — ACETAMINOPHEN 325 MG/1
650 TABLET ORAL EVERY 6 HOURS SCHEDULED
Status: DISCONTINUED | OUTPATIENT
Start: 2023-11-03 | End: 2023-11-04 | Stop reason: HOSPADM

## 2023-11-03 RX ORDER — MELATONIN
4000 DAILY
Status: DISCONTINUED | OUTPATIENT
Start: 2023-11-04 | End: 2023-11-04 | Stop reason: HOSPADM

## 2023-11-03 RX ORDER — METHOCARBAMOL 500 MG/1
500 TABLET, FILM COATED ORAL 2 TIMES DAILY PRN
Status: DISCONTINUED | OUTPATIENT
Start: 2023-11-03 | End: 2023-11-04 | Stop reason: HOSPADM

## 2023-11-03 RX ORDER — ONDANSETRON 2 MG/ML
4 INJECTION INTRAMUSCULAR; INTRAVENOUS EVERY 6 HOURS PRN
Status: DISCONTINUED | OUTPATIENT
Start: 2023-11-03 | End: 2023-11-04 | Stop reason: HOSPADM

## 2023-11-03 RX ORDER — POLYETHYLENE GLYCOL 3350 17 G/17G
17 POWDER, FOR SOLUTION ORAL DAILY
Status: DISCONTINUED | OUTPATIENT
Start: 2023-11-04 | End: 2023-11-04 | Stop reason: HOSPADM

## 2023-11-03 RX ORDER — METHOCARBAMOL 500 MG/1
500 TABLET, FILM COATED ORAL ONCE
Status: COMPLETED | OUTPATIENT
Start: 2023-11-03 | End: 2023-11-03

## 2023-11-03 RX ORDER — ASCORBIC ACID 500 MG
500 TABLET ORAL DAILY
Status: DISCONTINUED | OUTPATIENT
Start: 2023-11-04 | End: 2023-11-04 | Stop reason: HOSPADM

## 2023-11-03 RX ORDER — AMLODIPINE BESYLATE 2.5 MG/1
2.5 TABLET ORAL DAILY
Status: DISCONTINUED | OUTPATIENT
Start: 2023-11-03 | End: 2023-11-04 | Stop reason: HOSPADM

## 2023-11-03 RX ORDER — HYDROMORPHONE HCL/PF 1 MG/ML
0.5 SYRINGE (ML) INJECTION ONCE
Status: COMPLETED | OUTPATIENT
Start: 2023-11-03 | End: 2023-11-03

## 2023-11-03 RX ORDER — CALCIUM CARBONATE 500(1250)
1 TABLET ORAL
Status: DISCONTINUED | OUTPATIENT
Start: 2023-11-04 | End: 2023-11-04 | Stop reason: HOSPADM

## 2023-11-03 RX ORDER — KETOROLAC TROMETHAMINE 30 MG/ML
15 INJECTION, SOLUTION INTRAMUSCULAR; INTRAVENOUS EVERY 6 HOURS SCHEDULED
Status: DISCONTINUED | OUTPATIENT
Start: 2023-11-03 | End: 2023-11-04 | Stop reason: HOSPADM

## 2023-11-03 RX ORDER — ACETYLCYSTEINE 200 MG/ML
3 SOLUTION ORAL; RESPIRATORY (INHALATION)
Status: DISCONTINUED | OUTPATIENT
Start: 2023-11-04 | End: 2023-11-04 | Stop reason: HOSPADM

## 2023-11-03 RX ORDER — ACETYLCYSTEINE 200 MG/ML
3 SOLUTION ORAL; RESPIRATORY (INHALATION)
Status: DISCONTINUED | OUTPATIENT
Start: 2023-11-03 | End: 2023-11-03

## 2023-11-03 RX ORDER — NITROGLYCERIN 0.4 MG/1
0.4 TABLET SUBLINGUAL
Status: DISCONTINUED | OUTPATIENT
Start: 2023-11-03 | End: 2023-11-04 | Stop reason: HOSPADM

## 2023-11-03 RX ORDER — LORAZEPAM 2 MG/ML
0.25 INJECTION INTRAMUSCULAR ONCE
Status: COMPLETED | OUTPATIENT
Start: 2023-11-03 | End: 2023-11-03

## 2023-11-03 RX ORDER — ALBUTEROL SULFATE 2.5 MG/3ML
2.5 SOLUTION RESPIRATORY (INHALATION) EVERY 6 HOURS PRN
Status: DISCONTINUED | OUTPATIENT
Start: 2023-11-03 | End: 2023-11-04 | Stop reason: HOSPADM

## 2023-11-03 RX ORDER — OXYCODONE HYDROCHLORIDE 5 MG/1
5 TABLET ORAL EVERY 4 HOURS PRN
Status: DISCONTINUED | OUTPATIENT
Start: 2023-11-03 | End: 2023-11-04 | Stop reason: HOSPADM

## 2023-11-03 RX ORDER — ACETAMINOPHEN 325 MG/1
975 TABLET ORAL ONCE
Status: COMPLETED | OUTPATIENT
Start: 2023-11-03 | End: 2023-11-03

## 2023-11-03 RX ORDER — LIDOCAINE 50 MG/G
1 PATCH TOPICAL DAILY
Status: DISCONTINUED | OUTPATIENT
Start: 2023-11-03 | End: 2023-11-04 | Stop reason: HOSPADM

## 2023-11-03 RX ADMIN — POTASSIUM CHLORIDE 40 MEQ: 1500 TABLET, EXTENDED RELEASE ORAL at 18:03

## 2023-11-03 RX ADMIN — HYDROMORPHONE HYDROCHLORIDE 0.5 MG: 1 INJECTION, SOLUTION INTRAMUSCULAR; INTRAVENOUS; SUBCUTANEOUS at 22:33

## 2023-11-03 RX ADMIN — BUDESONIDE AND FORMOTEROL FUMARATE DIHYDRATE 2 PUFF: 160; 4.5 AEROSOL RESPIRATORY (INHALATION) at 18:19

## 2023-11-03 RX ADMIN — IPRATROPIUM BROMIDE AND ALBUTEROL SULFATE 3 ML: 2.5; .5 SOLUTION RESPIRATORY (INHALATION) at 20:18

## 2023-11-03 RX ADMIN — ACETAMINOPHEN 975 MG: 325 TABLET ORAL at 14:41

## 2023-11-03 RX ADMIN — MORPHINE SULFATE 2 MG: 2 INJECTION, SOLUTION INTRAMUSCULAR; INTRAVENOUS at 10:37

## 2023-11-03 RX ADMIN — IOHEXOL 100 ML: 350 INJECTION, SOLUTION INTRAVENOUS at 12:00

## 2023-11-03 RX ADMIN — ACETYLCYSTEINE 600 MG: 200 INHALANT RESPIRATORY (INHALATION) at 20:41

## 2023-11-03 RX ADMIN — LEVALBUTEROL HYDROCHLORIDE 0.31 MG: 0.63 SOLUTION RESPIRATORY (INHALATION) at 20:41

## 2023-11-03 RX ADMIN — MORPHINE SULFATE 2 MG: 2 INJECTION, SOLUTION INTRAMUSCULAR; INTRAVENOUS at 11:22

## 2023-11-03 RX ADMIN — LIDOCAINE 1 PATCH: 50 PATCH CUTANEOUS at 18:03

## 2023-11-03 RX ADMIN — HEPARIN SODIUM 5000 UNITS: 5000 INJECTION INTRAVENOUS; SUBCUTANEOUS at 18:03

## 2023-11-03 RX ADMIN — METHOCARBAMOL 500 MG: 500 TABLET ORAL at 14:41

## 2023-11-03 RX ADMIN — METHYLPREDNISOLONE SODIUM SUCCINATE 125 MG: 125 INJECTION, POWDER, FOR SOLUTION INTRAMUSCULAR; INTRAVENOUS at 20:56

## 2023-11-03 RX ADMIN — IPRATROPIUM BROMIDE AND ALBUTEROL SULFATE 3 ML: .5; 3 SOLUTION RESPIRATORY (INHALATION) at 20:18

## 2023-11-03 RX ADMIN — DOCUSATE SODIUM 100 MG: 100 CAPSULE, LIQUID FILLED ORAL at 18:05

## 2023-11-03 RX ADMIN — AMLODIPINE BESYLATE 2.5 MG: 2.5 TABLET ORAL at 18:04

## 2023-11-03 RX ADMIN — PREDNISONE 40 MG: 20 TABLET ORAL at 18:05

## 2023-11-03 RX ADMIN — HYDROMORPHONE HYDROCHLORIDE 0.5 MG: 1 INJECTION, SOLUTION INTRAMUSCULAR; INTRAVENOUS; SUBCUTANEOUS at 19:26

## 2023-11-03 RX ADMIN — POTASSIUM CHLORIDE 40 MEQ: 1500 TABLET, EXTENDED RELEASE ORAL at 15:49

## 2023-11-03 RX ADMIN — KETOROLAC TROMETHAMINE 15 MG: 30 INJECTION, SOLUTION INTRAMUSCULAR; INTRAVENOUS at 18:03

## 2023-11-03 RX ADMIN — GUAIFENESIN 600 MG: 600 TABLET, EXTENDED RELEASE ORAL at 21:40

## 2023-11-03 RX ADMIN — HYDROMORPHONE HYDROCHLORIDE 0.5 MG: 1 INJECTION, SOLUTION INTRAMUSCULAR; INTRAVENOUS; SUBCUTANEOUS at 13:55

## 2023-11-03 RX ADMIN — ACETAMINOPHEN 650 MG: 325 TABLET ORAL at 18:04

## 2023-11-03 RX ADMIN — PRAVASTATIN SODIUM 20 MG: 20 TABLET ORAL at 18:04

## 2023-11-03 RX ADMIN — LORAZEPAM 0.25 MG: 2 INJECTION INTRAMUSCULAR; INTRAVENOUS at 20:08

## 2023-11-03 RX ADMIN — Medication 0.1 MG: at 19:58

## 2023-11-03 NOTE — ED PROVIDER NOTES
History  Chief Complaint   Patient presents with    Back Pain     C/o severe left lower back pain. Pt reports has chronic back pain for years and received 6 shots last 10/25/23 and was told pain will be worsened before it get better, but states pain is worsening, unable to stand. Pt is a 83yo F who presents for back pain. Patient reports that she has history of significant back pain for which she recently had epidural steroid injections. Patient reports this was done on 10/25. She reports that since that time she has been having her baseline level of pain but yesterday woke up with severe pain. She states is primarily left-sided and since that time it has been worsening and she feels as though it is radiating up her back. Patient states her left buttocks is also painful. Patient states that she has been taking Tylenol, ibuprofen, lidocaine patches, and recently prescribed muscle relaxer at home with minimal improvement. Patient states she has been unable to ambulate secondary to the pain and therefore was recommended to come in for further evaluation. Patient denies any fevers or chills. Patient states she has never had pain this severe before. Patient reports she takes her medications regularly. Prior to Admission Medications   Prescriptions Last Dose Informant Patient Reported? Taking?    Cholecalciferol (VITAMIN D) 2000 UNITS tablet  Self Yes No   Sig: Take 4,000 Units by mouth daily   EPINEPHrine (EPIPEN) 0.3 mg/0.3 mL SOAJ   No No   Sig: Inject 0.3 mL (0.3 mg total) into a muscle once for 1 dose   SYMBICORT 160-4.5 MCG/ACT inhaler  Self Yes No   UNKNOWN TO PATIENT  Self Yes No   Sig: Allegra daily po  Stool softener bid po   albuterol (2.5 mg/3 mL) 0.083 % nebulizer solution   No No   Sig: Take 3 mL (2.5 mg total) by nebulization every 6 (six) hours as needed for wheezing or shortness of breath   albuterol (PROVENTIL HFA,VENTOLIN HFA) 90 mcg/act inhaler   No No   Sig: inhale 2 puffs by mouth and INTO THE LUNGS every 4 hours if needed for wheezing   ascorbic acid (VITAMIN C) 500 MG tablet   Yes No   Sig: Take 500 mg by mouth daily   aspirin 81 MG tablet  Self Yes No   Sig: Take 81 mg by mouth daily     benzonatate (TESSALON PERLES) 100 mg capsule   No No   Sig: Take 1 capsule (100 mg total) by mouth 3 (three) times a day as needed for cough   budesonide (PULMICORT) 0.25 mg/2 mL nebulizer solution   No No   Sig: Take 2 mL (0.25 mg total) by nebulization 2 (two) times a day Rinse mouth after use.    calcium carbonate-vitamin D 500 mg-5 mcg per tablet   No No   Sig: Take 1 tablet by mouth 2 (two) times a day with meals   dextromethorphan-guaiFENesin (ROBITUSSIN DM)  mg/5 mL syrup   No No   Sig: Take 5 mL by mouth 3 (three) times a day as needed for cough   fexofenadine (ALLEGRA) 60 MG tablet  Self Yes No   Sig: Take by mouth   levothyroxine 75 mcg tablet   No No   Sig: take 1 tablet by mouth once daily   lidocaine (LIDODERM) 5 %  Self No No   Sig: Place 1 patch on the skin daily Remove & Discard patch within 12 hours or as directed by MD   methocarbamol (ROBAXIN) 500 mg tablet   No No   Sig: Take 1 tablet (500 mg total) by mouth 2 (two) times a day as needed for muscle spasms   nitroglycerin (NITROSTAT) 0.4 mg SL tablet   Yes No   Sig: Place 1 tablet under the tongue every 5 (five) minutes as needed   predniSONE 10 mg tablet   No No   Sig: Take 40 mg PO daily x 3 days, then 30 mg daily x 3 days, then 20 mg daily x 3 days, then 10 mg daily x 3 days, then stop.   simvastatin (ZOCOR) 10 mg tablet   No No   Sig: Take 1 tablet (10 mg total) by mouth daily at bedtime      Facility-Administered Medications: None       Past Medical History:   Diagnosis Date    Asthma     Cardiac disease     heart attack    Disease of thyroid gland     Hyperlipidemia        Past Surgical History:   Procedure Laterality Date    APPENDECTOMY      BREAST LUMPECTOMY Bilateral     several    CARDIAC SURGERY      CATARACT EXTRACTION, BILATERAL      CORONARY STENT PLACEMENT      EPIDURAL BLOCK INJECTION Bilateral 10/25/2023    Procedure: bilateral L4-L5  TRANSFORAMINAL epidural steroid injection (39094); Surgeon: Bharati Samayoa DO;  Location: [facility] MAIN OR;  Service: Pain Management     NASAL POLYP SURGERY         Family History   Problem Relation Age of Onset    No Known Problems Mother     ALS Father     Cancer Brother     Heart disease Family     Asthma Family      I have reviewed and agree with the history as documented. E-Cigarette/Vaping    E-Cigarette Use Never User      E-Cigarette/Vaping Substances    Nicotine No     THC No     CBD No     Flavoring No     Other No     Unknown No      Social History     Tobacco Use    Smoking status: Never    Smokeless tobacco: Never   Vaping Use    Vaping Use: Never used   Substance Use Topics    Alcohol use: Yes     Comment: rare    Drug use: No       Review of Systems   Musculoskeletal:  Positive for back pain and gait problem (2/2 back pain). All other systems reviewed and are negative. Physical Exam  Physical Exam  Vitals reviewed. Constitutional:       Appearance: She is well-developed. She is not toxic-appearing or diaphoretic. Comments: Uncomfortable appearing with movement   HENT:      Head: Normocephalic and atraumatic. Right Ear: External ear normal.      Left Ear: External ear normal.      Nose: Nose normal.      Mouth/Throat:      Pharynx: Oropharynx is clear. Eyes:      Extraocular Movements: Extraocular movements intact. Pupils: Pupils are equal, round, and reactive to light. Cardiovascular:      Rate and Rhythm: Normal rate and regular rhythm. Heart sounds: Normal heart sounds. Pulmonary:      Effort: Pulmonary effort is normal. No respiratory distress. Breath sounds: Normal breath sounds. Abdominal:      General: There is no distension. Palpations: Abdomen is soft. Tenderness: There is no abdominal tenderness. Musculoskeletal:         General: Normal range of motion. Cervical back: Normal range of motion and neck supple. No rigidity or tenderness. Thoracic back: Tenderness present. Lumbar back: Tenderness present. Back:       Right lower leg: No edema. Left lower leg: No edema. Skin:     General: Skin is warm and dry. Capillary Refill: Capillary refill takes less than 2 seconds. Coloration: Skin is not pale. Findings: No erythema or rash. Neurological:      Mental Status: She is alert and oriented to person, place, and time. Cranial Nerves: No cranial nerve deficit or facial asymmetry. Sensory: No sensory deficit. Motor: Weakness (R hip flexion (reported as chronic)) present. No atrophy or abnormal muscle tone. Coordination: Coordination normal.      Comments: No saddle anesthesia   Psychiatric:         Speech: Speech normal.         Behavior: Behavior is cooperative.          Vital Signs  ED Triage Vitals [11/03/23 0943]   Temperature Pulse Respirations Blood Pressure SpO2   97.6 °F (36.4 °C) 55 21 (!) 179/81 100 %      Temp Source Heart Rate Source Patient Position - Orthostatic VS BP Location FiO2 (%)   Oral Monitor Lying Right arm --      Pain Score       10 - Worst Possible Pain           Vitals:    11/03/23 0943 11/03/23 1219 11/03/23 1300   BP: (!) 179/81 (!) 181/77 165/72   Pulse: 55 55 (!) 51   Patient Position - Orthostatic VS: Lying Lying Lying         Visual Acuity      ED Medications  Medications   acetaminophen (TYLENOL) tablet 975 mg (has no administration in time range)   methocarbamol (ROBAXIN) tablet 500 mg (has no administration in time range)   morphine injection 2 mg (2 mg Intravenous Given 11/3/23 1037)   morphine injection 2 mg (2 mg Intravenous Given 11/3/23 1122)   iohexol (OMNIPAQUE) 350 MG/ML injection (MULTI-DOSE) 100 mL (100 mL Intravenous Given 11/3/23 1200)   HYDROmorphone (DILAUDID) injection 0.5 mg (0.5 mg Intravenous Given 11/3/23 5905)       Diagnostic Studies  Results Reviewed       Procedure Component Value Units Date/Time    COVID/FLU/RSV [[id number]]  (Normal) Collected: 11/03/23 1124    Lab Status: Final result Specimen: Nares from Nose Updated: 11/03/23 1210     SARS-CoV-2 Negative     INFLUENZA A PCR Negative     INFLUENZA B PCR Negative     RSV PCR Negative    Narrative:      FOR PEDIATRIC PATIENTS - copy/paste COVID Guidelines URL to browser: [email or web address]    SARS-CoV-2 assay is a Nucleic Acid Amplification assay intended for the  qualitative detection of nucleic acid from SARS-CoV-2 in nasopharyngeal  swabs. Results are for the presumptive identification of SARS-CoV-2 RNA. Positive results are indicative of infection with SARS-CoV-2, the virus  causing COVID-19, but do not rule out bacterial infection or co-infection  with other viruses. Laboratories within the [facility] and its  territories are required to report all positive results to the appropriate  public health authorities. Negative results do not preclude SARS-CoV-2  infection and should not be used as the sole basis for treatment or other  patient management decisions. Negative results must be combined with  clinical observations, patient history, and epidemiological information. This test has not been FDA cleared or approved. This test has been authorized by FDA under an Emergency Use Authorization  (EUA). This test is only authorized for the duration of time the  declaration that circumstances exist justifying the authorization of the  emergency use of an in vitro diagnostic tests for detection of SARS-CoV-2  virus and/or diagnosis of COVID-19 infection under section 564(b)(1) of  the Act, 21 U. S.C. 302EKA-5(M)(0), unless the authorization is terminated  or revoked sooner. The test has been validated but independent review by FDA  and CLIA is pending.     Test performed using [employer]: This RT-PCR assay targets N2,  a region unique to SARS-CoV-2. A conserved region in the E-gene was chosen  for pan-Sarbecovirus detection which includes SARS-CoV-2. According to CMS-2020-01-R, this platform meets the definition of high-throughput technology.     Comprehensive metabolic panel [[id number]]  (Abnormal) Collected: 11/03/23 1124    Lab Status: Final result Specimen: Blood from Arm, Left Updated: 11/03/23 1153     Sodium 139 mmol/L      Potassium 3.3 mmol/L      Chloride 106 mmol/L      CO2 22 mmol/L      ANION GAP 11 mmol/L      BUN 14 mg/dL      Creatinine 0.50 mg/dL      Glucose 87 mg/dL      Calcium 9.0 mg/dL      AST 18 U/L      ALT 9 U/L      Alkaline Phosphatase 64 U/L      Total Protein 6.6 g/dL      Albumin 3.7 g/dL      Total Bilirubin 0.47 mg/dL      eGFR 91 ml/min/1.73sq m     Narrative:      National Kidney Disease Foundation guidelines for Chronic Kidney Disease (CKD):     Stage 1 with normal or high GFR (GFR > 90 mL/min/1.73 square meters)    Stage 2 Mild CKD (GFR = 60-89 mL/min/1.73 square meters)    Stage 3A Moderate CKD (GFR = 45-59 mL/min/1.73 square meters)    Stage 3B Moderate CKD (GFR = 30-44 mL/min/1.73 square meters)    Stage 4 Severe CKD (GFR = 15-29 mL/min/1.73 square meters)    Stage 5 End Stage CKD (GFR <15 mL/min/1.73 square meters)  Note: GFR calculation is accurate only with a steady state creatinine    CBC and differential [[id number]] Collected: 11/03/23 1037    Lab Status: Final result Specimen: Blood from Arm, Left Updated: 11/03/23 1043     WBC 7.92 Thousand/uL      RBC 4.79 Million/uL      Hemoglobin 14.2 g/dL      Hematocrit 43.3 %      MCV 90 fL      MCH 29.6 pg      MCHC 32.8 g/dL      RDW 14.8 %      MPV 10.0 fL      Platelets 659 Thousands/uL      nRBC 0 /100 WBCs      Neutrophils Relative 69 %      Immat GRANS % 0 %      Lymphocytes Relative 17 %      Monocytes Relative 11 %      Eosinophils Relative 2 %      Basophils Relative 1 %      Neutrophils Absolute 5.46 Thousands/µL      Immature Grans Absolute 0.02 Thousand/uL      Lymphocytes Absolute 1.38 Thousands/µL      Monocytes Absolute 0.86 Thousand/µL      Eosinophils Absolute 0.14 Thousand/µL      Basophils Absolute 0.06 Thousands/µL                    CT chest abdomen pelvis w contrast   Final Result by Jennifer Echevarria MD (11/03 1350)   Addendum (preliminary) 1 of 1 by Jennifer Echevarria MD (11/03 1350)   ADDENDUM:      Rounded cystic area seen anterior to the right hip joint, not seen on the    previously likely represented distended iliopsoas bursa. This measures 4.4    cm x 2.2 cm. This may be evaluated with the MRI performed on nonemergent    basis      The study was marked in EPIC for significant notification. Final      No acute inflammatory stranding   No bowel obstruction      2.1 cm lesion in the posterior to the right kidney may be a hemorrhagic    cyst versus a solid lesion, indeterminate, can be characterized with MRI    performed on nonemergent basis      Degenerative changes in the lumbar spine      The study was marked in EPIC for significant notification. Workstation performed: [id number]         CT recon only thoracolumbar (No Charge)   Final Result by Tico Mejia MD (11/03 1359)      No fracture or traumatic subluxation. An indeterminate 2.1 cm solid appearing lesion is noted at the posterior midpole of the right kidney. Further correlation with dedicated ultrasound or MRI is recommended. Please refer to the corresponding CT chest, abdomen, and pelvis for additional    details. Workstation performed: [id number]         MRI inpatient order    (Results Pending)              Procedures  Procedures         ED Course  ED Course as of 11/03/23 1433   Fri Nov 03, 2023   1043 CBC and differential  Reviewed and without actionable derangement. 1049 No availability for MRI today. CT ordered. 7255 [facility] pt who reports no improvement in pain. Will redose and reassess.  Pt also made aware at this time for CT and possible admission for MRI if indicated. Pt agreeable. 1157 Comprehensive metabolic panel(!)  Reviewed and without actionable derangement. 1211 COVID/FLU/RSV  Negative. 1407 CT chest abdomen pelvis w contrast  2.1 cm lesion in the posterior to the right kidney may be a hemorrhagic cyst versus a solid lesion, indeterminate, can be characterized with MRI performed on nonemergent basis  Degenerative changes in the lumbar spine   1417 Reassessed pt who reports pain is about the same. Discussed all results with pt as well as incidental findings. Also discussed with pt recommendation for admission for MRI tomorrow as well as pain control. Pt agreeable. SBIRT 20yo+      Flowsheet Row Most Recent Value   Initial Alcohol Screen: US AUDIT-C     1. How often do you have a drink containing alcohol? 0 Filed at: 11/03/2023 0945   2. How many drinks containing alcohol do you have on a typical day you are drinking? 0 Filed at: 11/03/2023 0945   3a. Male UNDER 65: How often do you have five or more drinks on one occasion? 0 Filed at: 11/03/2023 0945   3b. FEMALE Any Age, or MALE 65+: How often do you have 4 or more drinks on one occassion? 0 Filed at: 11/03/2023 0945   Audit-C Score 0 Filed at: 11/03/2023 0945   BRUCE: How many times in the past year have you. .. Used an illegal drug or used a prescription medication for non-medical reasons? Never Filed at: 11/03/2023 0945                      Medical Decision Making  Pt is a 83yo F who presents with back pain. Differential diagnosis to include but not limited to epidural hematoma, postprocedural pain, muscular strain, acute on chronic back pain, fracture, discitis. Initial plan for MRI. Stat MRI approval obtained, however no availability for MRIs today. Patient will only be able to receive MRI tomorrow. Will obtain basic labs and CT and plan for admission and MRI tomorrow.   Will treat symptomatically and reassess. See ED course for results and details. Plan to admit pt to [facility]. Pt discussed with admitting team and admission orders placed. Pt admitted without incident. Amount and/or Complexity of Data Reviewed  Labs: ordered. Decision-making details documented in ED Course. Radiology: ordered. Decision-making details documented in ED Course. Risk  OTC drugs. Prescription drug management. Decision regarding hospitalization. Disposition  Final diagnoses:   Back pain   Ambulatory dysfunction     Time reflects when diagnosis was documented in both MDM as applicable and the Disposition within this note       Time User Action Codes Description Comment    11/3/2023  2:27 PM Yanira Gonzalez Add [M54.9] Back pain     11/3/2023  2:27 PM Yanira Gonzalez Add [R26.2] Ambulatory dysfunction           ED Disposition       ED Disposition   Admit    Condition   Stable    Date/Time   Fri Nov 3, 2023 1420    Comment   Case was discussed with OG and the patient's admission status was agreed to be Admission Status: observation status to the service of Dr. Ludy Ellis. Follow-up Information    None         Patient's Medications   Discharge Prescriptions    No medications on file       No discharge procedures on file.     PDMP Review       None            ED Provider  Electronically Signed by             Monie Castaneda MD  11/03/23 1763

## 2023-11-03 NOTE — ASSESSMENT & PLAN NOTE
Patient reported acute left-sided low back pain, underwent lumbar injection bilaterally on 10/25/2023 for chronic back pain. Stated the pain was so intense that she was unable to stand up to ambulate prior to coming in. Did receive some pain medications including morphine, Robaxin, Tylenol and Dilaudid. Feels that the Dilaudid worked most efficiently. We will start patient on prednisone 40 mg p.o. daily, round-the-clock extra strength Tylenol, Toradol 15 mg IV scheduled every 6 hours, as needed Roxicodone 5 mg for moderate pain and Dilaudid 0.5 mg as needed for severe pain. PT/OT evaluation and treatment, follow-up on recommendations. Fall precautions.

## 2023-11-03 NOTE — ASSESSMENT & PLAN NOTE
Patient has a history of CABG in 2014  Denies any chest pain at this time.   As needed nitroglycerin sublingual

## 2023-11-03 NOTE — H&P
[address] [facility]  H&P  Name: Sharonda Claudio 82 y.o. female I MRN: [id number]  Unit/Bed#: ED 05 I Date of Admission: 11/3/2023   Date of Service: 11/3/2023 I Hospital Day: 0      Assessment/Plan   * Ambulatory dysfunction  Assessment & Plan  Patient reports ambulatory dysfunction status post lumbar injection on 10/25/2023 for chronic back pain  Reports 2 mornings ago that she was having increased pain, called her physician who indicated that she should expect to have worsening pain before experiencing improvement. She was prescribed muscle relaxant which she tried with Tylenol, Lidoderm patch with no relief. On morning of admission she was unable to stand up straight, needed to hang onto furniture to ambulate to the bathroom. She called her doctor who indicated she should present to the emergency department for further evaluation and treatment. In the ED CT of the thoracolumbar was performed which showed no fracture or traumatic subluxation, indeterminate 2.1 cm solid-appearing lesion posterior mid pole of right kidney identified as per radiology report. CT of chest abdomen pelvis showed a rounded cystic area anterior right hip joint not seen previously likely representing distended iliopsoas bursa measuring 4.4 cm x 2.2 cm, no acute compression collapse, 2.1 cm lesion in the posterior to right kidney may be a hemorrhagic cyst versus solid lesion as per radiology report, nonemergent MRI recommended. MRI scheduled for patient, scheduling indicated would occur on 11/4. PT/OT evaluation and treatment. Fall precautions. Acute left-sided low back pain  Assessment & Plan  Patient reported acute left-sided low back pain, underwent lumbar injection bilaterally on 10/25/2023 for chronic back pain. Stated the pain was so intense that she was unable to stand up to ambulate prior to coming in. Did receive some pain medications including morphine, Robaxin, Tylenol and Dilaudid.   Feels that the Dilaudid worked most efficiently. We will start patient on prednisone 40 mg p.o. daily, round-the-clock extra strength Tylenol, Toradol 15 mg IV scheduled every 6 hours, as needed Roxicodone 5 mg for moderate pain and Dilaudid 0.5 mg as needed for severe pain. PT/OT evaluation and treatment, follow-up on recommendations. Fall precautions. Mild persistent asthma without complication  Assessment & Plan  Patient has a history of mild persistent asthma, continue home inhalers and nebulizers. No exacerbation noted at this time. O2 saturations on room air upper 90s  Monitor    Acquired hypothyroidism  Assessment & Plan  Continue patient's home medication levothyroxine    Hx of CABG  Assessment & Plan  Patient has a history of CABG in 2014  Denies any chest pain at this time. As needed nitroglycerin sublingual           VTE Pharmacologic Prophylaxis: VTE Score: 4 Moderate Risk (Score 3-4) - Pharmacological DVT Prophylaxis Ordered: heparin. Code Status:  full code discussed with patient and  at bedside  Discussion with family: Updated  () at bedside. Anticipated Length of Stay: Patient will be admitted on an observation basis with an anticipated length of stay of less than 2 midnights secondary to ambulatory dysfunction, back pain, pain management, MRI in a.m. Total Time Spent on Date of Encounter in care of patient: Greater than 45 mins. This time was spent on one or more of the following: performing physical exam; counseling and coordination of care; obtaining or reviewing history; documenting in the medical record; reviewing/ordering tests, medications or procedures; communicating with other healthcare professionals and discussing with patient's family/caregivers.     Chief Complaint: Ambulatory dysfunction and back pain    History of Present Illness:  Louann Nair is a 82 y.o. female with a PMH of asthma, MI status post CABG 2014, hypothyroidism, hyperlipidemia, steroid injection bilaterally L4-L5 on 10/25/2023 who presents with ambulatory dysfunction and back pain. Patient reports that she has had chronic back pain that she has been receiving injections for some time, her doctor recommended that she undergo bilateral steroid injections L4-L5 which she did on 10/25. She indicated that Dr. Steele Sample mentioned that she would feel worse before she got better; morning previous to her presentation she felt that her back pain was worse, did call her doctor and he did prescribe muscle relaxant which the patient did take along with Tylenol and Lidoderm patch. On morning of presentation the pain was worsened, she was unable to stand up straight to walk. She called her doctor once again and he recommended that she present to the emergency department for further evaluation and treatment. In the ED CT of thoracolumbar revealed no fracture or traumatic subluxation however it did demonstrate an indeterminate 2.1 cm solid-appearing lesion at the posterior mid pole of right kidney as per radiology report. CT of chest abdomen pelvis showed a rounded cystic area anterior to right hip joint not seen previously likely representing distended iliopsoas bursa measuring 4.4 cm x 2.2 cm, no acute compression collapse. 2.1 cm lesion in the posterior to right kidney may be a hemorrhagic cyst versus solid lesion, nonemergent MRI recommended as per radiology report. The patient is scheduled for MRI which will be performed on 11/4. Patient did receive pain medication in the emergency department including Tylenol, Robaxin, morphine and Dilaudid. We will place the patient on prednisone 40 mg p.o. daily, will give scheduled Toradol, scheduled extra strength Tylenol and as needed Roxicodone and Dilaudid for pain. PT/OT evaluation and treatment also are ordered. This was discussed with the patient and her  at the bedside, they verbalized understanding and are agreeable to the plan.   CODE STATUS was discussed with the patient at the bedside and she is to be a full code. .    Review of Systems:  Review of Systems   Constitutional:  Positive for activity change. Negative for appetite change, chills and fever. HENT: Negative. Eyes: Negative. Respiratory:  Negative for cough, shortness of breath and wheezing. Cardiovascular:  Negative for chest pain and leg swelling. Gastrointestinal:  Positive for constipation. Endocrine: Negative. Genitourinary: Negative. Musculoskeletal:  Positive for back pain. Skin: Negative. Allergic/Immunologic: Negative. Neurological: Negative. Hematological: Negative. Psychiatric/Behavioral: Negative. Past Medical and Surgical History:   Past Medical History:   Diagnosis Date    Asthma     Cardiac disease     heart attack    Disease of thyroid gland     Hyperlipidemia        Past Surgical History:   Procedure Laterality Date    APPENDECTOMY      BREAST LUMPECTOMY Bilateral     several    CARDIAC SURGERY      CATARACT EXTRACTION, BILATERAL      CORONARY STENT PLACEMENT      EPIDURAL BLOCK INJECTION Bilateral 10/25/2023    Procedure: bilateral L4-L5  TRANSFORAMINAL epidural steroid injection (13833); Surgeon: Fred Lovelace DO;  Location: [facility] MAIN OR;  Service: Pain Management     NASAL POLYP SURGERY         Meds/Allergies:  Prior to Admission medications    Medication Sig Start Date End Date Taking?  Authorizing Provider   albuterol (2.5 mg/3 mL) 0.083 % nebulizer solution Take 3 mL (2.5 mg total) by nebulization every 6 (six) hours as needed for wheezing or shortness of breath 10/9/23   NA Corbett   albuterol (PROVENTIL HFA,VENTOLIN HFA) 90 mcg/act inhaler inhale 2 puffs by mouth and INTO THE LUNGS every 4 hours if needed for wheezing 4/14/23   Flo Lu DO   ascorbic acid (VITAMIN C) 500 MG tablet Take 500 mg by mouth daily    Historical Provider, MD   aspirin 81 MG tablet Take 81 mg by mouth daily      Historical Provider, MD   benzonatate (TESSALON PERLES) 100 mg capsule Take 1 capsule (100 mg total) by mouth 3 (three) times a day as needed for cough 10/6/23   Nataliia Parnell PA-C   budesonide (PULMICORT) 0.25 mg/2 mL nebulizer solution Take 2 mL (0.25 mg total) by nebulization 2 (two) times a day Rinse mouth after use. 1/5/23   NA Salinas   calcium carbonate-vitamin D 500 mg-5 mcg per tablet Take 1 tablet by mouth 2 (two) times a day with meals 8/29/23   Sacha Osei DO   Cholecalciferol (VITAMIN D) 2000 UNITS tablet Take 4,000 Units by mouth daily    Historical Provider, MD   dextromethorphan-guaiFENesin (ROBITUSSIN DM)  mg/5 mL syrup Take 5 mL by mouth 3 (three) times a day as needed for cough 10/9/23   NA Salinas   EPINEPHrine (EPIPEN) 0.3 mg/0.3 mL SOAJ Inject 0.3 mL (0.3 mg total) into a muscle once for 1 dose 8/29/23 8/29/23  Sacha Osei DO   fexofenadine (ALLEGRA) 60 MG tablet Take by mouth    Historical Provider, MD   levothyroxine 75 mcg tablet take 1 tablet by mouth once daily 8/4/23   Sacha Osei DO   lidocaine (LIDODERM) 5 % Place 1 patch on the skin daily Remove & Discard patch within 12 hours or as directed by MD 5/6/18   Rabia Ventura MD   methocarbamol (ROBAXIN) 500 mg tablet Take 1 tablet (500 mg total) by mouth 2 (two) times a day as needed for muscle spasms 11/2/23   Brinda Portillo,    nitroglycerin (NITROSTAT) 0.4 mg SL tablet Place 1 tablet under the tongue every 5 (five) minutes as needed    Historical Provider, MD   predniSONE 10 mg tablet Take 40 mg PO daily x 3 days, then 30 mg daily x 3 days, then 20 mg daily x 3 days, then 10 mg daily x 3 days, then stop.  10/9/23   NA Salinas   simvastatin (ZOCOR) 10 mg tablet Take 1 tablet (10 mg total) by mouth daily at bedtime 9/1/23   Sacha Osei DO   SYMBICORT 160-4.5 MCG/ACT inhaler  4/28/18   Historical Provider, MD   UNKNOWN TO PATIENT Allegra daily po  Stool softener bid po    Historical Provider, MD     I have reviewed home medications with patient personally. Allergies: Allergies   Allergen Reactions    Penicillins     Latex Rash       Social History:  Marital Status: /Civil Union   Occupation: retired   Patient Pre-hospital Living Situation: Home, With spouse  Patient Pre-hospital Level of Mobility: walks with cane  Patient Pre-hospital Diet Restrictions: none   Substance Use History:   Social History     Substance and Sexual Activity   Alcohol Use Yes    Comment: rare     Social History     Tobacco Use   Smoking Status Never   Smokeless Tobacco Never     Social History     Substance and Sexual Activity   Drug Use No       Family History:  Family History   Problem Relation Age of Onset    No Known Problems Mother     ALS Father     Cancer Brother     Heart disease Family     Asthma Family        Physical Exam:     Vitals:   Blood Pressure: 161/76 (11/03/23 1500)  Pulse: (!) 49 (11/03/23 1500)  Temperature: 97.6 °F (36.4 °C) (11/03/23 0943)  Temp Source: Oral (11/03/23 0943)  Respirations: 18 (11/03/23 1500)  Height: 5' (152.4 cm) (11/03/23 0943)  Weight - Scale: 79.4 kg (175 lb) (11/03/23 0943)  SpO2: 99 % (11/03/23 1500)    Physical Exam  Vitals and nursing note reviewed. Constitutional:       Appearance: She is obese. HENT:      Head: Normocephalic. Nose: Nose normal.      Mouth/Throat:      Mouth: Mucous membranes are moist.   Eyes:      Extraocular Movements: Extraocular movements intact. Conjunctiva/sclera: Conjunctivae normal.      Pupils: Pupils are equal, round, and reactive to light. Cardiovascular:      Rate and Rhythm: Normal rate and regular rhythm. Pulses: Normal pulses. Heart sounds: Normal heart sounds. Pulmonary:      Effort: Pulmonary effort is normal.      Breath sounds: Normal breath sounds. No wheezing. Abdominal:      General: Bowel sounds are normal. There is no distension. Palpations: Abdomen is soft. Tenderness:  There is no abdominal tenderness. Genitourinary:     Comments: Voiding spontaneously   Musculoskeletal:         General: No swelling. Cervical back: Normal range of motion. Right lower leg: No edema. Left lower leg: No edema. Comments: Patient admits to left lower back pain that radiates around to her front, also admits to having pain radiating down into her left buttock. No saddle anesthesia, no paresthesias lower extremity, good sensation and palpable pulses bilateral lower extremities. Does have some left hip flexion weakness, however reports this has been ongoing since she had her CABG. Skin:     Capillary Refill: Capillary refill takes less than 2 seconds. Neurological:      General: No focal deficit present. Mental Status: She is alert and oriented to person, place, and time. Psychiatric:         Mood and Affect: Mood normal.         Behavior: Behavior normal.         Thought Content:  Thought content normal.         Judgment: Judgment normal.          Additional Data:     Lab Results:  Results from last 7 days   Lab Units 11/03/23  1037   WBC Thousand/uL 7.92   HEMOGLOBIN g/dL 14.2   HEMATOCRIT % 43.3   PLATELETS Thousands/uL 224   NEUTROS PCT % 69   LYMPHS PCT % 17   MONOS PCT % 11   EOS PCT % 2     Results from last 7 days   Lab Units 11/03/23  1124   SODIUM mmol/L 139   POTASSIUM mmol/L 3.3*   CHLORIDE mmol/L 106   CO2 mmol/L 22   BUN mg/dL 14   CREATININE mg/dL 0.50*   ANION GAP mmol/L 11   CALCIUM mg/dL 9.0   ALBUMIN g/dL 3.7   TOTAL BILIRUBIN mg/dL 0.47   ALK PHOS U/L 64   ALT U/L 9   AST U/L 18   GLUCOSE RANDOM mg/dL 87                       Lines/Drains:  Invasive Devices       Peripheral Intravenous Line  Duration             Peripheral IV 11/03/23 Left Antecubital <1 day                        Imaging: Reviewed radiology reports from this admission including: abdominal/pelvic CT  CT chest abdomen pelvis w contrast   Final Result by Jose Valles MD (11/03 8545)   Addendum (preliminary) 1 of 1 by Renetta Cochran MD (11/03 0270)   ADDENDUM:      Rounded cystic area seen anterior to the right hip joint, not seen on the    previously likely represented distended iliopsoas bursa. This measures 4.4    cm x 2.2 cm. This may be evaluated with the MRI performed on nonemergent    basis      The study was marked in EPIC for significant notification. Final      No acute inflammatory stranding   No bowel obstruction      2.1 cm lesion in the posterior to the right kidney may be a hemorrhagic    cyst versus a solid lesion, indeterminate, can be characterized with MRI    performed on nonemergent basis      Degenerative changes in the lumbar spine      The study was marked in EPIC for significant notification. Workstation performed: [id number]         CT recon only thoracolumbar (No Charge)   Final Result by Dylan Sylvester MD (11/03 1293)      No fracture or traumatic subluxation. An indeterminate 2.1 cm solid appearing lesion is noted at the posterior midpole of the right kidney. Further correlation with dedicated ultrasound or MRI is recommended. Please refer to the corresponding CT chest, abdomen, and pelvis for additional    details. Workstation performed: [id number]         MRI inpatient order    (Results Pending)       EKG and Other Studies Reviewed on Admission:   EKG: No EKG obtained. ** Please Note: This note has been constructed using a voice recognition system.  **

## 2023-11-03 NOTE — ASSESSMENT & PLAN NOTE
Patient has a history of mild persistent asthma, continue home inhalers and nebulizers. No exacerbation noted at this time.   O2 saturations on room air upper 90s  Monitor

## 2023-11-03 NOTE — ASSESSMENT & PLAN NOTE
Patient reports ambulatory dysfunction status post lumbar injection on 10/25/2023 for chronic back pain  Reports 2 mornings ago that she was having increased pain, called her physician who indicated that she should expect to have worsening pain before experiencing improvement. She was prescribed muscle relaxant which she tried with Tylenol, Lidoderm patch with no relief. On morning of admission she was unable to stand up straight, needed to hang onto furniture to ambulate to the bathroom. She called her doctor who indicated she should present to the emergency department for further evaluation and treatment. In the ED CT of the thoracolumbar was performed which showed no fracture or traumatic subluxation, indeterminate 2.1 cm solid-appearing lesion posterior mid pole of right kidney identified as per radiology report. CT of chest abdomen pelvis showed a rounded cystic area anterior right hip joint not seen previously likely representing distended iliopsoas bursa measuring 4.4 cm x 2.2 cm, no acute compression collapse, 2.1 cm lesion in the posterior to right kidney may be a hemorrhagic cyst versus solid lesion as per radiology report, nonemergent MRI recommended. MRI scheduled for patient, scheduling indicated would occur on 11/4. PT/OT evaluation and treatment. Fall precautions.

## 2023-11-03 NOTE — PLAN OF CARE
Problem: Potential for Falls  Goal: Patient will remain free of falls  Description: INTERVENTIONS:  - Educate patient/family on patient safety including physical limitations  - Instruct patient to call for assistance with activity   - Consult OT/PT to assist with strengthening/mobility   - Keep Call bell within reach  - Keep bed low and locked with side rails adjusted as appropriate  - Keep care items and personal belongings within reach  - Initiate and maintain comfort rounds  - Make Fall Risk Sign visible to staff  - Offer Toileting every  Hours, in advance of need  - Initiate/Maintain alarm  - Obtain necessary fall risk management equipment:   - Apply yellow socks and bracelet for high fall risk patients  - Consider moving patient to room near nurses station  Outcome: Progressing     Problem: MOBILITY - ADULT  Goal: Maintain or return to baseline ADL function  Description: INTERVENTIONS:  -  Assess patient's ability to carry out ADLs; assess patient's baseline for ADL function and identify physical deficits which impact ability to perform ADLs (bathing, care of mouth/teeth, toileting, grooming, dressing, etc.)  - Assess/evaluate cause of self-care deficits   - Assess range of motion  - Assess patient's mobility; develop plan if impaired  - Assess patient's need for assistive devices and provide as appropriate  - Encourage maximum independence but intervene and supervise when necessary  - Involve family in performance of ADLs  - Assess for home care needs following discharge   - Consider OT consult to assist with ADL evaluation and planning for discharge  - Provide patient education as appropriate  Outcome: Progressing  Goal: Maintains/Returns to pre admission functional level  Description: INTERVENTIONS:  - Perform BMAT or MOVE assessment daily.   - Set and communicate daily mobility goal to care team and patient/family/caregiver.    - Collaborate with rehabilitation services on mobility goals if consulted  - Perform Range of Motion  times a day. - Reposition patient every  hours.   - Dangle patient  times a day  - Stand patient  times a day  - Ambulate patient  times a day  - Out of bed to chair  times a day   - Out of bed for meals  times a day  - Out of bed for toileting  - Record patient progress and toleration of activity level   Outcome: Progressing     Problem: PAIN - ADULT  Goal: Verbalizes/displays adequate comfort level or baseline comfort level  Description: Interventions:  - Encourage patient to monitor pain and request assistance  - Assess pain using appropriate pain scale  - Administer analgesics based on type and severity of pain and evaluate response  - Implement non-pharmacological measures as appropriate and evaluate response  - Consider cultural and social influences on pain and pain management  - Notify physician/advanced practitioner if interventions unsuccessful or patient reports new pain  Outcome: Progressing     Problem: SAFETY ADULT  Goal: Patient will remain free of falls  Description: INTERVENTIONS:  - Educate patient/family on patient safety including physical limitations  - Instruct patient to call for assistance with activity   - Consult OT/PT to assist with strengthening/mobility   - Keep Call bell within reach  - Keep bed low and locked with side rails adjusted as appropriate  - Keep care items and personal belongings within reach  - Initiate and maintain comfort rounds  - Make Fall Risk Sign visible to staff  - Offer Toileting every  Hours, in advance of need  - Initiate/Maintain alarm  - Obtain necessary fall risk management equipment:  - Apply yellow socks and bracelet for high fall risk patients  - Consider moving patient to room near nurses station  Outcome: Progressing     Problem: DISCHARGE PLANNING  Goal: Discharge to home or other facility with appropriate resources  Description: INTERVENTIONS:  - Identify barriers to discharge w/patient and caregiver  - Arrange for needed discharge resources and transportation as appropriate  - Identify discharge learning needs (meds, wound care, etc.)  - Arrange for interpretive services to assist at discharge as needed  - Refer to Case Management Department for coordinating discharge planning if the patient needs post-hospital services based on physician/advanced practitioner order or complex needs related to functional status, cognitive ability, or social support system  Outcome: Progressing     Problem: MUSCULOSKELETAL - ADULT  Goal: Maintain or return mobility to safest level of function  Description: INTERVENTIONS:  - Assess patient's ability to carry out ADLs; assess patient's baseline for ADL function and identify physical deficits which impact ability to perform ADLs (bathing, care of mouth/teeth, toileting, grooming, dressing, etc.)  - Assess/evaluate cause of self-care deficits   - Assess range of motion  - Assess patient's mobility  - Assess patient's need for assistive devices and provide as appropriate  - Encourage maximum independence but intervene and supervise when necessary  - Involve family in performance of ADLs  - Assess for home care needs following discharge   - Consider OT consult to assist with ADL evaluation and planning for discharge  - Provide patient education as appropriate  Outcome: Progressing  Goal: Maintain proper alignment of affected body part  Description: INTERVENTIONS:  - Support, maintain and protect limb and body alignment  - Provide patient/ family with appropriate education  Outcome: Progressing

## 2023-11-04 ENCOUNTER — APPOINTMENT (OUTPATIENT)
Dept: RADIOLOGY | Facility: HOSPITAL | Age: 83
End: 2023-11-04
Payer: MEDICARE

## 2023-11-04 VITALS
BODY MASS INDEX: 34.36 KG/M2 | HEIGHT: 60 IN | RESPIRATION RATE: 14 BRPM | SYSTOLIC BLOOD PRESSURE: 128 MMHG | HEART RATE: 62 BPM | WEIGHT: 175 LBS | DIASTOLIC BLOOD PRESSURE: 72 MMHG | OXYGEN SATURATION: 96 % | TEMPERATURE: 98.6 F

## 2023-11-04 LAB
ANION GAP SERPL CALCULATED.3IONS-SCNC: 9 MMOL/L
BUN SERPL-MCNC: 18 MG/DL (ref 5–25)
CALCIUM SERPL-MCNC: 9.1 MG/DL (ref 8.4–10.2)
CARDIAC TROPONIN I PNL SERPL HS: 8 NG/L (ref 8–18)
CHLORIDE SERPL-SCNC: 105 MMOL/L (ref 96–108)
CO2 SERPL-SCNC: 22 MMOL/L (ref 21–32)
CREAT SERPL-MCNC: 0.65 MG/DL (ref 0.6–1.3)
ERYTHROCYTE [DISTWIDTH] IN BLOOD BY AUTOMATED COUNT: 15 % (ref 11.6–15.1)
GFR SERPL CREATININE-BSD FRML MDRD: 84 ML/MIN/1.73SQ M
GLUCOSE P FAST SERPL-MCNC: 166 MG/DL (ref 65–99)
GLUCOSE SERPL-MCNC: 166 MG/DL (ref 65–140)
HCT VFR BLD AUTO: 43 % (ref 34.8–46.1)
HGB BLD-MCNC: 14.1 G/DL (ref 11.5–15.4)
MAGNESIUM SERPL-MCNC: 2.1 MG/DL (ref 1.9–2.7)
MCH RBC QN AUTO: 30 PG (ref 26.8–34.3)
MCHC RBC AUTO-ENTMCNC: 32.8 G/DL (ref 31.4–37.4)
MCV RBC AUTO: 92 FL (ref 82–98)
PLATELET # BLD AUTO: 228 THOUSANDS/UL (ref 149–390)
PMV BLD AUTO: 10.5 FL (ref 8.9–12.7)
POTASSIUM SERPL-SCNC: 4.7 MMOL/L (ref 3.5–5.3)
RBC # BLD AUTO: 4.7 MILLION/UL (ref 3.81–5.12)
SODIUM SERPL-SCNC: 136 MMOL/L (ref 135–147)
WBC # BLD AUTO: 5.55 THOUSAND/UL (ref 4.31–10.16)

## 2023-11-04 PROCEDURE — 72146 MRI CHEST SPINE W/O DYE: CPT

## 2023-11-04 PROCEDURE — 97530 THERAPEUTIC ACTIVITIES: CPT | Performed by: PHYSICAL THERAPIST

## 2023-11-04 PROCEDURE — 99239 HOSP IP/OBS DSCHRG MGMT >30: CPT | Performed by: NURSE PRACTITIONER

## 2023-11-04 PROCEDURE — 80048 BASIC METABOLIC PNL TOTAL CA: CPT | Performed by: NURSE PRACTITIONER

## 2023-11-04 PROCEDURE — 94640 AIRWAY INHALATION TREATMENT: CPT

## 2023-11-04 PROCEDURE — 84484 ASSAY OF TROPONIN QUANT: CPT | Performed by: NURSE PRACTITIONER

## 2023-11-04 PROCEDURE — 72148 MRI LUMBAR SPINE W/O DYE: CPT

## 2023-11-04 PROCEDURE — 85027 COMPLETE CBC AUTOMATED: CPT | Performed by: NURSE PRACTITIONER

## 2023-11-04 PROCEDURE — 83735 ASSAY OF MAGNESIUM: CPT | Performed by: NURSE PRACTITIONER

## 2023-11-04 PROCEDURE — 99214 OFFICE O/P EST MOD 30 MIN: CPT | Performed by: ORTHOPAEDIC SURGERY

## 2023-11-04 PROCEDURE — 94760 N-INVAS EAR/PLS OXIMETRY 1: CPT

## 2023-11-04 PROCEDURE — 97162 PT EVAL MOD COMPLEX 30 MIN: CPT | Performed by: PHYSICAL THERAPIST

## 2023-11-04 RX ORDER — OXYCODONE HYDROCHLORIDE 5 MG/1
5 TABLET ORAL EVERY 4 HOURS PRN
Qty: 30 TABLET | Refills: 0 | Status: SHIPPED | OUTPATIENT
Start: 2023-11-04 | End: 2023-11-09

## 2023-11-04 RX ORDER — PREDNISONE 10 MG/1
TABLET ORAL
Qty: 30 TABLET | Refills: 0 | Status: SHIPPED | OUTPATIENT
Start: 2023-11-04 | End: 2023-11-16

## 2023-11-04 RX ORDER — KETOROLAC TROMETHAMINE 10 MG/1
10 TABLET, FILM COATED ORAL EVERY 6 HOURS PRN
Qty: 12 TABLET | Refills: 0 | Status: SHIPPED | OUTPATIENT
Start: 2023-11-04 | End: 2023-11-07

## 2023-11-04 RX ADMIN — HEPARIN SODIUM 5000 UNITS: 5000 INJECTION INTRAVENOUS; SUBCUTANEOUS at 05:11

## 2023-11-04 RX ADMIN — ACETAMINOPHEN 650 MG: 325 TABLET ORAL at 05:11

## 2023-11-04 RX ADMIN — KETOROLAC TROMETHAMINE 15 MG: 30 INJECTION, SOLUTION INTRAMUSCULAR; INTRAVENOUS at 00:02

## 2023-11-04 RX ADMIN — BUDESONIDE AND FORMOTEROL FUMARATE DIHYDRATE 2 PUFF: 160; 4.5 AEROSOL RESPIRATORY (INHALATION) at 08:56

## 2023-11-04 RX ADMIN — LIDOCAINE 1 PATCH: 50 PATCH CUTANEOUS at 08:56

## 2023-11-04 RX ADMIN — ACETYLCYSTEINE 600 MG: 200 INHALANT RESPIRATORY (INHALATION) at 07:21

## 2023-11-04 RX ADMIN — PREDNISONE 40 MG: 20 TABLET ORAL at 08:55

## 2023-11-04 RX ADMIN — ASPIRIN 81 MG CHEWABLE TABLET 81 MG: 81 TABLET CHEWABLE at 08:54

## 2023-11-04 RX ADMIN — Medication 4000 UNITS: at 08:55

## 2023-11-04 RX ADMIN — ACETAMINOPHEN 650 MG: 325 TABLET ORAL at 00:02

## 2023-11-04 RX ADMIN — AMLODIPINE BESYLATE 2.5 MG: 2.5 TABLET ORAL at 08:54

## 2023-11-04 RX ADMIN — KETOROLAC TROMETHAMINE 15 MG: 30 INJECTION, SOLUTION INTRAMUSCULAR; INTRAVENOUS at 12:38

## 2023-11-04 RX ADMIN — OXYCODONE HYDROCHLORIDE AND ACETAMINOPHEN 500 MG: 500 TABLET ORAL at 08:55

## 2023-11-04 RX ADMIN — LEVALBUTEROL HYDROCHLORIDE 0.63 MG: 0.63 SOLUTION RESPIRATORY (INHALATION) at 07:21

## 2023-11-04 RX ADMIN — DOCUSATE SODIUM 100 MG: 100 CAPSULE, LIQUID FILLED ORAL at 08:55

## 2023-11-04 RX ADMIN — CALCIUM 1 TABLET: 500 TABLET ORAL at 08:56

## 2023-11-04 RX ADMIN — ACETAMINOPHEN 650 MG: 325 TABLET ORAL at 12:38

## 2023-11-04 RX ADMIN — KETOROLAC TROMETHAMINE 15 MG: 30 INJECTION, SOLUTION INTRAMUSCULAR; INTRAVENOUS at 05:11

## 2023-11-04 NOTE — CONSULTS
Consultation - Kathryn KAUR Vidal 82 y.o. female MRN: [id number]  Unit/Bed#: [address] [place] [address] Encounter: [id number]      Assessment/Plan     Assessment:  Back pain -patient is a pleasant 59-year-old female that underwent bilateral L4-L5 transforaminal epidural steroid injections with Dr. Heather Sibley on October 25 for low back pain with radiculopathy. The right side responded quite well, but her left side did seem to have an initial flare of pain. This is since resolved since her admission last night. Updated MRIs today of her thoracic and lumbar spine are negative for any acute processes. There is no concern for cauda equina as she does not have any symptoms of saddle anesthesia or bowel or bladder incontinence. Clinically, she has been able to sit up and walk and work with physical therapy without significant pain or radiculopathy into the left lower extremity. Therefore, this was most likely acute pain after steroid injection, which can be common. No further intervention is required at this time. She can follow-up with Dr. Heather Sibley as an outpatient as scheduled. Orthopedics will sign off at this time    Plan:  Since symptoms have improved and there are no acute findings on either the thoracic or lumbar MRI, patient may be discharged home to follow-up with the spine and pain as planned  Analgesia as needed  DVT prophylaxis per primary medical team  Orthopedics will sign off    History of Present Illness   Physician Requesting Consult: Ghassan Deras MD  Reason for Consult / Principal Problem: Low back pain  HPI: Kelly Kaye is a 82y.o. year old female who presents with worsening left-sided low back pain. She is known to the spine and pain department and recently underwent L4-L5 bilateral transforaminal epidural steroid injections with Dr. Heather Sibley on October 25. This did improve the symptoms of her right side. However, she had increased discomfort in the left side which became severe yesterday. She had difficulty ambulating due to the pain in her low back. She denies any bowel or bladder incontinence. She denies any saddle anesthesia at any point. She was admitted overnight and has been treated with oral steroids and muscle relaxers. Upon presentation today, she notes that her pain is significantly improved. She continues to deny any bowel or bladder incontinence or any saddle anesthesia. She was able to work with physical and occupational therapy and return to her baseline level of function    Inpatient consult to Orthopedic Surgery  Consult performed by: Mikle Bloch, PA-C  Consult ordered by: Nena Al MD          Review of Systems   Constitutional: Negative. HENT: Negative. Eyes: Negative. Respiratory: Negative. Cardiovascular: Negative. Gastrointestinal: Negative. Endocrine: Negative. Genitourinary: Negative. Musculoskeletal:  Positive for back pain, gait problem and myalgias. Skin: Negative. Allergic/Immunologic: Negative. Hematological: Negative. Psychiatric/Behavioral: Negative. Historical Information   Past Medical History:   Diagnosis Date    Asthma     Cardiac disease     heart attack    Disease of thyroid gland     Hyperlipidemia      Past Surgical History:   Procedure Laterality Date    APPENDECTOMY      BREAST LUMPECTOMY Bilateral     several    CARDIAC SURGERY      CATARACT EXTRACTION, BILATERAL      CORONARY STENT PLACEMENT      EPIDURAL BLOCK INJECTION Bilateral 10/25/2023    Procedure: bilateral L4-L5  TRANSFORAMINAL epidural steroid injection ([id number]);   Surgeon: Liliya Aleman DO;  Location: [facility] MAIN OR;  Service: Pain Management     NASAL POLYP SURGERY       Social History   Social History     Substance and Sexual Activity   Alcohol Use Yes    Comment: rare     Social History     Substance and Sexual Activity   Drug Use No     E-Cigarette/Vaping    E-Cigarette Use Never User      E-Cigarette/Vaping Substances Nicotine No     THC No     CBD No     Flavoring No     Other No     Unknown No      Social History     Tobacco Use   Smoking Status Never   Smokeless Tobacco Never     Family History:   Family History   Problem Relation Age of Onset    No Known Problems Mother     ALS Father     Cancer Brother     Heart disease Family     Asthma Family        Meds/Allergies   all current active meds have been reviewed, current meds:   Current Facility-Administered Medications   Medication Dose Route Frequency    acetaminophen (TYLENOL) tablet 650 mg  650 mg Oral Q6H [address]    levalbuterol (XOPENEX) inhalation solution 0.63 mg  0.63 mg Nebulization Q8H    And    acetylcysteine (MUCOMYST) 200 mg/mL inhalation solution 600 mg  3 mL Nebulization Q8H    albuterol (PROVENTIL HFA,VENTOLIN HFA) inhaler 1 puff  1 puff Inhalation Q6H PRN    albuterol inhalation solution 2.5 mg  2.5 mg Nebulization Q6H PRN    amLODIPine (NORVASC) tablet 2.5 mg  2.5 mg Oral Daily    ascorbic acid (VITAMIN C) tablet 500 mg  500 mg Oral Daily    aspirin chewable tablet 81 mg  81 mg Oral Daily    budesonide-formoterol (SYMBICORT) 160-4.5 mcg/act inhaler 2 puff  2 puff Inhalation BID    calcium carbonate (OYSTER SHELL,OSCAL) 500 mg tablet 1 tablet  1 tablet Oral Daily With Breakfast    cholecalciferol (VITAMIN D3) tablet 4,000 Units  4,000 Units Oral Daily    docusate sodium (COLACE) capsule 100 mg  100 mg Oral BID    guaiFENesin (MUCINEX) 12 hr tablet 600 mg  600 mg Oral Q12H [facility]    heparin (porcine) subcutaneous injection 5,000 Units  5,000 Units Subcutaneous Q8H [facility]    HYDROmorphone (DILAUDID) injection 0.5 mg  0.5 mg Intravenous Q3H PRN    ketorolac (TORADOL) injection 15 mg  15 mg Intravenous Q6H [facility]    levothyroxine tablet 75 mcg  75 mcg Oral Daily    lidocaine (LIDODERM) 5 % patch 1 patch  1 patch Topical Daily    loratadine (CLARITIN) tablet 5 mg  5 mg Oral Daily    methocarbamol (ROBAXIN) tablet 500 mg  500 mg Oral BID PRN    nitroglycerin (NITROSTAT) SL tablet 0.4 mg  0.4 mg Sublingual Q5 Min PRN    ondansetron (ZOFRAN) injection 4 mg  4 mg Intravenous Q6H PRN    oxyCODONE (ROXICODONE) IR tablet 5 mg  5 mg Oral Q4H PRN    polyethylene glycol (MIRALAX) packet 17 g  17 g Oral Daily    pravastatin (PRAVACHOL) tablet 20 mg  20 mg Oral Daily With Dinner    predniSONE tablet 40 mg  40 mg Oral Daily   , and PTA meds:   Prior to Admission Medications   Prescriptions Last Dose Informant Patient Reported? Taking? Cholecalciferol (VITAMIN D) 2000 UNITS tablet 11/2/2023 Self Yes Yes   Sig: Take 4,000 Units by mouth daily   EPINEPHrine (EPIPEN) 0.3 mg/0.3 mL SOAJ   No No   Sig: Inject 0.3 mL (0.3 mg total) into a muscle once for 1 dose   SYMBICORT 160-4.5 MCG/ACT inhaler 11/2/2023 Self Yes Yes   UNKNOWN TO PATIENT Not Taking Self Yes No   Sig: Allegra daily po  Stool softener bid po   Patient not taking: Reported on 11/3/2023   albuterol (2.5 mg/3 mL) 0.083 % nebulizer solution 11/2/2023  No Yes   Sig: Take 3 mL (2.5 mg total) by nebulization every 6 (six) hours as needed for wheezing or shortness of breath   albuterol (PROVENTIL HFA,VENTOLIN HFA) 90 mcg/act inhaler 11/2/2023  No Yes   Sig: inhale 2 puffs by mouth and INTO THE LUNGS every 4 hours if needed for wheezing   amLODIPine (NORVASC) 2.5 mg tablet 11/2/2023  Yes Yes   Sig: Take 2.5 mg by mouth daily   ascorbic acid (VITAMIN C) 500 MG tablet 11/2/2023  Yes Yes   Sig: Take 500 mg by mouth daily   aspirin 81 MG tablet 11/2/2023 Self Yes Yes   Sig: Take 81 mg by mouth daily     benzonatate (TESSALON PERLES) 100 mg capsule Not Taking  No No   Sig: Take 1 capsule (100 mg total) by mouth 3 (three) times a day as needed for cough   Patient not taking: Reported on 11/3/2023   budesonide (PULMICORT) 0.25 mg/2 mL nebulizer solution 11/2/2023  No Yes   Sig: Take 2 mL (0.25 mg total) by nebulization 2 (two) times a day Rinse mouth after use.    calcium carbonate-vitamin D 500 mg-5 mcg per tablet 11/2/2023  No Yes   Sig: Take 1 tablet by mouth 2 (two) times a day with meals   dextromethorphan-guaiFENesin (ROBITUSSIN DM)  mg/5 mL syrup Not Taking  No No   Sig: Take 5 mL by mouth 3 (three) times a day as needed for cough   Patient not taking: Reported on 11/3/2023   fexofenadine (ALLEGRA) 60 MG tablet Not Taking Self Yes No   Sig: Take by mouth   Patient not taking: Reported on 11/3/2023   levothyroxine 75 mcg tablet 11/2/2023  No Yes   Sig: take 1 tablet by mouth once daily   lidocaine (LIDODERM) 5 % 11/2/2023 Self No Yes   Sig: Place 1 patch on the skin daily Remove & Discard patch within 12 hours or as directed by MD   methocarbamol (ROBAXIN) 500 mg tablet 11/2/2023  No Yes   Sig: Take 1 tablet (500 mg total) by mouth 2 (two) times a day as needed for muscle spasms   nitroglycerin (NITROSTAT) 0.4 mg SL tablet Not Taking  Yes No   Sig: Place 1 tablet under the tongue every 5 (five) minutes as needed   Patient not taking: Reported on 11/3/2023   predniSONE 10 mg tablet Not Taking  No No   Sig: Take 40 mg PO daily x 3 days, then 30 mg daily x 3 days, then 20 mg daily x 3 days, then 10 mg daily x 3 days, then stop. Patient not taking: Reported on 11/3/2023   simvastatin (ZOCOR) 10 mg tablet 11/2/2023  No Yes   Sig: Take 1 tablet (10 mg total) by mouth daily at bedtime      Facility-Administered Medications: None     Allergies   Allergen Reactions    Penicillins     Latex Rash       Objective   Vitals: Blood pressure 128/72, pulse 62, temperature 98.6 °F (37 °C), resp. rate 14, height 5' (1.524 m), weight 79.4 kg (175 lb), SpO2 96 %. ,Body mass index is 34.18 kg/m². No intake or output data in the 24 hours ending 11/04/23 1126  No intake/output data recorded. Invasive Devices       Peripheral Intravenous Line  Duration             Peripheral IV 11/03/23 Left Antecubital 1 day                    Physical Exam  Vitals and nursing note reviewed. Constitutional:       Appearance: Normal appearance. She is obese.    HENT:      Head: Normocephalic and atraumatic. Right Ear: External ear normal.      Left Ear: External ear normal.      Nose: Nose normal.      Mouth/Throat:      Mouth: Mucous membranes are moist.   Eyes:      Pupils: Pupils are equal, round, and reactive to light. Comments: Wears glasses   Cardiovascular:      Rate and Rhythm: Normal rate. Pulses: Normal pulses. Pulmonary:      Effort: Pulmonary effort is normal.   Abdominal:      Palpations: Abdomen is soft. Musculoskeletal:      Cervical back: Normal range of motion. Lumbar back: Negative right straight leg raise test and negative left straight leg raise test.      Comments: See Ortho exam   Skin:     General: Skin is warm and dry. Neurological:      General: No focal deficit present. Mental Status: She is alert. Psychiatric:         Mood and Affect: Mood normal.         Behavior: Behavior normal.         Thought Content: Thought content normal.         Judgment: Judgment normal.       Back Exam     Tenderness   The patient is experiencing tenderness in the lumbar and sacroiliac. Range of Motion   Extension:  abnormal   Flexion:  abnormal   Lateral bend right:  abnormal   Lateral bend left:  abnormal   Rotation right:  abnormal   Rotation left:  abnormal     Muscle Strength   Right Quadriceps:  5/5   Left Quadriceps:  5/5   Right Hamstrings:  5/5   Left Hamstrings:  5/5     Tests   Straight leg raise right: negative  Straight leg raise left: negative    Other   Sensation: normal  Gait: normal   Erythema: no back redness  Scars: absent    Comments:  No neuropathic or myelopathic reflexes  Tender over left-sided paraspinal muscles and left SI joint  Negative neuro straight leg raise and slump test  Reports normal sensation globally in both lower extremities  Thigh and calf soft and nontender            Lab Results: I have personally reviewed pertinent lab results.   CBC:   Lab Results   Component Value Date    WBC 5.55 11/04/2023    HGB 14.1 11/04/2023    HCT 43.0 11/04/2023    MCV 92 11/04/2023     11/04/2023    RBC 4.70 11/04/2023    MCH 30.0 11/04/2023    MCHC 32.8 11/04/2023    RDW 15.0 11/04/2023    MPV 10.5 11/04/2023     CMP:   Lab Results   Component Value Date    SODIUM 136 11/04/2023     11/04/2023    CO2 22 11/04/2023    BUN 18 11/04/2023    CREATININE 0.65 11/04/2023    CALCIUM 9.1 11/04/2023    EGFR 84 11/04/2023     PT/INR: No results found for: "PT", "INR"  Imaging Studies: I have personally reviewed pertinent films in PACS  CT recon only thoracolumbar (No Charge)    Result Date: 11/3/2023  Narrative: CT THORACIC AND LUMBAR SPINE INDICATION:   thoracic pain. COMPARISON: CT chest, abdomen, and pelvis 11/3/2023. TECHNIQUE: Axial CT examination of the thoracic and lumbar spine was obtained utilizing reconstructed images from CT of the chest abdomen and pelvis performed the same day. Images were reformatted in the sagittal and coronal planes. This examination, like all CT scans performed in the [facility], was performed utilizing techniques to minimize radiation dose exposure, including the use of iterative reconstruction and automated exposure control. FINDINGS: ALIGNMENT: Normal alignment. No spondylolisthesis. S-shaped thoracolumbar scoliosis with dextroconvex thoracic and levoconvex lumbar components. VERTEBRAE:  No fracture. No acute osseous abnormality. DEGENERATIVE CHANGES: Mild multilevel degenerative disc disease. PREVERTEBRAL AND PARASPINAL SOFT TISSUES: An indeterminate 2.1 cm solid appearing lesion is noted at the posterior midpole of the right kidney. Impression: No fracture or traumatic subluxation. An indeterminate 2.1 cm solid appearing lesion is noted at the posterior midpole of the right kidney. Further correlation with dedicated ultrasound or MRI is recommended. Please refer to the corresponding CT chest, abdomen, and pelvis for additional details.  Workstation performed: [id number]     CT chest abdomen pelvis w contrast    Addendum Date: 11/3/2023 Addendum:   ADDENDUM: Rounded cystic area seen anterior to the right hip joint, not seen on the previously likely represented distended iliopsoas bursa. This measures 4.4 cm x 2.2 cm. This may be evaluated with the MRI performed on nonemergent basis The study was marked in EPIC for significant notification. Result Date: 11/3/2023  Narrative: CT CHEST, ABDOMEN AND PELVIS WITH IV CONTRAST INDICATION:   Worsening back pain s/p epidural steroid injection. COMPARISON:  None. TECHNIQUE: CT examination of the chest, abdomen and pelvis was performed. Multiplanar 2D reformatted images were created from the source data. This examination, like all CT scans performed in the [facility], was performed utilizing techniques to minimize radiation dose exposure, including the use of iterative reconstruction and automated exposure control. Radiation dose length product (DLP) for this visit:  1905.47 mGy-cm IV Contrast:  100 mL of iohexol (OMNIPAQUE) Enteric Contrast: Enteric contrast was administered. FINDINGS: CHEST LUNGS: No acute consolidation Trachea and central bronchi are patent PLEURA: No pleural effusion No pneumothorax HEART/GREAT VESSELS: Heart is unremarkable for patient's age. No thoracic aortic aneurysm. Ascending aorta measures 3.2 cm MEDIASTINUM AND JONES: Lower right paratracheal, lymph nodes do not meet the criteria for pathologic enlargement on the basis of size CHEST WALL AND LOWER NECK:  Unremarkable. ABDOMEN LIVER/BILIARY TREE:  Unremarkable. GALLBLADDER:  No calcified gallstones. No pericholecystic inflammatory change. SPLEEN:  Unremarkable. PANCREAS:  Unremarkable. ADRENAL GLANDS:  Unremarkable. KIDNEYS/URETERS:  No hydronephrosis or urinary tract calculus. One or more sharply circumscribed subcentimeter renal hypodensities are present, too small to accurately characterize, and statistically most likely benign findings.  According to recent literature (Radiology 2019) no further workup of these findings is recommended. A 2.1 cm lesion seen posterior aspect of the right kidney with attenuation of [address] STOMACH AND BOWEL: No abnormal dilatation of the bowel loops and no bowel wall thickening APPENDIX:  No findings to suggest appendicitis. ABDOMINOPELVIC CAVITY:  No ascites. No pneumoperitoneum. No lymphadenopathy. VESSELS: Celiac trunk, SMA appear unremarkable Eccentric intramural plaquing seen in the infrarenal abdominal aorta PELVIS REPRODUCTIVE ORGANS:  Unremarkable for patient's age. URINARY BLADDER:  Unremarkable. ABDOMINAL WALL/INGUINAL REGIONS:  Unremarkable. OSSEOUS STRUCTURES: No acute compression collapse of the vetebra Degenerative disc disease at L4/5 with moderate central canal narrowing. There is severe left foraminal narrowing and moderate right foraminal narrowing Degenerative disc disease seen at L3-4 with facet joint disease with severe central canal narrowing. There is severe right foraminal narrowing. There is mild left foraminal narrowing at L2-3 there is degenerative disc disease with bilateral foraminal narrowing. There is moderate central canal narrowing    Impression: No acute inflammatory stranding No bowel obstruction 2.1 cm lesion in the posterior to the right kidney may be a hemorrhagic cyst versus a solid lesion, indeterminate, can be characterized with MRI performed on nonemergent basis Degenerative changes in the lumbar spine The study was marked in EPIC for significant notification. Workstation performed: [id number]     [place] spine and pain procedure    Result Date: 10/25/2023  Narrative: A spine and pain study was performed by the Department of Spine and Pain. Please refer to the report for the official interpretation. The images are stored for archival purposes only. Study images were not formally reviewed by the Radiology Department.     XR chest 1 view portable    Result Date: 10/6/2023  Narrative: CHEST INDICATION:   SOB, s/p covid. COMPARISON: CXR 12/05/2017 and 4/29/2015. EXAM PERFORMED/VIEWS:  XR CHEST PORTABLE. FINDINGS: Moderate cardiomegaly. CABG. Lungs clear. Question small loculated left effusion. No pneumothorax. Upper abdomen normal. Calcified loose body right glenohumeral joint. Moderate curvature of the thoracolumbar junction. Impression: No pneumonia. Question small loculated left effusion. Workstation performed: [id number]     Dr. Ted Mosher and I reviewed the available pertinent images of the CT scans and MRIs done of her lumbar and thoracic spine. There are no acute findings. It demonstrates significant multilevel degenerative disc disease. With moderate to severe central canal and bilateral lateral recess narrowing at L2-L3 and again at L3-L4.   There is associated scoliosis as well    EKG, Pathology, and Other Studies: I have personally reviewed pertinent films in PACS    VTE Prophylaxis: Sequential compression device Nancy Cotter)     Code Status: Level 1 - Full Code  Advance Directive and Living Will: Yes    Power of :    POLST:      Brook Collet, PA-C

## 2023-11-04 NOTE — ASSESSMENT & PLAN NOTE
Patient has a history of mild persistent asthma, continue home inhalers and nebulizers. Currently on room air, O2 sats mid 90s. No wheezing.   Continue home regimen on discharge  Follow-up outpatient PCP 1 to 2 weeks postdischarge

## 2023-11-04 NOTE — PHYSICAL THERAPY NOTE
PT EVALUATION/ TREATMENT    Time In: 0825  Time Out: 8340       11/04/23 0825   PT Last Visit   PT Visit Date 11/04/23   Note Type   Note type Evaluation   Pain Assessment   Pain Assessment Tool 0-10   Pain Score 2   Pain Location/Orientation Orientation: Lower; Location: Back   Hospital Pain Intervention(s) Repositioned; Ambulation/increased activity; Emotional support   Restrictions/Precautions   Other Precautions Fall Risk;Pain; Chair Alarm; Bed Alarm   Home Living   Type of [address] Multi-level;Bed/bath upstairs  (5 RUPESH from front, 3 RUPESH from back of home)   Bathroom Shower/Tub Tub/shower unit   Bathroom Toilet Standard   Bathroom Equipment Built-in shower seat   [address]   Additional Comments Pt has been using cane since having 3 falls within the past year   Prior Function   Level of [place] Independent with ADLs; Independent with functional mobility; Independent with IADLS   Lives With Spouse   Receives Help From Family   IADLs Independent with driving; Independent with meal prep; Independent with medication management   Falls in the last 6 months 0   Vocational Retired   [employer]   Additional Pertinent History Pt admitted for severe low back pain.  Pt had steroid injections performed on lumbar spine on 10/25/23, reporting "feel fine on the right side, but the left side got worse."   Family/Caregiver Present Yes   Cognition   Overall Cognitive Status WFL   Arousal/Participation Alert   Orientation Level Oriented X4   Memory Within functional limits   Following Commands Follows all commands and directions without difficulty   Subjective   Subjective "Pain isn't too bad now."   RLE Assessment   RLE Assessment WFL  (4/5 all myotomes tested in sitting)   LLE Assessment   LLE Assessment WFL  (4/5 all myotomes tested in sitting)   Light Touch   RLE Light Touch Grossly intact   LLE Light Touch Grossly intact   Bed Mobility   Supine to Sit 5  Supervision Additional Comments Pt seated EOB at end of session today, LULU Sebastian present to give patient her medication   Transfers   Sit to Stand 5  Supervision   Stand to Sit 5  Supervision   Toilet transfer 5  Supervision   Additional items Standard toilet   Ambulation/Elevation   Gait pattern Wide JONNY; Forward Flexion   Gait Assistance 5  Supervision   Additional items   (Occasional contact guard when ambulating around corners)   Assistive Device Straight cane   Distance 100 feet   Stair Management Assistance Not tested   Balance   Static Sitting Good   Dynamic Sitting Fair +   Static Standing Fair +   Dynamic Standing Fair   Ambulatory Fair   Activity Tolerance   Activity Tolerance Patient tolerated treatment well   Nurse Made Aware LULU Chavez   Assessment   Problem List Decreased strength;Decreased endurance; Impaired balance;Decreased mobility;Pain   Assessment Patient seen for Physical Therapy evaluation. Patient admitted with Ambulatory dysfunction. Comorbidities affecting patient's physical performance include: asthma, chronic back pain, and hypothyroid. Personal factors affecting patient at time of initial evaluation include: ambulating with assistive device, inability to ambulate household distances, and inability to navigate community distances. Prior to admission, patient was independent with functional mobility with SPC, independent with ADLS, independent with IADLS, living with spouse in a multi- level home with 3-5 steps to enter, ambulating household distance, and ambulating community distances. Please find objective findings from Physical Therapy assessment regarding body systems outlined above with impairments and limitations including weakness, gait deviations, pain, decreased activity tolerance, decreased functional mobility tolerance, and fall risk.   The Barthel Index was used as a functional outcome tool presenting with a score of Barthel Index Score: 65 today indicating moderate limitations of functional mobility and ADLS. Patient's clinical presentation is currently evolving as seen in patient's presentation of increased fall risk, new onset of impairment of functional mobility, decreased endurance, and new onset of weakness. Pt would benefit from continued Physical Therapy treatment to address deficits as defined above and maximize level of functional mobility. As demonstrated by objective findings, the assigned level of complexity for this evaluation is moderate. The patient's AM-PAC Basic Mobility Inpatient Short Form Raw Score is 21. A Raw score of greater than 16 suggests the patient may benefit from discharge to home. Please also refer to the recommendation of the Physical Therapist for safe discharge planning. Goals   STG Expiration Date 11/11/23   Short Term Goal #1 Short-term goals: Pt will improve strength by 1/2 grade in bilateral LE;  Pt will improve bed mobility to mod I in order to improve function; Standing balance will improve to "fair +" or better in order to decrease risk of falls; Pt will ambulate with  feet with mod I assist;   LTG Expiration Date 11/18/23   Long Term Goal #1 Long-term goals: Patient will improve all transfers to mod I demonstrating safe and appropriate technique in order to improve ability to negotiate safely in home environment;  Patient will be able to ambulate with least restrictive AD > 200'x1 with mod I in order to demonstrate ability to negotiate in home environment; Patient will negotiate stairs using most appropriate technique and S in order to be able to negotiate safely in home environment; Patient will improve AMPAC score to 19/24 or better to demonstrate improved functional independence. Plan   Treatment/Interventions ADL retraining;Functional transfer training;LE strengthening/ROM; Therapeutic exercise;Patient/family training;Bed mobility;Gait training;Spoke to nursing   PT Frequency 3-5x/wk  (5x/wk)   Discharge Recommendation   Rehab Resource Intensity Level, PT III (Minimum Resource Intensity)   Additional Comments PT recommending OP PT to address chronic LBP`   AM-PAC Basic Mobility Inpatient   Turning in Flat Bed Without Bedrails 4   Lying on Back to Sitting on Edge of Flat Bed Without Bedrails 4   Moving Bed to Chair 4   Standing Up From Chair Using Arms 3   Walk in Room 3   Climb 3-5 Stairs With Railing 3   Basic Mobility Inpatient Raw Score 21   Basic Mobility Standardized Score 45.55   Highest Level Of Mobility   JH-HLM Goal 6: Walk 10 steps or more   JH-HLM Achieved 7: Walk 25 feet or more   Barthel Index   Feeding 10   Bathing 5   Grooming Score 5   Dressing Score 10   Bladder Score 10   Bowels Score 10   Toilet Use Score 5   Transfers (Bed/Chair) Score 10   Mobility (Level Surface) Score 0   Stairs Score 0   Barthel Index Score 65   Additional Treatment Session   Start Time 0835   End Time 0845   Treatment Assessment S: "I think I can walk, I walked in the hallway earlier." O: Patient able to ambulate supervision/contact guard with  feet in hallway. Pt then reporting needing to use bathroom. Pt ambulated into bathroom with SPC supervision, able to urinate in toilet, and wipe independently. Pt returned to EOB ambulating supervision with SPC. A: Pt demonstrating overall good stability with ambulation in hallway and in room. Occasional contact guard when patient ambulating around corners in hallway for stability. P: PT recommending OP PT once discharged from hospital to address chronic low back pain. Patient will benefit from increasing functional mobility with clinical staff and continued physical therapy with progression as tolerated to regain function. Equipment Use SPC   End of Consult   Patient Position at End of Consult Seated edge of bed; All needs within reach   End of Consult Comments LULU Lomeli present at end of session to administer medication   Licensure   [place] License Number  Connie Kan PT, DPT [id number]

## 2023-11-04 NOTE — ASSESSMENT & PLAN NOTE
Patient reports ambulatory dysfunction status post lumbar injection on 10/25/2023 for chronic back pain  Reports 2 mornings ago that she was having increased pain, called her physician who indicated that she should expect to have worsening pain before experiencing improvement. She was prescribed muscle relaxant which she tried with Tylenol, Lidoderm patch with no relief. On morning of admission she was unable to stand up straight, needed to hang onto furniture to ambulate to the bathroom. She called her doctor who indicated she should present to the emergency department for further evaluation and treatment. In the ED CT of the thoracolumbar was performed which showed no fracture or traumatic subluxation, indeterminate 2.1 cm solid-appearing lesion posterior mid pole of right kidney identified as per radiology report. CT of chest abdomen pelvis showed a rounded cystic area anterior right hip joint not seen previously likely representing distended iliopsoas bursa measuring 4.4 cm x 2.2 cm, no acute compression collapse, 2.1 cm lesion in the posterior to right kidney may be a hemorrhagic cyst versus solid lesion as per radiology report, nonemergent MRI recommended. MRI thoracic spine showed S shaped thoracolumbar scoliosis with dextroconvex thoracic component apex at T7, thoracic vertebral body heights are maintained demonstrating no acute fracture or subluxation, disc protrusions at T6-7, T9-10 and T10-11 levels without central canal narrowing, mild disc bulge at the T11-12 level. The thoracic cord appears normal in caliber and signal with no evidence of focal impingement as per radiology report. MRI of lumbar spine shows levoscoliosis apex at L3, multilevel lumbar degenerative disc disease no new disc herniation compared to 5/13/2022, moderate to severe central canal and bilateral subarticular/lateral recess narrowing at L2-3 progressed.   Moderate to severe central canal stenosis is again noted at the L3-L4 level.  A 2.2 indeterminate partially exophytic lesion is noted in the posterior midpole right kidney as per radiology report. PT/OT evaluation and treatment. Fall precautions.

## 2023-11-04 NOTE — ASSESSMENT & PLAN NOTE
Patient reported acute left-sided low back pain, underwent lumbar injection bilaterally on 10/25/2023 for chronic back pain. Stated the pain was so intense that she was unable to stand up to ambulate prior to coming in. Did receive some pain medications including morphine, Robaxin, Tylenol and Dilaudid. Feels that the Dilaudid worked most efficiently. We will start patient on prednisone 40 mg p.o. daily, round-the-clock extra strength Tylenol, Toradol 15 mg IV scheduled every 6 hours, as needed Roxicodone 5 mg for moderate pain and Dilaudid 0.5 mg as needed for severe pain. Orthopedics consulted, appreciate recommendations okay for discharge as patient is improving  We will continue patient on prednisone taper 40 mg p.o. x3 days with decrease by 10 mg every third day on discharge. Continue with Roxicodone 5 mg p.o. every 6 hours for short course. We will also continue with 3 more days of as needed oral Toradol. Patient is advised to follow-up with her PCP 1 to 2 weeks postdischarge.

## 2023-11-04 NOTE — PLAN OF CARE
Problem: Potential for Falls  Goal: Patient will remain free of falls  Description: INTERVENTIONS:  - Educate patient/family on patient safety including physical limitations  - Instruct patient to call for assistance with activity   - Consult OT/PT to assist with strengthening/mobility   - Keep Call bell within reach  - Keep bed low and locked with side rails adjusted as appropriate  - Keep care items and personal belongings within reach  - Initiate and maintain comfort rounds  - Make Fall Risk Sign visible to staff  - Offer Toileting every 2 Hours, in advance of need  - Initiate/Maintain bed alarm  - Obtain necessary fall risk management equipment: cane   - Apply yellow socks and bracelet for high fall risk patients  - Consider moving patient to room near nurses station  Outcome: Progressing     Problem: MOBILITY - ADULT  Goal: Maintain or return to baseline ADL function  Description: INTERVENTIONS:  -  Assess patient's ability to carry out ADLs; assess patient's baseline for ADL function and identify physical deficits which impact ability to perform ADLs (bathing, care of mouth/teeth, toileting, grooming, dressing, etc.)  - Assess/evaluate cause of self-care deficits   - Assess range of motion  - Assess patient's mobility; develop plan if impaired  - Assess patient's need for assistive devices and provide as appropriate  - Encourage maximum independence but intervene and supervise when necessary  - Involve family in performance of ADLs  - Assess for home care needs following discharge   - Consider OT consult to assist with ADL evaluation and planning for discharge  - Provide patient education as appropriate  Outcome: Progressing  Goal: Maintains/Returns to pre admission functional level  Description: INTERVENTIONS:  - Perform BMAT or MOVE assessment daily.   - Set and communicate daily mobility goal to care team and patient/family/caregiver.    - Collaborate with rehabilitation services on mobility goals if consulted  - Perform Range of Motion 3 times a day. - Reposition patient every 2 hours.   - Dangle patient 3 times a day  - Stand patient 3 times a day  - Ambulate patient 33 times a day  - Out of bed to chair 3 times a day   - Out of bed for meals 3 times a day  - Out of bed for toileting  - Record patient progress and toleration of activity level   Outcome: Progressing     Problem: PAIN - ADULT  Goal: Verbalizes/displays adequate comfort level or baseline comfort level  Description: Interventions:  - Encourage patient to monitor pain and request assistance  - Assess pain using appropriate pain scale  - Administer analgesics based on type and severity of pain and evaluate response  - Implement non-pharmacological measures as appropriate and evaluate response  - Consider cultural and social influences on pain and pain management  - Notify physician/advanced practitioner if interventions unsuccessful or patient reports new pain  Outcome: Progressing     Problem: SAFETY ADULT  Goal: Patient will remain free of falls  Description: INTERVENTIONS:  - Educate patient/family on patient safety including physical limitations  - Instruct patient to call for assistance with activity   - Consult OT/PT to assist with strengthening/mobility   - Keep Call bell within reach  - Keep bed low and locked with side rails adjusted as appropriate  - Keep care items and personal belongings within reach  - Initiate and maintain comfort rounds  - Make Fall Risk Sign visible to staff  - Offer Toileting every 2 Hours, in advance of need  - Initiate/Maintain bed alarm  - Obtain necessary fall risk management equipment: cane  - Apply yellow socks and bracelet for high fall risk patients  - Consider moving patient to room near nurses station  Outcome: Progressing     Problem: DISCHARGE PLANNING  Goal: Discharge to home or other facility with appropriate resources  Description: INTERVENTIONS:  - Identify barriers to discharge w/patient and caregiver  - Arrange for needed discharge resources and transportation as appropriate  - Identify discharge learning needs (meds, wound care, etc.)  - Arrange for interpretive services to assist at discharge as needed  - Refer to Case Management Department for coordinating discharge planning if the patient needs post-hospital services based on physician/advanced practitioner order or complex needs related to functional status, cognitive ability, or social support system  Outcome: Progressing     Problem: MUSCULOSKELETAL - ADULT  Goal: Maintain or return mobility to safest level of function  Description: INTERVENTIONS:  - Assess patient's ability to carry out ADLs; assess patient's baseline for ADL function and identify physical deficits which impact ability to perform ADLs (bathing, care of mouth/teeth, toileting, grooming, dressing, etc.)  - Assess/evaluate cause of self-care deficits   - Assess range of motion  - Assess patient's mobility  - Assess patient's need for assistive devices and provide as appropriate  - Encourage maximum independence but intervene and supervise when necessary  - Involve family in performance of ADLs  - Assess for home care needs following discharge   - Consider OT consult to assist with ADL evaluation and planning for discharge  - Provide patient education as appropriate  Outcome: Progressing  Goal: Maintain proper alignment of affected body part  Description: INTERVENTIONS:  - Support, maintain and protect limb and body alignment  - Provide patient/ family with appropriate education  Outcome: Progressing     Problem: RESPIRATORY - ADULT  Goal: Achieves optimal ventilation and oxygenation  Description: INTERVENTIONS:  - Assess for changes in respiratory status  - Assess for changes in mentation and behavior  - Position to facilitate oxygenation and minimize respiratory effort  - Oxygen administered by appropriate delivery if ordered  - Initiate smoking cessation education as indicated  - Encourage broncho-pulmonary hygiene including cough, deep breathe, Incentive Spirometry  - Assess the need for suctioning and aspirate as needed  - Assess and instruct to report SOB or any respiratory difficulty  - Respiratory Therapy support as indicated  Outcome: Progressing

## 2023-11-04 NOTE — INCIDENTAL FINDINGS
The following findings require follow up:  Radiographic finding   Findin.1 cm lesion posterior to right kidney may be a hemorrhagic cyst versus solid lesion   Follow up required: Patient MRI   Follow up should be done within 1 month(s)    Please notify the following clinician to assist with the follow up:   Dr. Katy Zuniga

## 2023-11-04 NOTE — PLAN OF CARE
Problem: Potential for Falls  Goal: Patient will remain free of falls  Description: INTERVENTIONS:  - Educate patient/family on patient safety including physical limitations  - Instruct patient to call for assistance with activity   - Consult OT/PT to assist with strengthening/mobility   - Keep Call bell within reach  - Keep bed low and locked with side rails adjusted as appropriate  - Keep care items and personal belongings within reach  - Initiate and maintain comfort rounds  - Make Fall Risk Sign visible to staff  - Offer Toileting every 2 Hours, in advance of need  - Initiate/Maintain bed alarm  - Obtain necessary fall risk management equipment: yellow socks  - Apply yellow socks and bracelet for high fall risk patients  - Consider moving patient to room near nurses station  Outcome: Progressing     Problem: MOBILITY - ADULT  Goal: Maintain or return to baseline ADL function  Description: INTERVENTIONS:  -  Assess patient's ability to carry out ADLs; assess patient's baseline for ADL function and identify physical deficits which impact ability to perform ADLs (bathing, care of mouth/teeth, toileting, grooming, dressing, etc.)  - Assess/evaluate cause of self-care deficits   - Assess range of motion  - Assess patient's mobility; develop plan if impaired  - Assess patient's need for assistive devices and provide as appropriate  - Encourage maximum independence but intervene and supervise when necessary  - Involve family in performance of ADLs  - Assess for home care needs following discharge   - Consider OT consult to assist with ADL evaluation and planning for discharge  - Provide patient education as appropriate  Outcome: Progressing  Goal: Maintains/Returns to pre admission functional level  Description: INTERVENTIONS:  - Perform BMAT or MOVE assessment daily.   - Set and communicate daily mobility goal to care team and patient/family/caregiver.    - Collaborate with rehabilitation services on mobility goals if consulted  - Perform Range of Motion 3 times a day. - Reposition patient every 2 hours.   - Dangle patient 3 times a day  - Stand patient 3 times a day  - Ambulate patient 3 times a day  - Out of bed to chair 3 times a day   - Out of bed for meals 3 times a day  - Out of bed for toileting  - Record patient progress and toleration of activity level   Outcome: Progressing     Problem: PAIN - ADULT  Goal: Verbalizes/displays adequate comfort level or baseline comfort level  Description: Interventions:  - Encourage patient to monitor pain and request assistance  - Assess pain using appropriate pain scale  - Administer analgesics based on type and severity of pain and evaluate response  - Implement non-pharmacological measures as appropriate and evaluate response  - Consider cultural and social influences on pain and pain management  - Notify physician/advanced practitioner if interventions unsuccessful or patient reports new pain  Outcome: Progressing     Problem: SAFETY ADULT  Goal: Patient will remain free of falls  Description: INTERVENTIONS:  - Educate patient/family on patient safety including physical limitations  - Instruct patient to call for assistance with activity   - Consult OT/PT to assist with strengthening/mobility   - Keep Call bell within reach  - Keep bed low and locked with side rails adjusted as appropriate  - Keep care items and personal belongings within reach  - Initiate and maintain comfort rounds  - Make Fall Risk Sign visible to staff  - Offer Toileting every 2 Hours, in advance of need  - Initiate/Maintain bed alarm  - Obtain necessary fall risk management equipment: cane  - Apply yellow socks and bracelet for high fall risk patients  - Consider moving patient to room near nurses station  Outcome: Progressing     Problem: DISCHARGE PLANNING  Goal: Discharge to home or other facility with appropriate resources  Description: INTERVENTIONS:  - Identify barriers to discharge w/patient and caregiver  - Arrange for needed discharge resources and transportation as appropriate  - Identify discharge learning needs (meds, wound care, etc.)  - Arrange for interpretive services to assist at discharge as needed  - Refer to Case Management Department for coordinating discharge planning if the patient needs post-hospital services based on physician/advanced practitioner order or complex needs related to functional status, cognitive ability, or social support system  Outcome: Progressing     Problem: MUSCULOSKELETAL - ADULT  Goal: Maintain or return mobility to safest level of function  Description: INTERVENTIONS:  - Assess patient's ability to carry out ADLs; assess patient's baseline for ADL function and identify physical deficits which impact ability to perform ADLs (bathing, care of mouth/teeth, toileting, grooming, dressing, etc.)  - Assess/evaluate cause of self-care deficits   - Assess range of motion  - Assess patient's mobility  - Assess patient's need for assistive devices and provide as appropriate  - Encourage maximum independence but intervene and supervise when necessary  - Involve family in performance of ADLs  - Assess for home care needs following discharge   - Consider OT consult to assist with ADL evaluation and planning for discharge  - Provide patient education as appropriate  Outcome: Progressing  Goal: Maintain proper alignment of affected body part  Description: INTERVENTIONS:  - Support, maintain and protect limb and body alignment  - Provide patient/ family with appropriate education  Outcome: Progressing     Problem: RESPIRATORY - ADULT  Goal: Achieves optimal ventilation and oxygenation  Description: INTERVENTIONS:  - Assess for changes in respiratory status  - Assess for changes in mentation and behavior  - Position to facilitate oxygenation and minimize respiratory effort  - Oxygen administered by appropriate delivery if ordered  - Initiate smoking cessation education as indicated  - Encourage broncho-pulmonary hygiene including cough, deep breathe, Incentive Spirometry  - Assess the need for suctioning and aspirate as needed  - Assess and instruct to report SOB or any respiratory difficulty  - Respiratory Therapy support as indicated  Outcome: Progressing

## 2023-11-04 NOTE — PLAN OF CARE
Problem: PHYSICAL THERAPY ADULT  Goal: Performs mobility at highest level of function for planned discharge setting. See evaluation for individualized goals. Description: Treatment/Interventions: ADL retraining, Functional transfer training, LE strengthening/ROM, Therapeutic exercise, Patient/family training, Bed mobility, Gait training, Spoke to nursing          See flowsheet documentation for full assessment, interventions and recommendations. Note:    Problem List: Decreased strength, Decreased endurance, Impaired balance, Decreased mobility, Pain  Assessment: Patient seen for Physical Therapy evaluation. Patient admitted with Ambulatory dysfunction. Comorbidities affecting patient's physical performance include: asthma, chronic back pain, and hypothyroid. Personal factors affecting patient at time of initial evaluation include: ambulating with assistive device, inability to ambulate household distances, and inability to navigate community distances. Prior to admission, patient was independent with functional mobility with SPC, independent with ADLS, independent with IADLS, living with spouse in a multi- level home with 3-5 steps to enter, ambulating household distance, and ambulating community distances. Please find objective findings from Physical Therapy assessment regarding body systems outlined above with impairments and limitations including weakness, gait deviations, pain, decreased activity tolerance, decreased functional mobility tolerance, and fall risk. The Barthel Index was used as a functional outcome tool presenting with a score of Barthel Index Score: 65 today indicating moderate limitations of functional mobility and ADLS. Patient's clinical presentation is currently evolving as seen in patient's presentation of increased fall risk, new onset of impairment of functional mobility, decreased endurance, and new onset of weakness.  Pt would benefit from continued Physical Therapy treatment to address deficits as defined above and maximize level of functional mobility. As demonstrated by objective findings, the assigned level of complexity for this evaluation is moderate. The patient's AM-PAC Basic Mobility Inpatient Short Form Raw Score is 21. A Raw score of greater than 16 suggests the patient may benefit from discharge to home. Please also refer to the recommendation of the Physical Therapist for safe discharge planning. Rehab Resource Intensity Level, PT: III (Minimum Resource Intensity)    See flowsheet documentation for full assessment.

## 2023-11-04 NOTE — ASSESSMENT & PLAN NOTE
Patient has a history of CABG in 2014  Denies any chest pain at this time.   Continue to follow-up with outpatient PCP 1 to 2 weeks postdischarge

## 2023-11-04 NOTE — DISCHARGE SUMMARY
[address] [facility]  Discharge- Louann Nair 1940, 82 y.o. female MRN: [id number]  Unit/Bed#: 913 [facility] 119-68 Encounter: [id number]  Primary Care Provider: Desmond Christian DO   Date and time admitted to hospital: 11/3/2023  9:32 AM    * Ambulatory dysfunction  Assessment & Plan  Patient reports ambulatory dysfunction status post lumbar injection on 10/25/2023 for chronic back pain  Reports 2 mornings ago that she was having increased pain, called her physician who indicated that she should expect to have worsening pain before experiencing improvement. She was prescribed muscle relaxant which she tried with Tylenol, Lidoderm patch with no relief. On morning of admission she was unable to stand up straight, needed to hang onto furniture to ambulate to the bathroom. She called her doctor who indicated she should present to the emergency department for further evaluation and treatment. In the ED CT of the thoracolumbar was performed which showed no fracture or traumatic subluxation, indeterminate 2.1 cm solid-appearing lesion posterior mid pole of right kidney identified as per radiology report. CT of chest abdomen pelvis showed a rounded cystic area anterior right hip joint not seen previously likely representing distended iliopsoas bursa measuring 4.4 cm x 2.2 cm, no acute compression collapse, 2.1 cm lesion in the posterior to right kidney may be a hemorrhagic cyst versus solid lesion as per radiology report, nonemergent MRI recommended. MRI thoracic spine showed S shaped thoracolumbar scoliosis with dextroconvex thoracic component apex at T7, thoracic vertebral body heights are maintained demonstrating no acute fracture or subluxation, disc protrusions at T6-7, T9-10 and T10-11 levels without central canal narrowing, mild disc bulge at the T11-12 level. The thoracic cord appears normal in caliber and signal with no evidence of focal impingement as per radiology report.   MRI of lumbar spine shows levoscoliosis apex at L3, multilevel lumbar degenerative disc disease no new disc herniation compared to 5/13/2022, moderate to severe central canal and bilateral subarticular/lateral recess narrowing at L2-3 progressed. Moderate to severe central canal stenosis is again noted at the L3-L4 level. A 2.2 indeterminate partially exophytic lesion is noted in the posterior midpole right kidney as per radiology report. PT/OT evaluation and treatment. Fall precautions. Acute left-sided low back pain  Assessment & Plan  Patient reported acute left-sided low back pain, underwent lumbar injection bilaterally on 10/25/2023 for chronic back pain. Stated the pain was so intense that she was unable to stand up to ambulate prior to coming in. Did receive some pain medications including morphine, Robaxin, Tylenol and Dilaudid. Feels that the Dilaudid worked most efficiently. We will start patient on prednisone 40 mg p.o. daily, round-the-clock extra strength Tylenol, Toradol 15 mg IV scheduled every 6 hours, as needed Roxicodone 5 mg for moderate pain and Dilaudid 0.5 mg as needed for severe pain. Orthopedics consulted, appreciate recommendations okay for discharge as patient is improving  We will continue patient on prednisone taper 40 mg p.o. x3 days with decrease by 10 mg every third day on discharge. Continue with Roxicodone 5 mg p.o. every 6 hours for short course. We will also continue with 3 more days of as needed oral Toradol. Patient is advised to follow-up with her PCP 1 to 2 weeks postdischarge. Mild persistent asthma without complication  Assessment & Plan  Patient has a history of mild persistent asthma, continue home inhalers and nebulizers. Currently on room air, O2 sats mid 90s. No wheezing.   Continue home regimen on discharge  Follow-up outpatient PCP 1 to 2 weeks postdischarge    Acquired hypothyroidism  Assessment & Plan  Continue patient's home medication levothyroxine    Hx of CABG  Assessment & Plan  Patient has a history of CABG in 2014  Denies any chest pain at this time. Continue to follow-up with outpatient PCP 1 to 2 weeks postdischarge        Medical Problems       Resolved Problems  Date Reviewed: 11/4/2023   None       Discharging Physician / Practitioner: Theresa Pacheco, [address] [place] [address]  PCP: Kera Tai DO  Admission Date:   Admission Orders (From admission, onward)       Ordered        11/03/23 1432  Place in Observation  Once                          Discharge Date: 11/04/23    Consultations During Hospital Stay:  Critical care  Orthopedics     Procedures Performed:   None     Significant Findings / Test Results:   Thoracolumbar CT performed 11/3 showed no fracture or traumatic subluxation, and indeterminant 2.1 cm solid-appearing lesion is noted in the posterior midpole of the right kidney, further correlation with dedicated ultrasound or MRI is recommended as per radiology report. CT of chest abdomen pelvis performed on 11/3 shows no acute inflammatory stranding, no bowel obstruction, 2.1 cm lesion posterior to the right kidney may be a hemorrhagic cyst versus solid lesion indeterminate can be characterized with MRI performed on nonemergent basis, degenerative changes in lumbar spine as per radiology report. MRI thoracic spine performed on 11/4 shows S-shaped thoracolumbar scoliosis with a dextroconvex thoracic component apex at T7. Thoracic vertebral body heights are maintained demonstrating no acute fracture or subluxation, disc protrusions at the T6-7, T9-10 and T10 10-11 levels without central canal narrowing, mild disc bulge at the T11-12 level. The thoracic cord appears normal in caliber and signal with no evidence of focal impingement as per radiology report. MRI of lumbar spine performed on 11/4 shows levoscoliosis apex at L3, multilevel lumbar degenerative disc disease as detailed with no new disc herniation compared to 5/13/2022.   Moderate to severe central canal and bilateral subarticular/lateral recess narrowing at L2-3 progressed. Moderate to severe central canal stenosis is again noted at the L3-L4 level. A 2.2 cm indeterminate partially exophytic lesion is noted at the posterior midpole of the right kidney as per radiology report. Incidental Findings:   Imaging showing 2.1 lesion posterior to right kidney  I reviewed the above mentioned incidental findings with the patient and/or family and they expressed understanding. Recommend nonemergent MRI outpatient, message sent to PCP. Test Results Pending at Discharge (will require follow up): None     Outpatient Tests Requested:  None    Complications: None    Reason for Admission: Ambulatory dysfunction, back pain    Hospital Course:   Roney Bush is a 82 y.o. female patient past medical history of asthma, MI status post CABG 2014, hypothyroidism, hyperlipidemia,'s steroid injection bilateral L2 4-L5 on 10/25/2023 who originally presented to the hospital on 11/3/2023 due to ambulatory dysfunction and back pain. Patient reported that she has chronic back pain and she had been receiving injections for some time, her doctor recommended that she undergo bilateral steroid injections at L4-L5 which she did on 10/25.  2 days prior to presentation to the ER patient was experiencing increased pain, spoke with her doctor who had ordered muscle relaxant, Lidoderm patch and round-the-clock Tylenol. On the morning of admission the pain had worsened with patient was unable to stand up and ambulate. She reached out to her doctor who recommended that she present to the emergency department for further evaluation and treatment. In the ED CT thoracolumbar was performed which showed no fracture or traumatic subluxation, indeterminate 2.1 cm solid-appearing lesion noted in posterior mid pole of right kidney as per radiology report.   CT of the chest abdomen pelvis was also performed which showed no acute inflammatory stranding no bowel obstruction, 2.1 cm lesion posterior to right kidney as per radiology report. Patient was admitted for further evaluation and treatment. She did receive pain management modality of Lidoderm patch, Toradol, scheduled Tylenol, as needed Roxicodone and as needed Dilaudid. Evening of admission patient experienced episode of increased work of breathing and shortness of breath, rapid response was called, most likely secondary to anxiety/panic attack. Patient respiratory status improved, remained on the floor. She did receive IV steroid followed by oral prednisone. She subsequently improved dramatically with her pain level and her ambulatory function. MRI of thoracic and lumbar spine was performed, no acute abnormality. Orthopedics was consulted, are in agreement with continuing steroid and pain management on discharge, cleared for discharge from their point of view. Patient is advised to follow-up with her primary care physician 1 to 2 weeks postdischarge. She is also advised to follow-up with her primary care regarding incidental finding of 2.1 cm lesion noted right kidney, recommending nonemergent outpatient MRI. This was discussed with the patient and her family at the bedside, they verbalized understanding and are agreeable to the plan. Please see above list of diagnoses and related plan for additional information. Condition at Discharge: good    Discharge Day Visit / Exam:   Subjective:   Patient reports that she feels much improved, is able to ambulate without difficulty, pain level as improved dramatically. She is hopeful to be going home today. Good appetite no nausea or vomiting. Was able to complete the MRI without difficulty.     Vitals: Blood Pressure: 128/72 (11/04/23 0739)  Pulse: 62 (11/04/23 0739)  Temperature: 98.6 °F (37 °C) (11/04/23 0415)  Temp Source: Oral (11/03/23 2238)  Respirations: 14 (11/04/23 0739)  Height: 5' (152.4 cm) (11/03/23 0943)  Weight - Scale: 79.4 kg (175 lb) (11/03/23 0943)  SpO2: 96 % (11/04/23 0739)    Exam:   Physical Exam  Vitals and nursing note reviewed. Constitutional:       Appearance: Normal appearance. She is obese. HENT:      Head: Normocephalic. Nose: Nose normal.      Mouth/Throat:      Mouth: Mucous membranes are moist.   Eyes:      Extraocular Movements: Extraocular movements intact. Conjunctiva/sclera: Conjunctivae normal.   Cardiovascular:      Rate and Rhythm: Normal rate and regular rhythm. Pulses: Normal pulses. Heart sounds: Normal heart sounds. Pulmonary:      Effort: Pulmonary effort is normal.      Breath sounds: Normal breath sounds. No wheezing. Abdominal:      General: Bowel sounds are normal. There is no distension. Palpations: Abdomen is soft. Tenderness: There is no abdominal tenderness. Genitourinary:     Comments: Voiding spontaneously   Musculoskeletal:         General: Normal range of motion. Cervical back: Normal range of motion. Right lower leg: No edema. Left lower leg: No edema. Comments: Mobility improved; able to ambulate upright with no pain. Skin:     General: Skin is warm and dry. Capillary Refill: Capillary refill takes less than 2 seconds. Neurological:      General: No focal deficit present. Mental Status: She is alert and oriented to person, place, and time. Psychiatric:         Mood and Affect: Mood normal.         Behavior: Behavior normal.         Thought Content: Thought content normal.         Judgment: Judgment normal.          Discussion with Family: Updated  ( and daughter) at bedside. Discharge instructions/Information to patient and family:   See after visit summary for information provided to patient and family. Provisions for Follow-Up Care:  See after visit summary for information related to follow-up care and any pertinent home health orders.        Disposition:   Home    Planned Readmission: No     Discharge Statement:  I spent greater than 35 minutes discharging the patient. This time was spent on the day of discharge. I had direct contact with the patient on the day of discharge. Greater than 50% of the total time was spent examining patient, answering all patient questions, arranging and discussing plan of care with patient as well as directly providing post-discharge instructions. Additional time then spent on discharge activities. Discharge Medications:  See after visit summary for reconciled discharge medications provided to patient and/or family.       **Please Note: This note may have been constructed using a voice recognition system**

## 2023-11-04 NOTE — RAPID RESPONSE
Rapid Response Note  Roney Bush 82 y.o. female MRN: [id number]  Unit/Bed#: [address] [place] [address] Encounter: [id number]    Rapid Response Notification(s):   Response called date/time:  11/3/2023 7:52 PM  Response team arrival date/time:  11/3/2023 7:53 PM  Response end date/time:  11/3/2023 8:45 PM  Level of care:  Medsur  Rapid response location:  [facility] unit  Primary reason for rapid response call: Other (comment) (Acute increase in WOB, SpO2 stable)    Rapid Response Intervention(s):   Airway:  None  Breathing:  Oxygen (15L midflow in place immediately prior to arrival by team. Previously on room air.)  Fluids administered:  None  Medications administered:  Albuterol, ipratropium and epinephrine (mucomyst)       Assessment:   Increased WOB  Anxiety    Plan:   CXR- completed and clear, similar to prior  EKG- NSR without ST changes, rate 60-70s  Duoneb now, then xopenex with mucomyst for mucokinesis q8h. Rest of treatment per asthma plan per primary team  Lorazepam 0.25mg IV now- additional doses per primary team pending improvement  Masimo for continuous pulse oximetry  Chest PT, accapella valve, IS     Rapid Response Outcome:   Transfer:  Remain on floor  Code Status: Level 1 (Full Code)      Family notified of transfer: no- to remain on floor. Daughter at bedside  Family member contacted: Daughter at bedside     Background/Situation:   Roney Bush is a 82 y.o. female with PMHx of asthma, hypothyroidism, CAD s/p CABG in 2014, recent COVID-19 last month. She was admitted today for back pain. An RRT was called this evening after the primary team noted an acute increase in her WOB and anxiety while discussing the possibility of back surgery. She became acutely tachypneic with accessory muscle use. 15L NC was added prior to team arrival; she did not experience any acute hypoxia. Her daughter was at bedside and demanded administration of an "epi pen", as this had helped her in prior situations at home.  She appeared anxious, agitated, and her and her daughter were yelling at staff in a panic to "do something". Her vital signs were stable, and SpO2 remained stable throughout the RRT. On exam, there is no audible wheezing, rhonchi, or stridor. Her lungs were clear and easily audible to the bases. Given these findings and low suspicion for an anaphylactic etiology, there was no anaphylactic dose of epinephrine given. She was given 0.1mg epinephrine IV in the interim while a duoneb was being obtained. She received lorazepam 0.25mg IVP x1 for anxiety. EKG was completed and showed NSR with no ischemia. A CXR was completed and was clear. She became more calm with administration of lorazepam and her duoneb, then voiced feeling as though she had mucous in her airway. She was given Xopenex and mucomyst to help with mucokinesis. After these interventions, she reported improvement, and was more calm, and pleasantly interactive. The case was discussed with the [address] [place] [address] attending and she will remain on the F. Review of Systems   Respiratory:  Positive for cough ("I have mucous in my airway, I need epinephrine now!"), chest tightness and shortness of breath. Cardiovascular:  Negative for chest pain, palpitations and leg swelling. Gastrointestinal: Negative. Musculoskeletal:  Positive for back pain (Presented to ED today for back pain). Neurological: Negative. Psychiatric/Behavioral:  The patient is nervous/anxious. Objective:   Vitals:    11/03/23 1648 11/03/23 1926 11/03/23 2006 11/03/23 2021   BP: (!) 185/74 (!) 174/73 (!) 198/79 (!) 187/81   BP Location: Right arm      Pulse: (!) 50 (!) 48 67 60   Resp: 18 18 (!) 26 (!) 24   Temp: 97.8 °F (36.6 °C) 98.4 °F (36.9 °C)     TempSrc: Oral Oral     SpO2: 99%  98% 100%   Weight:       Height:         Physical Exam  Vitals and nursing note reviewed. Constitutional:       General: She is in acute distress. Appearance: She is obese.       Interventions: Nasal cannula and face mask in place. HENT:      Head: Normocephalic. Nose: Nose normal.      Mouth/Throat:      Mouth: Mucous membranes are moist.   Eyes:      General: Lids are normal.   Cardiovascular:      Rate and Rhythm: Normal rate and regular rhythm. Pulses:           Radial pulses are 2+ on the right side and 2+ on the left side. Pulmonary:      Effort: Tachypnea, accessory muscle usage and respiratory distress present. Breath sounds: Normal breath sounds. No wheezing, rhonchi or rales. Comments: Adventitious upper airway sounds on expiration. Absent when speaking. Increased WOB. Lungs clear. SpO2 100%. Abdominal:      Palpations: Abdomen is soft. Skin:     General: Skin is warm and dry. Neurological:      General: No focal deficit present. Mental Status: She is alert and oriented to person, place, and time. GCS: GCS eye subscore is 4. GCS verbal subscore is 5. GCS motor subscore is 6. Motor: Motor function is intact. Psychiatric:         Attention and Perception: Attention normal.         Mood and Affect: Mood is anxious. Affect is angry. Speech: Speech normal.         Behavior: Behavior is uncooperative, agitated and aggressive. Cognition and Memory: Cognition normal.         Portions of the record may have been created with voice recognition software. Occasional wrong word or "sound a like" substitutions may have occurred due to the inherent limitations of voice recognition software. Read the chart carefully and recognize, using context, where substitutions have occurred.     [address]

## 2023-11-04 NOTE — OCCUPATIONAL THERAPY NOTE
Occupational Therapy Screen     Patient Name: Sherlyn Tyler  [id number] Date: 11/4/2023  Problem List  Principal Problem:    Ambulatory dysfunction  Active Problems:    Mild persistent asthma without complication    Acquired hypothyroidism    Hx of CABG    Acute left-sided low back pain    Past Medical History  Past Medical History:   Diagnosis Date    Asthma     Cardiac disease     heart attack    Disease of thyroid gland     Hyperlipidemia      Past Surgical History  Past Surgical History:   Procedure Laterality Date    APPENDECTOMY      BREAST LUMPECTOMY Bilateral     several    CARDIAC SURGERY      CATARACT EXTRACTION, BILATERAL      CORONARY STENT PLACEMENT      EPIDURAL BLOCK INJECTION Bilateral 10/25/2023    Procedure: bilateral L4-L5  TRANSFORAMINAL epidural steroid injection (19774); Surgeon: Maico Eller DO;  Location: [facility] MAIN OR;  Service: Pain Management     NASAL POLYP SURGERY          11/04/23 1150   OT Last Visit   OT Visit Date 11/04/23  (Saturday)   Note Type   Note type Screen   Additional Comments OT orders received and chart review completed. Spoke w/ Emma Curry and RNMayco. Observed pt ambulating in halls using cane w/ mod I. Pt is completing ADLs at / near baseline level of I w/ imporved pain control (per RN). No acute OT needs at this time. Will screen from OT caseload. Please re-consult if acute needs arise.  [address], OTR/L  [id number]  [id number]

## 2023-11-06 LAB
ATRIAL RATE: 62 BPM
ATRIAL RATE: 63 BPM
ATRIAL RATE: 66 BPM
ATRIAL RATE: 68 BPM
ATRIAL RATE: 71 BPM
ATRIAL RATE: 73 BPM
P AXIS: 19 DEGREES
P AXIS: 31 DEGREES
P AXIS: 58 DEGREES
P AXIS: 69 DEGREES
P AXIS: 85 DEGREES
PR INTERVAL: 154 MS
PR INTERVAL: 170 MS
PR INTERVAL: 176 MS
PR INTERVAL: 176 MS
QRS AXIS: -33 DEGREES
QRS AXIS: -56 DEGREES
QRS AXIS: 11 DEGREES
QRS AXIS: 163 DEGREES
QRS AXIS: 180 DEGREES
QRS AXIS: 185 DEGREES
QRSD INTERVAL: 124 MS
QRSD INTERVAL: 130 MS
QRSD INTERVAL: 134 MS
QRSD INTERVAL: 140 MS
QRSD INTERVAL: 142 MS
QRSD INTERVAL: 144 MS
QT INTERVAL: 420 MS
QT INTERVAL: 444 MS
QT INTERVAL: 454 MS
QT INTERVAL: 456 MS
QT INTERVAL: 458 MS
QT INTERVAL: 548 MS
QTC INTERVAL: 461 MS
QTC INTERVAL: 462 MS
QTC INTERVAL: 478 MS
QTC INTERVAL: 487 MS
QTC INTERVAL: 490 MS
QTC INTERVAL: 681 MS
T WAVE AXIS: 115 DEGREES
T WAVE AXIS: 73 DEGREES
T WAVE AXIS: 89 DEGREES
T WAVE AXIS: 90 DEGREES
T WAVE AXIS: 93 DEGREES
T WAVE AXIS: 96 DEGREES
VENTRICULAR RATE: 65 BPM
VENTRICULAR RATE: 66 BPM
VENTRICULAR RATE: 68 BPM
VENTRICULAR RATE: 70 BPM
VENTRICULAR RATE: 73 BPM
VENTRICULAR RATE: 93 BPM

## 2023-11-06 PROCEDURE — 93010 ELECTROCARDIOGRAM REPORT: CPT | Performed by: INTERNAL MEDICINE

## 2023-11-07 ENCOUNTER — TRANSITIONAL CARE MANAGEMENT (OUTPATIENT)
Dept: FAMILY MEDICINE CLINIC | Facility: CLINIC | Age: 83
End: 2023-11-07

## 2023-11-13 ENCOUNTER — TELEPHONE (OUTPATIENT)
Age: 83
End: 2023-11-13

## 2023-11-13 DIAGNOSIS — I10 ESSENTIAL HYPERTENSION: Primary | ICD-10-CM

## 2023-11-13 RX ORDER — AMLODIPINE BESYLATE 2.5 MG/1
2.5 TABLET ORAL DAILY
Qty: 90 TABLET | Refills: 1 | Status: SHIPPED | OUTPATIENT
Start: 2023-11-13

## 2023-11-13 NOTE — TELEPHONE ENCOUNTER
Please let her know that it looks like the cancellation order accidentally came from the emergency room. She does need to stay on the medication.   I sent in a new prescription for her to [facility]  Thank you,  Magdy Ruano, DO

## 2023-11-13 NOTE — TELEPHONE ENCOUNTER
Patient went to the pharmacy to  amlodipine. The pharmacy told her the prescription was "cancelled" and to call MD.  The patient wondered if she should still be taking amlodipine and why it was "cancelled".

## 2023-11-20 ENCOUNTER — OFFICE VISIT (OUTPATIENT)
Dept: FAMILY MEDICINE CLINIC | Facility: CLINIC | Age: 83
End: 2023-11-20
Payer: MEDICARE

## 2023-11-20 ENCOUNTER — TELEPHONE (OUTPATIENT)
Age: 83
End: 2023-11-20

## 2023-11-20 VITALS
HEART RATE: 66 BPM | TEMPERATURE: 97 F | HEIGHT: 60 IN | DIASTOLIC BLOOD PRESSURE: 70 MMHG | BODY MASS INDEX: 35.34 KG/M2 | OXYGEN SATURATION: 97 % | RESPIRATION RATE: 18 BRPM | SYSTOLIC BLOOD PRESSURE: 132 MMHG | WEIGHT: 180 LBS

## 2023-11-20 DIAGNOSIS — M54.50 ACUTE LEFT-SIDED LOW BACK PAIN WITHOUT SCIATICA: Primary | ICD-10-CM

## 2023-11-20 DIAGNOSIS — I10 ESSENTIAL HYPERTENSION: ICD-10-CM

## 2023-11-20 DIAGNOSIS — J45.20 MILD INTERMITTENT ASTHMA WITHOUT COMPLICATION: ICD-10-CM

## 2023-11-20 DIAGNOSIS — N28.9 KIDNEY LESION, NATIVE, RIGHT: ICD-10-CM

## 2023-11-20 PROCEDURE — 99495 TRANSJ CARE MGMT MOD F2F 14D: CPT | Performed by: FAMILY MEDICINE

## 2023-11-20 NOTE — PROGRESS NOTES
Assessment & Plan     1. Acute left-sided low back pain without sciatica  Comments:  Left-sided pain developed after her injection. Sent to different pain management doctor for second opinion  Orders:  -     Ambulatory referral to Spine & Pain Management; Future    2. Kidney lesion, native, right  Comments:  new  Orders:  -     MRI abdomen w wo contrast; Future; Expected date: 11/20/2023    3. Essential hypertension  Assessment & Plan:  Stable  Continue amlodipine 2.5 mg daily        4. Mild intermittent asthma without complication  Assessment & Plan:  Continue albuterol and Pulmicort      Return for Scheduled follow up in March. Subjective     Transitional Care Management Review:   Eloise Kirby is a 82 y.o. female here for TCM follow up. During the TCM phone call patient stated:  TCM Call     Date and time call was made  11/7/2023 12:53 PM    Hospital care reviewed  Records reviewed    Patient was hospitialized at  [address] [place] [address]    Date of Admission  11/03/23    Date of discharge  11/04/23    Diagnosis  Ambulatory dysfunction    Disposition  Home    Were the patients medications reviewed and updated  Yes    Current Symptoms  None      TCM Call     Post hospital issues  None    Should patient be enrolled in anticoag monitoring? No    Scheduled for follow up?   Yes    Did you obtain your prescribed medications  Yes    Do you need help managing your prescriptions or medications  No    Is transportation to your appointment needed  No    I have advised the patient to call PCP with any new or worsening symptoms  [facility]  or Jeanne other    Support System  Family    The type of support provided  Emotional; Financial; Physical    Do you have social support  Yes, as much as I need    Are you recieving any outpatient services  No    Are you recieving home care services  No    Current waiver services  No    Have you fallen in the last 12 months  No Interperter language line needed  No    Counseling  Patient    Counseling topics  Activities of daily living        She now has worsening left sided back pain after having the injection done. The pain is getting worse now that she is home  She can stand now. Her right sided back pain got better. Review of Systems    Objective     /70   Pulse 66   Temp (!) 97 °F (36.1 °C)   Resp 18   Ht 5' (1.524 m)   Wt 81.6 kg (180 lb)   SpO2 97%   BMI 35.15 kg/m²      Physical Exam  Vitals and nursing note reviewed. Constitutional:       Appearance: She is well-developed. HENT:      Head: Normocephalic and atraumatic. Right Ear: External ear normal.      Left Ear: External ear normal.      Nose: Nose normal.   Cardiovascular:      Rate and Rhythm: Normal rate and regular rhythm. Heart sounds: Normal heart sounds. No murmur heard. No friction rub. Pulmonary:      Effort: No respiratory distress. Breath sounds: Normal breath sounds. No wheezing or rales. Musculoskeletal:         General: Tenderness (left low back, with left lumbar paraspinal muscle fullness) present. Right lower leg: No edema. Left lower leg: No edema. Neurological:      Mental Status: She is oriented to person, place, and time. Cranial Nerves: No cranial nerve deficit.        Medications have been reviewed by provider in current encounter    Denver Hammond DO

## 2023-11-20 NOTE — TELEPHONE ENCOUNTER
Caller: Corazon Verduzco, pt    Doctor: Briseida Gomez    Reason for call: pt would like to ERICA EDMONDS to Dr. Jerod Huddleston    Call back#: [phone]    Dr. Briseida Gomez,    Will you release the pt from your care? Dr Jerod Huddleston,     Will you take the pt on? Please advise    Thank you!

## 2023-12-18 ENCOUNTER — TELEPHONE (OUTPATIENT)
Age: 83
End: 2023-12-18

## 2023-12-18 DIAGNOSIS — J45.30 MILD PERSISTENT ASTHMA WITHOUT COMPLICATION: Primary | ICD-10-CM

## 2023-12-18 RX ORDER — BUDESONIDE AND FORMOTEROL FUMARATE DIHYDRATE 160; 4.5 UG/1; UG/1
2 AEROSOL RESPIRATORY (INHALATION) 2 TIMES DAILY
Qty: 10.2 G | Refills: 3 | Status: SHIPPED | OUTPATIENT
Start: 2023-12-18

## 2023-12-18 NOTE — TELEPHONE ENCOUNTER
Reason for call:   [x] Refill   [] Prior Auth  [] Other:     Office:   [x] PCP/Provider -   [] Specialty/Provider -     Medication: Symbicort    Dose/Frequency: 160-4.5 mcg/ act inhaler    Quantity:     Pharmacy: [facility]     Does the patient have enough for 3 days?   [] Yes   [x] No - Send as HP to POD

## 2023-12-18 NOTE — TELEPHONE ENCOUNTER
Caller: Patient  Doctor/office: Stephon MENDEZ#: [phone]      patient called to start auth for VISCO/EUFLEXXA/Durolane injections    Body Part: LT Knee  Pain Level (scale 1-10):   Date of last Gel Injection: 6/1    Educated patient on Visco procedure. Medications must first be authorized and delivered to our office BEFORE we can schedule

## 2023-12-19 DIAGNOSIS — E03.9 ACQUIRED HYPOTHYROIDISM: ICD-10-CM

## 2023-12-19 DIAGNOSIS — M17.12 PRIMARY OSTEOARTHRITIS OF LEFT KNEE: Primary | ICD-10-CM

## 2023-12-19 RX ORDER — LEVOTHYROXINE SODIUM 0.07 MG/1
TABLET ORAL
Qty: 90 TABLET | Refills: 1 | Status: SHIPPED | OUTPATIENT
Start: 2023-12-19

## 2023-12-26 ENCOUNTER — TELEPHONE (OUTPATIENT)
Age: 83
End: 2023-12-26

## 2023-12-26 NOTE — TELEPHONE ENCOUNTER
Other patient called to state she rec'd text reminder of appt for this patient. The phone number was in both charts. Corrected the phone number

## 2024-01-12 ENCOUNTER — HOSPITAL ENCOUNTER (OUTPATIENT)
Dept: RADIOLOGY | Facility: HOSPITAL | Age: 84
Discharge: HOME | End: 2024-01-12
Payer: MEDICARE

## 2024-01-12 DIAGNOSIS — N28.9 KIDNEY LESION, NATIVE, RIGHT: ICD-10-CM

## 2024-01-12 PROCEDURE — G1004 CDSM NDSC: HCPCS

## 2024-01-12 PROCEDURE — 74183 MRI ABD W/O CNTR FLWD CNTR: CPT

## 2024-01-12 PROCEDURE — A9585 GADOBUTROL INJECTION: HCPCS | Performed by: FAMILY MEDICINE

## 2024-01-12 RX ORDER — GADOBUTROL 604.72 MG/ML
8 INJECTION INTRAVENOUS
Status: COMPLETED | OUTPATIENT
Start: 2024-01-12 | End: 2024-01-12

## 2024-01-12 RX ADMIN — GADOBUTROL 8 ML: 604.72 INJECTION INTRAVENOUS at 11:47

## 2024-01-19 ENCOUNTER — TELEPHONE (OUTPATIENT)
Dept: FAMILY MEDICINE CLINIC | Facility: CLINIC | Age: 84
End: 2024-01-19

## 2024-01-19 DIAGNOSIS — N28.89 RENAL MASS, RIGHT: Primary | ICD-10-CM

## 2024-01-19 DIAGNOSIS — K86.2 PANCREATIC CYST: ICD-10-CM

## 2024-01-19 NOTE — TELEPHONE ENCOUNTER
1/19/2024 1:36 PM Called Kathryn regarding her MRI of the abdomen.    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  She agreed to follow-up with the urologist as soon as possible.  She would like to go to [facility].    I also let her know she would need another follow-up on the pancreatic cyst in 2 years.    Angelina Magaña, DO

## 2024-01-22 ENCOUNTER — OFFICE VISIT (OUTPATIENT)
Dept: PULMONOLOGY | Facility: MEDICAL CENTER | Age: 84
End: 2024-01-22
Payer: MEDICARE

## 2024-01-22 VITALS
OXYGEN SATURATION: 97 % | BODY MASS INDEX: 34.95 KG/M2 | SYSTOLIC BLOOD PRESSURE: 160 MMHG | DIASTOLIC BLOOD PRESSURE: 92 MMHG | WEIGHT: 178 LBS | HEART RATE: 88 BPM | RESPIRATION RATE: 18 BRPM | HEIGHT: 60 IN | TEMPERATURE: 97.8 F

## 2024-01-22 DIAGNOSIS — N28.89 RIGHT RENAL MASS: ICD-10-CM

## 2024-01-22 DIAGNOSIS — M54.50 LUMBAR PAIN: ICD-10-CM

## 2024-01-22 DIAGNOSIS — R06.02 SHORTNESS OF BREATH: Primary | ICD-10-CM

## 2024-01-22 DIAGNOSIS — J45.30 MILD PERSISTENT ASTHMA WITHOUT COMPLICATION: ICD-10-CM

## 2024-01-22 PROCEDURE — 99214 OFFICE O/P EST MOD 30 MIN: CPT | Performed by: INTERNAL MEDICINE

## 2024-01-22 RX ORDER — PREDNISONE 20 MG/1
TABLET ORAL
Qty: 15 TABLET | Refills: 0 | Status: SHIPPED | OUTPATIENT
Start: 2024-01-22

## 2024-01-22 NOTE — PATIENT INSTRUCTIONS
Take prednisone 20 mg 2 tablets for 5 days then 1 tablet for 5 days    If you feel like you have a mucous plug use nebulizer with albuterol 2.5 mg and use your flutter valve    When done the prednisone if you are still feeling short of breath can try the higher strength of budesonide 0.5 mg twice a day for 2 weeks to see if this makes any difference instead of year normal dose of 0.25 mg    Try using Spiriva 1.25 mcg Respimat 2 puffs once a day x 4 weeks

## 2024-01-23 ENCOUNTER — PROCEDURE VISIT (OUTPATIENT)
Dept: OBGYN CLINIC | Facility: CLINIC | Age: 84
End: 2024-01-23
Payer: MEDICARE

## 2024-01-23 ENCOUNTER — OFFICE VISIT (OUTPATIENT)
Dept: UROLOGY | Facility: CLINIC | Age: 84
End: 2024-01-23
Payer: MEDICARE

## 2024-01-23 VITALS
HEART RATE: 67 BPM | OXYGEN SATURATION: 98 % | BODY MASS INDEX: 34.4 KG/M2 | SYSTOLIC BLOOD PRESSURE: 134 MMHG | DIASTOLIC BLOOD PRESSURE: 84 MMHG | HEIGHT: 60 IN | WEIGHT: 175.2 LBS

## 2024-01-23 DIAGNOSIS — M17.12 PRIMARY OSTEOARTHRITIS OF LEFT KNEE: Primary | ICD-10-CM

## 2024-01-23 DIAGNOSIS — N28.89 RIGHT RENAL MASS: Primary | ICD-10-CM

## 2024-01-23 PROCEDURE — 20610 DRAIN/INJ JOINT/BURSA W/O US: CPT | Performed by: ORTHOPAEDIC SURGERY

## 2024-01-23 PROCEDURE — 99204 OFFICE O/P NEW MOD 45 MIN: CPT | Performed by: UROLOGY

## 2024-01-23 RX ORDER — HYALURONATE SODIUM 10 MG/ML
20 SYRINGE (ML) INTRAARTICULAR
Status: COMPLETED | OUTPATIENT
Start: 2024-01-23 | End: 2024-01-23

## 2024-01-23 RX ADMIN — Medication 20 MG: at 11:30

## 2024-01-23 NOTE — PROGRESS NOTES
MMReferring Physician: Angelina Magaña DO  A copy of this note was sent to the referring physician.       Diagnoses and all orders for this visit:    Right renal mass  -     MRI abdomen w wo contrast; Future  -     Creatinine, serum; Future            Assessment and plan:       1.  2 cm enhancing renal mass consistent with renal cell carcinoma  -2 cm, right, completely posterior, partially exophytic  -Lesion would be amenable to percutaneous cryoablation if indicated  - Have recommended a trial of active surveillance for now    2.  Medical comorbidities:  -Coronary artery disease status post prior CABG on aspirin  -Octogenarian  - Hypertension  - Ambulatory dysfunction  - Class I obesity  - chronic back pain      We discussed with the patient the details regarding their renal mass, including the risks of recurrence, progression and metastasis. We went over the various alternatives for managing renal masses including active surveillance, cryoablation and radiofrequency ablation, partial nephrectomy and radical nephrectomy. Where appropriate, we also discussed the differences between open, laparoscopic and robotic renal surgery.    Given the small size, general low risk, patient's age and competing comorbidities I discussed 2 primary options:  - Active surveillance  - Percutaneous cryoablation with concomitant biopsy     pros and cons of each of which were reviewed extensively.  In my opinion there would be an excellent high likelihood of cancer control with percutaneous cryoablation.  Tumor is well situated from a location standpoint.  That being said the lesion is quite small and I have recommended a period of active surveillance with the plan to initiate IR referral for cryoablation if the lesion were to grow particular with some rapidity and or approaching threshold of 3 cm in size.    Patient and her and are very comfortable with this management option and relieved that she does not need major surgery for this small  lesion.    She will follow-up in 6 months with our advanced practitioner team with an MRI prior.    Jaquan Moseley MD      Chief Complaint     Renal mass consult      History of Present Illness     Kathryn Drake is a 83 y.o. referred in consultation for enhancing renal mass    Detailed Urologic History     - please refer to HPI    Review of Systems     Review of Systems   Constitutional: Negative for activity change and fatigue.   HENT: Negative for congestion.    Eyes: Negative for visual disturbance.   Respiratory: Negative for shortness of breath and wheezing.    Cardiovascular: Negative for chest pain and leg swelling.   Gastrointestinal: Negative for abdominal pain.   Endocrine: Negative for polyuria.   Genitourinary: Negative for dysuria, flank pain, hematuria and urgency.   Musculoskeletal: Negative for back pain.   Allergic/Immunologic: Negative for immunocompromised state.   Neurological: Negative for dizziness and numbness.   Psychiatric/Behavioral: Negative for dysphoric mood.   All other systems reviewed and are negative.          Allergies     Allergies   Allergen Reactions    Penicillins     Latex Rash       Physical Exam       Physical Exam  Constitutional:       General: He is not in acute distress.     Appearance: He is well-developed.   HENT:      Head: Normocephalic and atraumatic.   Cardiovascular:      Comments: Negative lower extremity edema  Pulmonary:      Effort: Pulmonary effort is normal.      Breath sounds: Normal breath sounds.   Abdominal:      Palpations: Abdomen is soft.   Musculoskeletal:         General: Normal range of motion.      Cervical back: Normal range of motion.   Skin:     General: Skin is warm.   Neurological:      Mental Status: He is alert and oriented to person, place, and time.   Psychiatric:         Behavior: Behavior normal.           Vital Signs  There were no vitals filed for this visit.      Current Medications       Current Outpatient Medications:      albuterol (2.5 mg/3 mL) 0.083 % nebulizer solution, Take 3 mL (2.5 mg total) by nebulization every 6 (six) hours as needed for wheezing or shortness of breath, Disp: 360 mL, Rfl: 5    albuterol (PROVENTIL HFA,VENTOLIN HFA) 90 mcg/act inhaler, inhale 2 puffs by mouth and INTO THE LUNGS every 4 hours if needed for wheezing, Disp: 8.5 g, Rfl: 8    amLODIPine (NORVASC) 2.5 mg tablet, Take 1 tablet (2.5 mg total) by mouth daily, Disp: 90 tablet, Rfl: 1    ascorbic acid (VITAMIN C) 1500 MG TBCR, Take 500 mg by mouth daily (Patient not taking: Reported on 1/22/2024), Disp: , Rfl:     ascorbic acid (VITAMIN C) 500 MG tablet, Take 500 mg by mouth daily, Disp: , Rfl:     aspirin 81 MG tablet, Take 81 mg by mouth daily  , Disp: , Rfl:     benzonatate (TESSALON PERLES) 100 mg capsule, Take 1 capsule (100 mg total) by mouth 3 (three) times a day as needed for cough (Patient not taking: Reported on 11/3/2023), Disp: 20 capsule, Rfl: 0    budesonide (PULMICORT) 0.25 mg/2 mL nebulizer solution, Take 2 mL (0.25 mg total) by nebulization 2 (two) times a day Rinse mouth after use., Disp: 60 mL, Rfl: 11    calcium carbonate-vitamin D 500 mg-5 mcg per tablet, Take 1 tablet by mouth 2 (two) times a day with meals (Patient not taking: Reported on 1/22/2024), Disp: , Rfl:     Cholecalciferol (VITAMIN D) 2000 UNITS tablet, Take 4,000 Units by mouth daily (Patient not taking: Reported on 1/22/2024), Disp: , Rfl:     dextromethorphan-guaiFENesin (ROBITUSSIN DM)  mg/5 mL syrup, Take 5 mL by mouth 3 (three) times a day as needed for cough (Patient not taking: Reported on 11/20/2023), Disp: 118 mL, Rfl: 0    EPINEPHrine (EPIPEN) 0.3 mg/0.3 mL SOAJ, Inject 0.3 mL (0.3 mg total) into a muscle once for 1 dose, Disp: 0.6 mL, Rfl: 0    fexofenadine (ALLEGRA) 60 MG tablet, Take by mouth (Patient not taking: Reported on 1/22/2024), Disp: , Rfl:     levothyroxine 75 mcg tablet, take 1 tablet by mouth once daily (Patient not taking: Reported on  1/22/2024), Disp: 90 tablet, Rfl: 1    lidocaine (LIDODERM) 5 %, Place 1 patch on the skin daily Remove & Discard patch within 12 hours or as directed by MD, Disp: 30 patch, Rfl: 0    nitroglycerin (NITROSTAT) 0.4 mg SL tablet, Place 1 tablet under the tongue every 5 (five) minutes as needed (Patient not taking: Reported on 1/22/2024), Disp: , Rfl:     predniSONE 20 mg tablet, Take 2 tabs for 5 days then 1 tablet for 5 days, Disp: 15 tablet, Rfl: 0    simvastatin (ZOCOR) 10 mg tablet, Take 1 tablet (10 mg total) by mouth daily at bedtime, Disp: 90 tablet, Rfl: 1    Symbicort 160-4.5 MCG/ACT inhaler, Inhale 2 puffs 2 (two) times a day (Patient not taking: Reported on 1/22/2024), Disp: 10.2 g, Rfl: 3    UNKNOWN TO PATIENT, Allegra daily po Stool softener bid po (Patient not taking: Reported on 1/22/2024), Disp: , Rfl:       Active Problems     Patient Active Problem List   Diagnosis    Mild persistent asthma without complication    Chronic allergic rhinitis    Coronary artery disease involving native coronary artery of native heart without angina pectoris    Class 1 obesity due to excess calories without serious comorbidity with body mass index (BMI) of 33.0 to 33.9 in adult    Acquired hypothyroidism    Hx of CABG    Mild intermittent asthma without complication    Essential hypertension    Recurrent depression (HCC)    Low bone mass    Lumbar radiculopathy    Ambulatory dysfunction    Lumbar pain    Shortness of breath    Right renal mass         Past Medical History     Past Medical History:   Diagnosis Date    Asthma     Cardiac disease     heart attack    Disease of thyroid gland     Hyperlipidemia          Surgical History     Past Surgical History:   Procedure Laterality Date    APPENDECTOMY      BREAST LUMPECTOMY Bilateral     several    CARDIAC SURGERY      CATARACT EXTRACTION, BILATERAL      CORONARY STENT PLACEMENT      EPIDURAL BLOCK INJECTION Bilateral 10/25/2023    Procedure: bilateral L4-L5   "TRANSFORAMINAL epidural steroid injection (09226);  Surgeon: Ariel Heredia DO;  Location: [facility] MAIN OR;  Service: Pain Management     NASAL POLYP SURGERY           Family History     Family History   Problem Relation Age of Onset    No Known Problems Mother     ALS Father     Cancer Brother     Heart disease Family     Asthma Family          Social History     Social History     Social History     Tobacco Use   Smoking Status Never   Smokeless Tobacco Never         Pertinent Lab Values     Lab Results   Component Value Date    CREATININE 0.65 11/04/2023       No results found for: \"PSA\"    @RESULTRCNT(1H])@      Pertinent Imaging       MRI abdomen w wo contrast    Result Date: 1/19/2024  Narrative: MRI - ABDOMEN - WITH AND WITHOUT CONTRAST INDICATION: 83 years / Female. N28.9: Disorder of kidney and ureter, unspecified. COMPARISON: TECHNIQUE: Multiplanar/multisequence MRI of the abdomen was performed before and after administration of contrast. IV Contrast: 8 mL of Gadobutrol injection (SINGLE-DOSE) DIAGNOSTIC QUALITY: Postcontrast images are very motion limited. FINDINGS: LOWER CHEST: Unremarkable. LIVER: Normal in size and configuration. No suspicious mass. The hepatic veins and portal veins are patent. BILE DUCTS: No intrahepatic or extrahepatic bile duct dilation. GALLBLADDER: Normal. PANCREAS: Atrophic. Dominant 7 mm pancreatic tail cyst #5/22 and #7/22. No pancreatic ductal dilation. Other smaller scattered cysts. ADRENAL GLANDS: Unremarkable. SPLEEN: Normal. KIDNEYS/PROXIMAL URETERS: No hydroureteronephrosis. SOLID RENAL MASS #1. . Location: Posterior right upper pole, Size: 2.0 x 1.9 cm.  Given differences in measuring technique, no significant change from prior. Macroscopic fat: No. Necrosis: No. Solid enhancement: Yes, throughout entire mass. Mass margins: Circumscribed. Capsular location:  Exophytic by >50%. Distance to sinus fat/collecting system: 0.7 cm. Tumor thrombus: Not visualized. Limited " "postcontrast imaging. Ossian venous thrombus: Not visualized. Limited postcontrast imaging. Scattered tiny benign cortical cysts. BOWEL: No dilated loops of bowel. PERITONEUM/RETROPERITONEUM: No mass. No ascites. LYMPH NODES: No abdominal lymphadenopathy. VASCULAR STRUCTURES: No aneurysm. ABDOMINAL WALL: Unremarkable. OSSEOUS STRUCTURES: No suspicious osseous lesion.     Impression: Motion limited study. Postcontrast images are severely motion limited. Solid enhancing 2 cm right renal lesion suspicious for renal cell carcinoma. 7 mm pancreatic tail cyst. For simple cyst(s) less than 1.5 cm, recommend follow-up every 2 years for 2 times. After 4 years, no more follow-ups. Recommend next followup in 2 years. Preferred imaging modality: abdomen MRI and MRCP with and without IV contrast, or triple phase abdomen CT with IV contrast, or abdomen MRI and MRCP without IV contrast. The study was marked in EPIC for immediate notification. Workstation performed: [id number]         Portions of the record may have been created with voice recognition software.  Occasional wrong word or \"sound a like\" substitutions may have occurred due to the inherent limitations of voice recognition software.  In addition some of the content generated from this outpatient encounter includes information designed for patient education and/or communication back to the referring provider.  Read the chart carefully and recognize, using context, where substitutions have occurred.    "

## 2024-01-23 NOTE — PROGRESS NOTES
Assessment  1. Lumbar stenosis with neurogenic claudication    2. Intervertebral disc disorder with radiculopathy of lumbar region    3. Bilateral primary osteoarthritis of hip        Plan  The patient is experiencing symptoms related to lumbar spinal stenosis as well as hip osteoarthritis.  At this time, I will start her on Lyrica 50 mg twice a day which she will take initially at bedtime for 5 days before increasing to twice daily dosing.  She was apprised the most common side effects including sleepiness and dizziness.  In addition, I will order updated x-rays of the hip/pelvis that she does have moderate osteoarthritis that I think is contributing more to her symptoms and the stenosis.  I advised her to call with an update in 2 weeks and how the medication is working.    My impressions and treatment recommendations were discussed in detail with the patient who verbalized understanding and had no further questions.  Discharge instructions were provided. I personally saw and examined the patient and I agree with the above discussed plan of care.    Orders Placed This Encounter   Procedures   • XR hips bilateral 3-4 vw w pelvis if performed     Standing Status:   Future     Standing Expiration Date:   1/24/2028     Scheduling Instructions:      Bring along any outside films relating to this procedure.           New Medications Ordered This Visit   Medications   • pregabalin (LYRICA) 50 mg capsule     Sig: Take 1 capsule (50 mg total) by mouth daily at bedtime for 5 days, THEN 1 capsule (50 mg total) 2 (two) times a day.     Dispense:  60 capsule     Refill:  1       History of Present Illness    Kathryn Drake is a 83 y.o. female referred by Dr. Magaña for lower back and left greater than right leg pain.  Symptoms have been present for over 4 years.  She saw Dr. Heredia and underwent a bilateral L4 transforaminal epidural steroid injection in October which helped the right side but then the left side got worse and  she had to go to emergency room.  Since then she has been experiencing moderate to severe pain rated 6-9/10 on a numeric rating scale that is felt constantly described to be sharp, shooting, dull, aching, pressure-like with pins-and-needles down the left leg.  Symptoms are aggravated by standing, bending, walking, coughing, sneezing.  She has tried physical therapy and exercise with some relief.  She uses Tylenol and ibuprofen with minimal relief she also uses lidocaine patches.    I have personally reviewed and/or updated the patient's past medical history, past surgical history, family history, social history, current medications, allergies, and vital signs today.     Review of Systems   Constitutional:  Negative for fever and unexpected weight change.   HENT:  Negative for trouble swallowing.    Eyes:  Negative for visual disturbance.   Respiratory:  Negative for shortness of breath and wheezing.    Cardiovascular:  Negative for chest pain and palpitations.   Gastrointestinal:  Negative for constipation, diarrhea, nausea and vomiting.   Endocrine: Negative for cold intolerance, heat intolerance and polydipsia.   Genitourinary:  Negative for difficulty urinating and frequency.   Musculoskeletal:  Negative for arthralgias, gait problem, joint swelling and myalgias.   Skin:  Negative for rash.   Neurological:  Negative for dizziness, seizures, syncope, weakness and headaches.   Hematological:  Does not bruise/bleed easily.   Psychiatric/Behavioral:  Negative for dysphoric mood.    All other systems reviewed and are negative.      Patient Active Problem List   Diagnosis   • Mild persistent asthma without complication   • Chronic allergic rhinitis   • Coronary artery disease involving native coronary artery of native heart without angina pectoris   • Class 1 obesity due to excess calories without serious comorbidity with body mass index (BMI) of 33.0 to 33.9 in adult   • Acquired hypothyroidism   • Hx of CABG   • Mild  intermittent asthma without complication   • Essential hypertension   • Recurrent depression (HCC)   • Low bone mass   • Lumbar radiculopathy   • Ambulatory dysfunction   • Lumbar pain   • Shortness of breath   • Right renal mass       Past Medical History:   Diagnosis Date   • Asthma    • Cardiac disease     heart attack   • Disease of thyroid gland    • Hyperlipidemia        Past Surgical History:   Procedure Laterality Date   • APPENDECTOMY     • BREAST LUMPECTOMY Bilateral     several   • CARDIAC SURGERY     • CATARACT EXTRACTION, BILATERAL     • CORONARY STENT PLACEMENT     • EPIDURAL BLOCK INJECTION Bilateral 10/25/2023    Procedure: bilateral L4-L5  TRANSFORAMINAL epidural steroid injection (37165);  Surgeon: Ariel Heredia DO;  Location: [facility] MAIN OR;  Service: Pain Management    • NASAL POLYP SURGERY         Family History   Problem Relation Age of Onset   • No Known Problems Mother    • ALS Father    • Cancer Brother    • Heart disease Family    • Asthma Family        Social History     Occupational History   • Not on file   Tobacco Use   • Smoking status: Never   • Smokeless tobacco: Never   Vaping Use   • Vaping status: Never Used   Substance and Sexual Activity   • Alcohol use: Yes     Comment: rare   • Drug use: No   • Sexual activity: Not on file       Current Outpatient Medications on File Prior to Visit   Medication Sig   • albuterol (2.5 mg/3 mL) 0.083 % nebulizer solution Take 3 mL (2.5 mg total) by nebulization every 6 (six) hours as needed for wheezing or shortness of breath   • amLODIPine (NORVASC) 2.5 mg tablet Take 1 tablet (2.5 mg total) by mouth daily   • ascorbic acid (VITAMIN C) 500 MG tablet Take 500 mg by mouth daily   • aspirin 81 MG tablet Take 81 mg by mouth daily     • budesonide (PULMICORT) 0.25 mg/2 mL nebulizer solution Take 2 mL (0.25 mg total) by nebulization 2 (two) times a day Rinse mouth after use.   • Cholecalciferol (VITAMIN D) 2000 UNITS tablet Take 4,000 Units by  mouth daily   • levothyroxine 75 mcg tablet take 1 tablet by mouth once daily   • simvastatin (ZOCOR) 10 mg tablet Take 1 tablet (10 mg total) by mouth daily at bedtime   • Symbicort 160-4.5 MCG/ACT inhaler Inhale 2 puffs 2 (two) times a day   • albuterol (PROVENTIL HFA,VENTOLIN HFA) 90 mcg/act inhaler inhale 2 puffs by mouth and INTO THE LUNGS every 4 hours if needed for wheezing   • ascorbic acid (VITAMIN C) 1500 MG TBCR Take 500 mg by mouth daily   • benzonatate (TESSALON PERLES) 100 mg capsule Take 1 capsule (100 mg total) by mouth 3 (three) times a day as needed for cough   • calcium carbonate-vitamin D 500 mg-5 mcg per tablet Take 1 tablet by mouth 2 (two) times a day with meals   • dextromethorphan-guaiFENesin (ROBITUSSIN DM)  mg/5 mL syrup Take 5 mL by mouth 3 (three) times a day as needed for cough   • EPINEPHrine (EPIPEN) 0.3 mg/0.3 mL SOAJ Inject 0.3 mL (0.3 mg total) into a muscle once for 1 dose   • fexofenadine (ALLEGRA) 60 MG tablet Take by mouth   • lidocaine (LIDODERM) 5 % Place 1 patch on the skin daily Remove & Discard patch within 12 hours or as directed by MD   • nitroglycerin (NITROSTAT) 0.4 mg SL tablet Place 1 tablet under the tongue every 5 (five) minutes as needed   • predniSONE 20 mg tablet Take 2 tabs for 5 days then 1 tablet for 5 days   • UNKNOWN TO PATIENT Allegra daily po  Stool softener bid po     No current facility-administered medications on file prior to visit.       Allergies   Allergen Reactions   • Penicillins    • Latex Rash       Physical Exam    /81   Pulse (!) 51   Wt 79.4 kg (175 lb)   BMI 34.18 kg/m²     Constitutional: normal, well developed, well nourished, alert, in no distress and non-toxic and no overt pain behavior. and obese  Eyes: anicteric  HEENT: grossly intact  Neck: supple, symmetric, trachea midline and no masses   Pulmonary:even and unlabored  Cardiovascular:No edema or pitting edema present  Skin:Normal without rashes or lesions and well  hydrated  Psychiatric:Mood and affect appropriate  Neurologic:Cranial Nerves II-XII grossly intact  Musculoskeletal:antalgic    Lumbar Spine Exam  Appearance:  Scoliosis  Palpation/Tenderness:  left lumbar paraspinal tenderness  right lumbar paraspinal tenderness  Range of Motion:  Flexion:  Moderately limited  with pain  Extension:  Moderately limited  with pain  Motor Strength:  Left hip flexion:  5/5  Left hip extension:  5/5  Right hip flexion:  5/5  Right hip extension:  5/5  Left knee flexion:  5/5  Left knee extension:  5/5  Right knee flexion:  5/5  Right knee extension:  5/5  Left foot dorsiflexion:  5/5  Left foot plantar flexion:  5/5  Right foot dorsiflexion:  5/5  Right foot plantar flexion:  5/5  Reflexes:  Left Patellar:  2+   Right Patellar:  2+   Left Achilles:  2+   Right Achilles:  2+     Imaging    MRI LUMBAR SPINE WITHOUT CONTRAST (11/4/2023)     INDICATION: back pain.     COMPARISON: CT chest, abdomen, and pelvis 11/3/2023     TECHNIQUE:  Multiplanar, multisequence imaging of the lumbar spine was performed. .        IMAGE QUALITY:  Diagnostic     FINDINGS:     VERTEBRAL BODIES:  There are 5 lumbar type vertebral bodies. Levoscoliosis, apex at L3 with Modic type I endplate changes are noted at the L2-3 and L3-4 levels.     SACRUM:  Normal signal within the sacrum. No evidence of insufficiency or stress fracture.     DISTAL CORD AND CONUS:  Normal size and signal within the distal cord and conus. The conus terminates at the L1 level.  The cauda equina nerve roots demonstrate crowding at the L2-3 and L3-4 levels secondary to thecal sac narrowing.     PARASPINAL SOFT TISSUES: Mild dependent subcutaneous edema within the lower back region.     LOWER THORACIC DISC SPACES:  Normal disc height and signal.  No disc herniation, canal stenosis or foraminal narrowing.     LUMBAR DISC SPACES:     L1-2: Diffuse disc bulge with bilateral ligamentum flavum and facet hypertrophy.  No central canal or   subarticular/lateral recess narrowing. Mild bilateral neural foraminal stenosis is again noted.     L2-3: Diffuse disc bulge and superimposed broad-based central/right paracentral disc protrusion. Moderate to severe central canal and bilateral subarticular/lateral recess narrowing, progressed. Moderate right and mild left neural foraminal stenosis,   unchanged.     L3-4: Stable diffuse disc bulge along with bilateral ligamentum flavum and facet hypertrophy. Moderate to severe central canal and bilateral subarticular/lateral recess narrowing. Moderate to severe right and mild left neural foraminal stenosis,   unchanged.     L4-5: Moderate diffuse disc bulge is again noted, eccentric to the left. Bilateral ligamentum flavum and facet hypertrophy. Moderate central canal and moderate to severe left subarticular/lateral recess narrowing. Moderate left neural foraminal stenosis,   unchanged.     L5-S1: Stable small left foraminal disc protrusion.  No central canal or  subarticular/lateral recess narrowing. Mild to moderate left neural foraminal stenosis, unchanged.     OTHER FINDINGS: A 2.2 cm indeterminate, partially exophytic lesion at the posterior midpole of the right kidney     IMPRESSION:     Levoscoliosis, apex at L3.     Multilevel lumbar degenerative disc disease as detailed with no new disc herniation compared to 5/13/2022. Moderate to severe central canal and bilateral subarticular/lateral recess narrowing at L2-3, progressed. Moderate to severe central canal stenosis   is again noted at the L3-4 level.     A 2.2 cm indeterminate, partially exophytic lesion is noted at the posterior midpole of the right kidney. Please refer to the CT chest, abdomen, and pelvis 11/3/2023 for additional details.       MRI THORACIC SPINE WITHOUT CONTRAST (11/4/2023)     INDICATION: back pain.     COMPARISON:  CT thoracolumbar spine 11/3/2023     TECHNIQUE:  Multiplanar, multisequence imaging of the thoracic spine was performed.  .     IMAGE QUALITY: Diagnostic.     FINDINGS:     ALIGNMENT.  No compression fracture.  No subluxation. S-shaped thoracolumbar scoliosis with the dextroconvex thoracic component, apex at T7.     MARROW SIGNAL:  Normal marrow signal is identified within the visualized bony structures.  No discrete marrow lesion.     THORACIC CORD: Normal signal within the thoracic cord.     PARAVERTEBRAL SOFT TISSUES:  Normal.     THORACIC DEGENERATIVE CHANGE: Central disc protrusion at the T6-7 level without central canal narrowing. Small bilateral paracentral disc protrusions at the T9-10 level without central canal narrowing. Small left paracentral disc protrusion at the T10-11   level without central canal narrowing. Mild disc bulge at the T11-12 level without central canal narrowing.        IMPRESSION:     S-shaped thoracolumbar scoliosis with the dextroconvex thoracic component, apex at T7. Thoracic vertebral body heights are maintained demonstrating no acute fracture or subluxation     Disc protrusions at the T6-7, T9-10, and T10-11 levels without central canal narrowing. Mild disc bulge at the T11-12 level.     The thoracic cord appears normal in caliber and signal with no evidence of focal impingement.

## 2024-01-23 NOTE — PROGRESS NOTES
Assessment/Plan:  1. Primary osteoarthritis of left knee          Kathryn tolerated her first Euflexxa injection in her left knee today.  Follow-up in 1 week for the next injection.    Subjective:   Kathryn Drake is a 83 y.o. female who presents for her   First Euflexxa injection of the left knee today.         Past Medical History:   Diagnosis Date    Asthma     Cardiac disease     heart attack    Disease of thyroid gland     Hyperlipidemia        Past Surgical History:   Procedure Laterality Date    APPENDECTOMY      BREAST LUMPECTOMY Bilateral     several    CARDIAC SURGERY      CATARACT EXTRACTION, BILATERAL      CORONARY STENT PLACEMENT      EPIDURAL BLOCK INJECTION Bilateral 10/25/2023    Procedure: bilateral L4-L5  TRANSFORAMINAL epidural steroid injection (18850);  Surgeon: Ariel Heredia DO;  Location: [facility] MAIN OR;  Service: Pain Management     NASAL POLYP SURGERY         Family History   Problem Relation Age of Onset    No Known Problems Mother     ALS Father     Cancer Brother     Heart disease Family     Asthma Family        Social History     Occupational History    Not on file   Tobacco Use    Smoking status: Never    Smokeless tobacco: Never   Vaping Use    Vaping status: Never Used   Substance and Sexual Activity    Alcohol use: Yes     Comment: rare    Drug use: No    Sexual activity: Not on file         Current Outpatient Medications:     albuterol (2.5 mg/3 mL) 0.083 % nebulizer solution, Take 3 mL (2.5 mg total) by nebulization every 6 (six) hours as needed for wheezing or shortness of breath, Disp: 360 mL, Rfl: 5    albuterol (PROVENTIL HFA,VENTOLIN HFA) 90 mcg/act inhaler, inhale 2 puffs by mouth and INTO THE LUNGS every 4 hours if needed for wheezing (Patient not taking: Reported on 1/23/2024), Disp: 8.5 g, Rfl: 8    amLODIPine (NORVASC) 2.5 mg tablet, Take 1 tablet (2.5 mg total) by mouth daily, Disp: 90 tablet, Rfl: 1    ascorbic acid (VITAMIN C) 1500 MG TBCR, Take 500 mg by  mouth daily (Patient not taking: Reported on 1/22/2024), Disp: , Rfl:     ascorbic acid (VITAMIN C) 500 MG tablet, Take 500 mg by mouth daily, Disp: , Rfl:     aspirin 81 MG tablet, Take 81 mg by mouth daily  , Disp: , Rfl:     benzonatate (TESSALON PERLES) 100 mg capsule, Take 1 capsule (100 mg total) by mouth 3 (three) times a day as needed for cough (Patient not taking: Reported on 11/3/2023), Disp: 20 capsule, Rfl: 0    budesonide (PULMICORT) 0.25 mg/2 mL nebulizer solution, Take 2 mL (0.25 mg total) by nebulization 2 (two) times a day Rinse mouth after use., Disp: 60 mL, Rfl: 11    calcium carbonate-vitamin D 500 mg-5 mcg per tablet, Take 1 tablet by mouth 2 (two) times a day with meals (Patient not taking: Reported on 1/22/2024), Disp: , Rfl:     Cholecalciferol (VITAMIN D) 2000 UNITS tablet, Take 4,000 Units by mouth daily, Disp: , Rfl:     dextromethorphan-guaiFENesin (ROBITUSSIN DM)  mg/5 mL syrup, Take 5 mL by mouth 3 (three) times a day as needed for cough (Patient not taking: Reported on 11/20/2023), Disp: 118 mL, Rfl: 0    EPINEPHrine (EPIPEN) 0.3 mg/0.3 mL SOAJ, Inject 0.3 mL (0.3 mg total) into a muscle once for 1 dose, Disp: 0.6 mL, Rfl: 0    fexofenadine (ALLEGRA) 60 MG tablet, Take by mouth (Patient not taking: Reported on 1/22/2024), Disp: , Rfl:     levothyroxine 75 mcg tablet, take 1 tablet by mouth once daily (Patient not taking: Reported on 1/22/2024), Disp: 90 tablet, Rfl: 1    lidocaine (LIDODERM) 5 %, Place 1 patch on the skin daily Remove & Discard patch within 12 hours or as directed by MD (Patient not taking: Reported on 1/23/2024), Disp: 30 patch, Rfl: 0    nitroglycerin (NITROSTAT) 0.4 mg SL tablet, Place 1 tablet under the tongue every 5 (five) minutes as needed (Patient not taking: Reported on 1/22/2024), Disp: , Rfl:     predniSONE 20 mg tablet, Take 2 tabs for 5 days then 1 tablet for 5 days (Patient not taking: Reported on 1/23/2024), Disp: 15 tablet, Rfl: 0    simvastatin  (ZOCOR) 10 mg tablet, Take 1 tablet (10 mg total) by mouth daily at bedtime, Disp: 90 tablet, Rfl: 1    Symbicort 160-4.5 MCG/ACT inhaler, Inhale 2 puffs 2 (two) times a day (Patient not taking: Reported on 1/22/2024), Disp: 10.2 g, Rfl: 3    UNKNOWN TO PATIENT, Allegra daily po Stool softener bid po (Patient not taking: Reported on 1/22/2024), Disp: , Rfl:     Allergies   Allergen Reactions    Penicillins     Latex Rash       Objective:  There were no vitals filed for this visit.  Pain Score:   9      Ortho Exam    Physical Exam    Large joint arthrocentesis: L knee  Universal Protocol:  Consent: Verbal consent obtained.  Risks and benefits: risks, benefits and alternatives were discussed  Consent given by: patient  Site marked: the operative site was marked  Supporting Documentation  Indications: pain   Procedure Details  Location: knee - L knee  Needle size: 22 G  Ultrasound guidance: no  Approach: anterolateral  Medications administered: 20 mg Sodium Hyaluronate (Viscosup) 20 MG/2ML    Patient tolerance: patient tolerated the procedure well with no immediate complications  Dressing:  Sterile dressing applied            This document was created using speech voice recognition software.   Grammatical errors, random word insertions, pronoun errors, and incomplete sentences are an occasional consequence of this system due to software limitations, ambient noise, and hardware issues.   Any formal questions or concerns about content, text, or information contained within the body of this dictation should be directly addressed to the provider for clarification.

## 2024-01-23 NOTE — ASSESSMENT & PLAN NOTE
Kathryn is complaining of severe lumbar pain.  She states her back pain got worse after she had had a lumbar injection done bilateral 10/25/2023.  Still is not having much relief even despite hospitalization to adjust medication to help her pain.  She does have appointment to see a pain specialist on 1/14

## 2024-01-23 NOTE — ASSESSMENT & PLAN NOTE
Continue with desonide 0.25 mg twice daily and can use albuterol 2.5 mg nebulizer albuterol Ju 2 puffs 4 times a day as needed.    I did give her 2 samples of Spiriva 1.25 mcg Respimat she can use 2 puffs once a day to see if this helps her breathing.

## 2024-01-23 NOTE — ASSESSMENT & PLAN NOTE
MRI of her abdomen done 1/12/24 revealed a 2 cm solid mass in the upper pole of the right kidney.  He has appointment to see urologist Dr. Moseley tomorrow.

## 2024-01-23 NOTE — ASSESSMENT & PLAN NOTE
Kathryn is complaining of increased shortness of breath in past few weeks.  Not having any wheezing today.  Could be mostly related to her lower back pain.  I did prescribe her prednisone over 40 mg for 5 days then 20 mg for 5 days.  Today Ms. also help with her back pain.  She does have appointment to see a pain specialist 1/24.

## 2024-01-23 NOTE — PROGRESS NOTES
Assessment/Plan        Problem List Items Addressed This Visit          Respiratory    Mild persistent asthma without complication     Continue with desonide 0.25 mg twice daily and can use albuterol 2.5 mg nebulizer albuterol Hailer 2 puffs 4 times a day as needed.    I did give her 2 samples of Spiriva 1.25 mcg Respimat she can use 2 puffs once a day to see if this helps her breathing.            Other    Lumbar pain     Kathryn is complaining of severe lumbar pain.  She states her back pain got worse after she had had a lumbar injection done bilateral 10/25/2023.  Still is not having much relief even despite hospitalization to adjust medication to help her pain.  She does have appointment to see a pain specialist on 1/14         Shortness of breath - Primary     Kathryn is complaining of increased shortness of breath in past few weeks.  Not having any wheezing today.  Could be mostly related to her lower back pain.  I did prescribe her prednisone over 40 mg for 5 days then 20 mg for 5 days.  Today Ms. also help with her back pain.  She does have appointment to see a pain specialist 1/24.         Relevant Medications    predniSONE 20 mg tablet    Right renal mass     MRI of her abdomen done 1/12/24 revealed a 2 cm solid mass in the upper pole of the right kidney.  He has appointment to see urologist Dr. Moseley tomorrow.              Follow-up, short of breath with activity      HPI    Kathryn has mild persistent asthma chronic allergic rhinitis.  He also has coronary disease and had CABG surgery in 2014.  She does take Singulair 10 mg daily for chronic allergic rhinitis.    She has been having increased lower lumbar back pain for past few months is not far affecting her breathing.  She said she has been more short of breath.  To help with cost of her medication she is using nebulized budesonide 0.25 mg twice daily which she has gotten from her line and butyryl 2.5 mg for her nebulizer as well as  albuterol inhaler.  She used to be on Symbicort 160 mcg but this was expensive for her and she is not using a nebulizer solution which is portable..  She does have occasional cough and is worried that she may not be able to expectorate mucus and may have mucous plug.  She does have a flutter valve at home.    Does have history of hypertension takes amlodipine 2.5 mg daily    He was hospitalized at [facility] from 11/3 to 11/4/23 he was having neck significant worsening of lower back pain after she had undergone lumbar injection bilateral 10/25/2023 for chronic back pain.  Her pain became so intense she had difficulty standing and she was admitted to help treat her pain.  MRI of her thoracic spine showed an S shaped thoracolumbar scoliosis which she has had for some time.  No evidence of any acute fracture or subluxation.  MRI of the lumbar spine showed levoscoliosis apex at L3.  No new disc herniation.  She did have evidence of moderate to severe central canal stenosis.  She still having significant pain in her lower back.  She does have an appointment to see pain specialist Dr. Cazares on 1/24.    Also MRI of her abdomen 1/12/24 did reveal a 2 cm solid mass in upper pole of the right kidney.  She also had a 7 millimeter pancreatic tail cyst.  She has appointment to see urologist Dr. Moseley tomorrow.  Not have any hematuria.        Past Medical History:   Diagnosis Date    Asthma     Cardiac disease     heart attack    Disease of thyroid gland     Hyperlipidemia        Past Surgical History:   Procedure Laterality Date    APPENDECTOMY      BREAST LUMPECTOMY Bilateral     several    CARDIAC SURGERY      CATARACT EXTRACTION, BILATERAL      CORONARY STENT PLACEMENT      EPIDURAL BLOCK INJECTION Bilateral 10/25/2023    Procedure: bilateral L4-L5  TRANSFORAMINAL epidural steroid injection (26801);  Surgeon: Ariel Heredia DO;  Location: [facility] MAIN OR;  Service: Pain Management     NASAL POLYP SURGERY            Current Outpatient Medications:     albuterol (2.5 mg/3 mL) 0.083 % nebulizer solution, Take 3 mL (2.5 mg total) by nebulization every 6 (six) hours as needed for wheezing or shortness of breath, Disp: 360 mL, Rfl: 5    albuterol (PROVENTIL HFA,VENTOLIN HFA) 90 mcg/act inhaler, inhale 2 puffs by mouth and INTO THE LUNGS every 4 hours if needed for wheezing, Disp: 8.5 g, Rfl: 8    amLODIPine (NORVASC) 2.5 mg tablet, Take 1 tablet (2.5 mg total) by mouth daily, Disp: 90 tablet, Rfl: 1    ascorbic acid (VITAMIN C) 500 MG tablet, Take 500 mg by mouth daily, Disp: , Rfl:     aspirin 81 MG tablet, Take 81 mg by mouth daily  , Disp: , Rfl:     budesonide (PULMICORT) 0.25 mg/2 mL nebulizer solution, Take 2 mL (0.25 mg total) by nebulization 2 (two) times a day Rinse mouth after use., Disp: 60 mL, Rfl: 11    lidocaine (LIDODERM) 5 %, Place 1 patch on the skin daily Remove & Discard patch within 12 hours or as directed by MD, Disp: 30 patch, Rfl: 0    predniSONE 20 mg tablet, Take 2 tabs for 5 days then 1 tablet for 5 days, Disp: 15 tablet, Rfl: 0    simvastatin (ZOCOR) 10 mg tablet, Take 1 tablet (10 mg total) by mouth daily at bedtime, Disp: 90 tablet, Rfl: 1    ascorbic acid (VITAMIN C) 1500 MG TBCR, Take 500 mg by mouth daily (Patient not taking: Reported on 1/22/2024), Disp: , Rfl:     benzonatate (TESSALON PERLES) 100 mg capsule, Take 1 capsule (100 mg total) by mouth 3 (three) times a day as needed for cough (Patient not taking: Reported on 11/3/2023), Disp: 20 capsule, Rfl: 0    calcium carbonate-vitamin D 500 mg-5 mcg per tablet, Take 1 tablet by mouth 2 (two) times a day with meals (Patient not taking: Reported on 1/22/2024), Disp: , Rfl:     Cholecalciferol (VITAMIN D) 2000 UNITS tablet, Take 4,000 Units by mouth daily (Patient not taking: Reported on 1/22/2024), Disp: , Rfl:     dextromethorphan-guaiFENesin (ROBITUSSIN DM)  mg/5 mL syrup, Take 5 mL by mouth 3 (three) times a day as needed for cough  (Patient not taking: Reported on 11/20/2023), Disp: 118 mL, Rfl: 0    EPINEPHrine (EPIPEN) 0.3 mg/0.3 mL SOAJ, Inject 0.3 mL (0.3 mg total) into a muscle once for 1 dose, Disp: 0.6 mL, Rfl: 0    fexofenadine (ALLEGRA) 60 MG tablet, Take by mouth (Patient not taking: Reported on 1/22/2024), Disp: , Rfl:     levothyroxine 75 mcg tablet, take 1 tablet by mouth once daily (Patient not taking: Reported on 1/22/2024), Disp: 90 tablet, Rfl: 1    nitroglycerin (NITROSTAT) 0.4 mg SL tablet, Place 1 tablet under the tongue every 5 (five) minutes as needed (Patient not taking: Reported on 1/22/2024), Disp: , Rfl:     Symbicort 160-4.5 MCG/ACT inhaler, Inhale 2 puffs 2 (two) times a day (Patient not taking: Reported on 1/22/2024), Disp: 10.2 g, Rfl: 3    UNKNOWN TO PATIENT, Allegra daily po Stool softener bid po (Patient not taking: Reported on 1/22/2024), Disp: , Rfl:     Allergies   Allergen Reactions    Penicillins     Latex Rash       Social History     Tobacco Use    Smoking status: Never    Smokeless tobacco: Never   Substance Use Topics    Alcohol use: Yes     Comment: rare         Family History   Problem Relation Age of Onset    No Known Problems Mother     ALS Father     Cancer Brother     Heart disease Family     Asthma Family        Review of Systems   Constitutional:  Negative for chills, fever and unexpected weight change.   HENT:  Negative for congestion, rhinorrhea and sore throat.    Eyes:  Negative for discharge and redness.   Respiratory:  Positive for shortness of breath.    Cardiovascular:  Negative for chest pain, palpitations and leg swelling.   Gastrointestinal:  Negative for abdominal distention, abdominal pain and nausea.   Endocrine: Negative for polydipsia and polyphagia.   Genitourinary:  Negative for dysuria.   Musculoskeletal:  Negative for joint swelling and myalgias.        Has lower back pain severe at times   Skin:  Negative for rash.   Neurological:  Negative for light-headedness.    Psychiatric/Behavioral:  Negative for confusion.            Vitals:    01/22/24 0950   BP: 160/92   Pulse: 88   Resp: 18   Temp: 97.8 °F (36.6 °C)   SpO2: 97%     Height: 5' (152.4 cm)  IBW (Ideal Body Weight): 45.5 kg  Body mass index is 34.76 kg/m².  Weight (last 2 days)       Date/Time Weight    01/22/24 0950 80.7 (178)                Physical Exam  Vitals reviewed.   Constitutional:       General: She is not in acute distress.     Appearance: Normal appearance. She is well-developed.   HENT:      Head: Normocephalic.      Right Ear: External ear normal.      Left Ear: External ear normal.      Nose: Nose normal.      Mouth/Throat:      Mouth: Mucous membranes are moist.      Pharynx: Oropharynx is clear. No oropharyngeal exudate.   Eyes:      Conjunctiva/sclera: Conjunctivae normal.      Pupils: Pupils are equal, round, and reactive to light.   Cardiovascular:      Rate and Rhythm: Normal rate and regular rhythm.      Heart sounds: Normal heart sounds.   Pulmonary:      Effort: Pulmonary effort is normal.      Comments: Lung sounds are clear.  No wheezes, crackles or rhonchi  Abdominal:      General: There is no distension.      Palpations: Abdomen is soft.      Tenderness: There is no abdominal tenderness.   Musculoskeletal:      Cervical back: Neck supple.      Comments: No edema, cyanosis or clubbing   Lymphadenopathy:      Cervical: No cervical adenopathy.   Skin:     General: Skin is warm and dry.   Neurological:      General: No focal deficit present.      Mental Status: She is alert and oriented to person, place, and time.   Psychiatric:         Mood and Affect: Mood normal.         Behavior: Behavior normal.         Thought Content: Thought content normal.

## 2024-01-24 ENCOUNTER — OFFICE VISIT (OUTPATIENT)
Dept: PAIN MEDICINE | Facility: CLINIC | Age: 84
End: 2024-01-24
Payer: MEDICARE

## 2024-01-24 ENCOUNTER — TRANSCRIBE ORDERS (OUTPATIENT)
Dept: PAIN MEDICINE | Facility: CLINIC | Age: 84
End: 2024-01-24

## 2024-01-24 VITALS
DIASTOLIC BLOOD PRESSURE: 81 MMHG | HEART RATE: 51 BPM | WEIGHT: 175 LBS | BODY MASS INDEX: 34.18 KG/M2 | SYSTOLIC BLOOD PRESSURE: 110 MMHG

## 2024-01-24 DIAGNOSIS — M16.0 BILATERAL PRIMARY OSTEOARTHRITIS OF HIP: ICD-10-CM

## 2024-01-24 DIAGNOSIS — M51.16 INTERVERTEBRAL DISC DISORDER WITH RADICULOPATHY OF LUMBAR REGION: ICD-10-CM

## 2024-01-24 DIAGNOSIS — M48.062 LUMBAR STENOSIS WITH NEUROGENIC CLAUDICATION: Primary | ICD-10-CM

## 2024-01-24 PROCEDURE — 99214 OFFICE O/P EST MOD 30 MIN: CPT | Performed by: ANESTHESIOLOGY

## 2024-01-24 RX ORDER — PREGABALIN 50 MG/1
CAPSULE ORAL
Qty: 60 CAPSULE | Refills: 1 | Status: SHIPPED | OUTPATIENT
Start: 2024-01-24 | End: 2024-03-29

## 2024-01-25 ENCOUNTER — HOSPITAL ENCOUNTER (OUTPATIENT)
Dept: RADIOLOGY | Facility: HOSPITAL | Age: 84
Discharge: HOME | End: 2024-01-25
Payer: MEDICARE

## 2024-01-25 ENCOUNTER — TELEPHONE (OUTPATIENT)
Dept: RADIOLOGY | Facility: CLINIC | Age: 84
End: 2024-01-25

## 2024-01-25 DIAGNOSIS — M16.0 BILATERAL PRIMARY OSTEOARTHRITIS OF HIP: ICD-10-CM

## 2024-01-25 PROCEDURE — 73521 X-RAY EXAM HIPS BI 2 VIEWS: CPT

## 2024-01-25 NOTE — TELEPHONE ENCOUNTER
Please let patient know that I reviewed the x-rays of the hip she had done yesterday and she has had advancement of the arthritis in both hips particular in her left hip which I suspect is why it has been hurting so much more.  It has progressed since 2021 as well.  Would like her to be on the Lyrica just little bit longer but then we will likely recommend proceeding with bilateral hip intra-articular steroid injections.

## 2024-01-25 NOTE — TELEPHONE ENCOUNTER
S/w pt, reviewed results. Pt is still waiting for her pharmacy to get her medication in stock then will start it and provide an update in a few weeks. Pt does not want to proceed with injections.

## 2024-01-26 ENCOUNTER — TELEPHONE (OUTPATIENT)
Age: 84
End: 2024-01-26

## 2024-01-26 DIAGNOSIS — J45.41 MODERATE PERSISTENT ASTHMA WITH EXACERBATION: ICD-10-CM

## 2024-01-26 RX ORDER — BUDESONIDE 0.25 MG/2ML
0.25 INHALANT ORAL 2 TIMES DAILY
Qty: 60 ML | Refills: 5 | Status: SHIPPED | OUTPATIENT
Start: 2024-01-26 | End: 2024-02-01 | Stop reason: SDUPTHER

## 2024-01-26 NOTE — TELEPHONE ENCOUNTER
PA for Budesonide    Submitted via  []CMM-KEY    [x][employer]-Case ID # [id number]   []Faxed to plan   []Other website    []Phone call Case ID #      Office notes sent, clinical questions answered. Awaiting determination

## 2024-01-26 NOTE — TELEPHONE ENCOUNTER
RX WAS RELEASED TO [facility] TO BE RUN UNDER PT'S MEDICARE PART B    [facility] AWARE

## 2024-01-26 NOTE — TELEPHONE ENCOUNTER
Patient called in requesting a refill on her budesonide nebulizer solution. She would like this sent to the [facility] on file. Please advise.       Patient has an upcoming appt on 2/21 with Dr. Long.

## 2024-01-30 ENCOUNTER — OFFICE VISIT (OUTPATIENT)
Dept: OBGYN CLINIC | Facility: CLINIC | Age: 84
End: 2024-01-30
Payer: MEDICARE

## 2024-01-30 DIAGNOSIS — M17.12 PRIMARY OSTEOARTHRITIS OF LEFT KNEE: Primary | ICD-10-CM

## 2024-01-30 PROCEDURE — 20610 DRAIN/INJ JOINT/BURSA W/O US: CPT | Performed by: ORTHOPAEDIC SURGERY

## 2024-01-30 RX ORDER — HYALURONATE SODIUM 10 MG/ML
20 SYRINGE (ML) INTRAARTICULAR
Status: COMPLETED | OUTPATIENT
Start: 2024-01-30 | End: 2024-01-30

## 2024-01-30 RX ADMIN — Medication 20 MG: at 10:30

## 2024-01-30 NOTE — PROGRESS NOTES
Assessment/Plan:  1. Primary osteoarthritis of left knee            Kathryn tolerated her second Euflexxa injection left knee today.  Follow-up in 1 week for the next injection.    Subjective:   Kathryn Drake is a 83 y.o. female who presents for her second Euflexxa injection in the left knee today.         Past Medical History:   Diagnosis Date    Asthma     Cardiac disease     heart attack    Disease of thyroid gland     Hyperlipidemia        Past Surgical History:   Procedure Laterality Date    APPENDECTOMY      BREAST LUMPECTOMY Bilateral     several    CARDIAC SURGERY      CATARACT EXTRACTION, BILATERAL      CORONARY STENT PLACEMENT      EPIDURAL BLOCK INJECTION Bilateral 10/25/2023    Procedure: bilateral L4-L5  TRANSFORAMINAL epidural steroid injection (64854);  Surgeon: Ariel Heredia DO;  Location: [facility] MAIN OR;  Service: Pain Management     NASAL POLYP SURGERY         Family History   Problem Relation Age of Onset    No Known Problems Mother     ALS Father     Cancer Brother     Heart disease Family     Asthma Family        Social History     Occupational History    Not on file   Tobacco Use    Smoking status: Never    Smokeless tobacco: Never   Vaping Use    Vaping status: Never Used   Substance and Sexual Activity    Alcohol use: Yes     Comment: rare    Drug use: No    Sexual activity: Not on file         Current Outpatient Medications:     albuterol (2.5 mg/3 mL) 0.083 % nebulizer solution, Take 3 mL (2.5 mg total) by nebulization every 6 (six) hours as needed for wheezing or shortness of breath, Disp: 360 mL, Rfl: 5    albuterol (PROVENTIL HFA,VENTOLIN HFA) 90 mcg/act inhaler, inhale 2 puffs by mouth and INTO THE LUNGS every 4 hours if needed for wheezing, Disp: 8.5 g, Rfl: 8    amLODIPine (NORVASC) 2.5 mg tablet, Take 1 tablet (2.5 mg total) by mouth daily, Disp: 90 tablet, Rfl: 1    ascorbic acid (VITAMIN C) 1500 MG TBCR, Take 500 mg by mouth daily, Disp: , Rfl:     ascorbic acid  (VITAMIN C) 500 MG tablet, Take 500 mg by mouth daily, Disp: , Rfl:     aspirin 81 MG tablet, Take 81 mg by mouth daily  , Disp: , Rfl:     benzonatate (TESSALON PERLES) 100 mg capsule, Take 1 capsule (100 mg total) by mouth 3 (three) times a day as needed for cough, Disp: 20 capsule, Rfl: 0    budesonide (PULMICORT) 0.25 mg/2 mL nebulizer solution, Take 2 mL (0.25 mg total) by nebulization 2 (two) times a day Rinse mouth after use., Disp: 60 mL, Rfl: 5    calcium carbonate-vitamin D 500 mg-5 mcg per tablet, Take 1 tablet by mouth 2 (two) times a day with meals, Disp: , Rfl:     Cholecalciferol (VITAMIN D) 2000 UNITS tablet, Take 4,000 Units by mouth daily, Disp: , Rfl:     dextromethorphan-guaiFENesin (ROBITUSSIN DM)  mg/5 mL syrup, Take 5 mL by mouth 3 (three) times a day as needed for cough, Disp: 118 mL, Rfl: 0    EPINEPHrine (EPIPEN) 0.3 mg/0.3 mL SOAJ, Inject 0.3 mL (0.3 mg total) into a muscle once for 1 dose, Disp: 0.6 mL, Rfl: 0    fexofenadine (ALLEGRA) 60 MG tablet, Take by mouth, Disp: , Rfl:     levothyroxine 75 mcg tablet, take 1 tablet by mouth once daily, Disp: 90 tablet, Rfl: 1    lidocaine (LIDODERM) 5 %, Place 1 patch on the skin daily Remove & Discard patch within 12 hours or as directed by MD, Disp: 30 patch, Rfl: 0    nitroglycerin (NITROSTAT) 0.4 mg SL tablet, Place 1 tablet under the tongue every 5 (five) minutes as needed, Disp: , Rfl:     predniSONE 20 mg tablet, Take 2 tabs for 5 days then 1 tablet for 5 days, Disp: 15 tablet, Rfl: 0    pregabalin (LYRICA) 50 mg capsule, Take 1 capsule (50 mg total) by mouth daily at bedtime for 5 days, THEN 1 capsule (50 mg total) 2 (two) times a day., Disp: 60 capsule, Rfl: 1    simvastatin (ZOCOR) 10 mg tablet, Take 1 tablet (10 mg total) by mouth daily at bedtime, Disp: 90 tablet, Rfl: 1    Symbicort 160-4.5 MCG/ACT inhaler, Inhale 2 puffs 2 (two) times a day, Disp: 10.2 g, Rfl: 3    UNKNOWN TO PATIENT, Allegra daily po Stool softener bid po,  Disp: , Rfl:     Allergies   Allergen Reactions    Penicillins     Latex Rash       Objective:  There were no vitals filed for this visit.         Ortho Exam    Physical Exam  Vitals and nursing note reviewed.   Constitutional:       Appearance: Normal appearance. She is well-developed.   HENT:      Head: Normocephalic and atraumatic.      Right Ear: External ear normal.      Left Ear: External ear normal.   Eyes:      General: No scleral icterus.     Extraocular Movements: Extraocular movements intact.      Conjunctiva/sclera: Conjunctivae normal.   Cardiovascular:      Rate and Rhythm: Normal rate.   Pulmonary:      Effort: Pulmonary effort is normal. No respiratory distress.   Musculoskeletal:      Cervical back: Normal range of motion and neck supple.      Comments: See Ortho exam   Skin:     General: Skin is warm and dry.   Neurological:      General: No focal deficit present.      Mental Status: She is alert and oriented to person, place, and time.   Psychiatric:         Behavior: Behavior normal.         Large joint arthrocentesis: L knee  Universal Protocol:  Consent: Verbal consent obtained.  Risks and benefits: risks, benefits and alternatives were discussed  Consent given by: patient  Site marked: the operative site was marked  Supporting Documentation  Indications: pain   Procedure Details  Location: knee - L knee  Needle size: 22 G  Ultrasound guidance: no  Approach: anterolateral  Medications administered: 20 mg Sodium Hyaluronate (Viscosup) 20 MG/2ML    Patient tolerance: patient tolerated the procedure well with no immediate complications  Dressing:  Sterile dressing applied            This document was created using speech voice recognition software.   Grammatical errors, random word insertions, pronoun errors, and incomplete sentences are an occasional consequence of this system due to software limitations, ambient noise, and hardware issues.   Any formal questions or concerns about content, text,  or information contained within the body of this dictation should be directly addressed to the provider for clarification.

## 2024-02-01 DIAGNOSIS — J45.41 MODERATE PERSISTENT ASTHMA WITH EXACERBATION: ICD-10-CM

## 2024-02-01 RX ORDER — BUDESONIDE 0.25 MG/2ML
0.25 INHALANT ORAL 2 TIMES DAILY
Qty: 120 ML | Refills: 11 | Status: SHIPPED | OUTPATIENT
Start: 2024-02-01 | End: 2024-02-08 | Stop reason: SDUPTHER

## 2024-02-01 NOTE — PROGRESS NOTES
I fix prescription of budesonide 0.25 mg as why prescription was written she was only receiving enough for once a day per month.  She will use 0.25 mg of budesonide twice a day and [facility]  She has been getting it from [facility].

## 2024-02-02 ENCOUNTER — TELEPHONE (OUTPATIENT)
Age: 84
End: 2024-02-02

## 2024-02-02 LAB
DME PARACHUTE DELIVERY DATE REQUESTED: NORMAL
DME PARACHUTE ITEM DESCRIPTION: NORMAL
DME PARACHUTE ITEM DESCRIPTION: NORMAL
DME PARACHUTE ORDER STATUS: NORMAL
DME PARACHUTE SUPPLIER NAME: NORMAL
DME PARACHUTE SUPPLIER PHONE: NORMAL

## 2024-02-02 NOTE — TELEPHONE ENCOUNTER
Pt just phoned stating her medication just arrived by mail so she will not need to come into the office to  the medication

## 2024-02-06 ENCOUNTER — PROCEDURE VISIT (OUTPATIENT)
Dept: OBGYN CLINIC | Facility: CLINIC | Age: 84
End: 2024-02-06
Payer: MEDICARE

## 2024-02-06 DIAGNOSIS — M17.12 PRIMARY OSTEOARTHRITIS OF LEFT KNEE: Primary | ICD-10-CM

## 2024-02-06 PROCEDURE — 20610 DRAIN/INJ JOINT/BURSA W/O US: CPT | Performed by: ORTHOPAEDIC SURGERY

## 2024-02-06 RX ORDER — HYALURONATE SODIUM 10 MG/ML
20 SYRINGE (ML) INTRAARTICULAR
Status: COMPLETED | OUTPATIENT
Start: 2024-02-06 | End: 2024-02-06

## 2024-02-06 RX ADMIN — Medication 20 MG: at 10:30

## 2024-02-06 NOTE — PROGRESS NOTES
Assessment/Plan:  1. Primary osteoarthritis of left knee            Kathryn tolerated her third Euflexxa injection of the left knee today.  We can repeat these injections in 6 months if clinically indicated.    Subjective:   Kathryn Drake is a 83 y.o. female who presents to the office for her third Euflexxa injection in the left knee         Past Medical History:   Diagnosis Date    Asthma     Cardiac disease     heart attack    Disease of thyroid gland     Hyperlipidemia        Past Surgical History:   Procedure Laterality Date    APPENDECTOMY      BREAST LUMPECTOMY Bilateral     several    CARDIAC SURGERY      CATARACT EXTRACTION, BILATERAL      CORONARY STENT PLACEMENT      EPIDURAL BLOCK INJECTION Bilateral 10/25/2023    Procedure: bilateral L4-L5  TRANSFORAMINAL epidural steroid injection (40421);  Surgeon: Ariel Heredia DO;  Location: [facility] MAIN OR;  Service: Pain Management     NASAL POLYP SURGERY         Family History   Problem Relation Age of Onset    No Known Problems Mother     ALS Father     Cancer Brother     Heart disease Family     Asthma Family        Social History     Occupational History    Not on file   Tobacco Use    Smoking status: Never    Smokeless tobacco: Never   Vaping Use    Vaping status: Never Used   Substance and Sexual Activity    Alcohol use: Yes     Comment: rare    Drug use: No    Sexual activity: Not on file         Current Outpatient Medications:     albuterol (2.5 mg/3 mL) 0.083 % nebulizer solution, Take 3 mL (2.5 mg total) by nebulization every 6 (six) hours as needed for wheezing or shortness of breath, Disp: 360 mL, Rfl: 5    albuterol (PROVENTIL HFA,VENTOLIN HFA) 90 mcg/act inhaler, inhale 2 puffs by mouth and INTO THE LUNGS every 4 hours if needed for wheezing, Disp: 8.5 g, Rfl: 8    amLODIPine (NORVASC) 2.5 mg tablet, Take 1 tablet (2.5 mg total) by mouth daily, Disp: 90 tablet, Rfl: 1    ascorbic acid (VITAMIN C) 1500 MG TBCR, Take 500 mg by mouth daily,  Disp: , Rfl:     ascorbic acid (VITAMIN C) 500 MG tablet, Take 500 mg by mouth daily, Disp: , Rfl:     aspirin 81 MG tablet, Take 81 mg by mouth daily  , Disp: , Rfl:     benzonatate (TESSALON PERLES) 100 mg capsule, Take 1 capsule (100 mg total) by mouth 3 (three) times a day as needed for cough, Disp: 20 capsule, Rfl: 0    budesonide (PULMICORT) 0.25 mg/2 mL nebulizer solution, Take 2 mL (0.25 mg total) by nebulization 2 (two) times a day Rinse mouth after use., Disp: 120 mL, Rfl: 11    calcium carbonate-vitamin D 500 mg-5 mcg per tablet, Take 1 tablet by mouth 2 (two) times a day with meals, Disp: , Rfl:     Cholecalciferol (VITAMIN D) 2000 UNITS tablet, Take 4,000 Units by mouth daily, Disp: , Rfl:     dextromethorphan-guaiFENesin (ROBITUSSIN DM)  mg/5 mL syrup, Take 5 mL by mouth 3 (three) times a day as needed for cough, Disp: 118 mL, Rfl: 0    EPINEPHrine (EPIPEN) 0.3 mg/0.3 mL SOAJ, Inject 0.3 mL (0.3 mg total) into a muscle once for 1 dose, Disp: 0.6 mL, Rfl: 0    fexofenadine (ALLEGRA) 60 MG tablet, Take by mouth, Disp: , Rfl:     levothyroxine 75 mcg tablet, take 1 tablet by mouth once daily, Disp: 90 tablet, Rfl: 1    lidocaine (LIDODERM) 5 %, Place 1 patch on the skin daily Remove & Discard patch within 12 hours or as directed by MD, Disp: 30 patch, Rfl: 0    nitroglycerin (NITROSTAT) 0.4 mg SL tablet, Place 1 tablet under the tongue every 5 (five) minutes as needed, Disp: , Rfl:     predniSONE 20 mg tablet, Take 2 tabs for 5 days then 1 tablet for 5 days, Disp: 15 tablet, Rfl: 0    pregabalin (LYRICA) 50 mg capsule, Take 1 capsule (50 mg total) by mouth daily at bedtime for 5 days, THEN 1 capsule (50 mg total) 2 (two) times a day., Disp: 60 capsule, Rfl: 1    simvastatin (ZOCOR) 10 mg tablet, Take 1 tablet (10 mg total) by mouth daily at bedtime, Disp: 90 tablet, Rfl: 1    Symbicort 160-4.5 MCG/ACT inhaler, Inhale 2 puffs 2 (two) times a day, Disp: 10.2 g, Rfl: 3    UNKNOWN TO PATIENT, Allegra  daily po Stool softener bid po, Disp: , Rfl:     Allergies   Allergen Reactions    Penicillins     Latex Rash       Objective:  There were no vitals filed for this visit.         Ortho Exam    Physical Exam    Large joint arthrocentesis: L knee  Universal Protocol:  Consent: Verbal consent obtained.  Risks and benefits: risks, benefits and alternatives were discussed  Consent given by: patient  Site marked: the operative site was marked  Supporting Documentation  Indications: pain   Procedure Details  Location: knee - L knee  Needle size: 22 G  Ultrasound guidance: no  Approach: anterolateral  Medications administered: 20 mg Sodium Hyaluronate (Viscosup) 20 MG/2ML    Patient tolerance: patient tolerated the procedure well with no immediate complications  Dressing:  Sterile dressing applied            This document was created using speech voice recognition software.   Grammatical errors, random word insertions, pronoun errors, and incomplete sentences are an occasional consequence of this system due to software limitations, ambient noise, and hardware issues.   Any formal questions or concerns about content, text, or information contained within the body of this dictation should be directly addressed to the provider for clarification.

## 2024-02-07 ENCOUNTER — TELEPHONE (OUTPATIENT)
Age: 84
End: 2024-02-07

## 2024-02-07 ENCOUNTER — APPOINTMENT (OUTPATIENT)
Dept: LAB | Facility: HOSPITAL | Age: 84
End: 2024-02-07
Payer: MEDICARE

## 2024-02-07 DIAGNOSIS — E03.9 ACQUIRED HYPOTHYROIDISM: ICD-10-CM

## 2024-02-07 DIAGNOSIS — I10 ESSENTIAL HYPERTENSION: ICD-10-CM

## 2024-02-07 DIAGNOSIS — N28.89 RIGHT RENAL MASS: ICD-10-CM

## 2024-02-07 LAB
ALBUMIN SERPL BCP-MCNC: 3.9 G/DL (ref 3.5–5)
ALP SERPL-CCNC: 73 U/L (ref 34–104)
ALT SERPL W P-5'-P-CCNC: 9 U/L (ref 7–52)
ANION GAP SERPL CALCULATED.3IONS-SCNC: 8 MMOL/L
AST SERPL W P-5'-P-CCNC: 19 U/L (ref 13–39)
BILIRUB SERPL-MCNC: 0.65 MG/DL (ref 0.2–1)
BUN SERPL-MCNC: 11 MG/DL (ref 5–25)
CALCIUM SERPL-MCNC: 9.6 MG/DL (ref 8.4–10.2)
CHLORIDE SERPL-SCNC: 106 MMOL/L (ref 96–108)
CHOLEST SERPL-MCNC: 160 MG/DL
CO2 SERPL-SCNC: 28 MMOL/L (ref 21–32)
CREAT SERPL-MCNC: 0.57 MG/DL (ref 0.6–1.3)
ERYTHROCYTE [DISTWIDTH] IN BLOOD BY AUTOMATED COUNT: 14 % (ref 11.6–15.1)
GFR SERPL CREATININE-BSD FRML MDRD: 87 ML/MIN/1.73SQ M
GLUCOSE P FAST SERPL-MCNC: 86 MG/DL (ref 65–99)
HCT VFR BLD AUTO: 43 % (ref 34.8–46.1)
HDLC SERPL-MCNC: 64 MG/DL
HGB BLD-MCNC: 14.3 G/DL (ref 11.5–15.4)
LDLC SERPL CALC-MCNC: 75 MG/DL (ref 0–100)
MCH RBC QN AUTO: 30.4 PG (ref 26.8–34.3)
MCHC RBC AUTO-ENTMCNC: 33.3 G/DL (ref 31.4–37.4)
MCV RBC AUTO: 92 FL (ref 82–98)
PLATELET # BLD AUTO: 268 THOUSANDS/UL (ref 149–390)
PMV BLD AUTO: 10.1 FL (ref 8.9–12.7)
POTASSIUM SERPL-SCNC: 3.8 MMOL/L (ref 3.5–5.3)
PROT SERPL-MCNC: 6.9 G/DL (ref 6.4–8.4)
RBC # BLD AUTO: 4.7 MILLION/UL (ref 3.81–5.12)
SODIUM SERPL-SCNC: 142 MMOL/L (ref 135–147)
T4 FREE SERPL-MCNC: 1.13 NG/DL (ref 0.61–1.12)
TRIGL SERPL-MCNC: 104 MG/DL
TSH SERPL DL<=0.05 MIU/L-ACNC: 2.87 UIU/ML (ref 0.45–4.5)
WBC # BLD AUTO: 7.25 THOUSAND/UL (ref 4.31–10.16)

## 2024-02-07 PROCEDURE — 85027 COMPLETE CBC AUTOMATED: CPT

## 2024-02-07 PROCEDURE — 80061 LIPID PANEL: CPT

## 2024-02-07 PROCEDURE — 80053 COMPREHEN METABOLIC PANEL: CPT

## 2024-02-07 PROCEDURE — 84443 ASSAY THYROID STIM HORMONE: CPT

## 2024-02-07 PROCEDURE — 84439 ASSAY OF FREE THYROXINE: CPT

## 2024-02-07 PROCEDURE — 36415 COLL VENOUS BLD VENIPUNCTURE: CPT

## 2024-02-07 NOTE — TELEPHONE ENCOUNTER
Caller: rafi Peralta    Doctor: Debora    Reason for call: pt stated she still is in a lot of pain, would like to speak to nurse    Call back#: [phone]

## 2024-02-07 NOTE — TELEPHONE ENCOUNTER
FEDERICOI  S/w pt, she said her b/l hips are still bothering her, especially the R hip. Pt said she feels like something is lose in her R hip when she walks. RN reviewed xray with pt again and noted no fractures or dislocations. Pt said the Lyrica 50 mg BID is providing 40% relief. Pt said she has some initial dizziness when she takes it in the AM. Pt taking doses 10 hours apart, RN suggested taking morning dose 2 hours later and with food to see if the dizziness improves. RN also instructed pt to trial an extra tab at HS to see if symptoms improve further and then call back in 2 weeks with an update or sooner with any s/e. Pt repeated she wants no injections.

## 2024-02-08 ENCOUNTER — TELEPHONE (OUTPATIENT)
Age: 84
End: 2024-02-08

## 2024-02-08 DIAGNOSIS — J45.41 MODERATE PERSISTENT ASTHMA WITH EXACERBATION: ICD-10-CM

## 2024-02-08 RX ORDER — BUDESONIDE 0.25 MG/2ML
0.25 INHALANT ORAL 2 TIMES DAILY
Qty: 240 ML | Refills: 5 | Status: SHIPPED | OUTPATIENT
Start: 2024-02-08 | End: 2024-02-23 | Stop reason: SDUPTHER

## 2024-02-08 NOTE — TELEPHONE ENCOUNTER
Pharmacy called stating the script they received for the budesinde was for 30 doses, they need the script to say 60. Please fax new script to [phone].

## 2024-02-23 RX ORDER — BUDESONIDE 0.25 MG/2ML
0.25 INHALANT ORAL 2 TIMES DAILY
Qty: 240 ML | Refills: 5 | Status: SHIPPED | OUTPATIENT
Start: 2024-02-23 | End: 2024-02-27 | Stop reason: SDUPTHER

## 2024-02-23 NOTE — TELEPHONE ENCOUNTER
Pt phoned, she only received one box of Budesinde instead of the two boxes she should have received.    Please send an order for another box.  Pt states this is happening all the time, she isn't sure why.

## 2024-02-27 DIAGNOSIS — J45.41 MODERATE PERSISTENT ASTHMA WITH EXACERBATION: ICD-10-CM

## 2024-02-27 LAB
DME PARACHUTE DELIVERY DATE ACTUAL: NORMAL
DME PARACHUTE DELIVERY DATE REQUESTED: NORMAL
DME PARACHUTE DELIVERY DATE REQUESTED: NORMAL
DME PARACHUTE ITEM DESCRIPTION: NORMAL
DME PARACHUTE ORDER STATUS: NORMAL
DME PARACHUTE ORDER STATUS: NORMAL
DME PARACHUTE SUPPLIER NAME: NORMAL
DME PARACHUTE SUPPLIER NAME: NORMAL
DME PARACHUTE SUPPLIER PHONE: NORMAL
DME PARACHUTE SUPPLIER PHONE: NORMAL

## 2024-02-27 RX ORDER — BUDESONIDE 0.25 MG/2ML
0.25 INHALANT ORAL 2 TIMES DAILY
Start: 2024-02-27

## 2024-02-27 RX ORDER — BUDESONIDE 0.25 MG/2ML
0.25 INHALANT ORAL 2 TIMES DAILY
Qty: 120 ML | Refills: 11 | Status: CANCELLED | OUTPATIENT
Start: 2024-02-27

## 2024-03-05 ENCOUNTER — HOSPITAL ENCOUNTER (OUTPATIENT)
Dept: RADIOLOGY | Facility: CLINIC | Age: 84
Discharge: HOME | End: 2024-03-05
Payer: MEDICARE

## 2024-03-05 VITALS
HEART RATE: 55 BPM | SYSTOLIC BLOOD PRESSURE: 172 MMHG | OXYGEN SATURATION: 97 % | DIASTOLIC BLOOD PRESSURE: 82 MMHG | RESPIRATION RATE: 20 BRPM | TEMPERATURE: 97.6 F

## 2024-03-05 DIAGNOSIS — M25.551 BILATERAL HIP PAIN: ICD-10-CM

## 2024-03-05 DIAGNOSIS — M25.552 BILATERAL HIP PAIN: ICD-10-CM

## 2024-03-05 DIAGNOSIS — I25.10 CORONARY ARTERY DISEASE INVOLVING NATIVE CORONARY ARTERY OF NATIVE HEART WITHOUT ANGINA PECTORIS: ICD-10-CM

## 2024-03-05 PROCEDURE — 20610 DRAIN/INJ JOINT/BURSA W/O US: CPT | Performed by: ANESTHESIOLOGY

## 2024-03-05 PROCEDURE — 77002 NEEDLE LOCALIZATION BY XRAY: CPT | Performed by: ANESTHESIOLOGY

## 2024-03-05 RX ORDER — ROPIVACAINE HYDROCHLORIDE 2 MG/ML
7 INJECTION, SOLUTION EPIDURAL; INFILTRATION; PERINEURAL ONCE
Status: COMPLETED | OUTPATIENT
Start: 2024-03-05 | End: 2024-03-05

## 2024-03-05 RX ORDER — 0.9 % SODIUM CHLORIDE 0.9 %
4 VIAL (ML) INJECTION ONCE
Status: COMPLETED | OUTPATIENT
Start: 2024-03-05 | End: 2024-03-05

## 2024-03-05 RX ORDER — METHYLPREDNISOLONE ACETATE 80 MG/ML
80 INJECTION, SUSPENSION INTRA-ARTICULAR; INTRALESIONAL; INTRAMUSCULAR; PARENTERAL; SOFT TISSUE ONCE
Status: COMPLETED | OUTPATIENT
Start: 2024-03-05 | End: 2024-03-05

## 2024-03-05 RX ORDER — SIMVASTATIN 10 MG
10 TABLET ORAL
Qty: 90 TABLET | Refills: 1 | Status: SHIPPED | OUTPATIENT
Start: 2024-03-05

## 2024-03-05 RX ADMIN — Medication 4 ML: at 12:03

## 2024-03-05 RX ADMIN — METHYLPREDNISOLONE ACETATE 80 MG: 80 INJECTION, SUSPENSION INTRA-ARTICULAR; INTRALESIONAL; INTRAMUSCULAR; PARENTERAL; SOFT TISSUE at 12:04

## 2024-03-05 RX ADMIN — ROPIVACAINE HYDROCHLORIDE 7 ML: 2 INJECTION, SOLUTION EPIDURAL; INFILTRATION; PERINEURAL at 12:04

## 2024-03-05 RX ADMIN — IOHEXOL 2 ML: 300 INJECTION, SOLUTION INTRAVENOUS at 12:04

## 2024-03-05 NOTE — TELEPHONE ENCOUNTER
Reason for call:   [x] Refill   [] Prior Auth  [] Other:     Office:   [x] PCP/Provider - Dr Magaña  [] Specialty/Provider -     Medication:   Simvastatin 10 mg, 1 qd, 90    [facility]    Does the patient have enough for 3 days?   [x] Yes   [] No - Send as HP to POD

## 2024-03-05 NOTE — H&P
History of Present Illness: The patient is a 83 y.o. female who presents with complaints of hip pain and is here today for bilateral hip injection    Past Medical History:   Diagnosis Date    Asthma     Cardiac disease     heart attack    Disease of thyroid gland     Hyperlipidemia        Past Surgical History:   Procedure Laterality Date    APPENDECTOMY      BREAST LUMPECTOMY Bilateral     several    CARDIAC SURGERY      CATARACT EXTRACTION, BILATERAL      CORONARY STENT PLACEMENT      EPIDURAL BLOCK INJECTION Bilateral 10/25/2023    Procedure: bilateral L4-L5  TRANSFORAMINAL epidural steroid injection (60955);  Surgeon: Ariel Heredia DO;  Location: [facility] MAIN OR;  Service: Pain Management     NASAL POLYP SURGERY           Current Outpatient Medications:     albuterol (2.5 mg/3 mL) 0.083 % nebulizer solution, Take 3 mL (2.5 mg total) by nebulization every 6 (six) hours as needed for wheezing or shortness of breath, Disp: 360 mL, Rfl: 5    albuterol (PROVENTIL HFA,VENTOLIN HFA) 90 mcg/act inhaler, inhale 2 puffs by mouth and INTO THE LUNGS every 4 hours if needed for wheezing, Disp: 8.5 g, Rfl: 8    amLODIPine (NORVASC) 2.5 mg tablet, Take 1 tablet (2.5 mg total) by mouth daily, Disp: 90 tablet, Rfl: 1    ascorbic acid (VITAMIN C) 500 MG tablet, Take 500 mg by mouth daily, Disp: , Rfl:     aspirin 81 MG tablet, Take 81 mg by mouth daily  , Disp: , Rfl:     benzonatate (TESSALON PERLES) 100 mg capsule, Take 1 capsule (100 mg total) by mouth 3 (three) times a day as needed for cough, Disp: 20 capsule, Rfl: 0    budesonide (PULMICORT) 0.25 mg/2 mL nebulizer solution, Take 2 mL (0.25 mg total) by nebulization 2 (two) times a day Rinse mouth after use., Disp: , Rfl:     calcium carbonate-vitamin D 500 mg-5 mcg per tablet, Take 1 tablet by mouth 2 (two) times a day with meals, Disp: , Rfl:     Cholecalciferol (VITAMIN D) 2000 UNITS tablet, Take 4,000 Units by mouth daily, Disp: , Rfl:      dextromethorphan-guaiFENesin (ROBITUSSIN DM)  mg/5 mL syrup, Take 5 mL by mouth 3 (three) times a day as needed for cough, Disp: 118 mL, Rfl: 0    EPINEPHrine (EPIPEN) 0.3 mg/0.3 mL SOAJ, Inject 0.3 mL (0.3 mg total) into a muscle once for 1 dose, Disp: 0.6 mL, Rfl: 0    levothyroxine 75 mcg tablet, take 1 tablet by mouth once daily, Disp: 90 tablet, Rfl: 1    lidocaine (LIDODERM) 5 %, Place 1 patch on the skin daily Remove & Discard patch within 12 hours or as directed by MD, Disp: 30 patch, Rfl: 0    pregabalin (LYRICA) 50 mg capsule, Take 1 capsule in the a.m. 2 capsules at bedtime, Disp: 90 capsule, Rfl: 5    simvastatin (ZOCOR) 10 mg tablet, Take 1 tablet (10 mg total) by mouth daily at bedtime, Disp: 90 tablet, Rfl: 1    Symbicort 160-4.5 MCG/ACT inhaler, Inhale 2 puffs 2 (two) times a day, Disp: 10.2 g, Rfl: 3    UNKNOWN TO PATIENT, Allegra daily po Stool softener bid po, Disp: , Rfl:     Current Facility-Administered Medications:     iohexol (OMNIPAQUE) 300 mg/mL injection 2 mL, 2 mL, Intra-articular, Once, Mario Cazares MD    lidocaine (PF) (XYLOCAINE-MPF) 2 % injection 4 mL, 4 mL, Infiltration, Once, Mario Cazares MD    methylPREDNISolone acetate (DEPO-MEDROL) injection 80 mg, 80 mg, Intra-articular, Once, Mario Cazares MD    ropivacaine (NAROPIN) injection 7 mL, 7 mL, Intra-articular, Once, Mario Cazares MD    sodium chloride (PF) 0.9 % injection 4 mL, 4 mL, Infiltration, Once, Mario Cazares MD    Allergies   Allergen Reactions    Penicillins     Latex Rash       Physical Exam:   Vitals:    03/05/24 1146   BP: (!) 192/79   Pulse: 55   Resp: 20   Temp: 97.6 °F (36.4 °C)   SpO2: 96%     General: Awake, Alert, Oriented x 3, Mood and affect appropriate  Respiratory: Respirations even and unlabored  Cardiovascular: Peripheral pulses intact; no edema  Musculoskeletal Exam: Lower back pain    ASA Score: 3         Assessment:   1. Bilateral hip pain        Plan: bilateral hip intra-articular  steroid injections

## 2024-03-05 NOTE — DISCHARGE INSTR - LAB
Do not apply heat to any area that is numb. If you have discomfort or soreness at the injection site, you may apply ice today, 20 minutes on and 20 minutes off. Tomorrow you may use ice or warm, moist heat. Do not apply ice or heat directly to the skin.  If you experience severe shortness of breath, go to the Emergency Room.  You may have numbness for several hours from the local anesthetic. Please use caution and common sense, especially with weight-bearing activities.  You may have an increase or change in the discomfort for 36-48 hours after your treatment. Apply ice and continue with any pain medicine you have been prescribed.  Do not do anything strenuous today. You may shower, but no tub baths or hot tubs today. You may resume your normal activities tomorrow, but do not “overdo it”. Resume normal activities slowly when you are feeling better.  If you experience redness, drainage or swelling at the injection site, or if you develop a fever above 100 degrees, please call [facility] at [phone] or go to the Emergency Room.  Continue to take all routine medicines prescribed by your primary care physician unless otherwise instructed by our staff. Most blood thinners should be started again according to your regularly scheduled dosing. If you have any questions, please give our office a call.    As no general anesthesia was used in today's procedure, you should not experience any side effects related to anesthesia.       If you have a problem specifically related to your procedure, please call our office at [phone].  Problems not related to your procedure should be directed to your primary care physician.

## 2024-03-07 ENCOUNTER — OFFICE VISIT (OUTPATIENT)
Dept: FAMILY MEDICINE CLINIC | Facility: CLINIC | Age: 84
End: 2024-03-07
Payer: MEDICARE

## 2024-03-07 VITALS
HEIGHT: 60 IN | BODY MASS INDEX: 34.75 KG/M2 | HEART RATE: 50 BPM | TEMPERATURE: 97.6 F | RESPIRATION RATE: 18 BRPM | WEIGHT: 177 LBS | SYSTOLIC BLOOD PRESSURE: 148 MMHG | DIASTOLIC BLOOD PRESSURE: 80 MMHG

## 2024-03-07 DIAGNOSIS — E03.9 ACQUIRED HYPOTHYROIDISM: ICD-10-CM

## 2024-03-07 DIAGNOSIS — N28.89 RIGHT RENAL MASS: ICD-10-CM

## 2024-03-07 DIAGNOSIS — R22.42 SKIN LUMP OF LEG, LEFT: ICD-10-CM

## 2024-03-07 DIAGNOSIS — Z00.00 MEDICARE ANNUAL WELLNESS VISIT, SUBSEQUENT: Primary | ICD-10-CM

## 2024-03-07 DIAGNOSIS — F33.9 RECURRENT DEPRESSION (HCC): ICD-10-CM

## 2024-03-07 DIAGNOSIS — Z12.31 ENCOUNTER FOR SCREENING MAMMOGRAM FOR BREAST CANCER: ICD-10-CM

## 2024-03-07 DIAGNOSIS — I89.0 LYMPHEDEMA: ICD-10-CM

## 2024-03-07 DIAGNOSIS — I10 ESSENTIAL HYPERTENSION: ICD-10-CM

## 2024-03-07 PROCEDURE — 99214 OFFICE O/P EST MOD 30 MIN: CPT | Performed by: FAMILY MEDICINE

## 2024-03-07 PROCEDURE — G0439 PPPS, SUBSEQ VISIT: HCPCS | Performed by: FAMILY MEDICINE

## 2024-03-07 NOTE — PROGRESS NOTES
Assessment and Plan:     Problem List Items Addressed This Visit       Acquired hypothyroidism     TSH is at goal  Continue levothyroxine 75 mcg          Essential hypertension     Stable  Continue amlodipine 2.5 mg daily         Relevant Orders    CBC    Comprehensive metabolic panel    Lipid Panel with Direct LDL reflex    Lymphedema     She has had chronic swelling in her left leg since her heart bypass surgery 10 years ago.  She reports that she has already had some massage and that did not help.  We discussed going to the lymphedema physical therapist but she was not sure if that would help or not.  She is concerned there may be fluid collection behind her knee and that is causing the problems.  Will further evaluate the area behind her leg with ultrasound and depending on the results consider the lymphedema physical therapist         Recurrent depression (HCC)     Not at goal, declines medication  Has tried them in the past   We will work on improving her mobility to help with her depression         Right renal mass     Following with urology          Other Visit Diagnoses       Medicare annual wellness visit, subsequent    -  Primary    Encounter for screening mammogram for breast cancer        Relevant Orders    Mammo screening bilateral w 3d & cad    Skin lump of leg, left        depending on results will consider lyphedema physical therapy    Relevant Orders    US extremity soft tissue           Return in about 6 months (around 9/7/2024) for Next scheduled follow up.  Preventive health issues were discussed with patient, and age appropriate screening tests were ordered as noted in patient's After Visit Summary.  Personalized health advice and appropriate referrals for health education or preventive services given if needed, as noted in patient's After Visit Summary.     History of Present Illness:     Patient presents for a Medicare Wellness Visit    She has had swelling in her left leg for 10 years since  having heart bypass surgery.  They took a vein from her leg.     PHQ-2/9 Depression Screening    Little interest or pleasure in doing things: 1 - several days  Feeling down, depressed, or hopeless: 2 - more than half the days  Trouble falling or staying asleep, or sleeping too much: 0 - not at all  Feeling tired or having little energy: 2 - more than half the days  Poor appetite or overeatin - several days  Feeling bad about yourself - or that you are a failure or have let   yourself or your family down: 2 - more than half the days  Trouble concentrating on things, such as reading the newspaper or watching   television: 1 - several days  Moving or speaking so slowly that other people could have noticed. Or the   opposite - being so fidgety or restless that you have been moving around a   lot more than usual: 0 - not at all  Thoughts that you would be better off dead, or of hurting yourself in some   way: 0 - not at all  PHQ-9 Score: 9  PHQ-9 Interpretation: Mild depression            Patient Care Team:  Angelina Magaña DO as PCP - General (Family Medicine)  DO Rony Duarte MD     Review of Systems:     Review of Systems     Problem List:     Patient Active Problem List   Diagnosis    Mild persistent asthma without complication    Chronic allergic rhinitis    Coronary artery disease involving native coronary artery of native heart without angina pectoris    Class 1 obesity due to excess calories without serious comorbidity with body mass index (BMI) of 33.0 to 33.9 in adult    Acquired hypothyroidism    Hx of CABG    Mild intermittent asthma without complication    Essential hypertension    Recurrent depression (HCC)    Low bone mass    Lumbar radiculopathy    Ambulatory dysfunction    Lumbar pain    Shortness of breath    Right renal mass    Bilateral hip pain    Lymphedema      Past Medical and Surgical History:     Past Medical History:   Diagnosis Date    Asthma     Cardiac disease      heart attack    Disease of thyroid gland     Hyperlipidemia      Past Surgical History:   Procedure Laterality Date    APPENDECTOMY      BREAST LUMPECTOMY Bilateral     several    CARDIAC SURGERY      CATARACT EXTRACTION, BILATERAL      CORONARY STENT PLACEMENT      EPIDURAL BLOCK INJECTION Bilateral 10/25/2023    Procedure: bilateral L4-L5  TRANSFORAMINAL epidural steroid injection (88389);  Surgeon: Ariel Heredia DO;  Location: [facility] MAIN OR;  Service: Pain Management     NASAL POLYP SURGERY        Family History:     Family History   Problem Relation Age of Onset    No Known Problems Mother     ALS Father     Cancer Brother     Heart disease Family     Asthma Family       Social History:     Social History     Socioeconomic History    Marital status: /Civil Union     Spouse name: None    Number of children: None    Years of education: None    Highest education level: None   Occupational History    None   Tobacco Use    Smoking status: Never     Passive exposure: Never    Smokeless tobacco: Never   Vaping Use    Vaping status: Never Used   Substance and Sexual Activity    Alcohol use: Yes     Comment: rare    Drug use: No    Sexual activity: None   Other Topics Concern    None   Social History Narrative    None     Social Determinants of Health     Financial Resource Strain: Low Risk  (3/7/2024)    Overall Financial Resource Strain (CARDIA)     Difficulty of Paying Living Expenses: Not hard at all   Food Insecurity: Not on file   Transportation Needs: No Transportation Needs (3/7/2024)    PRAPARE - Transportation     Lack of Transportation (Medical): No     Lack of Transportation (Non-Medical): No   Physical Activity: Not on file   Stress: Not on file   Social Connections: Not on file   Intimate Partner Violence: Not on file   Housing Stability: Not on file      Medications and Allergies:     Current Outpatient Medications   Medication Sig Dispense Refill    albuterol (2.5 mg/3 mL) 0.083 % nebulizer  solution Take 3 mL (2.5 mg total) by nebulization every 6 (six) hours as needed for wheezing or shortness of breath 360 mL 5    albuterol (PROVENTIL HFA,VENTOLIN HFA) 90 mcg/act inhaler inhale 2 puffs by mouth and INTO THE LUNGS every 4 hours if needed for wheezing 8.5 g 8    amLODIPine (NORVASC) 2.5 mg tablet Take 1 tablet (2.5 mg total) by mouth daily 90 tablet 1    ascorbic acid (VITAMIN C) 500 MG tablet Take 500 mg by mouth daily      aspirin 81 MG tablet Take 81 mg by mouth daily        budesonide (PULMICORT) 0.25 mg/2 mL nebulizer solution Take 2 mL (0.25 mg total) by nebulization 2 (two) times a day Rinse mouth after use.      calcium carbonate-vitamin D 500 mg-5 mcg per tablet Take 1 tablet by mouth 2 (two) times a day with meals      Cholecalciferol (VITAMIN D) 2000 UNITS tablet Take 4,000 Units by mouth daily      EPINEPHrine (EPIPEN) 0.3 mg/0.3 mL SOAJ Inject 0.3 mL (0.3 mg total) into a muscle once for 1 dose 0.6 mL 0    levothyroxine 75 mcg tablet take 1 tablet by mouth once daily 90 tablet 1    pregabalin (LYRICA) 50 mg capsule Take 1 capsule in the a.m. 2 capsules at bedtime 90 capsule 5    simvastatin (ZOCOR) 10 mg tablet Take 1 tablet (10 mg total) by mouth daily at bedtime 90 tablet 1    Symbicort 160-4.5 MCG/ACT inhaler Inhale 2 puffs 2 (two) times a day 10.2 g 3     No current facility-administered medications for this visit.     Allergies   Allergen Reactions    Penicillins     Latex Rash      Immunizations:     Immunization History   Administered Date(s) Administered    COVID-19 MODERNA VACC 0.5 ML IM 03/24/2021, 04/26/2021    COVID-19, unspecified 09/10/2022    Influenza, high dose seasonal 0.7 mL 09/06/2022, 09/27/2023    Pneumococcal Conjugate 13-Valent 10/19/2015    Pneumococcal Polysaccharide PPV23 05/20/2009      Health Maintenance:         Topic Date Due    DXA SCAN  03/21/2028    Hepatitis C Screening  Completed         Topic Date Due    COVID-19 Vaccine (4 - 2023-24 season) 09/01/2023       Medicare Screening Tests and Risk Assessments:     Kathryn is here for her Subsequent Wellness visit.     Health Risk Assessment:   Patient rates overall health as fair. Patient feels that their physical health rating is slightly worse. Patient is satisfied with their life. Eyesight was rated as same. Hearing was rated as slightly worse. Patient feels that their emotional and mental health rating is slightly worse. Patients states they are never, rarely angry. Patient states they are often unusually tired/fatigued. Pain experienced in the last 7 days has been a lot. Patient's pain rating has been 8/10. Patient states that she has experienced no weight loss or gain in last 6 months.     Depression Screening:   PHQ-9 Score: 9      Fall Risk Screening:   In the past year, patient has experienced: no history of falling in past year      Urinary Incontinence Screening:   Patient has not leaked urine accidently in the last six months.     Home Safety:  Patient has trouble with stairs inside or outside of their home. Patient has working smoke alarms and has working carbon monoxide detector. Home safety hazards include: none.     Nutrition:   Current diet is Regular and Limited junk food.     Medications:   Patient is currently taking over-the-counter supplements. OTC medications include: see medication list. Patient is able to manage medications.     Activities of Daily Living (ADLs)/Instrumental Activities of Daily Living (IADLs):   Walk and transfer into and out of bed and chair?: Yes  Dress and groom yourself?: Yes    Bathe or shower yourself?: Yes    Feed yourself? Yes  Do your laundry/housekeeping?: Yes  Manage your money, pay your bills and track your expenses?: Yes  Make your own meals?: Yes    Do your own shopping?: Yes    Previous Hospitalizations:   Any hospitalizations or ED visits within the last 12 months?: Yes    How many hospitalizations have you had in the last year?: 1-2    Advance Care Planning:    Living will: Yes    Durable POA for healthcare: Yes    Advanced directive: Yes    Advanced directive counseling given: Yes    End of Life Decisions reviewed with patient: Yes    Provider agrees with end of life decisions: Yes      Cognitive Screening:   Provider or family/friend/caregiver concerned regarding cognition?: No    PREVENTIVE SCREENINGS      Cardiovascular Screening:    General: Screening Current      Diabetes Screening:     General: Screening Current      Colorectal Cancer Screening:     General: Screening Not Indicated      Breast Cancer Screening:     General: Screening Current      Cervical Cancer Screening:    General: Screening Not Indicated      Osteoporosis Screening:    General: Screening Current      Abdominal Aortic Aneurysm (AAA) Screening:        General: Screening Not Indicated      Lung Cancer Screening:     General: Screening Not Indicated      Hepatitis C Screening:    General: Screening Current    Screening, Brief Intervention, and Referral to Treatment (SBIRT)    Screening  Typical number of drinks in a day: 0  Typical number of drinks in a week: 0  Interpretation: Low risk drinking behavior.    AUDIT-C Screenin) How often did you have a drink containing alcohol in the past year? never  2) How many drinks did you have on a typical day when you were drinking in the past year? 0  3) How often did you have 6 or more drinks on one occasion in the past year? never    AUDIT-C Score: 0  Interpretation: Score 0-2 (female): Negative screen for alcohol misuse    Single Item Drug Screening:  How often have you used an illegal drug (including marijuana) or a prescription medication for non-medical reasons in the past year? never    Single Item Drug Screen Score: 0  Interpretation: Negative screen for possible drug use disorder    Brief Intervention  Alcohol & drug use screenings were reviewed. No concerns regarding substance use disorder identified.     No results found.     Physical Exam:      /80   Pulse (!) 50   Temp 97.6 °F (36.4 °C) (Tympanic)   Resp 18   Ht 5' (1.524 m)   Wt 80.3 kg (177 lb)   BMI 34.57 kg/m²     Physical Exam  Vitals and nursing note reviewed.   Constitutional:       Appearance: She is well-developed.   HENT:      Head: Normocephalic and atraumatic.      Right Ear: External ear normal.      Left Ear: External ear normal.      Nose: Nose normal.   Cardiovascular:      Rate and Rhythm: Normal rate and regular rhythm.      Heart sounds: Normal heart sounds. No murmur heard.     No friction rub.   Pulmonary:      Effort: No respiratory distress.      Breath sounds: Normal breath sounds. No wheezing or rales.   Musculoskeletal:         General: Tenderness (Left knee posterior and distal posterior left thigh) present.      Right lower leg: No edema.      Left lower leg: Edema present.   Neurological:      Mental Status: She is oriented to person, place, and time.      Cranial Nerves: No cranial nerve deficit.      Gait: Gait abnormal (Using cane).          Angelina Magaña,

## 2024-03-07 NOTE — ASSESSMENT & PLAN NOTE
She has had chronic swelling in her left leg since her heart bypass surgery 10 years ago.  She reports that she has already had some massage and that did not help.  We discussed going to the lymphedema physical therapist but she was not sure if that would help or not.  She is concerned there may be fluid collection behind her knee and that is causing the problems.  Will further evaluate the area behind her leg with ultrasound and depending on the results consider the lymphedema physical therapist

## 2024-03-07 NOTE — ASSESSMENT & PLAN NOTE
Not at goal, declines medication  Has tried them in the past   We will work on improving her mobility to help with her depression

## 2024-03-07 NOTE — PATIENT INSTRUCTIONS
Medicare Preventive Visit Patient Instructions  Thank you for completing your Welcome to Medicare Visit or Medicare Annual Wellness Visit today. Your next wellness visit will be due in one year (3/8/2025).  The screening/preventive services that you may require over the next 5-10 years are detailed below. Some tests may not apply to you based off risk factors and/or age. Screening tests ordered at today's visit but not completed yet may show as past due. Also, please note that scanned in results may not display below.  Preventive Screenings:  Service Recommendations Previous Testing/Comments   Colorectal Cancer Screening  * Colonoscopy    * Fecal Occult Blood Test (FOBT)/Fecal Immunochemical Test (FIT)  * Fecal DNA/Cologuard Test  * Flexible Sigmoidoscopy Age: 45-75 years old   Colonoscopy: every 10 years (may be performed more frequently if at higher risk)  OR  FOBT/FIT: every 1 year  OR  Cologuard: every 3 years  OR  Sigmoidoscopy: every 5 years  Screening may be recommended earlier than age 45 if at higher risk for colorectal cancer. Also, an individualized decision between you and your healthcare provider will decide whether screening between the ages of 76-85 would be appropriate. Colonoscopy: Not on file  FOBT/FIT: Not on file  Cologuard: Not on file  Sigmoidoscopy: Not on file          Breast Cancer Screening Age: 40+ years old  Frequency: every 1-2 years  Not required if history of left and right mastectomy Mammogram: 03/21/2023    Screening Current   Cervical Cancer Screening Between the ages of 21-29, pap smear recommended once every 3 years.   Between the ages of 30-65, can perform pap smear with HPV co-testing every 5 years.   Recommendations may differ for women with a history of total hysterectomy, cervical cancer, or abnormal pap smears in past. Pap Smear: Not on file    Screening Not Indicated   Hepatitis C Screening Once for adults born between 1945 and 1965  More frequently in patients at high risk  for Hepatitis C Hep C Antibody: 03/08/2022    Screening Current   Diabetes Screening 1-2 times per year if you're at risk for diabetes or have pre-diabetes Fasting glucose: 86 mg/dL (2/7/2024)  A1C: No results in last 5 years (No results in last 5 years)  Screening Current   Cholesterol Screening Once every 5 years if you don't have a lipid disorder. May order more often based on risk factors. Lipid panel: 02/07/2024    Screening Current     Other Preventive Screenings Covered by Medicare:  Abdominal Aortic Aneurysm (AAA) Screening: covered once if your at risk. You're considered to be at risk if you have a family history of AAA.  Lung Cancer Screening: covers low dose CT scan once per year if you meet all of the following conditions: (1) Age 55-77; (2) No signs or symptoms of lung cancer; (3) Current smoker or have quit smoking within the last 15 years; (4) You have a tobacco smoking history of at least 20 pack years (packs per day multiplied by number of years you smoked); (5) You get a written order from a healthcare provider.  Glaucoma Screening: covered annually if you're considered high risk: (1) You have diabetes OR (2) Family history of glaucoma OR (3)  aged 50 and older OR (4)  American aged 65 and older  Osteoporosis Screening: covered every 2 years if you meet one of the following conditions: (1) You're estrogen deficient and at risk for osteoporosis based off medical history and other findings; (2) Have a vertebral abnormality; (3) On glucocorticoid therapy for more than 3 months; (4) Have primary hyperparathyroidism; (5) On osteoporosis medications and need to assess response to drug therapy.   Last bone density test (DXA Scan): 03/21/2023.  HIV Screening: covered annually if you're between the age of 15-65. Also covered annually if you are younger than 15 and older than 65 with risk factors for HIV infection. For pregnant patients, it is covered up to 3 times per  pregnancy.    Immunizations:  Immunization Recommendations   Influenza Vaccine Annual influenza vaccination during flu season is recommended for all persons aged >= 6 months who do not have contraindications   Pneumococcal Vaccine   * Pneumococcal conjugate vaccine = PCV13 (Prevnar 13), PCV15 (Vaxneuvance), PCV20 (Prevnar 20)  * Pneumococcal polysaccharide vaccine = PPSV23 (Pneumovax) Adults 19-65 yo with certain risk factors or if 65+ yo  If never received any pneumonia vaccine: recommend Prevnar 20 (PCV20)  Give PCV20 if previously received 1 dose of PCV13 or PPSV23   Hepatitis B Vaccine 3 dose series if at intermediate or high risk (ex: diabetes, end stage renal disease, liver disease)   Respiratory syncytial virus (RSV) Vaccine - COVERED BY MEDICARE PART D  * RSVPreF3 (Arexvy) CDC recommends that adults 60 years of age and older may receive a single dose of RSV vaccine using shared clinical decision-making (SCDM)   Tetanus (Td) Vaccine - COST NOT COVERED BY MEDICARE PART B Following completion of primary series, a booster dose should be given every 10 years to maintain immunity against tetanus. Td may also be given as tetanus wound prophylaxis.   Tdap Vaccine - COST NOT COVERED BY MEDICARE PART B Recommended at least once for all adults. For pregnant patients, recommended with each pregnancy.   Shingles Vaccine (Shingrix) - COST NOT COVERED BY MEDICARE PART B  2 shot series recommended in those 19 years and older who have or will have weakened immune systems or those 50 years and older     Health Maintenance Due:      Topic Date Due   • DXA SCAN  03/21/2028   • Hepatitis C Screening  Completed     Immunizations Due:      Topic Date Due   • COVID-19 Vaccine (4 - 2023-24 season) 09/01/2023     Advance Directives   What are advance directives?  Advance directives are legal documents that state your wishes and plans for medical care. These plans are made ahead of time in case you lose your ability to make decisions  for yourself. Advance directives can apply to any medical decision, such as the treatments you want, and if you want to donate organs.   What are the types of advance directives?  There are many types of advance directives, and each state has rules about how to use them. You may choose a combination of any of the following:  Living will:  This is a written record of the treatment you want. You can also choose which treatments you do not want, which to limit, and which to stop at a certain time. This includes surgery, medicine, IV fluid, and tube feedings.   Durable power of  for healthcare (DPAHC):  This is a written record that states who you want to make healthcare choices for you when you are unable to make them for yourself. This person, called a proxy, is usually a family member or a friend. You may choose more than 1 proxy.  Do not resuscitate (DNR) order:  A DNR order is used in case your heart stops beating or you stop breathing. It is a request not to have certain forms of treatment, such as CPR. A DNR order may be included in other types of advance directives.  Medical directive:  This covers the care that you want if you are in a coma, near death, or unable to make decisions for yourself. You can list the treatments you want for each condition. Treatment may include pain medicine, surgery, blood transfusions, dialysis, IV or tube feedings, and a ventilator (breathing machine).  Values history:  This document has questions about your views, beliefs, and how you feel and think about life. This information can help others choose the care that you would choose.  Why are advance directives important?  An advance directive helps you control your care. Although spoken wishes may be used, it is better to have your wishes written down. Spoken wishes can be misunderstood, or not followed. Treatments may be given even if you do not want them. An advance directive may make it easier for your family to make  difficult choices about your care.   Weight Management   Why it is important to manage your weight:  Being overweight increases your risk of health conditions such as heart disease, high blood pressure, type 2 diabetes, and certain types of cancer. It can also increase your risk for osteoarthritis, sleep apnea, and other respiratory problems. Aim for a slow, steady weight loss. Even a small amount of weight loss can lower your risk of health problems.  How to lose weight safely:  A safe and healthy way to lose weight is to eat fewer calories and get regular exercise. You can lose up about 1 pound a week by decreasing the number of calories you eat by 500 calories each day.   Healthy meal plan for weight management:  A healthy meal plan includes a variety of foods, contains fewer calories, and helps you stay healthy. A healthy meal plan includes the following:  Eat whole-grain foods more often.  A healthy meal plan should contain fiber. Fiber is the part of grains, fruits, and vegetables that is not broken down by your body. Whole-grain foods are healthy and provide extra fiber in your diet. Some examples of whole-grain foods are whole-wheat breads and pastas, oatmeal, brown rice, and bulgur.  Eat a variety of vegetables every day.  Include dark, leafy greens such as spinach, kale, dyan greens, and mustard greens. Eat yellow and orange vegetables such as carrots, sweet potatoes, and winter squash.   Eat a variety of fruits every day.  Choose fresh or canned fruit (canned in its own juice or light syrup) instead of juice. Fruit juice has very little or no fiber.  Eat low-fat dairy foods.  Drink fat-free (skim) milk or 1% milk. Eat fat-free yogurt and low-fat cottage cheese. Try low-fat cheeses such as mozzarella and other reduced-fat cheeses.  Choose meat and other protein foods that are low in fat.  Choose beans or other legumes such as split peas or lentils. Choose fish, skinless poultry (chicken or turkey), or  lean cuts of red meat (beef or pork). Before you cook meat or poultry, cut off any visible fat.   Use less fat and oil.  Try baking foods instead of frying them. Add less fat, such as margarine, sour cream, regular salad dressing and mayonnaise to foods. Eat fewer high-fat foods. Some examples of high-fat foods include french fries, doughnuts, ice cream, and cakes.  Eat fewer sweets.  Limit foods and drinks that are high in sugar. This includes candy, cookies, regular soda, and sweetened drinks.  Exercise:  Exercise at least 30 minutes per day on most days of the week. Some examples of exercise include walking, biking, dancing, and swimming. You can also fit in more physical activity by taking the stairs instead of the elevator or parking farther away from stores. Ask your healthcare provider about the best exercise plan for you.      © Copyright [employer] 2018 Information is for End User's use only and may not be sold, redistributed or otherwise used for commercial purposes. All illustrations and images included in CareNotes® are the copyrighted property of [employer] or [employer]

## 2024-03-11 ENCOUNTER — HOSPITAL ENCOUNTER (OUTPATIENT)
Dept: RADIOLOGY | Facility: HOSPITAL | Age: 84
Discharge: HOME | End: 2024-03-11
Payer: MEDICARE

## 2024-03-11 DIAGNOSIS — R22.42 SKIN LUMP OF LEG, LEFT: ICD-10-CM

## 2024-03-11 PROCEDURE — 76882 US LMTD JT/FCL EVL NVASC XTR: CPT

## 2024-03-12 ENCOUNTER — TELEPHONE (OUTPATIENT)
Dept: RADIOLOGY | Facility: HOSPITAL | Age: 84
End: 2024-03-12

## 2024-03-12 NOTE — TELEPHONE ENCOUNTER
Patient Reports    some improvement but hard to say because she fell     %     improvement post injection    Pain Level   hard to determine at this time  /10       Patient fell on right arm and right side.

## 2024-03-14 ENCOUNTER — TELEPHONE (OUTPATIENT)
Age: 84
End: 2024-03-14

## 2024-03-14 NOTE — TELEPHONE ENCOUNTER
Kathryn called in wanting to know if her Ultrasound results came back yet. Advised Kathryn that results were not back yet and once reviewed by Dr. Magaña she will be notified. NFA needed at this time.

## 2024-03-19 ENCOUNTER — TELEPHONE (OUTPATIENT)
Dept: FAMILY MEDICINE CLINIC | Facility: CLINIC | Age: 84
End: 2024-03-19

## 2024-03-19 DIAGNOSIS — I89.0 LYMPHEDEMA: Primary | ICD-10-CM

## 2024-03-19 NOTE — TELEPHONE ENCOUNTER
3/19/2024 2:29 PM Called Kathryn regarding her ultrasound results.    Discussed her results.  Referred her to PT for lymphedema    Angelina Magaña, DO   
Detail Level: Zone

## 2024-04-09 ENCOUNTER — TELEPHONE (OUTPATIENT)
Dept: PAIN MEDICINE | Facility: CLINIC | Age: 84
End: 2024-04-09

## 2024-04-09 NOTE — TELEPHONE ENCOUNTER
Caller: [facility]     Doctor: Meri    Reason for call: patient went to wrong location she went to [place]      On her way now

## 2024-04-10 ENCOUNTER — TELEPHONE (OUTPATIENT)
Dept: RADIOLOGY | Facility: CLINIC | Age: 84
End: 2024-04-10

## 2024-04-10 ENCOUNTER — OFFICE VISIT (OUTPATIENT)
Dept: PAIN MEDICINE | Facility: CLINIC | Age: 84
End: 2024-04-10
Payer: MEDICARE

## 2024-04-10 VITALS
WEIGHT: 179 LBS | BODY MASS INDEX: 35.14 KG/M2 | SYSTOLIC BLOOD PRESSURE: 159 MMHG | HEART RATE: 60 BPM | RESPIRATION RATE: 16 BRPM | HEIGHT: 60 IN | DIASTOLIC BLOOD PRESSURE: 76 MMHG

## 2024-04-10 DIAGNOSIS — G89.4 CHRONIC PAIN SYNDROME: ICD-10-CM

## 2024-04-10 DIAGNOSIS — M48.062 SPINAL STENOSIS OF LUMBAR REGION WITH NEUROGENIC CLAUDICATION: ICD-10-CM

## 2024-04-10 DIAGNOSIS — M25.552 BILATERAL HIP PAIN: ICD-10-CM

## 2024-04-10 DIAGNOSIS — M46.1 SACROILIITIS (HCC): Primary | ICD-10-CM

## 2024-04-10 DIAGNOSIS — M25.551 BILATERAL HIP PAIN: ICD-10-CM

## 2024-04-10 PROCEDURE — 99214 OFFICE O/P EST MOD 30 MIN: CPT

## 2024-04-10 NOTE — PROGRESS NOTES
Assessment:  1. Sacroiliitis (HCC)    2. Chronic pain syndrome    3. Spinal stenosis of lumbar region with neurogenic claudication    4. Bilateral hip pain        Plan:  The patient is an 83-year-old female with a history of chronic pain secondary to low back pain, lumbar intervertebral disc disorder with radiculopathy, lumbar stenosis with neurogenic claudication and bilateral hip pain who presents to the office with improved but ongoing bilateral low back pain and pain that radiates into bilateral hips.    On exam, the patient is tender with palpation over bilateral SI joints as well as having positive provocative maneuvers for sacroiliitis.  I instructed patient we can perform bilateral SI joint injections to decrease inflammation and provide them relief.  Patient would like to proceed.  I instructed our  will schedule them.  Complete risks and benefits including bleeding, infection, tissue reaction, nerve injury and allergic reaction were discussed.  The approach was demonstrated using models and literature was provided.  Verbal and written consent was obtained.    She can continue her current pain medication regimen, refills for Lyrica are not needed at this time.    My impressions and treatment recommendations were discussed in detail with the patient who verbalized understanding and had no further questions.  Discharge instructions were provided. I personally saw and examined the patient and I agree with the above discussed plan of care.    Orders Placed This Encounter   Procedures    FL spine and pain procedure     Dr Cazares     Standing Status:   Future     Standing Expiration Date:   4/10/2028     Order Specific Question:   Reason for Exam:     Answer:   Bilateral SI joint injections     Order Specific Question:   Anticoagulant hold needed?     Answer:   No     No orders of the defined types were placed in this encounter.      History of Present Illness:  Kathryn Drake is a 83 y.o. female  with a history of chronic pain secondary to low back pain, lumbar intervertebral disc disorder with radiculopathy, lumbar stenosis with neurogenic claudication and bilateral hip pain.  She was last seen on 3/5/2024 where she underwent a bilateral hip intra-articular injection which continues to provide her greater than 50% relief of her bilateral hip pain.  She presents to the office with improved but ongoing bilateral low back pain and pain that radiates into bilateral hips.    She states her pain is constant but worse in the evening.  She rates quality of her pain as pressure-like and is currently rating her pain a 5/10 on a numeric scale.    Current pain medications include Tylenol and Aleve which are helpful, she also is taking Lyrica 50 mg 1 tablet in the morning and 2 tablets before bed.  She states this medication regimen is providing her mild to moderate relief of her pain and is denying any side effects at this time.    I have personally reviewed and/or updated the patient's past medical history, past surgical history, family history, social history, current medications, allergies, and vital signs today.     Review of Systems   Respiratory:  Negative for shortness of breath.    Cardiovascular:  Negative for chest pain.   Gastrointestinal:  Negative for constipation, diarrhea, nausea and vomiting.   Musculoskeletal:  Positive for back pain, gait problem and joint swelling. Negative for arthralgias and myalgias.        B/L Hip Pain   Skin:  Negative for rash.   Neurological:  Positive for weakness. Negative for dizziness and seizures.   All other systems reviewed and are negative.      Patient Active Problem List   Diagnosis    Mild persistent asthma without complication    Chronic allergic rhinitis    Coronary artery disease involving native coronary artery of native heart without angina pectoris    Class 1 obesity due to excess calories without serious comorbidity with body mass index (BMI) of 33.0 to 33.9  in adult    Acquired hypothyroidism    Hx of CABG    Mild intermittent asthma without complication    Essential hypertension    Recurrent depression (HCC)    Low bone mass    Lumbar radiculopathy    Ambulatory dysfunction    Lumbar pain    Shortness of breath    Right renal mass    Bilateral hip pain    Lymphedema       Past Medical History:   Diagnosis Date    Asthma     Cardiac disease     heart attack    Disease of thyroid gland     Hyperlipidemia        Past Surgical History:   Procedure Laterality Date    APPENDECTOMY      BREAST LUMPECTOMY Bilateral     several    CARDIAC SURGERY      CATARACT EXTRACTION, BILATERAL      CORONARY STENT PLACEMENT      EPIDURAL BLOCK INJECTION Bilateral 10/25/2023    Procedure: bilateral L4-L5  TRANSFORAMINAL epidural steroid injection (05098);  Surgeon: Ariel Heredia DO;  Location: [facility] MAIN OR;  Service: Pain Management     NASAL POLYP SURGERY         Family History   Problem Relation Age of Onset    No Known Problems Mother     ALS Father     Cancer Brother     Heart disease Family     Asthma Family        Social History     Occupational History    Not on file   Tobacco Use    Smoking status: Never     Passive exposure: Never    Smokeless tobacco: Never   Vaping Use    Vaping status: Never Used   Substance and Sexual Activity    Alcohol use: Yes     Comment: rare    Drug use: No    Sexual activity: Not on file       Current Outpatient Medications on File Prior to Visit   Medication Sig    albuterol (2.5 mg/3 mL) 0.083 % nebulizer solution Take 3 mL (2.5 mg total) by nebulization every 6 (six) hours as needed for wheezing or shortness of breath    albuterol (PROVENTIL HFA,VENTOLIN HFA) 90 mcg/act inhaler inhale 2 puffs by mouth and INTO THE LUNGS every 4 hours if needed for wheezing    amLODIPine (NORVASC) 2.5 mg tablet Take 1 tablet (2.5 mg total) by mouth daily    ascorbic acid (VITAMIN C) 500 MG tablet Take 500 mg by mouth daily    aspirin 81 MG tablet Take 81 mg by  mouth daily      budesonide (PULMICORT) 0.25 mg/2 mL nebulizer solution Take 2 mL (0.25 mg total) by nebulization 2 (two) times a day Rinse mouth after use.    calcium carbonate-vitamin D 500 mg-5 mcg per tablet Take 1 tablet by mouth 2 (two) times a day with meals    Cholecalciferol (VITAMIN D) 2000 UNITS tablet Take 4,000 Units by mouth daily    levothyroxine 75 mcg tablet take 1 tablet by mouth once daily    pregabalin (LYRICA) 50 mg capsule Take 1 capsule in the a.m. 2 capsules at bedtime    simvastatin (ZOCOR) 10 mg tablet Take 1 tablet (10 mg total) by mouth daily at bedtime    Symbicort 160-4.5 MCG/ACT inhaler Inhale 2 puffs 2 (two) times a day    EPINEPHrine (EPIPEN) 0.3 mg/0.3 mL SOAJ Inject 0.3 mL (0.3 mg total) into a muscle once for 1 dose     No current facility-administered medications on file prior to visit.       Allergies   Allergen Reactions    Penicillins     Latex Rash       Physical Exam:    /76   Pulse 60   Resp 16   Ht 5' (1.524 m)   Wt 81.2 kg (179 lb)   BMI 34.96 kg/m²     Constitutional:normal, well developed, well nourished, alert, in no distress and non-toxic and no overt pain behavior.  Eyes:anicteric  HEENT:grossly intact  Neck:supple, symmetric, trachea midline and no masses   Pulmonary:even and unlabored  Cardiovascular:No edema or pitting edema present  Skin:Normal without rashes or lesions and well hydrated  Psychiatric:Mood and affect appropriate  Neurologic:Cranial Nerves II-XII grossly intact  Musculoskeletal:antalgic    Lumbar Spine Exam    Appearance:  Normal lordosis  Palpation/Tenderness:  left sacroiliac joint tenderness  right sacroiliac joint tenderness  Sensory:  no sensory deficits noted  Range of Motion:  Flexion:  Minimally limited  with pain  Extension:  Minimally limited  with pain  Motor Strength:  Left hip flexion:  5/5  Right hip flexion:  5/5  Left knee flexion:  5/5  Left knee extension:  5/5  Right knee flexion:  5/5  Right knee extension:  5/5  Left  foot dorsiflexion:  5/5  Left foot plantar flexion:  5/5  Right foot dorsiflexion:  5/5  Right foot plantar flexion:  5/5  Special Tests:  Left Straight Leg Test:  positive  Right Straight Leg Test:  positive  Left Pepe's Maneuver:  positive  Right Pepe's Maneuver:  positive  Left Gaenslen's Test:  positive  Right Gaenslen's Test:  positive  Left Pelvic Distraction Test:  positive  Right Pelvic Distraction Test:  positive      Imaging

## 2024-04-26 ENCOUNTER — HOSPITAL ENCOUNTER (OUTPATIENT)
Dept: RADIOLOGY | Facility: CLINIC | Age: 84
End: 2024-04-26
Payer: MEDICARE

## 2024-04-26 VITALS
TEMPERATURE: 97.3 F | SYSTOLIC BLOOD PRESSURE: 180 MMHG | OXYGEN SATURATION: 96 % | RESPIRATION RATE: 20 BRPM | HEART RATE: 49 BPM | DIASTOLIC BLOOD PRESSURE: 81 MMHG

## 2024-04-26 DIAGNOSIS — M46.1 SACROILIITIS (HCC): ICD-10-CM

## 2024-04-26 PROCEDURE — 27096 INJECT SACROILIAC JOINT: CPT | Performed by: ANESTHESIOLOGY

## 2024-04-26 RX ORDER — METHYLPREDNISOLONE ACETATE 80 MG/ML
80 INJECTION, SUSPENSION INTRA-ARTICULAR; INTRALESIONAL; INTRAMUSCULAR; PARENTERAL; SOFT TISSUE ONCE
Status: COMPLETED | OUTPATIENT
Start: 2024-04-26 | End: 2024-04-26

## 2024-04-26 RX ORDER — ROPIVACAINE HYDROCHLORIDE 2 MG/ML
3 INJECTION, SOLUTION EPIDURAL; INFILTRATION; PERINEURAL ONCE
Status: COMPLETED | OUTPATIENT
Start: 2024-04-26 | End: 2024-04-26

## 2024-04-26 RX ORDER — 0.9 % SODIUM CHLORIDE 0.9 %
2 VIAL (ML) INJECTION ONCE
Status: COMPLETED | OUTPATIENT
Start: 2024-04-26 | End: 2024-04-26

## 2024-04-26 RX ADMIN — METHYLPREDNISOLONE ACETATE 80 MG: 80 INJECTION, SUSPENSION INTRA-ARTICULAR; INTRALESIONAL; INTRAMUSCULAR; PARENTERAL; SOFT TISSUE at 14:45

## 2024-04-26 RX ADMIN — Medication 2 ML: at 14:44

## 2024-04-26 RX ADMIN — ROPIVACAINE HYDROCHLORIDE 3 ML: 2 INJECTION, SOLUTION EPIDURAL; INFILTRATION; PERINEURAL at 14:45

## 2024-04-26 RX ADMIN — SODIUM CHLORIDE 2 ML: 9 INJECTION INTRAMUSCULAR; INTRAVENOUS; SUBCUTANEOUS at 14:44

## 2024-04-26 RX ADMIN — IOHEXOL 1 ML: 300 INJECTION, SOLUTION INTRAVENOUS at 14:45

## 2024-04-26 NOTE — DISCHARGE INSTR - LAB

## 2024-04-26 NOTE — H&P
History of Present Illness: The patient is a 83 y.o. female who presents with complaints of lower back pain and is here today for bilateral sacroiliac joint injections    Past Medical History:   Diagnosis Date    Asthma     Cardiac disease     heart attack    Disease of thyroid gland     Hyperlipidemia        Past Surgical History:   Procedure Laterality Date    APPENDECTOMY      BREAST LUMPECTOMY Bilateral     several    CARDIAC SURGERY      CATARACT EXTRACTION, BILATERAL      CORONARY STENT PLACEMENT      EPIDURAL BLOCK INJECTION Bilateral 10/25/2023    Procedure: bilateral L4-L5  TRANSFORAMINAL epidural steroid injection (17227);  Surgeon: Ariel Heredia DO;  Location: [facility] MAIN OR;  Service: Pain Management     NASAL POLYP SURGERY           Current Outpatient Medications:     albuterol (2.5 mg/3 mL) 0.083 % nebulizer solution, Take 3 mL (2.5 mg total) by nebulization every 6 (six) hours as needed for wheezing or shortness of breath, Disp: 360 mL, Rfl: 5    albuterol (PROVENTIL HFA,VENTOLIN HFA) 90 mcg/act inhaler, inhale 2 puffs by mouth and INTO THE LUNGS every 4 hours if needed for wheezing, Disp: 8.5 g, Rfl: 8    amLODIPine (NORVASC) 2.5 mg tablet, Take 1 tablet (2.5 mg total) by mouth daily, Disp: 90 tablet, Rfl: 1    ascorbic acid (VITAMIN C) 500 MG tablet, Take 500 mg by mouth daily, Disp: , Rfl:     aspirin 81 MG tablet, Take 81 mg by mouth daily  , Disp: , Rfl:     budesonide (PULMICORT) 0.25 mg/2 mL nebulizer solution, Take 2 mL (0.25 mg total) by nebulization 2 (two) times a day Rinse mouth after use., Disp: , Rfl:     calcium carbonate-vitamin D 500 mg-5 mcg per tablet, Take 1 tablet by mouth 2 (two) times a day with meals, Disp: , Rfl:     Cholecalciferol (VITAMIN D) 2000 UNITS tablet, Take 4,000 Units by mouth daily, Disp: , Rfl:     EPINEPHrine (EPIPEN) 0.3 mg/0.3 mL SOAJ, Inject 0.3 mL (0.3 mg total) into a muscle once for 1 dose, Disp: 0.6 mL, Rfl: 0    levothyroxine 75 mcg tablet, take 1  tablet by mouth once daily, Disp: 90 tablet, Rfl: 1    pregabalin (LYRICA) 50 mg capsule, Take 1 capsule in the a.m. 2 capsules at bedtime, Disp: 90 capsule, Rfl: 5    simvastatin (ZOCOR) 10 mg tablet, Take 1 tablet (10 mg total) by mouth daily at bedtime, Disp: 90 tablet, Rfl: 1    Symbicort 160-4.5 MCG/ACT inhaler, Inhale 2 puffs 2 (two) times a day, Disp: 10.2 g, Rfl: 3    Current Facility-Administered Medications:     iohexol (OMNIPAQUE) 300 mg/mL injection 1 mL, 1 mL, Intra-articular, Once, Mario Cazares MD    lidocaine (PF) (XYLOCAINE-MPF) 2 % injection 2 mL, 2 mL, Infiltration, Once, Mario Cazares MD    methylPREDNISolone acetate (DEPO-MEDROL) injection 80 mg, 80 mg, Intra-articular, Once, Mario Cazares MD    ropivacaine (NAROPIN) injection 3 mL, 3 mL, Intra-articular, Once, Mario Cazares MD    sodium chloride (PF) 0.9 % injection 2 mL, 2 mL, Infiltration, Once, Mario Cazares MD    Allergies   Allergen Reactions    Penicillins     Latex Rash       Physical Exam:   Vitals:    04/26/24 1428   BP: (!) 188/82   Pulse: (!) 47   Resp: 18   Temp: (!) 97.3 °F (36.3 °C)   SpO2: 97%     General: Awake, Alert, Oriented x 3, Mood and affect appropriate  Respiratory: Respirations even and unlabored  Cardiovascular: Peripheral pulses intact; no edema  Musculoskeletal Exam: Lower back pain    ASA Score: 3    Patient/Chart Verification  Patient ID Verified: Verbal  Consents Confirmed: To be obtained in the Pre-Procedure area  Interval H&P(within 24 hr) Complete (required for Outpatients and Surgery Admit only): To be obtained in the Pre-Procedure area  Allergies Reviewed: Yes  Anticoag/NSAID held?: NA  Currently on antibiotics?: No    Assessment:   1. Sacroiliitis (HCC)        Plan: Bilateral SI joint injections

## 2024-05-03 ENCOUNTER — TELEPHONE (OUTPATIENT)
Dept: PAIN MEDICINE | Facility: CLINIC | Age: 84
End: 2024-05-03

## 2024-05-03 NOTE — TELEPHONE ENCOUNTER
Patient reports 50% improvement post inj  Pain level 8-9/10  When pt is sitting, the pain is a 2/10, but when the patient is active/moving the pain goes up.

## 2024-05-10 NOTE — TELEPHONE ENCOUNTER
Patient Reports       50  %     improvement post injection 2week    Pain Level  definitely better could not give a number   /10     Patient would like to know how much time she must wait in between injections.

## 2024-05-10 NOTE — TELEPHONE ENCOUNTER
Between SIJ injections it's 4 months but she has a lot of different pain generators   No/Unable to asses

## 2024-05-14 ENCOUNTER — EVALUATION (OUTPATIENT)
Dept: PHYSICAL THERAPY | Facility: CLINIC | Age: 84
End: 2024-05-14
Payer: MEDICARE

## 2024-05-14 DIAGNOSIS — I89.0 LYMPHEDEMA: ICD-10-CM

## 2024-05-14 PROCEDURE — 97530 THERAPEUTIC ACTIVITIES: CPT | Performed by: PHYSICAL THERAPIST

## 2024-05-14 PROCEDURE — 97162 PT EVAL MOD COMPLEX 30 MIN: CPT | Performed by: PHYSICAL THERAPIST

## 2024-05-14 NOTE — PROGRESS NOTES
Lymphedema Evaluation    Today's Date: 2024  Patient name: Kathryn Drake  : 1940  MRN: [id number]  Referring provider: Angelina Magaña DO  Dx:  Encounter Diagnosis     ICD-10-CM    1. Lymphedema  I89.0 Ambulatory referral to Physical Therapy                        Assessment  Assessment details: Patient is an 83 year old woman who presents with chief complaint of 10 year history of L LE edema and pain since vein was harvested for heart surgery.  She denies any treatment and has used over the count compression garments.  Upon examination patient with mild pitting edema in L distal LE, pain with light touch throughout and soft squishy edema more proximally.  Allodynia is significant and patient reports is marginally improved in past 10 years.  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  Patient was educated regarding CDT and agrees to attempt MLD and assess for possible improvement of compression garments.      Impairments: abnormal gait, activity intolerance, lacks appropriate home exercise program and pain with function  Other impairment: L LE edema  Functional limitations: ambualtion due to pain  Symptom irritability: moderateUnderstanding of Dx/Px/POC: good   Prognosis: good    Goals  Short Term Goals:    1.   volume by 100 ml or greater since IE.    2.  Decreased sensitivity to touch per patient report since IE.    3.  Pain decreased to <6/10 on average.      Long Term Goals:   1.  Patient independent with donning/doffing compression sleeve.     2.  Patient with reduction of greater than 300 mL since IE.      Plan  Patient would benefit from: skilled physical therapy  Planned therapy interventions: manual therapy, massage, strengthening, stretching, therapeutic activities, therapeutic exercise, home exercise program and compression  Frequency: 2x week  Duration in weeks: 8  Plan of Care beginning date: 2024  Plan of Care expiration  date: 7/9/2024  Treatment plan discussed with: patient    Subjective/History:  Chief Complaint: L LE edema, that has been present for 10 years since she had triple bypass surgery.    HPI:  Patient notes they took a vein from her L thigh for the bypass surgery and it has been swollen off and on since then.  She notes the edema is random in natural she doesn't know what causes it to get better.  She notes she has had a couple falls which has seemed to make the swelling worse.    Prior Edema Treatments:  none  Imaging:  doppler  Relevant PMHx:  hx of CABG  Personal history of Cancer? No    Lymphedema special questions  Feeling of heaviness, fullness, pressure? no  Resolves with elevation: Yes  Change in fit of shoes/clothes/jewelry?yes  History of Cellulitis?  no  History of S/DVT? no  History of Leg wounds? no  Sees a podiatrist regularly for foot care? no  Presence of trunk/abdominal/genital edema? no  Latex Allergy? yes    Systems Review:  Cardiovascular hx: Yes  On diuretics? no  Thyroid dysfunction: No  Diabetes: No  Kidney disease:No   Liver disease: No  Abdominal surgeries: No  Orthopedic injuries/surgeries: Yes      Pain: 8/10 when edema is worse  Function: limited by pain when LE is swollen.    Working Status: retired  Exercise status: sedentary  Patient goals:  decrease swelling and improve function    Lymphedema Exam: Lower Extremities  Abdomen/Genitals: none  Lower extremity: left  Toes: none  Dorsum of foot: mild  Ankle: mild  Calf: mild with 1+ pitting, pain  Knee: soft squishy, mild  Thigh: soft squishy, mild  Buttock: none  Lower extremity: right, no edema    Lymphedema classification (0 latent, 1 reverse, 2 non-rev, 3 elephantiasis): 1  >> REFER TO FLOW SHEET FOR GIRTH MEASUREMENTS <<  LOWER EXTREMITY LEFT CALCULATIONS      Flowsheet Row Most Recent Value   Volume LE (mL) 9365   Difference from last visit (mL)  -9365   Difference from first visit (mL)  -9365          LOWER EXTREMITY RIGHT CALCULATIONS       Flowsheet Row Most Recent Value   Volume LE (mL) 8259   Difference from last visit (mL)  -8259   Difference from first visit (mL)  -8259          LOWER EXTREMITY CHANGE OF AFFECTED LIMB CALCULATIONS      Flowsheet Row Most Recent Value   Current change in volume of affected limb (mL)  0   Current change in volume of affected limb (%)  13   Change in volume of affected limb since eval (mL)  0   Change in volume of affected limb since eval (%)  0             Skin Assessment:  Capillary refill: normal  Stemmer's sign: no  Fibrosis (0 normal - 4 >severe): 0  Erythema scale (1 very faint, 2 faint, 3 bright, 4 very bright): none  Skin quality: varicosities and ecchymosis random throughout LE's  Toenail quality: okay  Hemosiderin staining present: no  Blister present: no  Wound present: no  Scar present: no  Surface anatomy changes (Bony prominences; Tendon pathways; Joint creases): normal    Functional Capacity:  Gait assessment: ambulates with SPC, low speed, history of falls, pain in L LE  Transfer status: independent with use of Ue's.    Ability to don/doff clothing/garments: independent  Lower quarter Sensation: Abnormal, allodynia throughout L LE  Lower quarter Range of Motion: Normal  Lower Quarter Strength: Abnormal, decreased strength grossly in b/l LE's         Precautions: fall risk    POC expires Auth Status? (BOMN, approved, pending) Unit limit (Daily) Auth Start date Expiration date PT/OT + Visit Limit?             Date of Service 5/14/24        Visits used         Visits remaining         Compression Rx                           Manual Ther         volumetrics         MLD         Compression Bandaging         Wound Care                  Ther Ex         Remedial Exercise                                    Ther Activity & Self Care         Skin care         Garment Fit/Train         Sleeping position                  Pt Education         Edema differential dx X5 min        Lymphatic A&P X5 min         Compression options X5 min

## 2024-05-16 ENCOUNTER — OFFICE VISIT (OUTPATIENT)
Dept: PHYSICAL THERAPY | Facility: CLINIC | Age: 84
End: 2024-05-16
Payer: MEDICARE

## 2024-05-16 DIAGNOSIS — I89.0 LYMPHEDEMA: Primary | ICD-10-CM

## 2024-05-16 PROCEDURE — 97140 MANUAL THERAPY 1/> REGIONS: CPT | Performed by: PHYSICAL THERAPIST

## 2024-05-16 PROCEDURE — 97530 THERAPEUTIC ACTIVITIES: CPT | Performed by: PHYSICAL THERAPIST

## 2024-05-16 NOTE — PROGRESS NOTES
Daily Note     Today's date: 2024  Patient name: Kathryn Drake  : 1940  MRN: [id number]  Referring provider: Angelina Magaña DO  Dx:   Encounter Diagnosis     ICD-10-CM    1. Lymphedema  I89.0                      Subjective: Patient reports LE's are the same today as they always are.      Objective: See treatment diary below      Assessment: Tolerated treatment well. MLD was initiated today and tolerated without complaints.  Sequence included:  diaphragmatic breathing, short neck sequence, superficial abdominal sequence, and stationary circles with clearance to L inguinal node bed.  Patient was educated regarding importance of diaphragmatic breathing.  She was also educated regarding the lymphatic system, where the lymph comes from and why it accumulates.  Patient would benefit from continued PT      Plan: Continue per plan of care.      Precautions: fall risk    POC expires Auth Status? (BOMN, approved, pending) Unit limit (Daily) Auth Start date Expiration date PT/OT + Visit Limit?             Date of Service 24       Visits used         Visits remaining         Compression Rx                           Manual Ther         volumetrics  X35 min       MLD         Compression Bandaging         Wound Care                  Ther Ex         Remedial Exercise                                    Ther Activity & Self Care         Skin care         Garment Fit/Train         Sleeping position                  Pt Education         Edema differential dx X5 min        Lymphatic A&P X5 min X10 min       Compression options X5 min

## 2024-05-22 DIAGNOSIS — I10 ESSENTIAL HYPERTENSION: ICD-10-CM

## 2024-05-23 ENCOUNTER — OFFICE VISIT (OUTPATIENT)
Dept: PHYSICAL THERAPY | Facility: CLINIC | Age: 84
End: 2024-05-23
Payer: MEDICARE

## 2024-05-23 DIAGNOSIS — I89.0 LYMPHEDEMA: Primary | ICD-10-CM

## 2024-05-23 PROCEDURE — 97140 MANUAL THERAPY 1/> REGIONS: CPT | Performed by: PHYSICAL THERAPIST

## 2024-05-23 RX ORDER — AMLODIPINE BESYLATE 2.5 MG/1
2.5 TABLET ORAL DAILY
Qty: 90 TABLET | Refills: 1 | Status: SHIPPED | OUTPATIENT
Start: 2024-05-23

## 2024-05-23 NOTE — PROGRESS NOTES
Daily Note     Today's date: 2024  Patient name: Kathryn Drake  : 1940  MRN: [id number]  Referring provider: Angelina Magaña DO  Dx:   Encounter Diagnosis     ICD-10-CM    1. Lymphedema  I89.0                        Subjective: Patient wants to know if her leg is smaller yet.        Objective: See treatment diary below      Assessment: Tolerated treatment well. MLD was initiated today and tolerated without complaints.  Sequence included:  diaphragmatic breathing, short neck sequence, superficial abdominal sequence, and stationary circles with clearance to L inguinal node bed.  Patient was educated regarding importance of diaphragmatic breathing.  Patient tolerates pressure of MLD well and denies any pain during session.  Patient would benefit from continued PT      Plan: Continue per plan of care.      Precautions: fall risk    POC expires Auth Status? (BOMN, approved, pending) Unit limit (Daily) Auth Start date Expiration date PT/OT + Visit Limit?             Date of Service 24      Visits used         Visits remaining         Compression Rx                           Manual Ther         volumetrics         MLD  X35 min X43 min      Compression Bandaging         Wound Care                  Ther Ex         Remedial Exercise                                    Ther Activity & Self Care         Skin care         Garment Fit/Train         Sleeping position                  Pt Education         Edema differential dx X5 min        Lymphatic A&P X5 min X10 min       Compression options X5 min

## 2024-05-28 ENCOUNTER — OFFICE VISIT (OUTPATIENT)
Dept: PHYSICAL THERAPY | Facility: CLINIC | Age: 84
End: 2024-05-28
Payer: MEDICARE

## 2024-05-28 DIAGNOSIS — I89.0 LYMPHEDEMA: Primary | ICD-10-CM

## 2024-05-28 PROCEDURE — 97140 MANUAL THERAPY 1/> REGIONS: CPT | Performed by: PHYSICAL THERAPIST

## 2024-05-28 NOTE — PROGRESS NOTES
Daily Note     Today's date: 2024  Patient name: Kathryn Drake  : 1940  MRN: [id number]  Referring provider: nAgelina Magaña DO  Dx:   Encounter Diagnosis     ICD-10-CM    1. Lymphedema  I89.0                        Subjective: Patient notes some tenderness in upper leg today.      Objective: See treatment diary below      Assessment: Tolerated treatment well. MLD was initiated today and tolerated without complaints.  Sequence included:  diaphragmatic breathing, short neck sequence, superficial abdominal sequence, and stationary circles with clearance to L inguinal node bed.  Patient tolerates MLD well noting some tenderness in upper thigh.  Volume is low distally.  She continues to have an area in the inner thigh that accumulates greater volume.  Patient would benefit from continued PT      Plan: Continue per plan of care.      Precautions: fall risk    POC expires Auth Status? (BOMN, approved, pending) Unit limit (Daily) Auth Start date Expiration date PT/OT + Visit Limit?             Date of Service 24     Visits used         Visits remaining         Compression Rx                           Manual Ther         volumetrics         MLD  X35 min X43 min X43 min     Compression Bandaging         Wound Care                  Ther Ex         Remedial Exercise                                    Ther Activity & Self Care         Skin care         Garment Fit/Train         Sleeping position                  Pt Education         Edema differential dx X5 min        Lymphatic A&P X5 min X10 min       Compression options X5 min

## 2024-05-30 ENCOUNTER — OFFICE VISIT (OUTPATIENT)
Dept: PHYSICAL THERAPY | Facility: CLINIC | Age: 84
End: 2024-05-30
Payer: MEDICARE

## 2024-05-30 DIAGNOSIS — I89.0 LYMPHEDEMA: Primary | ICD-10-CM

## 2024-05-30 PROCEDURE — 97140 MANUAL THERAPY 1/> REGIONS: CPT | Performed by: PHYSICAL THERAPIST

## 2024-05-30 NOTE — PROGRESS NOTES
"Daily Note     Today's date: 2024  Patient name: Kathryn Drake  : 1940  MRN: [id number]  Referring provider: Angelina Magaña DO  Dx:   Encounter Diagnosis     ICD-10-CM    1. Lymphedema  I89.0                          Subjective: \"I have a prickly feeling in the top of my leg\"      Objective: See treatment diary below      Assessment: Tolerated treatment well. MLD was initiated today and tolerated without complaints.  Sequence included:  diaphragmatic breathing, short neck sequence, superficial abdominal sequence, and stationary circles with clearance to L inguinal node bed.  Patient continues to respond well.  Educated patient of importance of scheduling more appointments.  Patient would benefit from continued PT      Plan: Continue per plan of care.      Precautions: fall risk    POC expires Auth Status? (BOMN, approved, pending) Unit limit (Daily) Auth Start date Expiration date PT/OT + Visit Limit?             Date of Service 24    Visits used         Visits remaining         Compression Rx                           Manual Ther         volumetrics         MLD  X35 min X43 min X43 min X43 min    Compression Bandaging         Wound Care                  Ther Ex         Remedial Exercise                                    Ther Activity & Self Care         Skin care         Garment Fit/Train         Sleeping position                  Pt Education         Edema differential dx X5 min        Lymphatic A&P X5 min X10 min       Compression options X5 min                               "

## 2024-06-06 ENCOUNTER — OFFICE VISIT (OUTPATIENT)
Dept: PHYSICAL THERAPY | Facility: CLINIC | Age: 84
End: 2024-06-06
Payer: MEDICARE

## 2024-06-06 DIAGNOSIS — I89.0 LYMPHEDEMA: Primary | ICD-10-CM

## 2024-06-06 PROCEDURE — 97140 MANUAL THERAPY 1/> REGIONS: CPT | Performed by: PHYSICAL THERAPIST

## 2024-06-06 NOTE — PROGRESS NOTES
"Daily Note     Today's date: 2024  Patient name: Kathryn Drake  : 1940  MRN: [id number]  Referring provider: Angelina Magaña DO  Dx:   Encounter Diagnosis     ICD-10-CM    1. Lymphedema  I89.0                            Subjective: \"My leg feels the same as always.\"      Objective: See treatment diary below      Assessment: Tolerated treatment well. MLD was initiated today and tolerated without complaints.  Sequence included:  diaphragmatic breathing, short neck sequence, superficial abdominal sequence, and stationary circles with clearance to L inguinal node bed.  Area of ecchymosis in medial calf is decreasing in size.  Patient would benefit from continued PT      Plan: Continue per plan of care.      Precautions: fall risk    POC expires Auth Status? (BOMN, approved, pending) Unit limit (Daily) Auth Start date Expiration date PT/OT + Visit Limit?             Date of Service 24   Visits used         Visits remaining         Compression Rx                           Manual Ther         volumetrics         MLD  X35 min X43 min X43 min X43 min X43 min   Compression Bandaging         Wound Care                  Ther Ex         Remedial Exercise                                    Ther Activity & Self Care         Skin care         Garment Fit/Train         Sleeping position                  Pt Education         Edema differential dx X5 min        Lymphatic A&P X5 min X10 min       Compression options X5 min                               "

## 2024-06-11 ENCOUNTER — OFFICE VISIT (OUTPATIENT)
Dept: PHYSICAL THERAPY | Facility: CLINIC | Age: 84
End: 2024-06-11
Payer: MEDICARE

## 2024-06-11 DIAGNOSIS — I89.0 LYMPHEDEMA: Primary | ICD-10-CM

## 2024-06-11 PROCEDURE — 97140 MANUAL THERAPY 1/> REGIONS: CPT | Performed by: PHYSICAL THERAPIST

## 2024-06-11 NOTE — PROGRESS NOTES
"Daily Note     Today's date: 2024  Patient name: Kathryn Drake  : 1940  MRN: [id number]  Referring provider: Angelina Magaña DO  Dx:   Encounter Diagnosis     ICD-10-CM    1. Lymphedema  I89.0                          Subjective: \"I can see the bruising is almost gone.\"      Objective: See treatment diary below      Assessment: Tolerated treatment well. MLD was initiated today and tolerated without complaints.  Sequence included:  diaphragmatic breathing, short neck sequence, superficial abdominal sequence, and stationary circles with clearance to L inguinal node bed.  Area of ecchymosis on calf is almost completely resolved.  Inner thigh remains high in volume.  Plan to update volumetrics at next visit.  Patient would benefit from continued PT      Plan: Continue per plan of care.      Precautions: fall risk    POC expires Auth Status? (BOMN, approved, pending) Unit limit (Daily) Auth Start date Expiration date PT/OT + Visit Limit?             Date of Service 24   Visits used         Visits remaining         Compression Rx                           Manual Ther         volumetrics         MLD X42 min X35 min X43 min X43 min X43 min X43 min   Compression Bandaging         Wound Care                  Ther Ex         Remedial Exercise                                    Ther Activity & Self Care         Skin care         Garment Fit/Train         Sleeping position                  Pt Education         Edema differential dx X5 min        Lymphatic A&P X5 min X10 min       Compression options X5 min                               "

## 2024-06-13 ENCOUNTER — OFFICE VISIT (OUTPATIENT)
Dept: PHYSICAL THERAPY | Facility: CLINIC | Age: 84
End: 2024-06-13
Payer: MEDICARE

## 2024-06-13 DIAGNOSIS — I89.0 LYMPHEDEMA: Primary | ICD-10-CM

## 2024-06-13 PROCEDURE — 97140 MANUAL THERAPY 1/> REGIONS: CPT | Performed by: PHYSICAL THERAPIST

## 2024-06-13 NOTE — PROGRESS NOTES
"Daily Note     Today's date: 2024  Patient name: Kathryn Drake  : 1940  MRN: [id number]  Referring provider: Angelina Magaña DO  Dx:   Encounter Diagnosis     ICD-10-CM    1. Lymphedema  I89.0                            Subjective: \"I am interested to see if this is working.\"      Objective: See treatment diary below    LOWER EXTREMITY LEFT CALCULATIONS      Flowsheet Row Most Recent Value   Volume LE (mL) 8919   Difference from last visit (mL)  446   Difference from first visit (mL)  446   Difference from last visit (%)  5   Difference from first visit (%)  5               Assessment: Tolerated treatment well. MLD was initiated today and tolerated without complaints.  Sequence included:  diaphragmatic breathing, short neck sequence, superficial abdominal sequence, and stationary circles with clearance to L inguinal node bed.   Patient with significant volume reduction since IE 8 visits ago.  Patient also has completed resolution of distal area of ecchymosis.  Patient would benefit from continued PT      Plan: Continue per plan of care.      Precautions: fall risk    POC expires Auth Status? (BOMN, approved, pending) Unit limit (Daily) Auth Start date Expiration date PT/OT + Visit Limit?             Date of Service 24   Visits used         Visits remaining         Compression Rx                           Manual Ther         volumetrics  X10 min       MLD X42 min X35 min X43 min X43 min X43 min X43 min   Compression Bandaging         Wound Care                  Ther Ex         Remedial Exercise                                    Ther Activity & Self Care         Skin care         Garment Fit/Train         Sleeping position                  Pt Education         Edema differential dx X5 min        Lymphatic A&P X5 min X10 min       Compression options X5 min                               "

## 2024-06-24 ENCOUNTER — APPOINTMENT (OUTPATIENT)
Dept: RADIOLOGY | Facility: CLINIC | Age: 84
End: 2024-06-24
Payer: MEDICARE

## 2024-06-24 ENCOUNTER — OFFICE VISIT (OUTPATIENT)
Dept: URGENT CARE | Facility: CLINIC | Age: 84
End: 2024-06-24
Payer: MEDICARE

## 2024-06-24 VITALS
WEIGHT: 175 LBS | TEMPERATURE: 97.8 F | OXYGEN SATURATION: 100 % | HEART RATE: 56 BPM | RESPIRATION RATE: 18 BRPM | BODY MASS INDEX: 34.18 KG/M2 | SYSTOLIC BLOOD PRESSURE: 148 MMHG | DIASTOLIC BLOOD PRESSURE: 90 MMHG

## 2024-06-24 DIAGNOSIS — R05.1 ACUTE COUGH: ICD-10-CM

## 2024-06-24 DIAGNOSIS — J45.21 INTERMITTENT ASTHMA WITH ACUTE EXACERBATION, UNSPECIFIED ASTHMA SEVERITY: Primary | ICD-10-CM

## 2024-06-24 PROCEDURE — 99214 OFFICE O/P EST MOD 30 MIN: CPT | Performed by: PHYSICIAN ASSISTANT

## 2024-06-24 PROCEDURE — 71046 X-RAY EXAM CHEST 2 VIEWS: CPT

## 2024-06-24 RX ORDER — IPRATROPIUM BROMIDE AND ALBUTEROL SULFATE 2.5; .5 MG/3ML; MG/3ML
3 SOLUTION RESPIRATORY (INHALATION) EVERY 6 HOURS PRN
Qty: 90 ML | Refills: 0 | Status: SHIPPED | OUTPATIENT
Start: 2024-06-24 | End: 2024-07-02

## 2024-06-24 RX ORDER — IPRATROPIUM BROMIDE AND ALBUTEROL SULFATE 2.5; .5 MG/3ML; MG/3ML
3 SOLUTION RESPIRATORY (INHALATION) ONCE
Status: COMPLETED | OUTPATIENT
Start: 2024-06-24 | End: 2024-06-24

## 2024-06-24 RX ORDER — SODIUM CHLORIDE FOR INHALATION 0.9 %
3 VIAL, NEBULIZER (ML) INHALATION ONCE
Status: COMPLETED | OUTPATIENT
Start: 2024-06-24 | End: 2024-06-24

## 2024-06-24 RX ORDER — PREDNISONE 20 MG/1
20 TABLET ORAL DAILY
Qty: 5 TABLET | Refills: 0 | Status: SHIPPED | OUTPATIENT
Start: 2024-06-24 | End: 2024-06-29

## 2024-06-24 RX ADMIN — Medication 3 ML: at 10:45

## 2024-06-24 RX ADMIN — IPRATROPIUM BROMIDE AND ALBUTEROL SULFATE 3 ML: 2.5; .5 SOLUTION RESPIRATORY (INHALATION) at 10:45

## 2024-06-24 NOTE — PROGRESS NOTES
[facility]        NAME: Kathryn Drake is a 83 y.o. female  : 1940    MRN: [id number]  DATE: 2024  TIME: 2:18 PM    Assessment and Plan   Intermittent asthma with acute exacerbation, unspecified asthma severity [J45.21]  1. Intermittent asthma with acute exacerbation, unspecified asthma severity  XR chest pa & lateral    sodium chloride 0.9 % inhalation solution 3 mL    ipratropium-albuterol (DUO-NEB) 0.5-2.5 mg/3 mL inhalation solution 3 mL    predniSONE 20 mg tablet    ipratropium-albuterol (DUO-NEB) 0.5-2.5 mg/3 mL nebulizer solution          Nebulized DuoNeb done in office. Afterwards patient reports significant improvement, oxygen to 100%, and only faint expiratory wheezing in b/l lower lung fields. Prescribed oral steroid. Pt request duoneb solution to be used at home- advised her not to overlap with albuterol. Chest Xray showed no acute disease. Cardiomegaly similar in appearance to XR from last year. Encouraged pcp fu by the end of this week for recheck. If symptoms worsen go to ER.  Patient Instructions       Follow up with PCP in 3-5 days.  Proceed to  ER if symptoms worsen.    Chief Complaint     Chief Complaint   Patient presents with    Cold Like Symptoms     Pt presents with persistent cough for one week// OTC medications without relief// Inhalation Tx this morning x 2          History of Present Illness       CS is a 84 yo female with PMH asthma, allergic rhinitis, CAD, and HTN presenting with shortness of breath, cough, and mucus production x 1 week. Explains that she also has chest pain only with cough, wheezing with exertion and at night, sore throat, and rhinitis. Explains that this feels like prior asthma exacerbations. Last hospitalization was 10/2023 where she explains she had a mucus plug. Denies recent upper respiratory illness or sick contacts. Explains that she is on budesonide BID nebulized solution and albuterol four times daily nebulized solution with  sickness but they've only been providing mild relief. Last albuterol treatment was ~2-3 hours ago. Denies fever, chills, eye pain, ear pain, drooling, difficulty with eating/drinking, pleuritic chest pain, palpitations, myalgias.        Review of Systems   Review of Systems   Constitutional:  Negative for chills, fatigue and fever.   HENT:  Positive for rhinorrhea. Negative for congestion, drooling, ear discharge, ear pain, sinus pressure, sneezing, trouble swallowing and voice change.    Eyes:  Negative for pain, discharge and itching.   Respiratory:  Positive for cough, chest tightness, shortness of breath and wheezing. Negative for stridor.    Cardiovascular:  Negative for palpitations and leg swelling.   Gastrointestinal:  Negative for abdominal pain, diarrhea, nausea and vomiting.   Musculoskeletal:  Negative for myalgias.   Skin:  Negative for rash.   Allergic/Immunologic: Positive for environmental allergies.   Neurological:  Negative for dizziness and headaches.         Current Medications       Current Outpatient Medications:     albuterol (2.5 mg/3 mL) 0.083 % nebulizer solution, Take 3 mL (2.5 mg total) by nebulization every 6 (six) hours as needed for wheezing or shortness of breath, Disp: 360 mL, Rfl: 5    albuterol (PROVENTIL HFA,VENTOLIN HFA) 90 mcg/act inhaler, inhale 2 puffs by mouth and INTO THE LUNGS every 4 hours if needed for wheezing, Disp: 8.5 g, Rfl: 8    amLODIPine (NORVASC) 2.5 mg tablet, Take 1 tablet (2.5 mg total) by mouth daily, Disp: 90 tablet, Rfl: 1    ascorbic acid (VITAMIN C) 500 MG tablet, Take 500 mg by mouth daily, Disp: , Rfl:     aspirin 81 MG tablet, Take 81 mg by mouth daily  , Disp: , Rfl:     budesonide (PULMICORT) 0.25 mg/2 mL nebulizer solution, Take 2 mL (0.25 mg total) by nebulization 2 (two) times a day Rinse mouth after use., Disp: , Rfl:     calcium carbonate-vitamin D 500 mg-5 mcg per tablet, Take 1 tablet by mouth 2 (two) times a day with meals, Disp: , Rfl:      Cholecalciferol (VITAMIN D) 2000 UNITS tablet, Take 4,000 Units by mouth daily, Disp: , Rfl:     ipratropium-albuterol (DUO-NEB) 0.5-2.5 mg/3 mL nebulizer solution, Take 3 mL by nebulization every 6 (six) hours as needed for wheezing or shortness of breath, Disp: 90 mL, Rfl: 0    levothyroxine 75 mcg tablet, take 1 tablet by mouth once daily, Disp: 90 tablet, Rfl: 1    predniSONE 20 mg tablet, Take 1 tablet (20 mg total) by mouth daily for 5 days, Disp: 5 tablet, Rfl: 0    pregabalin (LYRICA) 50 mg capsule, Take 1 capsule in the a.m. 2 capsules at bedtime, Disp: 90 capsule, Rfl: 5    simvastatin (ZOCOR) 10 mg tablet, Take 1 tablet (10 mg total) by mouth daily at bedtime, Disp: 90 tablet, Rfl: 1    Symbicort 160-4.5 MCG/ACT inhaler, Inhale 2 puffs 2 (two) times a day, Disp: 10.2 g, Rfl: 3    EPINEPHrine (EPIPEN) 0.3 mg/0.3 mL SOAJ, Inject 0.3 mL (0.3 mg total) into a muscle once for 1 dose, Disp: 0.6 mL, Rfl: 0  No current facility-administered medications for this visit.    Current Allergies     Allergies as of 06/24/2024 - Reviewed 06/24/2024   Allergen Reaction Noted    Penicillins  09/18/2013    Latex Rash 03/03/2020            The following portions of the patient's history were reviewed and updated as appropriate: allergies, current medications, past family history, past medical history, past social history, past surgical history and problem list.     Past Medical History:   Diagnosis Date    Asthma     Cardiac disease     heart attack    Disease of thyroid gland     Hyperlipidemia        Past Surgical History:   Procedure Laterality Date    APPENDECTOMY      BREAST LUMPECTOMY Bilateral     several    CARDIAC SURGERY      CATARACT EXTRACTION, BILATERAL      CORONARY STENT PLACEMENT      EPIDURAL BLOCK INJECTION Bilateral 10/25/2023    Procedure: bilateral L4-L5  TRANSFORAMINAL epidural steroid injection (04683);  Surgeon: Ariel Heredia DO;  Location: [facility] MAIN OR;  Service: Pain Management     NASAL  POLYP SURGERY         Family History   Problem Relation Age of Onset    No Known Problems Mother     ALS Father     Cancer Brother     Heart disease Family     Asthma Family          Medications have been verified.        Objective   /90   Pulse 56   Temp 97.8 °F (36.6 °C)   Resp 18   Wt 79.4 kg (175 lb)   SpO2 100%   BMI 34.18 kg/m²        Physical Exam     Physical Exam  Vitals and nursing note reviewed.   Constitutional:       General: She is not in acute distress.     Appearance: Normal appearance. She is not ill-appearing.   HENT:      Head: Normocephalic and atraumatic.      Nose: Rhinorrhea present.   Cardiovascular:      Rate and Rhythm: Normal rate and regular rhythm.   Pulmonary:      Effort: Pulmonary effort is normal. No respiratory distress.      Breath sounds: No stridor or transmitted upper airway sounds. Examination of the right-upper field reveals wheezing and rhonchi. Examination of the left-upper field reveals wheezing and rhonchi. Examination of the right-middle field reveals wheezing and rhonchi. Examination of the left-middle field reveals wheezing and rhonchi. Examination of the right-lower field reveals wheezing and rhonchi. Examination of the left-lower field reveals wheezing and rhonchi. Decreased breath sounds, wheezing and rhonchi present.   Skin:     General: Skin is warm and dry.      Capillary Refill: Capillary refill takes less than 2 seconds.   Neurological:      Mental Status: She is alert and oriented to person, place, and time.   Psychiatric:         Mood and Affect: Mood normal.         Behavior: Behavior normal.

## 2024-06-26 ENCOUNTER — APPOINTMENT (OUTPATIENT)
Dept: PHYSICAL THERAPY | Facility: CLINIC | Age: 84
End: 2024-06-26
Payer: MEDICARE

## 2024-07-01 ENCOUNTER — HOSPITAL ENCOUNTER (OUTPATIENT)
Dept: RADIOLOGY | Facility: HOSPITAL | Age: 84
Discharge: HOME | End: 2024-07-01
Payer: MEDICARE

## 2024-07-01 DIAGNOSIS — N28.89 RIGHT RENAL MASS: ICD-10-CM

## 2024-07-01 PROCEDURE — G1004 CDSM NDSC: HCPCS

## 2024-07-01 PROCEDURE — A9585 GADOBUTROL INJECTION: HCPCS | Performed by: UROLOGY

## 2024-07-01 PROCEDURE — 74183 MRI ABD W/O CNTR FLWD CNTR: CPT

## 2024-07-01 RX ORDER — GADOBUTROL 604.72 MG/ML
8 INJECTION INTRAVENOUS
Status: COMPLETED | OUTPATIENT
Start: 2024-07-01 | End: 2024-07-01

## 2024-07-01 RX ADMIN — GADOBUTROL 8 ML: 604.72 INJECTION INTRAVENOUS at 09:48

## 2024-07-02 ENCOUNTER — OFFICE VISIT (OUTPATIENT)
Dept: PHYSICAL THERAPY | Facility: CLINIC | Age: 84
End: 2024-07-02
Payer: MEDICARE

## 2024-07-02 ENCOUNTER — OFFICE VISIT (OUTPATIENT)
Dept: PULMONOLOGY | Facility: MEDICAL CENTER | Age: 84
End: 2024-07-02
Payer: MEDICARE

## 2024-07-02 VITALS
BODY MASS INDEX: 34.28 KG/M2 | WEIGHT: 174.6 LBS | SYSTOLIC BLOOD PRESSURE: 122 MMHG | OXYGEN SATURATION: 96 % | RESPIRATION RATE: 12 BRPM | HEIGHT: 60 IN | HEART RATE: 76 BPM | DIASTOLIC BLOOD PRESSURE: 82 MMHG | TEMPERATURE: 98.7 F

## 2024-07-02 DIAGNOSIS — J45.30 MILD PERSISTENT ASTHMA WITHOUT COMPLICATION: Primary | ICD-10-CM

## 2024-07-02 DIAGNOSIS — I89.0 LYMPHEDEMA: Primary | ICD-10-CM

## 2024-07-02 DIAGNOSIS — E66.09 CLASS 1 OBESITY DUE TO EXCESS CALORIES WITHOUT SERIOUS COMORBIDITY WITH BODY MASS INDEX (BMI) OF 33.0 TO 33.9 IN ADULT: ICD-10-CM

## 2024-07-02 PROBLEM — J45.20 MILD INTERMITTENT ASTHMA WITHOUT COMPLICATION: Status: RESOLVED | Noted: 2020-02-04 | Resolved: 2024-07-02

## 2024-07-02 PROCEDURE — 97140 MANUAL THERAPY 1/> REGIONS: CPT | Performed by: PHYSICAL THERAPIST

## 2024-07-02 PROCEDURE — 99214 OFFICE O/P EST MOD 30 MIN: CPT | Performed by: NURSE PRACTITIONER

## 2024-07-02 PROCEDURE — G2211 COMPLEX E/M VISIT ADD ON: HCPCS | Performed by: NURSE PRACTITIONER

## 2024-07-02 RX ORDER — ALBUTEROL SULFATE 90 UG/1
2 AEROSOL, METERED RESPIRATORY (INHALATION) EVERY 4 HOURS PRN
Qty: 18 G | Refills: 5 | Status: SHIPPED | OUTPATIENT
Start: 2024-07-02

## 2024-07-02 RX ORDER — PREDNISONE 20 MG/1
40 TABLET ORAL DAILY
Qty: 10 TABLET | Refills: 0 | Status: SHIPPED | OUTPATIENT
Start: 2024-07-02

## 2024-07-02 NOTE — PROGRESS NOTES
"Pulmonary Follow-Up Note   Kathryn Drake 83 y.o. female MRN: [id number]  7/2/2024      Assessment/Plan:    Problem List Items Addressed This Visit          Respiratory    Mild persistent asthma without complication - Primary     She will continue with budesonide nebulized twice daily and is currently using DuoNebs at least twice a day.  She will continue with this current inhaled regimen.  She did ask for a sample of an albuterol inhaler; however, we do not have these in the office.  I did send a prescription to her pharmacy at her request.  I also sent a prescription for prednisone 40 mg daily for 5 days given some wheezing today.  If her symptoms do not improve with use of the nebulizer, she can do a second course of prednisone.  She was appreciative.         Relevant Medications    albuterol (PROVENTIL HFA,VENTOLIN HFA) 90 mcg/act inhaler    predniSONE 20 mg tablet    Other Relevant Orders    Nebulizer    Nebulizer Supplies       Other    Class 1 obesity due to excess calories without serious comorbidity with body mass index (BMI) of 33.0 to 33.9 in adult     Lifestyle modifications and weight loss as able.            Return in about 6 months (around 1/2/2025), or if symptoms worsen or fail to improve.    All of Kathryn's questions were answered prior to leaving the office today. She will follow-up in six months or sooner should the need arise. She is aware to call our office with any further questions or concerns.    History of Present Illness   Reason for Visit: Follow-up  Chief Complaint: \"I need a new nebulizer machine.\"  HPI: Kathryn Drake is a 83 y.o. female who presents to the office today for follow-up appointment to obtain a new nebulizer machine.  She reports that she was seen in urgent care a little over a week ago due to asthma exacerbation.  She was having cough and mucus production, but no fevers and chills.  She was having wheezing and was given prescriptions for DuoNebs and prednisone.  " She reports she took the 5-day course of prednisone and had significant improvement, but still continues with some shortness of breath and wheezing.  She is also using budesonide twice daily.  Her nebulizer is broken and she borrowed 1 from a friend and needs a new nebulizer.  Aside from the above, no other complaints.    Review of Systems   All other systems reviewed and are negative.  A full 12-point review of systems was completed and is negative except for those outlined in the HPI.    Historical Information   Past Medical History:   Diagnosis Date    Asthma     Cardiac disease     heart attack    Disease of thyroid gland     Hyperlipidemia      Past Surgical History:   Procedure Laterality Date    APPENDECTOMY      BREAST LUMPECTOMY Bilateral     several    CARDIAC SURGERY      CATARACT EXTRACTION, BILATERAL      CORONARY STENT PLACEMENT      EPIDURAL BLOCK INJECTION Bilateral 10/25/2023    Procedure: bilateral L4-L5  TRANSFORAMINAL epidural steroid injection (21200);  Surgeon: Ariel Heredia DO;  Location: [facility] MAIN OR;  Service: Pain Management     NASAL POLYP SURGERY       Family History   Problem Relation Age of Onset    No Known Problems Mother     ALS Father     Cancer Brother     Heart disease Family     Asthma Family      Social History   Social History     Substance and Sexual Activity   Alcohol Use Yes    Comment: rare     Social History     Substance and Sexual Activity   Drug Use No     Social History     Tobacco Use   Smoking Status Never    Passive exposure: Never   Smokeless Tobacco Never     E-Cigarette/Vaping    E-Cigarette Use Never User      E-Cigarette/Vaping Substances    Nicotine No     THC No     CBD No     Flavoring No     Other No     Unknown No        Meds/Allergies     Current Outpatient Medications:     albuterol (2.5 mg/3 mL) 0.083 % nebulizer solution, Take 3 mL (2.5 mg total) by nebulization every 6 (six) hours as needed for wheezing or shortness of breath, Disp: 360 mL,  Rfl: 5    albuterol (PROVENTIL HFA,VENTOLIN HFA) 90 mcg/act inhaler, inhale 2 puffs by mouth and INTO THE LUNGS every 4 hours if needed for wheezing, Disp: 8.5 g, Rfl: 8    albuterol (PROVENTIL HFA,VENTOLIN HFA) 90 mcg/act inhaler, Inhale 2 puffs every 4 (four) hours as needed for wheezing or shortness of breath, Disp: 18 g, Rfl: 5    amLODIPine (NORVASC) 2.5 mg tablet, Take 1 tablet (2.5 mg total) by mouth daily, Disp: 90 tablet, Rfl: 1    ascorbic acid (VITAMIN C) 500 MG tablet, Take 500 mg by mouth daily, Disp: , Rfl:     aspirin 81 MG tablet, Take 81 mg by mouth daily  , Disp: , Rfl:     budesonide (PULMICORT) 0.25 mg/2 mL nebulizer solution, Take 2 mL (0.25 mg total) by nebulization 2 (two) times a day Rinse mouth after use., Disp: , Rfl:     calcium carbonate-vitamin D 500 mg-5 mcg per tablet, Take 1 tablet by mouth 2 (two) times a day with meals, Disp: , Rfl:     Cholecalciferol (VITAMIN D) 2000 UNITS tablet, Take 4,000 Units by mouth daily, Disp: , Rfl:     levothyroxine 75 mcg tablet, take 1 tablet by mouth once daily, Disp: 90 tablet, Rfl: 1    predniSONE 20 mg tablet, Take 2 tablets (40 mg total) by mouth daily, Disp: 10 tablet, Rfl: 0    pregabalin (LYRICA) 50 mg capsule, Take 1 capsule in the a.m. 2 capsules at bedtime, Disp: 90 capsule, Rfl: 5    simvastatin (ZOCOR) 10 mg tablet, Take 1 tablet (10 mg total) by mouth daily at bedtime, Disp: 90 tablet, Rfl: 1    Symbicort 160-4.5 MCG/ACT inhaler, Inhale 2 puffs 2 (two) times a day, Disp: 10.2 g, Rfl: 3    Cholecalciferol 50 MCG (2000 UT) CAPS, Take 4,000 Units by mouth daily, Disp: , Rfl:     EPINEPHrine (EPIPEN) 0.3 mg/0.3 mL SOAJ, Inject 0.3 mL (0.3 mg total) into a muscle once for 1 dose, Disp: 0.6 mL, Rfl: 0  Allergies   Allergen Reactions    Penicillins     Latex Rash       Vitals: Blood pressure 122/82, pulse 76, temperature 98.7 °F (37.1 °C), temperature source Temporal, resp. rate 12, height 5' (1.524 m), weight 79.2 kg (174 lb 9.6 oz), SpO2 96%.  "Body mass index is 34.1 kg/m². Oxygen Therapy  SpO2: 96 %    Physical Exam:  Physical Exam  Vitals reviewed.   Constitutional:       General: She is not in acute distress.     Appearance: She is well-developed. She is obese. She is not toxic-appearing or diaphoretic.   HENT:      Head: Normocephalic and atraumatic.   Eyes:      General: No scleral icterus.     Extraocular Movements: Extraocular movements intact.   Neck:      Trachea: No tracheal deviation.   Cardiovascular:      Rate and Rhythm: Normal rate and regular rhythm.      Heart sounds: S1 normal and S2 normal. No murmur heard.     No friction rub. No gallop.   Pulmonary:      Effort: Pulmonary effort is normal. No tachypnea, accessory muscle usage or respiratory distress.      Breath sounds: No stridor. Wheezing present. No decreased breath sounds, rhonchi or rales.   Chest:      Chest wall: No tenderness.   Abdominal:      General: Bowel sounds are normal. There is no distension.      Palpations: Abdomen is soft.      Tenderness: There is no abdominal tenderness.   Musculoskeletal:      Cervical back: Neck supple.      Right lower leg: No edema.      Left lower leg: No edema.   Skin:     General: Skin is warm and dry.      Findings: No rash.   Neurological:      Mental Status: She is alert and oriented to person, place, and time.      GCS: GCS eye subscore is 4. GCS verbal subscore is 5. GCS motor subscore is 6.   Psychiatric:         Speech: Speech normal.         Behavior: Behavior normal. Behavior is cooperative.       Imaging and other studies: I have personally reviewed pertinent reports.     Chest x-ray from June 2024 showed no acute pulm infiltrate, effusion, or mass.      NA Hill  [facility]        Portions of the record may have been created with voice recognition software.  Occasional wrong word or \"sound a like\" substitutions may have occurred due to the inherent limitations of voice " recognition software.  Read the chart carefully and recognize, using context, where substitutions have occurred or contact the dictating provider.

## 2024-07-02 NOTE — PROGRESS NOTES
"Daily Note     Today's date: 2024  Patient name: Kathryn Drake  : 1940  MRN: [id number]  Referring provider: Angelina Magaña DO  Dx:   Encounter Diagnosis     ICD-10-CM    1. Lymphedema  I89.0                            Subjective: \"I am interested to see if this is working.\"      Objective: See treatment diary below             Assessment: Tolerated treatment well. MLD was initiated today and tolerated without complaints.  Sequence included:  diaphragmatic breathing, short neck sequence, superficial abdominal sequence, and stationary circles with clearance to L inguinal node bed.   Patient continues to have decreased volume after not having MLD session for a few weeks.  Patient would benefit from continued PT      Plan: Continue per plan of care.      Precautions: fall risk    POC expires Auth Status? (BOMN, approved, pending) Unit limit (Daily) Auth Start date Expiration date PT/OT + Visit Limit?             Date of Service 24   Visits used         Visits remaining         Compression Rx                           Manual Ther         volumetrics  X10 min       MLD X42 min X35 min X42 min X43 min X43 min X43 min   Compression Bandaging         Wound Care                  Ther Ex         Remedial Exercise                                    Ther Activity & Self Care         Skin care         Garment Fit/Train         Sleeping position                  Pt Education         Edema differential dx X5 min        Lymphatic A&P X5 min X10 min       Compression options X5 min                                 "

## 2024-07-03 LAB

## 2024-07-03 NOTE — PROGRESS NOTES
Nebulizer Orders/ Supplies [facility].

## 2024-07-05 ENCOUNTER — OFFICE VISIT (OUTPATIENT)
Dept: FAMILY MEDICINE CLINIC | Facility: CLINIC | Age: 84
End: 2024-07-05
Payer: MEDICARE

## 2024-07-05 VITALS
HEIGHT: 60 IN | RESPIRATION RATE: 20 BRPM | SYSTOLIC BLOOD PRESSURE: 142 MMHG | WEIGHT: 177.6 LBS | DIASTOLIC BLOOD PRESSURE: 66 MMHG | BODY MASS INDEX: 34.87 KG/M2 | TEMPERATURE: 97.2 F | HEART RATE: 61 BPM | OXYGEN SATURATION: 95 %

## 2024-07-05 DIAGNOSIS — J45.30 MILD PERSISTENT ASTHMA WITHOUT COMPLICATION: Primary | ICD-10-CM

## 2024-07-05 PROCEDURE — 99213 OFFICE O/P EST LOW 20 MIN: CPT | Performed by: FAMILY MEDICINE

## 2024-07-05 PROCEDURE — G2211 COMPLEX E/M VISIT ADD ON: HCPCS | Performed by: FAMILY MEDICINE

## 2024-07-05 RX ORDER — DOXYCYCLINE HYCLATE 100 MG
100 TABLET ORAL 2 TIMES DAILY
Qty: 14 TABLET | Refills: 0 | Status: SHIPPED | OUTPATIENT
Start: 2024-07-05 | End: 2024-07-12

## 2024-07-05 NOTE — PROGRESS NOTES
Ambulatory Visit  Name: Kathryn Darke      : 1940      MRN: [id number]  Encounter Provider: Angelina Magaña DO  Encounter Date: 2024   Encounter department: [facility]    Assessment & Plan   1. Mild persistent asthma without complication  Assessment & Plan:  Has had wheezing for 2 weeks  Will start 5 days of prednisone from Pulmonologist and start antibiotic  Has been sick for 2 weeks without improvement   Orders:  -     doxycycline hyclate (VIBRA-TABS) 100 mg tablet; Take 1 tablet (100 mg total) by mouth 2 (two) times a day for 7 days  Return if symptoms worsen or fail to improve.     History of Present Illness     She was seen in Urgent Care.  It took 3 days for her nebulizer treatments. Then she had issues with her nebulizer machine.  She has since then seen Pulmonologist.     She is still wheezing.  She has had one round of prednisone.          Review of Systems    Objective     /66   Pulse 61   Temp (!) 97.2 °F (36.2 °C) (Tympanic)   Resp 20   Ht 5' (1.524 m)   Wt 80.6 kg (177 lb 9.6 oz)   SpO2 95%   BMI 34.69 kg/m²     Physical Exam  Vitals and nursing note reviewed.   Constitutional:       General: She is not in acute distress.     Appearance: She is well-developed.   HENT:      Head: Normocephalic and atraumatic.      Right Ear: Tympanic membrane normal.      Left Ear: Tympanic membrane normal.   Cardiovascular:      Rate and Rhythm: Normal rate and regular rhythm.      Heart sounds: No murmur heard.  Pulmonary:      Effort: Pulmonary effort is normal. No respiratory distress.      Breath sounds: Wheezing present.   Musculoskeletal:         General: No swelling.      Cervical back: Neck supple.   Neurological:      Mental Status: She is alert.       Administrative Statements

## 2024-07-05 NOTE — ASSESSMENT & PLAN NOTE
Has had wheezing for 2 weeks  Will start 5 days of prednisone from Pulmonologist and start antibiotic  Has been sick for 2 weeks without improvement

## 2024-07-09 ENCOUNTER — OFFICE VISIT (OUTPATIENT)
Dept: PHYSICAL THERAPY | Facility: CLINIC | Age: 84
End: 2024-07-09
Payer: MEDICARE

## 2024-07-09 ENCOUNTER — APPOINTMENT (OUTPATIENT)
Dept: LAB | Facility: HOSPITAL | Age: 84
End: 2024-07-09
Payer: MEDICARE

## 2024-07-09 DIAGNOSIS — I89.0 LYMPHEDEMA: Primary | ICD-10-CM

## 2024-07-09 LAB
CREAT SERPL-MCNC: 0.67 MG/DL (ref 0.6–1.3)
GFR SERPL CREATININE-BSD FRML MDRD: 82 ML/MIN/1.73SQ M

## 2024-07-09 PROCEDURE — 82565 ASSAY OF CREATININE: CPT

## 2024-07-09 PROCEDURE — 97140 MANUAL THERAPY 1/> REGIONS: CPT | Performed by: PHYSICAL THERAPIST

## 2024-07-09 NOTE — PROGRESS NOTES
"     Lymphedema Re-evaluation    Today's Date: 2024  Patient name: Kathryn Drake  : 1940  MRN: [id number]  Referring provider: Angelina Magaña DO  Dx:  Encounter Diagnosis     ICD-10-CM    1. Lymphedema  I89.0                           Assessment  Impairments: abnormal gait, activity intolerance, lacks appropriate home exercise program and pain with function  Other impairment: L LE edema  Functional limitations: ambualtion due to pain  Symptom irritability: moderate    Assessment details: Patient is an 83 year old woman who has completed 10 visits of PT at this time.  Pitting edema in R LE has resolved since IE and areas of ecchymosis are resolved.  Patient continues to have area in inner thigh and ankle that can become edematous with increased activity.  When patient arrives to therapy R LE limb volume is low.  Patient has likely nearing maximal benefit from therapy and will be transitioned over the next few weeks to a home management program.    Understanding of Dx/Px/POC: good     Prognosis: good    Goals  Short Term Goals:    1.   volume by 100 ml or greater since IE.  met  2.  Decreased sensitivity to touch per patient report since IE.  met  3.  Pain decreased to <6/10 on average.  met    Long Term Goals:   1.  Patient independent with donning/doffing compression sleeve.     2.  Patient with reduction of greater than 300 mL since IE.      Plan  Patient would benefit from: skilled physical therapy    Planned therapy interventions: manual therapy, massage, strengthening, stretching, therapeutic activities, therapeutic exercise, home exercise program and compression    Frequency: 2x week  Duration in weeks: 3  Plan of Care beginning date: 2024  Plan of Care expiration date: 2024  Treatment plan discussed with: patient    Subjective/History:    24:  \"The top of my leg still puffs up sometimes.\"  Chief Complaint: L LE edema, that has been present for 10 years since she had triple " bypass surgery.    HPI:  Patient notes they took a vein from her L thigh for the bypass surgery and it has been swollen off and on since then.  She notes the edema is random in natural she doesn't know what causes it to get better.  She notes she has had a couple falls which has seemed to make the swelling worse.    Prior Edema Treatments:  none  Imaging:  doppler  Relevant PMHx:  hx of CABG  Personal history of Cancer? No    Lymphedema special questions  Feeling of heaviness, fullness, pressure? no  Resolves with elevation: Yes  Change in fit of shoes/clothes/jewelry?yes  History of Cellulitis?  no  History of S/DVT? no  History of Leg wounds? no  Sees a podiatrist regularly for foot care? no  Presence of trunk/abdominal/genital edema? no  Latex Allergy? yes    Systems Review:  Cardiovascular hx: Yes  On diuretics? no  Thyroid dysfunction: No  Diabetes: No  Kidney disease:No   Liver disease: No  Abdominal surgeries: No  Orthopedic injuries/surgeries: Yes      Pain: 6/10 when edema is worse  Function: limited by pain when LE is swollen.    Working Status: retired  Exercise status: sedentary  Patient goals:  decrease swelling and improve function    Lymphedema Exam: Lower Extremities  Abdomen/Genitals: none  Lower extremity: left  Toes: none  Dorsum of foot: mild  Ankle: mild  Calf: mild.no pitting  Knee: soft squishy, mild  Thigh: soft squishy, mild  Buttock: none  Lower extremity: right, no edema    Lymphedema classification (0 latent, 1 reverse, 2 non-rev, 3 elephantiasis): 1  >> REFER TO FLOW SHEET FOR GIRTH MEASUREMENTS <<               Skin Assessment:  Capillary refill: normal  Stemmer's sign: no  Fibrosis (0 normal - 4 >severe): 0  Erythema scale (1 very faint, 2 faint, 3 bright, 4 very bright): none  Skin quality: varicosities and ecchymosis random throughout LE's  Toenail quality: okay  Hemosiderin staining present: no  Blister present: no  Wound present: no  Scar present: no  Surface anatomy changes (Bony  prominences; Tendon pathways; Joint creases): normal    Functional Capacity:  Gait assessment: ambulates with SPC, low speed, history of falls, pain in L LE  Transfer status: independent with use of Ue's.    Ability to don/doff clothing/garments: independent  Lower quarter Sensation: Abnormal, allodynia throughout L LE  Lower quarter Range of Motion: Normal  Lower Quarter Strength: Abnormal, decreased strength grossly in b/l LE's         Precautions: fall risk    POC expires Auth Status? (BOMN, approved, pending) Unit limit (Daily) Auth Start date Expiration date PT/OT + Visit Limit?               Date of Service 6/11/24 6/13/24 7/2/24 7/9/24 5/30/24 6/6/24   Visits used         Visits remaining         Compression Rx                           Manual Ther         volumetrics  X10 min       MLD X42 min X35 min X42 min X42 min X43 min X43 min   Compression Bandaging         Wound Care                  Ther Ex         Remedial Exercise                                    Ther Activity & Self Care         Skin care         Garment Fit/Train         Sleeping position                  Pt Education         Edema differential dx X5 min        Lymphatic A&P X5 min X10 min       Compression options X5 min

## 2024-07-11 ENCOUNTER — OFFICE VISIT (OUTPATIENT)
Dept: PHYSICAL THERAPY | Facility: CLINIC | Age: 84
End: 2024-07-11
Payer: MEDICARE

## 2024-07-11 DIAGNOSIS — I89.0 LYMPHEDEMA: Primary | ICD-10-CM

## 2024-07-11 PROCEDURE — 97140 MANUAL THERAPY 1/> REGIONS: CPT | Performed by: PHYSICAL THERAPIST

## 2024-07-11 NOTE — PROGRESS NOTES
"Daily Note     Today's date: 2024  Patient name: Kathryn Drake  : 1940  MRN: [id number]  Referring provider: Angelina Magaña DO  Dx:   Encounter Diagnosis     ICD-10-CM    1. Lymphedema  I89.0                      Subjective: \"I have been a lot more active than normal with my  being sick.\"      Objective: See treatment diary below          Assessment: Tolerated treatment well. Patient continues to do well with MLD.  Sequence included:  diaphragmatic breathing, short neck sequence, superficial abdominal sequence, and stationary circles with clearance to L inguinal node bed.   Volume changes continues to be maintained between sessions.  Patient would benefit from continued PT      Plan: Continue per plan of care.   Perform volumetrics next visit.       Precautions: fall risk    POC expires Auth Status? (BOMN, approved, pending) Unit limit (Daily) Auth Start date Expiration date PT/OT + Visit Limit?                Date of Service 24   Visits used         Visits remaining         Compression Rx                           Manual Ther         volumetrics  X10 min       MLD X42 min X35 min X42 min X42 min X43 min X43 min   Compression Bandaging         Wound Care                  Ther Ex         Remedial Exercise                                    Ther Activity & Self Care         Skin care         Garment Fit/Train         Sleeping position                  Pt Education         Edema differential dx X5 min        Lymphatic A&P X5 min X10 min       Compression options X5 min                            "

## 2024-07-16 ENCOUNTER — OFFICE VISIT (OUTPATIENT)
Dept: PHYSICAL THERAPY | Facility: CLINIC | Age: 84
End: 2024-07-16
Payer: MEDICARE

## 2024-07-16 DIAGNOSIS — I89.0 LYMPHEDEMA: Primary | ICD-10-CM

## 2024-07-16 PROCEDURE — 97140 MANUAL THERAPY 1/> REGIONS: CPT | Performed by: PHYSICAL THERAPIST

## 2024-07-16 NOTE — PROGRESS NOTES
"Discharge Summary     Today's date: 2024  Patient name: Kathryn Drake  : 1940  MRN: [id number]  Referring provider: Angelina Magaña DO  Dx:   Encounter Diagnosis     ICD-10-CM    1. Lymphedema  I89.0                        Subjective: \"I woke up with more swelling today.\"      Objective: See treatment diary below    LOWER EXTREMITY LEFT CALCULATIONS      Flowsheet Row Most Recent Value   Volume LE (mL) 8587   Difference from last visit (mL)  332   Difference from first visit (mL)  778   Difference from last visit (%)  4   Difference from first visit (%)  8              Assessment: Patient has done well with therapy with a total volume decrease in LE of 778 mL since IE in May.  Patient was upset by the review of volume values and decided she wanted to be discharged today.  She states, \"when a patient comes in and says they have more swelling and pain you listen to them.\"  I educated patient that volume reductions have been significant and despite some feeling of increased edema today the values have decreased with each measurement since her IE.  I also explained that due to continued flare ups of pain despite volume change, the edema is likely not causing the pain.  Patient voices disagreement and states, \"I am quitting, I will not be returning.\"      Plan: Discharge patient today per patient request.       Precautions: fall risk    POC expires Auth Status? (CHAY, approved, pending) Unit limit (Daily) Auth Start date Expiration date PT/OT + Visit Limit?                Date of Service 24   Visits used         Visits remaining         Compression Rx                           Manual Ther         volumetrics  X10 min       MLD X42 min X35 min X42 min X42 min X43 min X10 min   Compression Bandaging         Wound Care                  Ther Ex         Remedial Exercise                                    Ther Activity & Self Care         Skin care         Garment " Fit/Train         Sleeping position                  Pt Education         Edema differential dx X5 min     X10 min   Lymphatic A&P X5 min X10 min       Compression options X5 min

## 2024-07-18 ENCOUNTER — APPOINTMENT (OUTPATIENT)
Dept: PHYSICAL THERAPY | Facility: CLINIC | Age: 84
End: 2024-07-18
Payer: MEDICARE

## 2024-07-24 ENCOUNTER — OFFICE VISIT (OUTPATIENT)
Dept: UROLOGY | Facility: CLINIC | Age: 84
End: 2024-07-24
Payer: MEDICARE

## 2024-07-24 ENCOUNTER — TELEPHONE (OUTPATIENT)
Dept: UROLOGY | Facility: CLINIC | Age: 84
End: 2024-07-24

## 2024-07-24 VITALS
HEART RATE: 57 BPM | DIASTOLIC BLOOD PRESSURE: 66 MMHG | HEIGHT: 60 IN | SYSTOLIC BLOOD PRESSURE: 140 MMHG | WEIGHT: 177 LBS | OXYGEN SATURATION: 96 % | BODY MASS INDEX: 34.75 KG/M2

## 2024-07-24 DIAGNOSIS — N28.89 RIGHT KIDNEY MASS: Primary | ICD-10-CM

## 2024-07-24 PROCEDURE — 99213 OFFICE O/P EST LOW 20 MIN: CPT | Performed by: PHYSICIAN ASSISTANT

## 2024-07-24 NOTE — TELEPHONE ENCOUNTER
Return for amb referral to IR for cryo post op with Lorelei for path review.    Watch for IR scheduling and report, then schedule with Dr Moseley

## 2024-07-24 NOTE — PROGRESS NOTES
UROLOGY PROGRESS NOTE   Patient Identifiers: Kathryn Drake (MRN [id number])  Date of Service: 7/24/2024    Subjective:   83-year-old female with history of 2 cm exophytic right renal mass thought to be solid and consistent with small renal cell carcinoma.  This was present on a CAT scan in November and now on 2 concurrent MRIs and remains unchanged.  She has a history of coronary artery disease and CABG.  She has asthma and ambulatory dysfunction.    Reason for visit: Right kidney mass follow-up    Objective:     VITALS:    Vitals:    07/24/24 0909   BP: 140/66   Pulse: 57   SpO2: 96%           LABS:  Lab Results   Component Value Date    HGB 14.3 02/07/2024    HCT 43.0 02/07/2024    WBC 7.25 02/07/2024     02/07/2024   ]    Lab Results   Component Value Date    K 3.8 02/07/2024     02/07/2024    CO2 28 02/07/2024    BUN 11 02/07/2024    CREATININE 0.67 07/09/2024    CALCIUM 9.6 02/07/2024   ]        INPATIENT MEDS:    Current Outpatient Medications:     albuterol (2.5 mg/3 mL) 0.083 % nebulizer solution, Take 3 mL (2.5 mg total) by nebulization every 6 (six) hours as needed for wheezing or shortness of breath, Disp: 360 mL, Rfl: 5    albuterol (PROVENTIL HFA,VENTOLIN HFA) 90 mcg/act inhaler, inhale 2 puffs by mouth and INTO THE LUNGS every 4 hours if needed for wheezing, Disp: 8.5 g, Rfl: 8    albuterol (PROVENTIL HFA,VENTOLIN HFA) 90 mcg/act inhaler, Inhale 2 puffs every 4 (four) hours as needed for wheezing or shortness of breath, Disp: 18 g, Rfl: 5    amLODIPine (NORVASC) 2.5 mg tablet, Take 1 tablet (2.5 mg total) by mouth daily, Disp: 90 tablet, Rfl: 1    ascorbic acid (VITAMIN C) 500 MG tablet, Take 500 mg by mouth daily, Disp: , Rfl:     aspirin 81 MG tablet, Take 81 mg by mouth daily  , Disp: , Rfl:     budesonide (PULMICORT) 0.25 mg/2 mL nebulizer solution, Take 2 mL (0.25 mg total) by nebulization 2 (two) times a day Rinse mouth after use., Disp: , Rfl:     calcium carbonate-vitamin D 500  mg-5 mcg per tablet, Take 1 tablet by mouth 2 (two) times a day with meals, Disp: , Rfl:     Cholecalciferol (VITAMIN D) 2000 UNITS tablet, Take 4,000 Units by mouth daily, Disp: , Rfl:     Cholecalciferol 50 MCG (2000 UT) CAPS, Take 4,000 Units by mouth daily, Disp: , Rfl:     EPINEPHrine (EPIPEN) 0.3 mg/0.3 mL SOAJ, Inject 0.3 mL (0.3 mg total) into a muscle once for 1 dose, Disp: 0.6 mL, Rfl: 0    levothyroxine 75 mcg tablet, take 1 tablet by mouth once daily, Disp: 90 tablet, Rfl: 1    predniSONE 20 mg tablet, Take 2 tablets (40 mg total) by mouth daily, Disp: 10 tablet, Rfl: 0    pregabalin (LYRICA) 50 mg capsule, Take 1 capsule in the a.m. 2 capsules at bedtime, Disp: 90 capsule, Rfl: 5    simvastatin (ZOCOR) 10 mg tablet, Take 1 tablet (10 mg total) by mouth daily at bedtime, Disp: 90 tablet, Rfl: 1    Symbicort 160-4.5 MCG/ACT inhaler, Inhale 2 puffs 2 (two) times a day, Disp: 10.2 g, Rfl: 3      Physical Exam:   /66 (BP Location: Left arm, Patient Position: Sitting, Cuff Size: Standard)   Pulse 57   Ht 5' (1.524 m)   Wt 80.3 kg (177 lb)   SpO2 96%   BMI 34.57 kg/m²   GEN: no acute distress    RESP: breathing comfortably with no accessory muscle use    ABD: soft, non-tender, non-distended   INCISION:    EXT: no significant peripheral edema       RADIOLOGY:    IMPRESSION:     Again seen is right renal upper pole solid mass consistent with renal cell carcinoma, unchanged in size at 2 cm.     Unchanged 4 mm pancreatic tail simple cyst (series 7 image 14.) For simple cyst(s) less than 1.5 cm, recommend follow-up every 2 years for 2 times. After 4 years, no more follow-ups. This finding can be followed at the same time as for the renal mass.    Assessment:   #1.  Stable 2 cm right renal mass    Plan:   -Reviewed findings and options of management with the patient and her   -Discussed continued observation versus percutaneous cryoablation versus robotic partial nephrectomy  -She is interested in  percutaneous cryoablation and I put a ambulatory referral into interventional radiology  -She can follow-up about 2 weeks after her procedure with Dr. Moseley for review of her pathology  -Going forward CAT scan in 3 months and then 6 months for continued surveillance

## 2024-08-08 ENCOUNTER — TELEMEDICINE (OUTPATIENT)
Dept: INTERVENTIONAL RADIOLOGY/VASCULAR | Facility: CLINIC | Age: 84
End: 2024-08-08
Payer: MEDICARE

## 2024-08-08 DIAGNOSIS — E03.9 ACQUIRED HYPOTHYROIDISM: ICD-10-CM

## 2024-08-08 DIAGNOSIS — N28.89 RIGHT KIDNEY MASS: ICD-10-CM

## 2024-08-08 PROCEDURE — 99442 PR PHYS/QHP TELEPHONE EVALUATION 11-20 MIN: CPT | Performed by: RADIOLOGY

## 2024-08-08 RX ORDER — LEVOTHYROXINE SODIUM 0.07 MG/1
75 TABLET ORAL DAILY
Qty: 100 TABLET | Refills: 1 | Status: SHIPPED | OUTPATIENT
Start: 2024-08-08

## 2024-08-08 NOTE — PROGRESS NOTES
Interventional Radiology Ambulatory Visit 8/8/2024    Kathryn Drake   1940   [id number]      [facility] CARE DOCUMENTATION:     1. This service was provided via Telemedicine using Other: telephone      2. Parties in the room with patient during teleconsult Patient only    3. Confidentiality My office door was closed     4. Participants No one else was in the room    5. Patient acknowledged consent and understanding of privacy and security of the  Telemedicine consult. I informed the patient that I have reviewed their record in Epic and presented the opportunity for them to ask any questions regarding the visit today.  The patient agreed to participate.    6. Time spent 15 minutes            Assessment/Plan:     1. Right kidney mass  -     Ambulatory Referral to Interventional Radiology       Subjective:     HPI: Kathryn Drake is a 83 y.o. female referred to IR from urology with a 2.0 cm right upper pole renal mass. Management options were discussed with the patient including continued observation, cryoablation and partial nephrectomy.     Cryoablation was discussed with the patient. Risks including bleeding, injury to the kidney and incomplete treatment were discussed. She understands and wishes to proceed.    We will also attempt to perform a biopsy of the lesion the day of treatment.    Review of Systems  as above    Past Medical History:   Diagnosis Date    Asthma     Cardiac disease     heart attack    Disease of thyroid gland     Hyperlipidemia         Past Surgical History:   Procedure Laterality Date    APPENDECTOMY      BREAST LUMPECTOMY Bilateral     several    CARDIAC SURGERY      CATARACT EXTRACTION, BILATERAL      CORONARY STENT PLACEMENT      EPIDURAL BLOCK INJECTION Bilateral 10/25/2023    Procedure: bilateral L4-L5  TRANSFORAMINAL epidural steroid injection ([id number]);  Surgeon: Ariel Heredia DO;  Location: [facility] MAIN OR;  Service: Pain Management     NASAL POLYP SURGERY          Social  History     Tobacco Use   Smoking Status Never    Passive exposure: Never   Smokeless Tobacco Never        Social History     Substance and Sexual Activity   Alcohol Use Yes    Comment: rare        Social History     Substance and Sexual Activity   Drug Use No        Allergies   Allergen Reactions    Penicillins     Latex Rash         Current Outpatient Medications   Medication Sig Dispense Refill    albuterol (2.5 mg/3 mL) 0.083 % nebulizer solution Take 3 mL (2.5 mg total) by nebulization every 6 (six) hours as needed for wheezing or shortness of breath 360 mL 5    albuterol (PROVENTIL HFA,VENTOLIN HFA) 90 mcg/act inhaler inhale 2 puffs by mouth and INTO THE LUNGS every 4 hours if needed for wheezing 8.5 g 8    albuterol (PROVENTIL HFA,VENTOLIN HFA) 90 mcg/act inhaler Inhale 2 puffs every 4 (four) hours as needed for wheezing or shortness of breath 18 g 5    amLODIPine (NORVASC) 2.5 mg tablet Take 1 tablet (2.5 mg total) by mouth daily 90 tablet 1    ascorbic acid (VITAMIN C) 500 MG tablet Take 500 mg by mouth daily      aspirin 81 MG tablet Take 81 mg by mouth daily        budesonide (PULMICORT) 0.25 mg/2 mL nebulizer solution Take 2 mL (0.25 mg total) by nebulization 2 (two) times a day Rinse mouth after use.      calcium carbonate-vitamin D 500 mg-5 mcg per tablet Take 1 tablet by mouth 2 (two) times a day with meals      Cholecalciferol (VITAMIN D) 2000 UNITS tablet Take 4,000 Units by mouth daily      Cholecalciferol 50 MCG (2000 UT) CAPS Take 4,000 Units by mouth daily      EPINEPHrine (EPIPEN) 0.3 mg/0.3 mL SOAJ Inject 0.3 mL (0.3 mg total) into a muscle once for 1 dose 0.6 mL 0    levothyroxine 75 mcg tablet take 1 tablet by mouth once daily 90 tablet 1    predniSONE 20 mg tablet Take 2 tablets (40 mg total) by mouth daily 10 tablet 0    pregabalin (LYRICA) 50 mg capsule Take 1 capsule in the a.m. 2 capsules at bedtime 90 capsule 5    simvastatin (ZOCOR) 10 mg tablet Take 1 tablet (10 mg total) by mouth  "daily at bedtime 90 tablet 1    Symbicort 160-4.5 MCG/ACT inhaler Inhale 2 puffs 2 (two) times a day 10.2 g 3     No current facility-administered medications for this visit.          Objective:    There were no vitals filed for this visit.     Physical Exam  Not performed    Lab Results   Component Value Date    K 3.8 02/07/2024     02/07/2024    CO2 28 02/07/2024    BUN 11 02/07/2024    CREATININE 0.67 07/09/2024    GLUF 86 02/07/2024    CALCIUM 9.6 02/07/2024    CORRECTEDCA 9.5 09/12/2022    AST 19 02/07/2024    ALT 9 02/07/2024    ALKPHOS 73 02/07/2024    EGFR 82 07/09/2024      Lab Results   Component Value Date    WBC 7.25 02/07/2024    HGB 14.3 02/07/2024    HCT 43.0 02/07/2024    MCV 92 02/07/2024     02/07/2024     No results found for: \"INR\", \"PROTIME\"    I have personally reviewed pertinent imaging and laboratory results.     Advance Directive and Living Will: Yes    Power of :    POLST:      IR has been consulted to evaluate the patient, determine the appropriate procedure, and whether or not a procedure can and should be performed.     Thank you for allowing me to participate in the care of Kathryn Drake. Please don't hesitate to call, text, email, or TigerText with any questions.     This text is generated with voice recognition software. There may be translation, syntax,  or grammatical errors. If you have any questions, please contact the dictating provider.        "

## 2024-08-12 ENCOUNTER — TELEPHONE (OUTPATIENT)
Age: 84
End: 2024-08-12

## 2024-08-12 DIAGNOSIS — M17.12 PRIMARY OSTEOARTHRITIS OF LEFT KNEE: Primary | ICD-10-CM

## 2024-08-12 NOTE — TELEPHONE ENCOUNTER
Caller: patient  Doctor/office: Stephon MENDEZ#: [phone]      Patient called to start auth/delivery for VISCO(Gel) injections.    Body Part: left knee  Pain Level (scale 1-10):   Date of last Gel Injection: 2/6/24    Educated patient on Visco procedure. Auth team will review insurance and process the request appropriately. Patient will be contacted if the have any questions and when ready to schedule.

## 2024-08-20 ENCOUNTER — PROCEDURE VISIT (OUTPATIENT)
Dept: OBGYN CLINIC | Facility: CLINIC | Age: 84
End: 2024-08-20
Payer: MEDICARE

## 2024-08-20 DIAGNOSIS — M17.12 PRIMARY OSTEOARTHRITIS OF LEFT KNEE: Primary | ICD-10-CM

## 2024-08-20 PROCEDURE — 20610 DRAIN/INJ JOINT/BURSA W/O US: CPT | Performed by: ORTHOPAEDIC SURGERY

## 2024-08-20 RX ORDER — HYALURONATE SODIUM 10 MG/ML
20 SYRINGE (ML) INTRAARTICULAR
Status: COMPLETED | OUTPATIENT
Start: 2024-08-20 | End: 2024-08-20

## 2024-08-20 RX ADMIN — Medication 20 MG: at 12:00

## 2024-08-20 NOTE — PROGRESS NOTES
Assessment/Plan:  1. Primary osteoarthritis of left knee  Large joint arthrocentesis: L knee          Kathryn tolerated her first Euflexxa injection left knee today.  Follow-up in 1 week for the next injection.    Subjective:   Kathryn Drake is a 83 y.o. female who presents to the office for her first Euflexxa injection in the left knee         Past Medical History:   Diagnosis Date    Asthma     Cardiac disease     heart attack    Disease of thyroid gland     Hyperlipidemia        Past Surgical History:   Procedure Laterality Date    APPENDECTOMY      BREAST LUMPECTOMY Bilateral     several    CARDIAC SURGERY      CATARACT EXTRACTION, BILATERAL      CORONARY STENT PLACEMENT      EPIDURAL BLOCK INJECTION Bilateral 10/25/2023    Procedure: bilateral L4-L5  TRANSFORAMINAL epidural steroid injection ([id number]);  Surgeon: Ariel Heredia DO;  Location: [facility] MAIN OR;  Service: Pain Management     NASAL POLYP SURGERY         Family History   Problem Relation Age of Onset    No Known Problems Mother     ALS Father     Cancer Brother     Heart disease Family     Asthma Family        Social History     Occupational History    Not on file   Tobacco Use    Smoking status: Never     Passive exposure: Never    Smokeless tobacco: Never   Vaping Use    Vaping status: Never Used   Substance and Sexual Activity    Alcohol use: Yes     Comment: rare    Drug use: No    Sexual activity: Not on file         Current Outpatient Medications:     albuterol (2.5 mg/3 mL) 0.083 % nebulizer solution, Take 3 mL (2.5 mg total) by nebulization every 6 (six) hours as needed for wheezing or shortness of breath, Disp: 360 mL, Rfl: 5    albuterol (PROVENTIL HFA,VENTOLIN HFA) 90 mcg/act inhaler, inhale 2 puffs by mouth and INTO THE LUNGS every 4 hours if needed for wheezing, Disp: 8.5 g, Rfl: 8    albuterol (PROVENTIL HFA,VENTOLIN HFA) 90 mcg/act inhaler, Inhale 2 puffs every 4 (four) hours as needed for wheezing or shortness of breath,  Disp: 18 g, Rfl: 5    amLODIPine (NORVASC) 2.5 mg tablet, Take 1 tablet (2.5 mg total) by mouth daily, Disp: 90 tablet, Rfl: 1    ascorbic acid (VITAMIN C) 500 MG tablet, Take 500 mg by mouth daily, Disp: , Rfl:     aspirin 81 MG tablet, Take 81 mg by mouth daily  , Disp: , Rfl:     budesonide (PULMICORT) 0.25 mg/2 mL nebulizer solution, Take 2 mL (0.25 mg total) by nebulization 2 (two) times a day Rinse mouth after use., Disp: , Rfl:     calcium carbonate-vitamin D 500 mg-5 mcg per tablet, Take 1 tablet by mouth 2 (two) times a day with meals, Disp: , Rfl:     Cholecalciferol (VITAMIN D) 2000 UNITS tablet, Take 4,000 Units by mouth daily, Disp: , Rfl:     Cholecalciferol 50 MCG (2000 UT) CAPS, Take 4,000 Units by mouth daily, Disp: , Rfl:     EPINEPHrine (EPIPEN) 0.3 mg/0.3 mL SOAJ, Inject 0.3 mL (0.3 mg total) into a muscle once for 1 dose, Disp: 0.6 mL, Rfl: 0    levothyroxine 75 mcg tablet, Take 1 tablet (75 mcg total) by mouth daily, Disp: 100 tablet, Rfl: 1    predniSONE 20 mg tablet, Take 2 tablets (40 mg total) by mouth daily, Disp: 10 tablet, Rfl: 0    pregabalin (LYRICA) 50 mg capsule, Take 1 capsule in the a.m. 2 capsules at bedtime, Disp: 90 capsule, Rfl: 5    simvastatin (ZOCOR) 10 mg tablet, Take 1 tablet (10 mg total) by mouth daily at bedtime, Disp: 90 tablet, Rfl: 1    Symbicort 160-4.5 MCG/ACT inhaler, Inhale 2 puffs 2 (two) times a day, Disp: 10.2 g, Rfl: 3    Allergies   Allergen Reactions    Penicillins     Latex Rash       Objective:  There were no vitals filed for this visit.  Pain Score:   7      Ortho Exam    Physical Exam    Large joint arthrocentesis: L knee  Universal Protocol:  Consent: Verbal consent obtained.  Risks and benefits: risks, benefits and alternatives were discussed  Consent given by: patient  Site marked: the operative site was marked  Supporting Documentation  Indications: pain   Procedure Details  Location: knee - L knee  Needle size: 22 G  Ultrasound guidance:  no  Approach: anterolateral  Medications administered: 20 mg Sodium Hyaluronate (Viscosup) 20 MG/2ML    Patient tolerance: patient tolerated the procedure well with no immediate complications  Dressing:  Sterile dressing applied            This document was created using speech voice recognition software.   Grammatical errors, random word insertions, pronoun errors, and incomplete sentences are an occasional consequence of this system due to software limitations, ambient noise, and hardware issues.   Any formal questions or concerns about content, text, or information contained within the body of this dictation should be directly addressed to the provider for clarification.

## 2024-08-22 ENCOUNTER — RA CDI HCC (OUTPATIENT)
Dept: OTHER | Facility: HOSPITAL | Age: 84
End: 2024-08-22

## 2024-08-22 DIAGNOSIS — M48.062 LUMBAR STENOSIS WITH NEUROGENIC CLAUDICATION: ICD-10-CM

## 2024-08-22 RX ORDER — PREGABALIN 50 MG/1
CAPSULE ORAL
Qty: 90 CAPSULE | Refills: 5 | Status: SHIPPED | OUTPATIENT
Start: 2024-08-22

## 2024-08-22 NOTE — TELEPHONE ENCOUNTER
RN called to s/w pt but  Adam (ok per comm form) said she was out of the house. He said she has been taking it and he believes it has been helping. He said her bottle is empty but she prob has a few days worth in her pill box.  Pt takes 1 pill in AM and 2 at HS.   Pt uses [facility] on file.     This was LP 2/21/24 w/ 5 RF, LOVS was 4/10/24.     No need to c/b once Rx is sent.

## 2024-08-26 ENCOUNTER — TELEPHONE (OUTPATIENT)
Age: 84
End: 2024-08-26

## 2024-08-27 ENCOUNTER — PROCEDURE VISIT (OUTPATIENT)
Dept: OBGYN CLINIC | Facility: CLINIC | Age: 84
End: 2024-08-27
Payer: MEDICARE

## 2024-08-27 DIAGNOSIS — M17.12 PRIMARY OSTEOARTHRITIS OF LEFT KNEE: Primary | ICD-10-CM

## 2024-08-27 PROCEDURE — 20610 DRAIN/INJ JOINT/BURSA W/O US: CPT | Performed by: ORTHOPAEDIC SURGERY

## 2024-08-27 RX ORDER — HYALURONATE SODIUM 10 MG/ML
20 SYRINGE (ML) INTRAARTICULAR
Status: COMPLETED | OUTPATIENT
Start: 2024-08-27 | End: 2024-08-27

## 2024-08-27 RX ADMIN — Medication 20 MG: at 12:00

## 2024-08-27 NOTE — PROGRESS NOTES
Assessment/Plan:  1. Primary osteoarthritis of left knee  Large joint arthrocentesis: L knee          Kathryn tolerated her second Euflexxa injection left knee today.  Follow-up in 1 week for the next injection.    Subjective:   Kathryn Drake is a 83 y.o. female who presents for her second Euflexxa injection in the left knee         Past Medical History:   Diagnosis Date    Asthma     Cardiac disease     heart attack    Disease of thyroid gland     Hyperlipidemia        Past Surgical History:   Procedure Laterality Date    APPENDECTOMY      BREAST LUMPECTOMY Bilateral     several    CARDIAC SURGERY      CATARACT EXTRACTION, BILATERAL      CORONARY STENT PLACEMENT      EPIDURAL BLOCK INJECTION Bilateral 10/25/2023    Procedure: bilateral L4-L5  TRANSFORAMINAL epidural steroid injection (90531);  Surgeon: Ariel Heredia DO;  Location: [facility] MAIN OR;  Service: Pain Management     NASAL POLYP SURGERY         Family History   Problem Relation Age of Onset    No Known Problems Mother     ALS Father     Cancer Brother     Heart disease Family     Asthma Family        Social History     Occupational History    Not on file   Tobacco Use    Smoking status: Never     Passive exposure: Never    Smokeless tobacco: Never   Vaping Use    Vaping status: Never Used   Substance and Sexual Activity    Alcohol use: Yes     Comment: rare    Drug use: No    Sexual activity: Not on file         Current Outpatient Medications:     albuterol (2.5 mg/3 mL) 0.083 % nebulizer solution, Take 3 mL (2.5 mg total) by nebulization every 6 (six) hours as needed for wheezing or shortness of breath, Disp: 360 mL, Rfl: 5    albuterol (PROVENTIL HFA,VENTOLIN HFA) 90 mcg/act inhaler, inhale 2 puffs by mouth and INTO THE LUNGS every 4 hours if needed for wheezing, Disp: 8.5 g, Rfl: 8    albuterol (PROVENTIL HFA,VENTOLIN HFA) 90 mcg/act inhaler, Inhale 2 puffs every 4 (four) hours as needed for wheezing or shortness of breath, Disp: 18 g, Rfl:  5    amLODIPine (NORVASC) 2.5 mg tablet, Take 1 tablet (2.5 mg total) by mouth daily, Disp: 90 tablet, Rfl: 1    ascorbic acid (VITAMIN C) 500 MG tablet, Take 500 mg by mouth daily, Disp: , Rfl:     aspirin 81 MG tablet, Take 81 mg by mouth daily  , Disp: , Rfl:     budesonide (PULMICORT) 0.25 mg/2 mL nebulizer solution, Take 2 mL (0.25 mg total) by nebulization 2 (two) times a day Rinse mouth after use., Disp: , Rfl:     calcium carbonate-vitamin D 500 mg-5 mcg per tablet, Take 1 tablet by mouth 2 (two) times a day with meals, Disp: , Rfl:     Cholecalciferol (VITAMIN D) 2000 UNITS tablet, Take 4,000 Units by mouth daily, Disp: , Rfl:     Cholecalciferol 50 MCG (2000 UT) CAPS, Take 4,000 Units by mouth daily, Disp: , Rfl:     EPINEPHrine (EPIPEN) 0.3 mg/0.3 mL SOAJ, Inject 0.3 mL (0.3 mg total) into a muscle once for 1 dose, Disp: 0.6 mL, Rfl: 0    levothyroxine 75 mcg tablet, Take 1 tablet (75 mcg total) by mouth daily, Disp: 100 tablet, Rfl: 1    predniSONE 20 mg tablet, Take 2 tablets (40 mg total) by mouth daily, Disp: 10 tablet, Rfl: 0    pregabalin (LYRICA) 50 mg capsule, Take 1 capsule in the a.m. 2 capsules at bedtime, Disp: 90 capsule, Rfl: 5    simvastatin (ZOCOR) 10 mg tablet, Take 1 tablet (10 mg total) by mouth daily at bedtime, Disp: 90 tablet, Rfl: 1    Symbicort 160-4.5 MCG/ACT inhaler, Inhale 2 puffs 2 (two) times a day, Disp: 10.2 g, Rfl: 3    Allergies   Allergen Reactions    Penicillins     Latex Rash       Objective:  There were no vitals filed for this visit.         Ortho Exam    Physical Exam    Large joint arthrocentesis: L knee  Universal Protocol:  Consent: Verbal consent obtained.  Risks and benefits: risks, benefits and alternatives were discussed  Consent given by: patient  Site marked: the operative site was marked  Supporting Documentation  Indications: pain   Procedure Details  Location: knee - L knee  Needle size: 22 G  Ultrasound guidance: no  Approach: anterolateral  Medications  administered: 20 mg Sodium Hyaluronate (Viscosup) 20 MG/2ML    Patient tolerance: patient tolerated the procedure well with no immediate complications  Dressing:  Sterile dressing applied            This document was created using speech voice recognition software.   Grammatical errors, random word insertions, pronoun errors, and incomplete sentences are an occasional consequence of this system due to software limitations, ambient noise, and hardware issues.   Any formal questions or concerns about content, text, or information contained within the body of this dictation should be directly addressed to the provider for clarification.

## 2024-08-27 NOTE — PRE-PROCEDURE INSTRUCTIONS
Pre-procedure Instructions for Interventional Radiology  [address] [address]  INTERVENTIONAL RADIOLOGY [phone]    You are scheduled for a/an cryoablation.    On Thursday 9-5-24.    Your tentative arrival time is 7:30am.  Short stay will notify you the day before your procedure with the exact arrival time and the location to arrive.    To prepare for your procedure:  Please arrange for someone to drive you home after the procedure and stay with you until the next morning if you are instructed to do so.  This is typically for patients receiving some type of sedative or anesthetic for the procedure.  DO NOT EAT OR DRINK ANYTHING after midnight on the evening before your procedure including candy & gum.  ONLY SIPS OF WATER with your medications are allowed on the morning of your procedure.  TAKE ALL OF YOUR REGULAR MEDICATIONS THE MORNING OF YOUR PROCEDURE with sips of water!  We may call you to stop some of your blood sugar, blood pressure and blood thinning medications depending on the procedure.  Please take all of these medications unless we instruct you to stop them.  If you have an allergy to x-ray dye, please contact Interventional Radiology for an x-ray dye preparation which usually consists of an oral steroid and Benadryl.    The day of your procedure:  Bring a list of the medications you take at home.  Bring medications you take for breathing problems (such as inhalers), medications for chest pain, or both.  Bring a case for your glasses or contacts.  Bring your insurance card and a form of photo ID.  Please leave all valuables such as credit cards and jewelry at home.  Report to the admitting office to the left of the registration desk in the main lobby at the [facility], Entrance B.  You will then be directed to the Short Stay Center.  While your procedure is being performed, your family may wait in the Radiology Waiting Room on the 1st floor in  Radiology.  if they need to leave, they may provide a number to be called following the procedure.   Be prepared to stay overnight just in case. Sometimes procedures will indicate the need for further observation or treatment.   If you are scheduled for a follow-up visit with the Interventional Radiologist after your procedure, you will be called with a date and time.    Special Instructions (Medications to stop taking before your procedure etc.):  Hold aspirin for 5 days before the procedure.  (Last dose 8-30-24.)

## 2024-08-28 ENCOUNTER — APPOINTMENT (OUTPATIENT)
Dept: LAB | Facility: HOSPITAL | Age: 84
End: 2024-08-28
Payer: MEDICARE

## 2024-08-28 DIAGNOSIS — I10 ESSENTIAL HYPERTENSION: ICD-10-CM

## 2024-08-28 LAB
ALBUMIN SERPL BCG-MCNC: 4 G/DL (ref 3.5–5)
ALP SERPL-CCNC: 74 U/L (ref 34–104)
ALT SERPL W P-5'-P-CCNC: 8 U/L (ref 7–52)
ANION GAP SERPL CALCULATED.3IONS-SCNC: 7 MMOL/L (ref 4–13)
AST SERPL W P-5'-P-CCNC: 20 U/L (ref 13–39)
BILIRUB SERPL-MCNC: 0.51 MG/DL (ref 0.2–1)
BUN SERPL-MCNC: 15 MG/DL (ref 5–25)
CALCIUM SERPL-MCNC: 9.3 MG/DL (ref 8.4–10.2)
CHLORIDE SERPL-SCNC: 107 MMOL/L (ref 96–108)
CHOLEST SERPL-MCNC: 139 MG/DL
CO2 SERPL-SCNC: 26 MMOL/L (ref 21–32)
CREAT SERPL-MCNC: 0.59 MG/DL (ref 0.6–1.3)
ERYTHROCYTE [DISTWIDTH] IN BLOOD BY AUTOMATED COUNT: 14.1 % (ref 11.6–15.1)
GFR SERPL CREATININE-BSD FRML MDRD: 86 ML/MIN/1.73SQ M
GLUCOSE P FAST SERPL-MCNC: 88 MG/DL (ref 65–99)
HCT VFR BLD AUTO: 40.7 % (ref 34.8–46.1)
HDLC SERPL-MCNC: 64 MG/DL
HGB BLD-MCNC: 13.1 G/DL (ref 11.5–15.4)
LDLC SERPL CALC-MCNC: 60 MG/DL (ref 0–100)
MCH RBC QN AUTO: 29.4 PG (ref 26.8–34.3)
MCHC RBC AUTO-ENTMCNC: 32.2 G/DL (ref 31.4–37.4)
MCV RBC AUTO: 92 FL (ref 82–98)
PLATELET # BLD AUTO: 260 THOUSANDS/UL (ref 149–390)
PMV BLD AUTO: 10.2 FL (ref 8.9–12.7)
POTASSIUM SERPL-SCNC: 4 MMOL/L (ref 3.5–5.3)
PROT SERPL-MCNC: 7 G/DL (ref 6.4–8.4)
RBC # BLD AUTO: 4.45 MILLION/UL (ref 3.81–5.12)
SODIUM SERPL-SCNC: 140 MMOL/L (ref 135–147)
TRIGL SERPL-MCNC: 74 MG/DL
WBC # BLD AUTO: 6.22 THOUSAND/UL (ref 4.31–10.16)

## 2024-08-28 PROCEDURE — 80061 LIPID PANEL: CPT

## 2024-08-28 PROCEDURE — 36415 COLL VENOUS BLD VENIPUNCTURE: CPT

## 2024-08-28 PROCEDURE — 80053 COMPREHEN METABOLIC PANEL: CPT

## 2024-08-28 PROCEDURE — 85027 COMPLETE CBC AUTOMATED: CPT

## 2024-09-03 ENCOUNTER — PROCEDURE VISIT (OUTPATIENT)
Dept: OBGYN CLINIC | Facility: CLINIC | Age: 84
End: 2024-09-03
Payer: MEDICARE

## 2024-09-03 DIAGNOSIS — M17.12 PRIMARY OSTEOARTHRITIS OF LEFT KNEE: Primary | ICD-10-CM

## 2024-09-03 DIAGNOSIS — I25.10 CORONARY ARTERY DISEASE INVOLVING NATIVE CORONARY ARTERY OF NATIVE HEART WITHOUT ANGINA PECTORIS: ICD-10-CM

## 2024-09-03 PROCEDURE — 20610 DRAIN/INJ JOINT/BURSA W/O US: CPT | Performed by: ORTHOPAEDIC SURGERY

## 2024-09-03 RX ORDER — SIMVASTATIN 10 MG
10 TABLET ORAL
Qty: 90 TABLET | Refills: 1 | Status: SHIPPED | OUTPATIENT
Start: 2024-09-03

## 2024-09-03 NOTE — H&P (VIEW-ONLY)
Assessment/Plan:  1. Primary osteoarthritis of left knee  Large joint arthrocentesis: L knee          Kathryn tolerated her third Euflexxa injection in the knee left knee today.  We could repeat these injections in 6 months if clinically indicated.    Subjective:   Kathryn Drake is a 83 y.o. female who presents for her third Euflexxa injection in the left knee         Past Medical History:   Diagnosis Date    Asthma     Cardiac disease     heart attack    Disease of thyroid gland     Hyperlipidemia        Past Surgical History:   Procedure Laterality Date    APPENDECTOMY      BREAST LUMPECTOMY Bilateral     several    CARDIAC SURGERY      CATARACT EXTRACTION, BILATERAL      CORONARY STENT PLACEMENT      EPIDURAL BLOCK INJECTION Bilateral 10/25/2023    Procedure: bilateral L4-L5  TRANSFORAMINAL epidural steroid injection (37912);  Surgeon: Ariel Heredia DO;  Location: [facility] MAIN OR;  Service: Pain Management     NASAL POLYP SURGERY         Family History   Problem Relation Age of Onset    No Known Problems Mother     ALS Father     Cancer Brother     Heart disease Family     Asthma Family        Social History     Occupational History    Not on file   Tobacco Use    Smoking status: Never     Passive exposure: Never    Smokeless tobacco: Never   Vaping Use    Vaping status: Never Used   Substance and Sexual Activity    Alcohol use: Yes     Comment: rare    Drug use: No    Sexual activity: Not on file         Current Outpatient Medications:     albuterol (2.5 mg/3 mL) 0.083 % nebulizer solution, Take 3 mL (2.5 mg total) by nebulization every 6 (six) hours as needed for wheezing or shortness of breath, Disp: 360 mL, Rfl: 5    albuterol (PROVENTIL HFA,VENTOLIN HFA) 90 mcg/act inhaler, inhale 2 puffs by mouth and INTO THE LUNGS every 4 hours if needed for wheezing, Disp: 8.5 g, Rfl: 8    albuterol (PROVENTIL HFA,VENTOLIN HFA) 90 mcg/act inhaler, Inhale 2 puffs every 4 (four) hours as needed for wheezing or  shortness of breath, Disp: 18 g, Rfl: 5    amLODIPine (NORVASC) 2.5 mg tablet, Take 1 tablet (2.5 mg total) by mouth daily, Disp: 90 tablet, Rfl: 1    ascorbic acid (VITAMIN C) 500 MG tablet, Take 500 mg by mouth daily, Disp: , Rfl:     aspirin 81 MG tablet, Take 81 mg by mouth daily  , Disp: , Rfl:     budesonide (PULMICORT) 0.25 mg/2 mL nebulizer solution, Take 2 mL (0.25 mg total) by nebulization 2 (two) times a day Rinse mouth after use., Disp: , Rfl:     calcium carbonate-vitamin D 500 mg-5 mcg per tablet, Take 1 tablet by mouth 2 (two) times a day with meals, Disp: , Rfl:     Cholecalciferol (VITAMIN D) 2000 UNITS tablet, Take 4,000 Units by mouth daily, Disp: , Rfl:     Cholecalciferol 50 MCG (2000 UT) CAPS, Take 4,000 Units by mouth daily, Disp: , Rfl:     EPINEPHrine (EPIPEN) 0.3 mg/0.3 mL SOAJ, Inject 0.3 mL (0.3 mg total) into a muscle once for 1 dose, Disp: 0.6 mL, Rfl: 0    levothyroxine 75 mcg tablet, Take 1 tablet (75 mcg total) by mouth daily, Disp: 100 tablet, Rfl: 1    predniSONE 20 mg tablet, Take 2 tablets (40 mg total) by mouth daily, Disp: 10 tablet, Rfl: 0    pregabalin (LYRICA) 50 mg capsule, Take 1 capsule in the a.m. 2 capsules at bedtime, Disp: 90 capsule, Rfl: 5    simvastatin (ZOCOR) 10 mg tablet, Take 1 tablet (10 mg total) by mouth daily at bedtime, Disp: 90 tablet, Rfl: 1    Symbicort 160-4.5 MCG/ACT inhaler, Inhale 2 puffs 2 (two) times a day, Disp: 10.2 g, Rfl: 3    Allergies   Allergen Reactions    Penicillins     Latex Rash       Objective:  There were no vitals filed for this visit.         Ortho Exam    Physical Exam    Large joint arthrocentesis: L knee  Universal Protocol:  Consent: Verbal consent obtained.  Risks and benefits: risks, benefits and alternatives were discussed  Consent given by: patient  Site marked: the operative site was marked  Supporting Documentation  Indications: pain   Procedure Details  Location: knee - L knee  Needle size: 22 G  Ultrasound guidance:  no  Approach: anterolateral    Patient tolerance: patient tolerated the procedure well with no immediate complications  Dressing:  Sterile dressing applied            This document was created using speech voice recognition software.   Grammatical errors, random word insertions, pronoun errors, and incomplete sentences are an occasional consequence of this system due to software limitations, ambient noise, and hardware issues.   Any formal questions or concerns about content, text, or information contained within the body of this dictation should be directly addressed to the provider for clarification.

## 2024-09-03 NOTE — TELEPHONE ENCOUNTER
Reason for call:   [x] Refill   [] Prior Auth  [] Other:     Office:   [x] PCP/Provider - Dr Magaña  [] Specialty/Provider -     Medication:   Simvastatin 10 mg, 1 qdm 90      Pharmacy:   [facility] [place], [place]     Does the patient have enough for 3 days?   [x] Yes   [] No - Send as HP to POD

## 2024-09-03 NOTE — PROGRESS NOTES
Assessment/Plan:  1. Primary osteoarthritis of left knee  Large joint arthrocentesis: L knee          Kathryn tolerated her third Euflexxa injection in the knee left knee today.  We could repeat these injections in 6 months if clinically indicated.    Subjective:   Kathryn Drake is a 83 y.o. female who presents for her third Euflexxa injection in the left knee         Past Medical History:   Diagnosis Date    Asthma     Cardiac disease     heart attack    Disease of thyroid gland     Hyperlipidemia        Past Surgical History:   Procedure Laterality Date    APPENDECTOMY      BREAST LUMPECTOMY Bilateral     several    CARDIAC SURGERY      CATARACT EXTRACTION, BILATERAL      CORONARY STENT PLACEMENT      EPIDURAL BLOCK INJECTION Bilateral 10/25/2023    Procedure: bilateral L4-L5  TRANSFORAMINAL epidural steroid injection (85276);  Surgeon: Ariel Heredia DO;  Location: [facility] MAIN OR;  Service: Pain Management     NASAL POLYP SURGERY         Family History   Problem Relation Age of Onset    No Known Problems Mother     ALS Father     Cancer Brother     Heart disease Family     Asthma Family        Social History     Occupational History    Not on file   Tobacco Use    Smoking status: Never     Passive exposure: Never    Smokeless tobacco: Never   Vaping Use    Vaping status: Never Used   Substance and Sexual Activity    Alcohol use: Yes     Comment: rare    Drug use: No    Sexual activity: Not on file         Current Outpatient Medications:     albuterol (2.5 mg/3 mL) 0.083 % nebulizer solution, Take 3 mL (2.5 mg total) by nebulization every 6 (six) hours as needed for wheezing or shortness of breath, Disp: 360 mL, Rfl: 5    albuterol (PROVENTIL HFA,VENTOLIN HFA) 90 mcg/act inhaler, inhale 2 puffs by mouth and INTO THE LUNGS every 4 hours if needed for wheezing, Disp: 8.5 g, Rfl: 8    albuterol (PROVENTIL HFA,VENTOLIN HFA) 90 mcg/act inhaler, Inhale 2 puffs every 4 (four) hours as needed for wheezing or  shortness of breath, Disp: 18 g, Rfl: 5    amLODIPine (NORVASC) 2.5 mg tablet, Take 1 tablet (2.5 mg total) by mouth daily, Disp: 90 tablet, Rfl: 1    ascorbic acid (VITAMIN C) 500 MG tablet, Take 500 mg by mouth daily, Disp: , Rfl:     aspirin 81 MG tablet, Take 81 mg by mouth daily  , Disp: , Rfl:     budesonide (PULMICORT) 0.25 mg/2 mL nebulizer solution, Take 2 mL (0.25 mg total) by nebulization 2 (two) times a day Rinse mouth after use., Disp: , Rfl:     calcium carbonate-vitamin D 500 mg-5 mcg per tablet, Take 1 tablet by mouth 2 (two) times a day with meals, Disp: , Rfl:     Cholecalciferol (VITAMIN D) 2000 UNITS tablet, Take 4,000 Units by mouth daily, Disp: , Rfl:     Cholecalciferol 50 MCG (2000 UT) CAPS, Take 4,000 Units by mouth daily, Disp: , Rfl:     EPINEPHrine (EPIPEN) 0.3 mg/0.3 mL SOAJ, Inject 0.3 mL (0.3 mg total) into a muscle once for 1 dose, Disp: 0.6 mL, Rfl: 0    levothyroxine 75 mcg tablet, Take 1 tablet (75 mcg total) by mouth daily, Disp: 100 tablet, Rfl: 1    predniSONE 20 mg tablet, Take 2 tablets (40 mg total) by mouth daily, Disp: 10 tablet, Rfl: 0    pregabalin (LYRICA) 50 mg capsule, Take 1 capsule in the a.m. 2 capsules at bedtime, Disp: 90 capsule, Rfl: 5    simvastatin (ZOCOR) 10 mg tablet, Take 1 tablet (10 mg total) by mouth daily at bedtime, Disp: 90 tablet, Rfl: 1    Symbicort 160-4.5 MCG/ACT inhaler, Inhale 2 puffs 2 (two) times a day, Disp: 10.2 g, Rfl: 3    Allergies   Allergen Reactions    Penicillins     Latex Rash       Objective:  There were no vitals filed for this visit.         Ortho Exam    Physical Exam    Large joint arthrocentesis: L knee  Universal Protocol:  Consent: Verbal consent obtained.  Risks and benefits: risks, benefits and alternatives were discussed  Consent given by: patient  Site marked: the operative site was marked  Supporting Documentation  Indications: pain   Procedure Details  Location: knee - L knee  Needle size: 22 G  Ultrasound guidance:  no  Approach: anterolateral    Patient tolerance: patient tolerated the procedure well with no immediate complications  Dressing:  Sterile dressing applied            This document was created using speech voice recognition software.   Grammatical errors, random word insertions, pronoun errors, and incomplete sentences are an occasional consequence of this system due to software limitations, ambient noise, and hardware issues.   Any formal questions or concerns about content, text, or information contained within the body of this dictation should be directly addressed to the provider for clarification.

## 2024-09-05 ENCOUNTER — ANESTHESIA (OUTPATIENT)
Dept: RADIOLOGY | Facility: HOSPITAL | Age: 84
End: 2024-09-05
Payer: MEDICARE

## 2024-09-05 ENCOUNTER — ANESTHESIA EVENT (OUTPATIENT)
Dept: RADIOLOGY | Facility: HOSPITAL | Age: 84
End: 2024-09-05
Payer: MEDICARE

## 2024-09-05 ENCOUNTER — TELEPHONE (OUTPATIENT)
Dept: PSYCHIATRY | Facility: CLINIC | Age: 84
End: 2024-09-05

## 2024-09-05 ENCOUNTER — HOSPITAL ENCOUNTER (OUTPATIENT)
Dept: RADIOLOGY | Facility: HOSPITAL | Age: 84
Setting detail: OBSERVATION
LOS: 1 days | Discharge: HOME | End: 2024-09-06
Attending: RADIOLOGY | Admitting: FAMILY MEDICINE
Payer: MEDICARE

## 2024-09-05 DIAGNOSIS — F33.9 RECURRENT DEPRESSION (HCC): ICD-10-CM

## 2024-09-05 DIAGNOSIS — M79.651 ACUTE PAIN OF RIGHT THIGH: Primary | ICD-10-CM

## 2024-09-05 DIAGNOSIS — N28.89 RIGHT KIDNEY MASS: ICD-10-CM

## 2024-09-05 LAB
ANION GAP SERPL CALCULATED.3IONS-SCNC: 7 MMOL/L (ref 4–13)
BUN SERPL-MCNC: 13 MG/DL (ref 5–25)
CALCIUM SERPL-MCNC: 9.1 MG/DL (ref 8.4–10.2)
CHLORIDE SERPL-SCNC: 107 MMOL/L (ref 96–108)
CO2 SERPL-SCNC: 27 MMOL/L (ref 21–32)
CREAT SERPL-MCNC: 0.47 MG/DL (ref 0.6–1.3)
ERYTHROCYTE [DISTWIDTH] IN BLOOD BY AUTOMATED COUNT: 14.1 % (ref 11.6–15.1)
GFR SERPL CREATININE-BSD FRML MDRD: 92 ML/MIN/1.73SQ M
GLUCOSE P FAST SERPL-MCNC: 88 MG/DL (ref 65–99)
GLUCOSE SERPL-MCNC: 88 MG/DL (ref 65–140)
HCT VFR BLD AUTO: 41.1 % (ref 34.8–46.1)
HGB BLD-MCNC: 13.2 G/DL (ref 11.5–15.4)
INR PPP: 0.98 (ref 0.85–1.19)
MCH RBC QN AUTO: 29.4 PG (ref 26.8–34.3)
MCHC RBC AUTO-ENTMCNC: 32.1 G/DL (ref 31.4–37.4)
MCV RBC AUTO: 92 FL (ref 82–98)
PLATELET # BLD AUTO: 267 THOUSANDS/UL (ref 149–390)
PMV BLD AUTO: 9.9 FL (ref 8.9–12.7)
POTASSIUM SERPL-SCNC: 3.8 MMOL/L (ref 3.5–5.3)
PROTHROMBIN TIME: 13.3 SECONDS (ref 12.3–15)
RBC # BLD AUTO: 4.49 MILLION/UL (ref 3.81–5.12)
SODIUM SERPL-SCNC: 141 MMOL/L (ref 135–147)
WBC # BLD AUTO: 7.86 THOUSAND/UL (ref 4.31–10.16)

## 2024-09-05 PROCEDURE — 50593 PERC CRYO ABLATE RENAL TUM: CPT | Performed by: RADIOLOGY

## 2024-09-05 PROCEDURE — 80048 BASIC METABOLIC PNL TOTAL CA: CPT | Performed by: RADIOLOGY

## 2024-09-05 PROCEDURE — 85027 COMPLETE CBC AUTOMATED: CPT | Performed by: RADIOLOGY

## 2024-09-05 PROCEDURE — 94760 N-INVAS EAR/PLS OXIMETRY 1: CPT

## 2024-09-05 PROCEDURE — 99223 1ST HOSP IP/OBS HIGH 75: CPT | Performed by: FAMILY MEDICINE

## 2024-09-05 PROCEDURE — 94640 AIRWAY INHALATION TREATMENT: CPT

## 2024-09-05 PROCEDURE — 77013 CT GUIDE FOR TISSUE ABLATION: CPT | Performed by: RADIOLOGY

## 2024-09-05 PROCEDURE — 94664 DEMO&/EVAL PT USE INHALER: CPT

## 2024-09-05 PROCEDURE — G0379 DIRECT REFER HOSPITAL OBSERV: HCPCS

## 2024-09-05 PROCEDURE — 50593 PERC CRYO ABLATE RENAL TUM: CPT | Performed by: ANESTHESIOLOGY

## 2024-09-05 PROCEDURE — C2618 PROBE/NEEDLE, CRYO: HCPCS

## 2024-09-05 PROCEDURE — 85610 PROTHROMBIN TIME: CPT | Performed by: RADIOLOGY

## 2024-09-05 RX ORDER — ONDANSETRON 2 MG/ML
INJECTION INTRAMUSCULAR; INTRAVENOUS AS NEEDED
Status: DISCONTINUED | OUTPATIENT
Start: 2024-09-05 | End: 2024-09-05

## 2024-09-05 RX ORDER — ACETAMINOPHEN 325 MG/1
975 TABLET ORAL EVERY 8 HOURS SCHEDULED
Status: DISCONTINUED | OUTPATIENT
Start: 2024-09-05 | End: 2024-09-06 | Stop reason: HOSPADM

## 2024-09-05 RX ORDER — ASPIRIN 81 MG/1
81 TABLET, CHEWABLE ORAL DAILY
Status: DISCONTINUED | OUTPATIENT
Start: 2024-09-06 | End: 2024-09-06 | Stop reason: HOSPADM

## 2024-09-05 RX ORDER — FENTANYL CITRATE/PF 50 MCG/ML
25 SYRINGE (ML) INJECTION
Status: DISCONTINUED | OUTPATIENT
Start: 2024-09-05 | End: 2024-09-05 | Stop reason: HOSPADM

## 2024-09-05 RX ORDER — LIDOCAINE HYDROCHLORIDE 10 MG/ML
INJECTION, SOLUTION EPIDURAL; INFILTRATION; INTRACAUDAL; PERINEURAL AS NEEDED
Status: DISCONTINUED | OUTPATIENT
Start: 2024-09-05 | End: 2024-09-05

## 2024-09-05 RX ORDER — ALBUTEROL SULFATE 0.83 MG/ML
2.5 SOLUTION RESPIRATORY (INHALATION) EVERY 6 HOURS PRN
Status: DISCONTINUED | OUTPATIENT
Start: 2024-09-05 | End: 2024-09-06 | Stop reason: HOSPADM

## 2024-09-05 RX ORDER — FENTANYL CITRATE 50 UG/ML
INJECTION, SOLUTION INTRAMUSCULAR; INTRAVENOUS AS NEEDED
Status: DISCONTINUED | OUTPATIENT
Start: 2024-09-05 | End: 2024-09-05

## 2024-09-05 RX ORDER — PRAVASTATIN SODIUM 40 MG
40 TABLET ORAL
Status: DISCONTINUED | OUTPATIENT
Start: 2024-09-05 | End: 2024-09-06 | Stop reason: HOSPADM

## 2024-09-05 RX ORDER — HYDROMORPHONE HCL/PF 1 MG/ML
0.5 SYRINGE (ML) INJECTION EVERY 4 HOURS PRN
Status: DISCONTINUED | OUTPATIENT
Start: 2024-09-05 | End: 2024-09-06

## 2024-09-05 RX ORDER — LIDOCAINE 50 MG/G
2 PATCH TOPICAL DAILY
Status: DISCONTINUED | OUTPATIENT
Start: 2024-09-05 | End: 2024-09-06 | Stop reason: HOSPADM

## 2024-09-05 RX ORDER — AMLODIPINE BESYLATE 2.5 MG/1
2.5 TABLET ORAL DAILY
Status: DISCONTINUED | OUTPATIENT
Start: 2024-09-06 | End: 2024-09-06 | Stop reason: HOSPADM

## 2024-09-05 RX ORDER — PROPOFOL 10 MG/ML
INJECTION, EMULSION INTRAVENOUS AS NEEDED
Status: DISCONTINUED | OUTPATIENT
Start: 2024-09-05 | End: 2024-09-05

## 2024-09-05 RX ORDER — OXYCODONE HYDROCHLORIDE 10 MG/1
10 TABLET ORAL EVERY 6 HOURS PRN
Status: DISCONTINUED | OUTPATIENT
Start: 2024-09-05 | End: 2024-09-06

## 2024-09-05 RX ORDER — BUDESONIDE 0.25 MG/2ML
0.25 INHALANT ORAL
Status: DISCONTINUED | OUTPATIENT
Start: 2024-09-05 | End: 2024-09-06 | Stop reason: HOSPADM

## 2024-09-05 RX ORDER — SODIUM CHLORIDE 9 MG/ML
75 INJECTION, SOLUTION INTRAVENOUS CONTINUOUS
Status: DISCONTINUED | OUTPATIENT
Start: 2024-09-05 | End: 2024-09-05

## 2024-09-05 RX ORDER — ACETAMINOPHEN 325 MG/1
650 TABLET ORAL EVERY 6 HOURS PRN
Status: DISCONTINUED | OUTPATIENT
Start: 2024-09-05 | End: 2024-09-06

## 2024-09-05 RX ORDER — DEXAMETHASONE SODIUM PHOSPHATE 10 MG/ML
INJECTION, SOLUTION INTRAMUSCULAR; INTRAVENOUS AS NEEDED
Status: DISCONTINUED | OUTPATIENT
Start: 2024-09-05 | End: 2024-09-05

## 2024-09-05 RX ORDER — ONDANSETRON 2 MG/ML
4 INJECTION INTRAMUSCULAR; INTRAVENOUS EVERY 6 HOURS PRN
Status: DISCONTINUED | OUTPATIENT
Start: 2024-09-05 | End: 2024-09-06 | Stop reason: HOSPADM

## 2024-09-05 RX ORDER — ROCURONIUM BROMIDE 10 MG/ML
INJECTION, SOLUTION INTRAVENOUS AS NEEDED
Status: DISCONTINUED | OUTPATIENT
Start: 2024-09-05 | End: 2024-09-05

## 2024-09-05 RX ORDER — HYDROMORPHONE HCL/PF 1 MG/ML
0.5 SYRINGE (ML) INJECTION ONCE
Status: COMPLETED | OUTPATIENT
Start: 2024-09-05 | End: 2024-09-05

## 2024-09-05 RX ORDER — OXYCODONE HYDROCHLORIDE 5 MG/1
5 TABLET ORAL EVERY 6 HOURS PRN
Status: DISCONTINUED | OUTPATIENT
Start: 2024-09-05 | End: 2024-09-06

## 2024-09-05 RX ORDER — HEPARIN SODIUM 5000 [USP'U]/ML
5000 INJECTION, SOLUTION INTRAVENOUS; SUBCUTANEOUS EVERY 8 HOURS SCHEDULED
Status: DISCONTINUED | OUTPATIENT
Start: 2024-09-06 | End: 2024-09-06 | Stop reason: HOSPADM

## 2024-09-05 RX ORDER — LEVOTHYROXINE SODIUM 75 UG/1
75 TABLET ORAL DAILY
Status: DISCONTINUED | OUTPATIENT
Start: 2024-09-05 | End: 2024-09-06 | Stop reason: HOSPADM

## 2024-09-05 RX ORDER — CYCLOBENZAPRINE HCL 10 MG
5 TABLET ORAL 3 TIMES DAILY
Status: DISCONTINUED | OUTPATIENT
Start: 2024-09-05 | End: 2024-09-06 | Stop reason: HOSPADM

## 2024-09-05 RX ORDER — EPHEDRINE SULFATE 50 MG/ML
INJECTION INTRAVENOUS AS NEEDED
Status: DISCONTINUED | OUTPATIENT
Start: 2024-09-05 | End: 2024-09-05

## 2024-09-05 RX ORDER — DIPHENHYDRAMINE HYDROCHLORIDE 50 MG/ML
12.5 INJECTION INTRAMUSCULAR; INTRAVENOUS ONCE AS NEEDED
Status: DISCONTINUED | OUTPATIENT
Start: 2024-09-05 | End: 2024-09-05 | Stop reason: HOSPADM

## 2024-09-05 RX ORDER — PREGABALIN 100 MG/1
100 CAPSULE ORAL EVERY EVENING
Status: DISCONTINUED | OUTPATIENT
Start: 2024-09-05 | End: 2024-09-06 | Stop reason: HOSPADM

## 2024-09-05 RX ORDER — BUDESONIDE 0.25 MG/2ML
0.25 INHALANT ORAL 2 TIMES DAILY
Status: DISCONTINUED | OUTPATIENT
Start: 2024-09-05 | End: 2024-09-05

## 2024-09-05 RX ORDER — ASCORBIC ACID 500 MG
500 TABLET ORAL DAILY
Status: DISCONTINUED | OUTPATIENT
Start: 2024-09-05 | End: 2024-09-05

## 2024-09-05 RX ORDER — LANOLIN ALCOHOL/MO/W.PET/CERES
1 CREAM (GRAM) TOPICAL 2 TIMES DAILY WITH MEALS
Status: DISCONTINUED | OUTPATIENT
Start: 2024-09-05 | End: 2024-09-06 | Stop reason: HOSPADM

## 2024-09-05 RX ORDER — LIDOCAINE WITH 8.4% SOD BICARB 0.9%(10ML)
SYRINGE (ML) INJECTION AS NEEDED
Status: COMPLETED | OUTPATIENT
Start: 2024-09-05 | End: 2024-09-05

## 2024-09-05 RX ORDER — BUDESONIDE 0.25 MG/2ML
0.25 INHALANT ORAL
Status: DISCONTINUED | OUTPATIENT
Start: 2024-09-06 | End: 2024-09-05

## 2024-09-05 RX ORDER — PREGABALIN 50 MG/1
50 CAPSULE ORAL DAILY
Status: DISCONTINUED | OUTPATIENT
Start: 2024-09-06 | End: 2024-09-06 | Stop reason: HOSPADM

## 2024-09-05 RX ADMIN — FENTANYL CITRATE 50 MCG: 50 INJECTION INTRAMUSCULAR; INTRAVENOUS at 11:05

## 2024-09-05 RX ADMIN — FENTANYL CITRATE 25 MCG: 50 INJECTION INTRAMUSCULAR; INTRAVENOUS at 13:14

## 2024-09-05 RX ADMIN — Medication 1 TABLET: at 16:19

## 2024-09-05 RX ADMIN — LIDOCAINE HYDROCHLORIDE 50 MG: 10 INJECTION, SOLUTION EPIDURAL; INFILTRATION; INTRACAUDAL; PERINEURAL at 09:31

## 2024-09-05 RX ADMIN — EPHEDRINE SULFATE 5 MG: 50 INJECTION, SOLUTION INTRAVENOUS at 10:03

## 2024-09-05 RX ADMIN — ROCURONIUM BROMIDE 50 MG: 10 INJECTION, SOLUTION INTRAVENOUS at 09:32

## 2024-09-05 RX ADMIN — ALBUTEROL SULFATE 2.5 MG: 2.5 SOLUTION RESPIRATORY (INHALATION) at 20:16

## 2024-09-05 RX ADMIN — CYCLOBENZAPRINE HYDROCHLORIDE 5 MG: 10 TABLET, FILM COATED ORAL at 16:19

## 2024-09-05 RX ADMIN — FENTANYL CITRATE 25 MCG: 50 INJECTION INTRAMUSCULAR; INTRAVENOUS at 14:04

## 2024-09-05 RX ADMIN — FENTANYL CITRATE 25 MCG: 50 INJECTION INTRAMUSCULAR; INTRAVENOUS at 13:04

## 2024-09-05 RX ADMIN — ROCURONIUM BROMIDE 25 MG: 10 INJECTION, SOLUTION INTRAVENOUS at 11:05

## 2024-09-05 RX ADMIN — SODIUM CHLORIDE 75 ML/HR: 0.9 INJECTION, SOLUTION INTRAVENOUS at 08:12

## 2024-09-05 RX ADMIN — PROPOFOL 50 MG: 10 INJECTION, EMULSION INTRAVENOUS at 12:06

## 2024-09-05 RX ADMIN — PROPOFOL 100 MG: 10 INJECTION, EMULSION INTRAVENOUS at 09:31

## 2024-09-05 RX ADMIN — HYDROMORPHONE HYDROCHLORIDE 0.5 MG: 1 INJECTION, SOLUTION INTRAMUSCULAR; INTRAVENOUS; SUBCUTANEOUS at 15:29

## 2024-09-05 RX ADMIN — FENTANYL CITRATE 25 MCG: 50 INJECTION INTRAMUSCULAR; INTRAVENOUS at 12:44

## 2024-09-05 RX ADMIN — FENTANYL CITRATE 25 MCG: 50 INJECTION INTRAMUSCULAR; INTRAVENOUS at 13:34

## 2024-09-05 RX ADMIN — ONDANSETRON 4 MG: 2 INJECTION INTRAMUSCULAR; INTRAVENOUS at 11:59

## 2024-09-05 RX ADMIN — PREGABALIN 100 MG: 100 CAPSULE ORAL at 17:54

## 2024-09-05 RX ADMIN — Medication 10 ML: at 10:27

## 2024-09-05 RX ADMIN — DEXAMETHASONE SODIUM PHOSPHATE 10 MG: 10 INJECTION, SOLUTION INTRAMUSCULAR; INTRAVENOUS at 09:55

## 2024-09-05 RX ADMIN — FENTANYL CITRATE 25 MCG: 50 INJECTION INTRAMUSCULAR; INTRAVENOUS at 09:53

## 2024-09-05 RX ADMIN — PROPOFOL 50 MG: 10 INJECTION, EMULSION INTRAVENOUS at 09:32

## 2024-09-05 RX ADMIN — FENTANYL CITRATE 25 MCG: 50 INJECTION INTRAMUSCULAR; INTRAVENOUS at 10:41

## 2024-09-05 RX ADMIN — ACETAMINOPHEN 975 MG: 325 TABLET ORAL at 16:18

## 2024-09-05 RX ADMIN — CYCLOBENZAPRINE HYDROCHLORIDE 5 MG: 10 TABLET, FILM COATED ORAL at 21:07

## 2024-09-05 RX ADMIN — FENTANYL CITRATE 25 MCG: 50 INJECTION INTRAMUSCULAR; INTRAVENOUS at 12:54

## 2024-09-05 RX ADMIN — PRAVASTATIN SODIUM 40 MG: 40 TABLET ORAL at 16:19

## 2024-09-05 RX ADMIN — SUGAMMADEX 200 MG: 100 INJECTION, SOLUTION INTRAVENOUS at 12:17

## 2024-09-05 RX ADMIN — LIDOCAINE 2 PATCH: 50 PATCH CUTANEOUS at 16:19

## 2024-09-05 NOTE — H&P
[facility]  H&P  Name: Kathryn Drake 83 y.o. female I MRN: [id number]  Unit/Bed#: [facility] 403-01 I Date of Admission: 9/5/2024   Date of Service: 9/5/2024 I Hospital Day: 1      Assessment & Plan   * Right renal mass  Assessment & Plan  Patient with right renal mass, highly concerning for RCC, presented for outpatient IR cryoablation.  Immediately after cryoablation, patient complained of significant right thigh pain, likely secondary to ablation of an exit nerve root adjacent to the renal mass.  Direct admission under medicine service for pain control.  Tylenol ATC, Lidoderm patch x 2, Flexeril 3 times daily  Continue home pregabalin  Oxycodone 5 mg every 6 hours as needed for moderate pain, 10 mg every 6 hours for severe pain, and IV Dilaudid for breakthrough pain.  Will consult acute pain management    Acute pain of right thigh  Assessment & Plan  Patient started having acute pain in right thigh immediately after Cryoablation of right renal mass, likely secondary to ablation of exiting nerve root in the area.       Lymphedema  Assessment & Plan  Patient reports chronic left lower extremity edema since she had a CABG 10 years ago.    Lumbar radiculopathy  Assessment & Plan  Patient has chronic low back pain, chronic bilateral hip pain  Patient is currently following up with outpatient pain management for pain control, and joint injections.  Continue home pregabalin    Recurrent depression (HCC)  Assessment & Plan  Patient currently does not have any antidepressant listed.  She is reporting ongoing sadness, hopelessness.  Patient also reports thoughts of hurting herself, which is happening on daily basis.  She does not have any plan, does not intend to do any harm.  Consult psychiatry.  Will continue virtual observation, low risk of suicide attempt    Essential hypertension  Assessment & Plan  Resume amlodipine 2.5 mg daily    Acquired hypothyroidism  Assessment & Plan  Resume  home levothyroxine    Coronary artery disease involving native coronary artery of native heart without angina pectoris  Assessment & Plan  History of CABG 10 years ago, continue aspirin and statin    Mild persistent asthma without complication  Assessment & Plan  Currently not in acute exacerbation, continue home inhalers           VTE Prophylaxis:start Heparin 24 hours after IR procedure/ sequential compression device   Code Status: full code   Discussion with family:  at bedside    Anticipated Length of Stay:  Patient will be admitted on an Observation basis with an anticipated length of stay of  < 2 midnights.   Justification for Hospital Stay: intractable right thigh pain    Total Time for Visit, including Counseling / Coordination of Care: 60 minutes.  Greater than 50% of this total time spent on direct patient counseling and coordination of care.    Chief Complaint:   direct admit from IR for right thigh pain    History of Present Illness:    Kathryn Drake is a 83 y.o. female with PMH of chronic low back pain, bilateral hip pain, right renal mass, presented for outpatient IR cryoablation of right renal mass today.  Immediately after procedure, patient reported intractable pain of right thigh which is much worse than her chronic pain.  The pain is likely secondary to an exit nerve root ablation that was adjacent to the right renal mass.  Patient admitted under medicine team for observation and pain control.    Patient also has history of recurrent depression, history of suicidal ideation.  Today she reports she continues to have thoughts of harming self.  However she reports it has been there for many years, does not have any plan, does not have any previous attempt.    Review of Systems:    Review of Systems   Constitutional:  Negative for chills and fever.   HENT:  Negative for ear pain and sore throat.    Eyes:  Negative for pain and visual disturbance.   Respiratory:  Negative for cough and  shortness of breath.    Cardiovascular:  Negative for chest pain and palpitations.   Gastrointestinal:  Negative for abdominal pain and vomiting.   Genitourinary:  Negative for dysuria and hematuria.   Musculoskeletal:  Positive for arthralgias, back pain, joint swelling and myalgias.   Skin:  Negative for color change and rash.   Neurological:  Negative for seizures and syncope.   Psychiatric/Behavioral:  Positive for suicidal ideas.    All other systems reviewed and are negative.      Past Medical and Surgical History:     Past Medical History:   Diagnosis Date    Asthma     Cardiac disease     heart attack    Disease of thyroid gland     Hyperlipidemia        Past Surgical History:   Procedure Laterality Date    APPENDECTOMY      BREAST LUMPECTOMY Bilateral     several    CARDIAC SURGERY      CATARACT EXTRACTION, BILATERAL      CORONARY STENT PLACEMENT      EPIDURAL BLOCK INJECTION Bilateral 10/25/2023    Procedure: bilateral L4-L5  TRANSFORAMINAL epidural steroid injection (68048);  Surgeon: Ariel Heredia DO;  Location: [facility] MAIN OR;  Service: Pain Management     NASAL POLYP SURGERY         Meds/Allergies:    Prior to Admission medications    Medication Sig Start Date End Date Taking? Authorizing Provider   albuterol (2.5 mg/3 mL) 0.083 % nebulizer solution Take 3 mL (2.5 mg total) by nebulization every 6 (six) hours as needed for wheezing or shortness of breath 10/9/23  Yes NA Hill   amLODIPine (NORVASC) 2.5 mg tablet Take 1 tablet (2.5 mg total) by mouth daily 5/23/24  Yes Angelina Magaña DO   ascorbic acid (VITAMIN C) 500 MG tablet Take 500 mg by mouth daily   Yes Historical Provider, MD   aspirin 81 MG tablet Take 81 mg by mouth daily     Yes Historical Provider, MD   calcium carbonate-vitamin D 500 mg-5 mcg per tablet Take 1 tablet by mouth 2 (two) times a day with meals 8/29/23  Yes Angelina Magaña DO   Cholecalciferol (VITAMIN D) 2000 UNITS tablet Take 4,000 Units by mouth daily   Yes  Historical Provider, MD   levothyroxine 75 mcg tablet Take 1 tablet (75 mcg total) by mouth daily 8/8/24  Yes Angelina Magaña DO   pregabalin (LYRICA) 50 mg capsule Take 1 capsule in the a.m. 2 capsules at bedtime 8/22/24  Yes NA Mitchell   simvastatin (ZOCOR) 10 mg tablet Take 1 tablet (10 mg total) by mouth daily at bedtime 9/3/24  Yes Angelina Magaña DO   albuterol (PROVENTIL HFA,VENTOLIN HFA) 90 mcg/act inhaler inhale 2 puffs by mouth and INTO THE LUNGS every 4 hours if needed for wheezing 4/14/23   Librado Long DO   budesonide (PULMICORT) 0.25 mg/2 mL nebulizer solution Take 2 mL (0.25 mg total) by nebulization 2 (two) times a day Rinse mouth after use. 2/27/24   NA Hill   EPINEPHrine (EPIPEN) 0.3 mg/0.3 mL SOAJ Inject 0.3 mL (0.3 mg total) into a muscle once for 1 dose 8/29/23 7/24/24  Angelina Magaña DO   predniSONE 20 mg tablet Take 2 tablets (40 mg total) by mouth daily 7/2/24   NA Hill   Symbicort 160-4.5 MCG/ACT inhaler Inhale 2 puffs 2 (two) times a day 12/18/23   Angelina Magaña DO   albuterol (PROVENTIL HFA,VENTOLIN HFA) 90 mcg/act inhaler Inhale 2 puffs every 4 (four) hours as needed for wheezing or shortness of breath 7/2/24 9/5/24  NA Hill   Cholecalciferol 50 MCG (2000 UT) CAPS Take 4,000 Units by mouth daily  9/5/24  Historical Provider, MD     I have reviewed home medications using allscripts.    Allergies:   Allergies   Allergen Reactions    Latex Rash    Penicillins GI Intolerance       Social History:     Marital Status: /Civil Union    Substance Use History:   Social History     Substance and Sexual Activity   Alcohol Use Yes    Comment: rare     Social History     Tobacco Use   Smoking Status Never    Passive exposure: Never   Smokeless Tobacco Never     Social History     Substance and Sexual Activity   Drug Use No       Family History:    Family History   Problem Relation Age of Onset    No Known Problems Mother     ALS Father      Cancer Brother     Heart disease Family     Asthma Family        Physical Exam:     Vitals:   Blood Pressure: 99/69 (09/05/24 1557)  Pulse: 66 (09/05/24 1557)  Temperature: 98 °F (36.7 °C) (09/05/24 1537)  Temp Source: Oral (09/05/24 0818)  Respirations: 20 (09/05/24 1540)  Height: 5' (152.4 cm) (09/05/24 1540)  Weight - Scale: 79.4 kg (175 lb) (09/05/24 0818)  SpO2: 94 % (09/05/24 1557)    Physical Exam  Vitals and nursing note reviewed.   Constitutional:       General: She is not in acute distress.     Appearance: She is well-developed.   HENT:      Head: Normocephalic and atraumatic.   Eyes:      Conjunctiva/sclera: Conjunctivae normal.   Cardiovascular:      Rate and Rhythm: Normal rate and regular rhythm.   Pulmonary:      Effort: Pulmonary effort is normal. No respiratory distress.      Breath sounds: Normal breath sounds.   Abdominal:      Palpations: Abdomen is soft.      Tenderness: There is no abdominal tenderness.   Musculoskeletal:         General: No swelling.      Cervical back: Neck supple.      Left lower leg: Edema present.      Comments: Patient reports of left lower extremity lymphedema for 10 years since CABG   Skin:     General: Skin is warm and dry.      Capillary Refill: Capillary refill takes less than 2 seconds.   Neurological:      Mental Status: She is alert.   Psychiatric:         Mood and Affect: Mood is depressed.         Behavior: Behavior normal.         Thought Content: Thought content includes suicidal ideation.      Comments: Patient reports of daily thoughts of suicidal ideation.  Patient reports it has been happening for many years.  However does not have any plan, does not have any previous attempt         Additional Data:     Lab Results: I have personally reviewed pertinent reports.      Results from last 7 days   Lab Units 09/05/24  0811   WBC Thousand/uL 7.86   HEMOGLOBIN g/dL 13.2   HEMATOCRIT % 41.1   PLATELETS Thousands/uL 267     Results from last 7 days   Lab Units  09/05/24  0811   SODIUM mmol/L 141   POTASSIUM mmol/L 3.8   CHLORIDE mmol/L 107   CO2 mmol/L 27   BUN mg/dL 13   CREATININE mg/dL 0.47*   ANION GAP mmol/L 7   CALCIUM mg/dL 9.1   GLUCOSE RANDOM mg/dL 88     Results from last 7 days   Lab Units 09/05/24  0811   INR  0.98                   Imaging: I have personally reviewed pertinent reports.      IR CRYOABLATION    (Results Pending)         ** Please Note: This note has been constructed using a voice recognition system. **

## 2024-09-05 NOTE — ASSESSMENT & PLAN NOTE
Patient currently does not have any antidepressant listed.  She is reporting ongoing sadness, hopelessness.  Patient also reports thoughts of hurting herself, which is happening on daily basis.  She does not have any plan, does not intend to do any harm.  Consult psychiatry.  Will continue virtual observation, low risk of suicide attempt

## 2024-09-05 NOTE — ANESTHESIA PREPROCEDURE EVALUATION
Procedure:  IR CRYOABLATION    Relevant Problems   CARDIO   (+) Coronary artery disease involving native coronary artery of native heart without angina pectoris   (+) Essential hypertension      ENDO   (+) Acquired hypothyroidism      MUSCULOSKELETAL   (+) Lumbar pain   (+) Sacroiliitis (HCC)      NEURO/PSYCH   (+) Recurrent depression (HCC)      PULMONARY   (+) Mild persistent asthma without complication   (+) Shortness of breath      Urinary   (+) Right renal mass      Surgery/Wound/Pain   (+) Hx of CABG   8/13/24   This result has an attachment that is not available.     Left Ventricle: Systolic function is normal with an ejection fraction   of 61-65%. There was septal hypokinesis. There is normal diastolic   function.    Left Atrium: Left atrium cavity is mildly dilated.     Aortic Valve: The aortic valve is trileaflet. The leaflets are mildly   calcified.    Mitral Valve: There is trace regurgitation.     Tricuspid Valve: There is trace regurgitation.     Physical Exam    Airway    Mallampati score: II  TM Distance: >3 FB  Neck ROM: full     Dental   No notable dental hx     Cardiovascular      Pulmonary   Breath sounds clear to auscultation    Other Findings  post-pubertal.      Anesthesia Plan  ASA Score- 3     Anesthesia Type- general with ASA Monitors.         Additional Monitors:     Airway Plan: ETT.           Plan Factors-Exercise tolerance (METS): <4 METS.    Chart reviewed. EKG reviewed.  Existing labs reviewed. Patient summary reviewed.    Patient is not a current smoker.              Induction- intravenous.    Postoperative Plan-         Informed Consent- Anesthetic plan and risks discussed with patient.  I personally reviewed this patient with the CRNA. Discussed and agreed on the Anesthesia Plan with the CRNA..

## 2024-09-05 NOTE — BRIEF OP NOTE (RAD/CATH)
INTERVENTIONAL RADIOLOGY PROCEDURE NOTE    Date: 9/5/2024    Procedure:   Procedure Summary       Date: 09/05/24 Room / Location: [facility] CAT Scan    Anesthesia Start: 0924 Anesthesia Stop:     Procedure: IR CRYOABLATION Diagnosis:       Right kidney mass      (Right renal mass)    Scheduled Providers: Cody Aj MD; Jonathan Nj MD Responsible Provider: Jonathan Nj MD    Anesthesia Type: general ASA Status: 3            Preoperative diagnosis:   1. Right kidney mass         Postoperative diagnosis: Same.    Surgeon: Cody Aj MD     Assistant: None. No qualified resident was available.    Blood loss: Minimal    Specimens: None     Findings: Cryoablation of superior pole right renal mass. The lesion required 2 probes which when placed left 7 mm between the two tips and unfortunately this was not felt to be a large enough window to safely perform a biopsy.     Post ablation scan demonstrated iceball to have good coverage of the tumor and no large hematoma.    Complications: None immediate.    Anesthesia: general

## 2024-09-05 NOTE — INTERVAL H&P NOTE
Update: (This section must be completed if the H&P was completed greater than 24 hrs to procedure or admission)    H&P reviewed. After examining the patient, I find no changed to the H&P since it had been written.    Re-discussed the ablation of renal mass. Discussed risks including bleeding, infection, injury to kidney and potential injury to lung given location of mass in the upper pole of the kidney. She understands the risks and wishes to proceed.    Patient re-evaluated. Accept as history and physical.    Cody Aj MD/September 5, 2024/9:14 AM

## 2024-09-05 NOTE — RESPIRATORY THERAPY NOTE
RT Protocol Note  Kathryn Drake 83 y.o. female MRN: [id number]  Unit/Bed#: [facility] 403-01 Encounter: [id number]    Assessment    Principal Problem:    Right renal mass  Active Problems:    Mild persistent asthma without complication    Coronary artery disease involving native coronary artery of native heart without angina pectoris    Acquired hypothyroidism    Essential hypertension    Recurrent depression (HCC)    Lumbar radiculopathy    Lymphedema    Acute pain of right thigh      Home Pulmonary Medications:  Albuterol, pulmicort       Past Medical History:   Diagnosis Date    Asthma     Cardiac disease     heart attack    Disease of thyroid gland     Hyperlipidemia      Social History     Socioeconomic History    Marital status: /Civil Union     Spouse name: None    Number of children: None    Years of education: None    Highest education level: None   Occupational History    None   Tobacco Use    Smoking status: Never     Passive exposure: Never    Smokeless tobacco: Never   Vaping Use    Vaping status: Never Used   Substance and Sexual Activity    Alcohol use: Yes     Comment: rare    Drug use: No    Sexual activity: None   Other Topics Concern    None   Social History Narrative    None     Social Determinants of Health     Financial Resource Strain: Low Risk  (3/7/2024)    Overall Financial Resource Strain (CARDIA)     Difficulty of Paying Living Expenses: Not hard at all   Food Insecurity: Patient Unable To Answer (9/5/2024)    Hunger Vital Sign     Worried About Running Out of Food in the Last Year: Patient unable to answer     Ran Out of Food in the Last Year: Patient unable to answer   Transportation Needs: No Transportation Needs (3/7/2024)    PRAPARE - Transportation     Lack of Transportation (Medical): No     Lack of Transportation (Non-Medical): No   Physical Activity: Not on file   Stress: Not on file   Social Connections: Not on file   Intimate Partner Violence: Not on file   Housing  Stability: High Risk (9/5/2024)    Housing Stability Vital Sign     Unable to Pay for Housing in the Last Year: Yes     Number of Times Moved in the Last Year: 0     Homeless in the Last Year: No       Subjective         Objective    Physical Exam:   Assessment Type: Assess only  General Appearance: Awake  Respiratory Pattern: Normal  Chest Assessment: Chest expansion symmetrical  Bilateral Breath Sounds: Clear    Vitals:  Blood pressure 139/55, pulse 69, temperature 98 °F (36.7 °C), resp. rate 20, height 5' (1.524 m), weight 85.7 kg (188 lb 15 oz), SpO2 95%.          Imaging and other studies: I have personally reviewed pertinent reports.            Plan    Respiratory Plan: Home Bronchodilator Patient pathway        Resp Comments: Pt assessed at this time per resp protocol. Pt admitted for right renal mass. Pt has a HX of asthma and takes pulmicort and prn albuterol at home. Pt in no acute resp distress at this time and will cont on home meds as ordered. Will cont to monitor per resp protocol.

## 2024-09-05 NOTE — ASSESSMENT & PLAN NOTE
Patient with right renal mass, highly concerning for RCC, presented for outpatient IR cryoablation.  Immediately after cryoablation, patient complained of significant right thigh pain, likely secondary to ablation of an exit nerve root adjacent to the renal mass.  Direct admission under medicine service for pain control.  Tylenol ATC, Lidoderm patch x 2, Flexeril 3 times daily  Continue home pregabalin  Oxycodone 5 mg every 6 hours as needed for moderate pain, 10 mg every 6 hours for severe pain, and IV Dilaudid for breakthrough pain.  Will consult acute pain management

## 2024-09-05 NOTE — ASSESSMENT & PLAN NOTE
Patient started having acute pain in right thigh immediately after Cryoablation of right renal mass, likely secondary to ablation of exiting nerve root in the area.

## 2024-09-05 NOTE — ANESTHESIA POSTPROCEDURE EVALUATION
Post-Op Assessment Note    CV Status:  Stable  Pain Score: 10    Pain management: adequate       Mental Status:  Alert and awake   Hydration Status:  Euvolemic   PONV Controlled:  Controlled   Airway Patency:  Patent     Post Op Vitals Reviewed: Yes    No anethesia notable event occurred.    Staff: CRNA               BP   129/80   Temp (!) 97 °F (36.1 °C) (09/05/24 1238)    Pulse 55   Resp 33   SpO2 97

## 2024-09-05 NOTE — SEDATION DOCUMENTATION
Right renal cryoablation performed by Dr Aj without complication. Patient tolerated procedure well. Anesthesia present throughout case. IR Procedure Bedrest Start Time is 1215 x 4 hours per Dr Aj. Patient transported to PACU with IR RN and CRNA, report given bedside.

## 2024-09-05 NOTE — ASSESSMENT & PLAN NOTE
Patient has chronic low back pain, chronic bilateral hip pain  Patient is currently following up with outpatient pain management for pain control, and joint injections.  Continue home pregabalin

## 2024-09-05 NOTE — QUICK NOTE
83 year old female with enlarging right renal mass highly concerning for renal cell cancer. Treated today with cryoablation. Renal mass close to exiting nerve roots. Post treatment the patient is having pain in her lateral and medial thigh. It seems likely that a exiting nerve was involved in the treatment zone.    The patient has been seen by pain management in the past. The patient will need acute management of her pain and will be admitted overnight.

## 2024-09-05 NOTE — PLAN OF CARE
Problem: PAIN - ADULT  Goal: Verbalizes/displays adequate comfort level or baseline comfort level  Description: Interventions:  - Encourage patient to monitor pain and request assistance  - Assess pain using appropriate pain scale  - Administer analgesics based on type and severity of pain and evaluate response  - Implement non-pharmacological measures as appropriate and evaluate response  - Consider cultural and social influences on pain and pain management  - Notify physician/advanced practitioner if interventions unsuccessful or patient reports new pain  Outcome: Progressing     Problem: INFECTION - ADULT  Goal: Absence or prevention of progression during hospitalization  Description: INTERVENTIONS:  - Assess and monitor for signs and symptoms of infection  - Monitor lab/diagnostic results  - Monitor all insertion sites, i.e. indwelling lines, tubes, and drains  - Monitor endotracheal if appropriate and nasal secretions for changes in amount and color  - [place] appropriate cooling/warming therapies per order  - Administer medications as ordered  - Instruct and encourage patient and family to use good hand hygiene technique  - Identify and instruct in appropriate isolation precautions for identified infection/condition  Outcome: Progressing  Goal: Absence of fever/infection during neutropenic period  Description: INTERVENTIONS:  - Monitor WBC    Outcome: Progressing     Problem: SAFETY ADULT  Goal: Patient will remain free of falls  Description: INTERVENTIONS:  - Educate patient/family on patient safety including physical limitations  - Instruct patient to call for assistance with activity   - Consult OT/PT to assist with strengthening/mobility   - Keep Call bell within reach  - Keep bed low and locked with side rails adjusted as appropriate  - Keep care items and personal belongings within reach  - Initiate and maintain comfort rounds  - Make Fall Risk Sign visible to staff  - Offer Toileting every 2 Hours,  in advance of need  - Initiate/Maintain bed alarm  - Apply yellow socks and bracelet for high fall risk patients  - Consider moving patient to room near nurses station  Outcome: Progressing  Goal: Maintain or return to baseline ADL function  Description: INTERVENTIONS:  -  Assess patient's ability to carry out ADLs; assess patient's baseline for ADL function and identify physical deficits which impact ability to perform ADLs (bathing, care of mouth/teeth, toileting, grooming, dressing, etc.)  - Assess/evaluate cause of self-care deficits   - Assess range of motion  - Assess patient's mobility; develop plan if impaired  - Assess patient's need for assistive devices and provide as appropriate  - Encourage maximum independence but intervene and supervise when necessary  - Involve family in performance of ADLs  - Assess for home care needs following discharge   - Consider OT consult to assist with ADL evaluation and planning for discharge  - Provide patient education as appropriate  Outcome: Progressing  Goal: Maintains/Returns to pre admission functional level  Description: INTERVENTIONS:  - Perform AM-PAC 6 Click Basic Mobility/ Daily Activity assessment daily.  - Set and communicate daily mobility goal to care team and patient/family/caregiver.   - Collaborate with rehabilitation services on mobility goals if consulted  - Perform Range of Motion 4 times a day.  - Reposition patient every 2 hours.  - Dangle patient 4 times a day  - Stand patient 4 times a day  - Ambulate patient 4 times a day  - Out of bed to chair 4 times a day   - Out of bed for meals 4 times a day  - Out of bed for toileting  - Record patient progress and toleration of activity level   Outcome: Progressing     Problem: DISCHARGE PLANNING  Goal: Discharge to home or other facility with appropriate resources  Description: INTERVENTIONS:  - Identify barriers to discharge w/patient and caregiver  - Arrange for needed discharge resources and  transportation as appropriate  - Identify discharge learning needs (meds, wound care, etc.)  - Arrange for interpretive services to assist at discharge as needed  - Refer to Case Management Department for coordinating discharge planning if the patient needs post-hospital services based on physician/advanced practitioner order or complex needs related to functional status, cognitive ability, or social support system  Outcome: Progressing     Problem: Knowledge Deficit  Goal: Patient/family/caregiver demonstrates understanding of disease process, treatment plan, medications, and discharge instructions  Description: Complete learning assessment and assess knowledge base.  Interventions:  - Provide teaching at level of understanding  - Provide teaching via preferred learning methods  Outcome: Progressing      1

## 2024-09-05 NOTE — DISCHARGE INSTRUCTIONS
Kidney Tumor Ablation     WHAT YOU NEED TO KNOW:                                           Kidney tumor ablation is a procedure to destroy cancer cells in your kidney without removing them. Using image guidance, a probe is inserted into the tumor. Ablations can be done using high energy radio waves (RFA). Or using microwave energy. Heat destroys the abnormal tissue. A cyroablation destroys abnormal tissue by using cold temperatures.    DISCHARGE INSTRUCTIONS:     You may resume your normal diet and medications. Small sips of flat soda will help with nausea.  Follow up with your healthcare provider as directed: Write down your questions so you remember to ask them during your visits.  Rest as needed: Slowly start to do more each day. Return to your daily activities as directed by your healthcare provider.     Contact your healthcare provider if:  [place], [place] and [place] Patients Contact Interventional  Radiology at [phone] if any of the following occur:     You have severe pain that does not get better with medicine.                  Difficulty breathing, nausea or vomiting.                Chills or fever above 101 degrees F.                 Develop any redness, swelling, heat, unusual drainage, heavy bruising or bleeding from the probe sites.                You continue to have pain a week after your procedure.      The above information is an  only. It is not intended as medical advice for individual conditions or treatments. Talk to your doctor, nurse or pharmacist before following any medical regimen to see if it is safe and effective for you.

## 2024-09-06 VITALS
HEIGHT: 60 IN | WEIGHT: 188.93 LBS | HEART RATE: 58 BPM | SYSTOLIC BLOOD PRESSURE: 121 MMHG | TEMPERATURE: 98.3 F | RESPIRATION RATE: 20 BRPM | OXYGEN SATURATION: 95 % | BODY MASS INDEX: 37.09 KG/M2 | DIASTOLIC BLOOD PRESSURE: 50 MMHG

## 2024-09-06 LAB
ANION GAP SERPL CALCULATED.3IONS-SCNC: 9 MMOL/L (ref 4–13)
BUN SERPL-MCNC: 25 MG/DL (ref 5–25)
CALCIUM SERPL-MCNC: 9.6 MG/DL (ref 8.4–10.2)
CHLORIDE SERPL-SCNC: 106 MMOL/L (ref 96–108)
CO2 SERPL-SCNC: 25 MMOL/L (ref 21–32)
CREAT SERPL-MCNC: 0.79 MG/DL (ref 0.6–1.3)
ERYTHROCYTE [DISTWIDTH] IN BLOOD BY AUTOMATED COUNT: 14.2 % (ref 11.6–15.1)
GFR SERPL CREATININE-BSD FRML MDRD: 70 ML/MIN/1.73SQ M
GLUCOSE SERPL-MCNC: 123 MG/DL (ref 65–140)
HCT VFR BLD AUTO: 38.7 % (ref 34.8–46.1)
HGB BLD-MCNC: 12.5 G/DL (ref 11.5–15.4)
MCH RBC QN AUTO: 29.3 PG (ref 26.8–34.3)
MCHC RBC AUTO-ENTMCNC: 32.3 G/DL (ref 31.4–37.4)
MCV RBC AUTO: 91 FL (ref 82–98)
PLATELET # BLD AUTO: 275 THOUSANDS/UL (ref 149–390)
PMV BLD AUTO: 10.5 FL (ref 8.9–12.7)
POTASSIUM SERPL-SCNC: 3.9 MMOL/L (ref 3.5–5.3)
RBC # BLD AUTO: 4.26 MILLION/UL (ref 3.81–5.12)
SODIUM SERPL-SCNC: 140 MMOL/L (ref 135–147)
WBC # BLD AUTO: 10.13 THOUSAND/UL (ref 4.31–10.16)

## 2024-09-06 PROCEDURE — 80048 BASIC METABOLIC PNL TOTAL CA: CPT | Performed by: FAMILY MEDICINE

## 2024-09-06 PROCEDURE — 99239 HOSP IP/OBS DSCHRG MGMT >30: CPT | Performed by: NURSE PRACTITIONER

## 2024-09-06 PROCEDURE — 94640 AIRWAY INHALATION TREATMENT: CPT

## 2024-09-06 PROCEDURE — 85027 COMPLETE CBC AUTOMATED: CPT | Performed by: FAMILY MEDICINE

## 2024-09-06 PROCEDURE — 99215 OFFICE O/P EST HI 40 MIN: CPT | Performed by: ANESTHESIOLOGY

## 2024-09-06 PROCEDURE — 94664 DEMO&/EVAL PT USE INHALER: CPT

## 2024-09-06 PROCEDURE — 94760 N-INVAS EAR/PLS OXIMETRY 1: CPT

## 2024-09-06 RX ORDER — HYDROMORPHONE HCL IN WATER/PF 6 MG/30 ML
0.2 PATIENT CONTROLLED ANALGESIA SYRINGE INTRAVENOUS EVERY 4 HOURS PRN
Status: DISCONTINUED | OUTPATIENT
Start: 2024-09-06 | End: 2024-09-06 | Stop reason: HOSPADM

## 2024-09-06 RX ORDER — IBUPROFEN 400 MG/1
400 TABLET, FILM COATED ORAL EVERY 6 HOURS PRN
Status: DISCONTINUED | OUTPATIENT
Start: 2024-09-06 | End: 2024-09-06 | Stop reason: HOSPADM

## 2024-09-06 RX ORDER — ACETAMINOPHEN 325 MG/1
975 TABLET ORAL EVERY 8 HOURS SCHEDULED
Start: 2024-09-06

## 2024-09-06 RX ORDER — OXYCODONE HYDROCHLORIDE 5 MG/1
5 TABLET ORAL EVERY 6 HOURS PRN
Status: DISCONTINUED | OUTPATIENT
Start: 2024-09-06 | End: 2024-09-06 | Stop reason: HOSPADM

## 2024-09-06 RX ADMIN — PREGABALIN 50 MG: 50 CAPSULE ORAL at 08:27

## 2024-09-06 RX ADMIN — ASPIRIN 81 MG CHEWABLE TABLET 81 MG: 81 TABLET CHEWABLE at 08:27

## 2024-09-06 RX ADMIN — AMLODIPINE BESYLATE 2.5 MG: 2.5 TABLET ORAL at 08:27

## 2024-09-06 RX ADMIN — CYCLOBENZAPRINE HYDROCHLORIDE 5 MG: 10 TABLET, FILM COATED ORAL at 08:27

## 2024-09-06 RX ADMIN — BUDESONIDE 0.25 MG: 0.25 INHALANT RESPIRATORY (INHALATION) at 07:12

## 2024-09-06 RX ADMIN — Medication 1 TABLET: at 08:27

## 2024-09-06 RX ADMIN — LEVOTHYROXINE SODIUM 75 MCG: 75 TABLET ORAL at 06:03

## 2024-09-06 NOTE — CONSULTS
Consultation - Acute Pain Service  Kathryn Drake 83 y.o. female MRN: [id number]  Unit/Bed#: [facility] 403-01 Encounter: [id number]               Kathryn Drake is a 83 y.o. female with past medical history significant for chronic pain (back, right hip, left leg), right renal mass concerning for renal cell carcinoma who is status post outpatient IR cryoablation on 9/5/2024.  Immediately after procedure she developed significant right hip and thigh pain and admitted for pain control.    Patient feels her pain is better now than it was immediately after the procedure.  She was able to ambulate to the bathroom and feels her pain is at its baseline more or less but she is worried about walking further.  She has not been using any as needed meds as she is worried about getting addicted to narcotics.  I will decrease her narcotic regimen in the event she needs something for pain later.  I will talk to primary team about using NSAIDs as this normally helps her at home.  Recommend therapy to evaluate her ambulation and if at her baseline pain can likely dc to home when medically stable.    Acute pain of right thigh  Assessment & Plan  Tylenol 975 mg every 8 hours scheduled  Lidocaine patches x 2, 12 hours on and 12 hours off  Lyrica 50 mg daily with additional 100 mg at nighttime, home regimen  Discontinue as needed Tylenol  Flexeril 5 mg every 8 hours scheduled for muscle spasm  Decrease IV Dilaudid to 0.2 mg every 4 hours as needed for breakthrough pain  Decrease oxycodone to 2.5 mg and 5 mg every 6 hours as needed for moderate and severe pain, respectively  Consider adding home dose of Advil as this usually helps patient's chronic pain  Recommend bowel regimen to avoid opioid-induced constipation    Recurrent depression (HCC)  Assessment & Plan  On 1:1 currently given daily thoughts of wanting to hurt herself, no plans however  Low risk for suicide    * Right renal mass  Assessment & Plan  Status post cryoablation on  9/5/2024        APS will sign off at this time. Thank you for the consult. All opioids and other analgesics to be written at discretion of primary team. Please contact Acute Pain Service - via [place] from [phone] with additional questions or concerns. See Anurag or Emily for additional contacts and after hours information.    History of Present Illness    Admit Date:  9/5/2024  Hospital Day:  1 day  Primary Service:  Hospitalist  Attending Provider:  Shoaib Riddle MD  Physician Requesting Consult: Shoaib Riddle MD  Reason for Consult / Principal Problem: acute pain in right hip / thigh following IR cryoablation  HPI: Kathryn Drake is a 83 y.o. year old female with past medical history significant for chronic pain not on outpatient opioids, depression, CAD s/p triple bypass renal mass concerning for renal cell carcinoma who presented initially for an outpatient IR cryoablation and immediately postprocedure developed acute onset right hip and thigh pain requiring inpatient admission for pain control.  She states the pain was an exacerbation of her baseline right hip pain and it traveled into the thigh.  She states she was screaming after the procedure but today since she has not been moving much she feels her pain is more or less getting back to her baseline pain.  She was able to ambulate to the bathroom this morning and did okay with this but she is worried about walking a longer distance and seeing if this elicits pain or not.    She states at baseline she has chronic back pain, right hip pain and left leg pain and swelling.  She states she walks with a cane and has to take frequent breaks when ambulating.  She follows with Dr. Szymanski (sports med) for her knee and Dr. Cazares (pain management) for her back / hips and has had good relief with Lyrica as well as serial injections.  She is hoping she can be seen in their office after this admission in order to get another injection if possible.  She is  worried about going on narcotics she does not want to get addicted.    Current pain location(s): Pain Score: 7  Pain Location/Orientation: Orientation: Right, Location: Leg  Pain Scale: Pain Assessment Tool: 0-10  Current Analgesic regimen:   Tylenol 975 mg every 8 hours scheduled  As needed Tylenol  Lidocaine patches x 2  Home regimen Lyrica  Flexeril 5 mg every 8 hours  Oxycodone 5 mg and 10 mg every 4 hours as needed for moderate-severe pain  IV Dilaudid 0.5 mg every 4 hours as needed for moderate and severe pain    Pain History: Patient follows with Dr. Szymanski and Dr. Cazares for serial injections in the hip, back and knee.  She is maintained on Lyrica which has worked well for her.  She is not on any outpatient narcotics.  This is confirmed with PDMP.    I have reviewed the patient's controlled substance dispensing history in the Prescription Drug Monitoring Program in compliance with the [facility] regulations before prescribing any controlled substances.     Inpatient consult to Acute Pain Service  Consult performed by: Mary Carrington PA-C  Consult ordered by: Shoaib Riddle MD          Review of Systems   Constitutional:  Positive for activity change. Negative for chills and fever.   HENT:  Negative for hearing loss, tinnitus and trouble swallowing.    Eyes:  Negative for visual disturbance.   Respiratory:  Negative for chest tightness and shortness of breath.    Cardiovascular:  Positive for leg swelling (left leg, chronic since triple bypass surgery). Negative for chest pain.   Gastrointestinal:  Negative for abdominal pain, constipation, diarrhea, nausea and vomiting.   Genitourinary:  Negative for difficulty urinating.   Musculoskeletal:  Positive for arthralgias (right hip, left leg / knee), back pain (chronic) and gait problem. Negative for neck pain.   Skin:  Negative for wound.   Allergic/Immunologic: Negative for environmental allergies and food allergies.   Neurological:  Negative for dizziness, facial  asymmetry, speech difficulty, weakness, numbness and headaches.   Hematological:  Does not bruise/bleed easily.   Psychiatric/Behavioral:  Negative for confusion.        Historical Information   Past Medical History:   Diagnosis Date    Asthma     Cardiac disease     heart attack    Disease of thyroid gland     Hyperlipidemia      Past Surgical History:   Procedure Laterality Date    APPENDECTOMY      BREAST LUMPECTOMY Bilateral     several    CARDIAC SURGERY      CATARACT EXTRACTION, BILATERAL      CORONARY STENT PLACEMENT      EPIDURAL BLOCK INJECTION Bilateral 10/25/2023    Procedure: bilateral L4-L5  TRANSFORAMINAL epidural steroid injection (25753);  Surgeon: Ariel Heredia DO;  Location: [facility] MAIN OR;  Service: Pain Management     NASAL POLYP SURGERY       Social History   Social History     Substance and Sexual Activity   Alcohol Use Yes    Comment: rare     Social History     Substance and Sexual Activity   Drug Use No     Social History     Tobacco Use   Smoking Status Never    Passive exposure: Never   Smokeless Tobacco Never     Family History:   Family History   Problem Relation Age of Onset    No Known Problems Mother     ALS Father     Cancer Brother     Heart disease Family     Asthma Family        Meds/Allergies   all current active meds have been reviewed, current meds:   Current Facility-Administered Medications   Medication Dose Route Frequency    acetaminophen (TYLENOL) tablet 975 mg  975 mg Oral Q8H ESTER    albuterol inhalation solution 2.5 mg  2.5 mg Nebulization Q6H PRN    amLODIPine (NORVASC) tablet 2.5 mg  2.5 mg Oral Daily    aspirin chewable tablet 81 mg  81 mg Oral Daily    budesonide (PULMICORT) inhalation solution 0.25 mg  0.25 mg Nebulization BID    calcium carbonate-vitamin D 500 mg-5 mcg tablet 1 tablet  1 tablet Oral BID With Meals    cyclobenzaprine (FLEXERIL) tablet 5 mg  5 mg Oral TID    heparin (porcine) subcutaneous injection 5,000 Units  5,000 Units Subcutaneous Q8H ESTER     HYDROmorphone HCl (DILAUDID) injection 0.2 mg  0.2 mg Intravenous Q4H PRN    ibuprofen (MOTRIN) tablet 400 mg  400 mg Oral Q6H PRN    levothyroxine tablet 75 mcg  75 mcg Oral Daily    lidocaine (LIDODERM) 5 % patch 2 patch  2 patch Topical Daily    ondansetron (ZOFRAN) injection 4 mg  4 mg Intravenous Q6H PRN    oxyCODONE (ROXICODONE) IR tablet 5 mg  5 mg Oral Q6H PRN    oxyCODONE (ROXICODONE) split tablet 2.5 mg  2.5 mg Oral Q6H PRN    pravastatin (PRAVACHOL) tablet 40 mg  40 mg Oral Daily With Dinner    pregabalin (LYRICA) capsule 100 mg  100 mg Oral QPM    pregabalin (LYRICA) capsule 50 mg  50 mg Oral Daily   , and PTA meds:   Prior to Admission Medications   Prescriptions Last Dose Informant Patient Reported? Taking?   Cholecalciferol (VITAMIN D) 2000 UNITS tablet 9/4/2024 Self Yes Yes   Sig: Take 4,000 Units by mouth daily   EPINEPHrine (EPIPEN) 0.3 mg/0.3 mL SOAJ   No No   Sig: Inject 0.3 mL (0.3 mg total) into a muscle once for 1 dose   Symbicort 160-4.5 MCG/ACT inhaler Unknown Self No No   Sig: Inhale 2 puffs 2 (two) times a day   albuterol (2.5 mg/3 mL) 0.083 % nebulizer solution 9/5/2024 Self No Yes   Sig: Take 3 mL (2.5 mg total) by nebulization every 6 (six) hours as needed for wheezing or shortness of breath   albuterol (PROVENTIL HFA,VENTOLIN HFA) 90 mcg/act inhaler Unknown Self No No   Sig: inhale 2 puffs by mouth and INTO THE LUNGS every 4 hours if needed for wheezing   amLODIPine (NORVASC) 2.5 mg tablet 9/4/2024 Self No Yes   Sig: Take 1 tablet (2.5 mg total) by mouth daily   ascorbic acid (VITAMIN C) 500 MG tablet 9/4/2024 Self Yes Yes   Sig: Take 500 mg by mouth daily   aspirin 81 MG tablet 8/28/2024 Self Yes Yes   Sig: Take 81 mg by mouth daily     budesonide (PULMICORT) 0.25 mg/2 mL nebulizer solution Unknown Self No No   Sig: Take 2 mL (0.25 mg total) by nebulization 2 (two) times a day Rinse mouth after use.   calcium carbonate-vitamin D 500 mg-5 mcg per tablet 9/4/2024 Self No Yes   Sig:  Take 1 tablet by mouth 2 (two) times a day with meals   levothyroxine 75 mcg tablet 9/4/2024  No Yes   Sig: Take 1 tablet (75 mcg total) by mouth daily   predniSONE 20 mg tablet   No No   Sig: Take 2 tablets (40 mg total) by mouth daily   pregabalin (LYRICA) 50 mg capsule 9/4/2024  No Yes   Sig: Take 1 capsule in the a.m. 2 capsules at bedtime   simvastatin (ZOCOR) 10 mg tablet 9/4/2024  No Yes   Sig: Take 1 tablet (10 mg total) by mouth daily at bedtime      Facility-Administered Medications: None       Allergies   Allergen Reactions    Latex Rash    Penicillins GI Intolerance       Objective   Vitals:    09/06/24 0530 09/06/24 0630 09/06/24 0714 09/06/24 0722   BP: 125/99 124/99  121/50   BP Location:    Left arm   Pulse: 59 64  58   Resp:    20   Temp:    98.3 °F (36.8 °C)   TempSrc:    Axillary   SpO2: 94% 95% 96% 95%   Weight:       Height:             Intake/Output Summary (Last 24 hours) at 9/6/2024 0953  Last data filed at 9/6/2024 0900  Gross per 24 hour   Intake 960 ml   Output 0 ml   Net 960 ml       Physical Exam  Constitutional:       General: She is awake.      Appearance: She is well-developed.   HENT:      Head: Normocephalic and atraumatic.   Eyes:      Conjunctiva/sclera: Conjunctivae normal.   Cardiovascular:      Rate and Rhythm: Normal rate.   Pulmonary:      Effort: Pulmonary effort is normal. No respiratory distress.   Musculoskeletal:         General: Normal range of motion.      Comments: Tenderness to palpation of the right hip and the left distal leg   Skin:     General: Skin is warm.   Neurological:      Mental Status: She is alert and oriented to person, place, and time.   Psychiatric:         Attention and Perception: Attention and perception normal.         Mood and Affect: Mood and affect normal.         Speech: Speech normal.         Behavior: Behavior normal. Behavior is cooperative.         Thought Content: Thought content normal.         Cognition and Memory: Cognition and memory  normal.         Judgment: Judgment normal.      Comments: Patient is a on a 1:1         Lab Results:  Estimated Creatinine Clearance: 54.3 mL/min (by C-G formula based on SCr of 0.79 mg/dL).  Lab Results   Component Value Date    WBC 10.13 09/06/2024    HGB 12.5 09/06/2024    HCT 38.7 09/06/2024     09/06/2024         Component Value Date/Time    K 3.9 09/06/2024 0455     09/06/2024 0455    CO2 25 09/06/2024 0455    BUN 25 09/06/2024 0455    CREATININE 0.79 09/06/2024 0455         Component Value Date/Time    CALCIUM 9.6 09/06/2024 0455    ALKPHOS 74 08/28/2024 0659    AST 20 08/28/2024 0659    ALT 8 08/28/2024 0659    TP 7.0 08/28/2024 0659    ALB 4.0 08/28/2024 0659       Imaging Studies/EKG: I have personally reviewed pertinent reports.   and I have personally reviewed pertinent films in PACS    Counseling / Coordination of Care  Total floor / unit time spent today 30 minutes. Greater than 50% of total time was spent with the patient and / or family counseling and / or coordination of care. A description of the counseling / coordination of care: Patient interview, physical examination, review of PDMP, review of medical record, review of imaging and laboratory data, development of pain management plan, discussion of pain management plan with patient and primary service.      Please note that the APS provides consultative services regarding pain management only.  With the exception of ketamine and epidural infusions and except when indicated, final decisions regarding starting or changing doses of analgesic medications are at the discretion of the consulting service.  Mary Carrington PA-C  Acute Pain Service

## 2024-09-06 NOTE — ASSESSMENT & PLAN NOTE
Patient started having acute pain in right thigh immediately after Cryoablation of right renal mass, likely secondary to ablation of exiting nerve root in the area.  Does not want to utilize opioids.  Can continue Tylenol and as needed NSAIDs on discharge.  Confirmed with IR no contraindication to use of NSAIDs following cryoablation.

## 2024-09-06 NOTE — ASSESSMENT & PLAN NOTE
Patient currently does not have any antidepressant listed.  She is reporting ongoing sadness, hopelessness.  Patient reportedly mentioned thoughts of hurting herself, which is happening on daily basis however she denies this to me.   She does not have any plan, does not intend to do any harm.  Can hold off on psychiatry consult  Can f/u with PCP

## 2024-09-06 NOTE — ASSESSMENT & PLAN NOTE
On 1:1 currently given daily thoughts of wanting to hurt herself, no plans however  Low risk for suicide

## 2024-09-06 NOTE — DISCHARGE INSTR - AVS FIRST PAGE
Continue all home meds as you were previously taking. Okay to use Advil as needed for pain in addition to tylenol.

## 2024-09-06 NOTE — ASSESSMENT & PLAN NOTE
Tylenol 975 mg every 8 hours scheduled  Lidocaine patches x 2, 12 hours on and 12 hours off  Lyrica 50 mg daily with additional 100 mg at nighttime, home regimen  Discontinue as needed Tylenol  Flexeril 5 mg every 8 hours scheduled for muscle spasm  Decrease IV Dilaudid to 0.2 mg every 4 hours as needed for breakthrough pain  Decrease oxycodone to 2.5 mg and 5 mg every 6 hours as needed for moderate and severe pain, respectively  Consider adding home dose of Advil as this usually helps patient's chronic pain  Recommend bowel regimen to avoid opioid-induced constipation

## 2024-09-06 NOTE — DISCHARGE SUMMARY
[facility]  Discharge- Kathryn Drake 1940, 83 y.o. female MRN: [id number]  Unit/Bed#: [facility] 403-01 Encounter: [id number]  Primary Care Provider: Angelina Magaña DO   Date and time admitted to hospital: 9/5/2024  3:17 PM    * Right renal mass  Assessment & Plan  Patient with right renal mass, highly concerning for RCC, presented for outpatient IR cryoablation.  Immediately after cryoablation, patient complained of significant right thigh pain, likely secondary to ablation of an exit nerve root adjacent to the renal mass.  Can continue Tylenol/NSAIDs as well as home Lyrica.  Refused opioids.    Tylenol ATC, Lidoderm patch x 2, Flexeril 3 times daily  Patient became very upset after receiving observation notice and would like to go home, no medical need for patient to remain in hospital at this time.  Stable for discharge home, discussed with acute pain team.     Acute pain of right thigh  Assessment & Plan  Patient started having acute pain in right thigh immediately after Cryoablation of right renal mass, likely secondary to ablation of exiting nerve root in the area.  Does not want to utilize opioids.  Can continue Tylenol and as needed NSAIDs on discharge.  Confirmed with IR no contraindication to use of NSAIDs following cryoablation.      Lumbar radiculopathy  Assessment & Plan  Patient has chronic low back pain, chronic bilateral hip pain  Patient is currently following up with outpatient pain management for pain control, and joint injections.  Continue home pregabalin    Essential hypertension  Assessment & Plan  Continue Norvasc    Coronary artery disease involving native coronary artery of native heart without angina pectoris  Assessment & Plan  History of CABG 10 years ago, continue aspirin and statin    Acquired hypothyroidism  Assessment & Plan  Continue levothyroxine    Lymphedema  Assessment & Plan  Patient reports chronic left lower extremity edema since she  had a CABG 10 years ago.    Mild persistent asthma without complication  Assessment & Plan  Currently not in acute exacerbation, continue home inhalers    Recurrent depression (HCC)  Assessment & Plan  Patient currently does not have any antidepressant listed.  She is reporting ongoing sadness, hopelessness.  Patient reportedly mentioned thoughts of hurting herself, which is happening on daily basis however she denies this to me.   She does not have any plan, does not intend to do any harm.  Can hold off on psychiatry consult  Can f/u with PCP        Medical Problems       Resolved Problems  Date Reviewed: 9/6/2024   None       Discharging Physician / Practitioner: NA De Los Santos  PCP: Angelina Magaña DO  Admission Date:   Admission Orders (From admission, onward)       Ordered        09/05/24 1521  Place in Observation  Once                          Discharge Date: 09/06/24    Consultations During Hospital Stay:  IR  APS    Procedures Performed:   Renal cryoablation    Significant Findings / Test Results:   none    Incidental Findings:   none       Test Results Pending at Discharge (will require follow up):   none     Outpatient Tests Requested:  F/u with PCP and IR PRN  F/u with urology     Complications:  none    Reason for Admission: right renal cryoablation     Hospital Course:   Kathryn Drake is a 83 y.o. female patient who originally presented to the hospital on 9/5/2024 due to right cryoablation in setting of renal mass.  Follows with urology.  Had pain after cryoablation so patient was admitted as observation under internal medicine service.  She refused opioid medications.  She is overall feeling better today other than being upset regarding observation noticed.  She reportedly expressed some suicidal ideation/depression to admitting provider and was admitted with one-to-one however currently with SI and wants to go home.  at bedside. No need for psychiatry consult, does not meet 302  criteria and would not want voluntary admission.     Please see above list of diagnoses and related plan for additional information.     Condition at Discharge: stable    Discharge Day Visit / Exam:   Subjective:  No complaints of pain. Wants to go home. Yelling at nursing staff about observation status.     Vitals: Blood Pressure: 121/50 (09/06/24 0722)  Pulse: 58 (09/06/24 0722)  Temperature: 98.3 °F (36.8 °C) (09/06/24 0722)  Temp Source: Axillary (09/06/24 0722)  Respirations: 20 (09/06/24 0722)  Height: 5' (152.4 cm) (09/05/24 1540)  Weight - Scale: 85.7 kg (188 lb 15 oz) (09/05/24 1634)  SpO2: 95 % (09/06/24 0722)  Exam:   Physical Exam  Vitals and nursing note reviewed.   Constitutional:       Appearance: She is obese.   Cardiovascular:      Rate and Rhythm: Normal rate.   Pulmonary:      Effort: No respiratory distress.   Skin:     General: Skin is warm.   Neurological:      Mental Status: She is alert and oriented to person, place, and time. Mental status is at baseline.   Psychiatric:         Mood and Affect: Affect is angry.          Discussion with Family: Updated  () at bedside.    Discharge instructions/Information to patient and family:   See after visit summary for information provided to patient and family.      Provisions for Follow-Up Care:  See after visit summary for information related to follow-up care and any pertinent home health orders.      Mobility at time of Discharge:   Basic Mobility Inpatient Raw Score: 22  JH-HLM Goal: 7: Walk 25 feet or more  JH-HLM Achieved: 2: Bed activities/Dependent transfer  HLM Goal NOT achieved. Continue to encourage mobility in post discharge setting.     Disposition:   Home    Planned Readmission: no     Discharge Statement:  I spent 40 minutes discharging the patient. This time was spent on the day of discharge. I had direct contact with the patient on the day of discharge. Greater than 50% of the total time was spent examining  patient, answering all patient questions, arranging and discussing plan of care with patient as well as directly providing post-discharge instructions.  Additional time then spent on discharge activities.    Discharge Medications:  See after visit summary for reconciled discharge medications provided to patient and/or family.      **Please Note: This note may have been constructed using a voice recognition system**

## 2024-09-06 NOTE — ASSESSMENT & PLAN NOTE
Patient with right renal mass, highly concerning for RCC, presented for outpatient IR cryoablation.  Immediately after cryoablation, patient complained of significant right thigh pain, likely secondary to ablation of an exit nerve root adjacent to the renal mass.  Can continue Tylenol/NSAIDs as well as home Lyrica.  Refused opioids.    Tylenol ATC, Lidoderm patch x 2, Flexeril 3 times daily  Patient became very upset after receiving observation notice and would like to go home, no medical need for patient to remain in hospital at this time.  Stable for discharge home, discussed with acute pain team.

## 2024-09-06 NOTE — PLAN OF CARE
Problem: PAIN - ADULT  Goal: Verbalizes/displays adequate comfort level or baseline comfort level  Description: Interventions:  - Encourage patient to monitor pain and request assistance  - Assess pain using appropriate pain scale  - Administer analgesics based on type and severity of pain and evaluate response  - Implement non-pharmacological measures as appropriate and evaluate response  - Consider cultural and social influences on pain and pain management  - Notify physician/advanced practitioner if interventions unsuccessful or patient reports new pain  Outcome: Progressing     Problem: INFECTION - ADULT  Goal: Absence or prevention of progression during hospitalization  Description: INTERVENTIONS:  - Assess and monitor for signs and symptoms of infection  - Monitor lab/diagnostic results  - Monitor all insertion sites, i.e. indwelling lines, tubes, and drains  - Monitor endotracheal if appropriate and nasal secretions for changes in amount and color  - [place] appropriate cooling/warming therapies per order  - Administer medications as ordered  - Instruct and encourage patient and family to use good hand hygiene technique  - Identify and instruct in appropriate isolation precautions for identified infection/condition  Outcome: Progressing  Goal: Absence of fever/infection during neutropenic period  Description: INTERVENTIONS:  - Monitor WBC    Outcome: Progressing     Problem: SAFETY ADULT  Goal: Patient will remain free of falls  Description: INTERVENTIONS:  - Educate patient/family on patient safety including physical limitations  - Instruct patient to call for assistance with activity   - Consult OT/PT to assist with strengthening/mobility   - Keep Call bell within reach  - Keep bed low and locked with side rails adjusted as appropriate  - Keep care items and personal belongings within reach  - Initiate and maintain comfort rounds  - Make Fall Risk Sign visible to staff  - Offer Toileting every 2 Hours,  in advance of need  - Initiate/Maintain bed alarm  - Obtain necessary fall risk management equipment:   - Apply yellow socks and bracelet for high fall risk patients  - Consider moving patient to room near nurses station  Outcome: Progressing  Goal: Maintain or return to baseline ADL function  Description: INTERVENTIONS:  -  Assess patient's ability to carry out ADLs; assess patient's baseline for ADL function and identify physical deficits which impact ability to perform ADLs (bathing, care of mouth/teeth, toileting, grooming, dressing, etc.)  - Assess/evaluate cause of self-care deficits   - Assess range of motion  - Assess patient's mobility; develop plan if impaired  - Assess patient's need for assistive devices and provide as appropriate  - Encourage maximum independence but intervene and supervise when necessary  - Involve family in performance of ADLs  - Assess for home care needs following discharge   - Consider OT consult to assist with ADL evaluation and planning for discharge  - Provide patient education as appropriate  Outcome: Progressing  Goal: Maintains/Returns to pre admission functional level  Description: INTERVENTIONS:  - Perform AM-PAC 6 Click Basic Mobility/ Daily Activity assessment daily.  - Set and communicate daily mobility goal to care team and patient/family/caregiver.   - Collaborate with rehabilitation services on mobility goals if consulted  - Perform Range of Motion 2 times a day.  - Reposition patient every 2 hours.  - Dangle patient 3 times a day  - Stand patient 4 times a day  - Ambulate patient 2 times a day  - Out of bed to chair 3 times a day   - Out of bed for meals 3 times a day  - Out of bed for toileting  - Record patient progress and toleration of activity level   Outcome: Progressing

## 2024-09-06 NOTE — PLAN OF CARE
Problem: PAIN - ADULT  Goal: Verbalizes/displays adequate comfort level or baseline comfort level  Description: Interventions:  - Encourage patient to monitor pain and request assistance  - Assess pain using appropriate pain scale  - Administer analgesics based on type and severity of pain and evaluate response  - Implement non-pharmacological measures as appropriate and evaluate response  - Consider cultural and social influences on pain and pain management  - Notify physician/advanced practitioner if interventions unsuccessful or patient reports new pain  Outcome: Progressing     Problem: INFECTION - ADULT  Goal: Absence or prevention of progression during hospitalization  Description: INTERVENTIONS:  - Assess and monitor for signs and symptoms of infection  - Monitor lab/diagnostic results  - Monitor all insertion sites, i.e. indwelling lines, tubes, and drains  - Monitor endotracheal if appropriate and nasal secretions for changes in amount and color  - [place] appropriate cooling/warming therapies per order  - Administer medications as ordered  - Instruct and encourage patient and family to use good hand hygiene technique  - Identify and instruct in appropriate isolation precautions for identified infection/condition  Outcome: Progressing  Goal: Absence of fever/infection during neutropenic period  Description: INTERVENTIONS:  - Monitor WBC    Outcome: Progressing     Problem: SAFETY ADULT  Goal: Patient will remain free of falls  Description: INTERVENTIONS:  - Educate patient/family on patient safety including physical limitations  - Instruct patient to call for assistance with activity   - Consult OT/PT to assist with strengthening/mobility   - Keep Call bell within reach  - Keep bed low and locked with side rails adjusted as appropriate  - Keep care items and personal belongings within reach  - Initiate and maintain comfort rounds  - Make Fall Risk Sign visible to staff  - Offer Toileting every  Hours,  in advance of need  - Initiate/Maintain alarm  - Obtain necessary fall risk management equipment:   - Apply yellow socks and bracelet for high fall risk patients  - Consider moving patient to room near nurses station  Outcome: Progressing  Goal: Maintain or return to baseline ADL function  Description: INTERVENTIONS:  -  Assess patient's ability to carry out ADLs; assess patient's baseline for ADL function and identify physical deficits which impact ability to perform ADLs (bathing, care of mouth/teeth, toileting, grooming, dressing, etc.)  - Assess/evaluate cause of self-care deficits   - Assess range of motion  - Assess patient's mobility; develop plan if impaired  - Assess patient's need for assistive devices and provide as appropriate  - Encourage maximum independence but intervene and supervise when necessary  - Involve family in performance of ADLs  - Assess for home care needs following discharge   - Consider OT consult to assist with ADL evaluation and planning for discharge  - Provide patient education as appropriate  Outcome: Progressing  Goal: Maintains/Returns to pre admission functional level  Description: INTERVENTIONS:  - Perform AM-PAC 6 Click Basic Mobility/ Daily Activity assessment daily.  - Set and communicate daily mobility goal to care team and patient/family/caregiver.   - Collaborate with rehabilitation services on mobility goals if consulted  - Perform Range of Motion  times a day.  - Reposition patient every  hours.  - Dangle patient  times a day  - Stand patient  times a day  - Ambulate patient  times a day  - Out of bed to chair  times a day   - Out of bed for meals  times a day  - Out of bed for toileting  - Record patient progress and toleration of activity level   Outcome: Progressing     Problem: DISCHARGE PLANNING  Goal: Discharge to home or other facility with appropriate resources  Description: INTERVENTIONS:  - Identify barriers to discharge w/patient and caregiver  - Arrange for  needed discharge resources and transportation as appropriate  - Identify discharge learning needs (meds, wound care, etc.)  - Arrange for interpretive services to assist at discharge as needed  - Refer to Case Management Department for coordinating discharge planning if the patient needs post-hospital services based on physician/advanced practitioner order or complex needs related to functional status, cognitive ability, or social support system  Outcome: Progressing     Problem: Knowledge Deficit  Goal: Patient/family/caregiver demonstrates understanding of disease process, treatment plan, medications, and discharge instructions  Description: Complete learning assessment and assess knowledge base.  Interventions:  - Provide teaching at level of understanding  - Provide teaching via preferred learning methods  Outcome: Progressing

## 2024-09-06 NOTE — CASE MANAGEMENT
Case Management Discharge Planning Note    Patient name Kathryn Drake  Location [facility] 403/[facility] 403-01 MRN [id number]  : 1940 Date 2024       Current Admission Date: 2024  Current Admission Diagnosis:Right renal mass   Patient Active Problem List    Diagnosis Date Noted Date Diagnosed    Acute pain of right thigh 2024     Sacroiliitis (HCC) 2024     Lymphedema 2024     Bilateral hip pain 2024     Shortness of breath 2024     Right renal mass 2024     Ambulatory dysfunction 2023     Lumbar pain 2023     Lumbar radiculopathy 10/25/2023     Recurrent depression (HCC) 2023     Low bone mass 2023     Essential hypertension 2022     Hx of CABG 2021     Coronary artery disease involving native coronary artery of native heart without angina pectoris 10/13/2020     Class 1 obesity due to excess calories without serious comorbidity with body mass index (BMI) of 33.0 to 33.9 in adult 10/13/2020     Acquired hypothyroidism 2020     Mild persistent asthma without complication 2019     Chronic allergic rhinitis 2019       LOS (days): 1  Geometric Mean LOS (GMLOS) (days):   Days to GMLOS:     OBJECTIVE:            Current admission status: Observation   Preferred Pharmacy:   [id number] [place], [place] - [address] ( [address])  [address] [place] [address] ( [address])  [facility] [address]  Phone: [phone] Fax: [phone]    Primary Care Provider: Angelina Magaña DO    Primary Insurance: MEDICARE  Secondary Insurance: [facility]    DISCHARGE DETAILS:    Discharge planning discussed with:: Pt and pt's   Freedom of Choice: Yes     CM contacted family/caregiver?: No- see comments (Pt alert and oriented,  at bedside)  Were Treatment Team discharge recommendations reviewed with patient/caregiver?: Yes          Contacts  Patient Contacts: leoncio Peralta  Relationship to Patient:: Other (Comment)  "(Self)  Contact Method: In Person                                                                     Additional Comments: Met pt and  at bedside to discussion observation status and implication. Pt got agitated with CM and stated that CM was attempting to charge her \"a million dollar for this room.\" CM attempted to calm pt down with no success. Pt became aggressive and threw the observation notice at  and asked CM to get out of her room. CM tried to deescalate and asked to still leave the notice at bedside so pt still had the information in case there is a bill. Pt refused to sign the obs notice. CM reiterated that she may not have a bill, but CM was trying to provide her with the information so she knows who to contact in case she does need it. She then yelled at  to leave the room again and to \"go get the dr and ask how much he is charging me.\" When CM attempted to explain that the providers would not have that information, she yelled again for CM to \"go find someone who knows what they're talking about! You're ruining my life!\" CM exited pt's room and called LEANDRO OLIVAREZ for resources, who provided CM with information of the billing department and Financial Assistance Department. CM presented information to pt and  at bedside again. Pt is d/c'd.                      "

## 2024-09-09 ENCOUNTER — OFFICE VISIT (OUTPATIENT)
Dept: FAMILY MEDICINE CLINIC | Facility: CLINIC | Age: 84
End: 2024-09-09
Payer: MEDICARE

## 2024-09-09 VITALS
RESPIRATION RATE: 18 BRPM | WEIGHT: 182 LBS | DIASTOLIC BLOOD PRESSURE: 70 MMHG | TEMPERATURE: 97.4 F | BODY MASS INDEX: 35.73 KG/M2 | HEIGHT: 60 IN | SYSTOLIC BLOOD PRESSURE: 134 MMHG | HEART RATE: 57 BPM

## 2024-09-09 DIAGNOSIS — J45.20 MILD INTERMITTENT ASTHMA WITHOUT COMPLICATION: ICD-10-CM

## 2024-09-09 DIAGNOSIS — I10 ESSENTIAL HYPERTENSION: Primary | ICD-10-CM

## 2024-09-09 DIAGNOSIS — E66.01 OBESITY, MORBID (HCC): ICD-10-CM

## 2024-09-09 DIAGNOSIS — E03.9 ACQUIRED HYPOTHYROIDISM: ICD-10-CM

## 2024-09-09 DIAGNOSIS — Z23 NEED FOR INFLUENZA VACCINATION: ICD-10-CM

## 2024-09-09 DIAGNOSIS — N28.89 RIGHT RENAL MASS: ICD-10-CM

## 2024-09-09 PROCEDURE — G0008 ADMIN INFLUENZA VIRUS VAC: HCPCS

## 2024-09-09 PROCEDURE — 99214 OFFICE O/P EST MOD 30 MIN: CPT | Performed by: FAMILY MEDICINE

## 2024-09-09 PROCEDURE — 90662 IIV NO PRSV INCREASED AG IM: CPT

## 2024-09-09 RX ORDER — EPINEPHRINE 0.3 MG/.3ML
0.3 INJECTION SUBCUTANEOUS ONCE
Qty: 0.6 ML | Refills: 0 | Status: SHIPPED | OUTPATIENT
Start: 2024-09-09 | End: 2024-09-09

## 2024-09-09 NOTE — PATIENT INSTRUCTIONS
For pain:  Tylenol 1000 mg with 400 mg of ibuprofen 3 times a day with food       Recent Results (from the past 672 hour(s))   CBC    Collection Time: 08/28/24  6:59 AM   Result Value Ref Range    WBC 6.22 4.31 - 10.16 Thousand/uL    RBC 4.45 3.81 - 5.12 Million/uL    Hemoglobin 13.1 11.5 - 15.4 g/dL    Hematocrit 40.7 34.8 - 46.1 %    MCV 92 82 - 98 fL    MCH 29.4 26.8 - 34.3 pg    MCHC 32.2 31.4 - 37.4 g/dL    RDW 14.1 11.6 - 15.1 %    Platelets 260 149 - 390 Thousands/uL    MPV 10.2 8.9 - 12.7 fL   Comprehensive metabolic panel    Collection Time: 08/28/24  6:59 AM   Result Value Ref Range    Sodium 140 135 - 147 mmol/L    Potassium 4.0 3.5 - 5.3 mmol/L    Chloride 107 96 - 108 mmol/L    CO2 26 21 - 32 mmol/L    ANION GAP 7 4 - 13 mmol/L    BUN 15 5 - 25 mg/dL    Creatinine 0.59 (L) 0.60 - 1.30 mg/dL    Glucose, Fasting 88 65 - 99 mg/dL    Calcium 9.3 8.4 - 10.2 mg/dL    AST 20 13 - 39 U/L    ALT 8 7 - 52 U/L    Alkaline Phosphatase 74 34 - 104 U/L    Total Protein 7.0 6.4 - 8.4 g/dL    Albumin 4.0 3.5 - 5.0 g/dL    Total Bilirubin 0.51 0.20 - 1.00 mg/dL    eGFR 86 ml/min/1.73sq m   Lipid Panel with Direct LDL reflex    Collection Time: 08/28/24  6:59 AM   Result Value Ref Range    Cholesterol 139 See Comment mg/dL    Triglycerides 74 See Comment mg/dL    HDL, Direct 64 >=50 mg/dL    LDL Calculated 60 0 - 100 mg/dL   Basic metabolic panel    Collection Time: 09/05/24  8:11 AM   Result Value Ref Range    Sodium 141 135 - 147 mmol/L    Potassium 3.8 3.5 - 5.3 mmol/L    Chloride 107 96 - 108 mmol/L    CO2 27 21 - 32 mmol/L    ANION GAP 7 4 - 13 mmol/L    BUN 13 5 - 25 mg/dL    Creatinine 0.47 (L) 0.60 - 1.30 mg/dL    Glucose 88 65 - 140 mg/dL    Glucose, Fasting 88 65 - 99 mg/dL    Calcium 9.1 8.4 - 10.2 mg/dL    eGFR 92 ml/min/1.73sq m   Protime-INR    Collection Time: 09/05/24  8:11 AM   Result Value Ref Range    Protime 13.3 12.3 - 15.0 seconds    INR 0.98 0.85 - 1.19   CBC    Collection Time: 09/05/24  8:11 AM    Result Value Ref Range    WBC 7.86 4.31 - 10.16 Thousand/uL    RBC 4.49 3.81 - 5.12 Million/uL    Hemoglobin 13.2 11.5 - 15.4 g/dL    Hematocrit 41.1 34.8 - 46.1 %    MCV 92 82 - 98 fL    MCH 29.4 26.8 - 34.3 pg    MCHC 32.1 31.4 - 37.4 g/dL    RDW 14.1 11.6 - 15.1 %    Platelets 267 149 - 390 Thousands/uL    MPV 9.9 8.9 - 12.7 fL   Basic metabolic panel    Collection Time: 09/06/24  4:55 AM   Result Value Ref Range    Sodium 140 135 - 147 mmol/L    Potassium 3.9 3.5 - 5.3 mmol/L    Chloride 106 96 - 108 mmol/L    CO2 25 21 - 32 mmol/L    ANION GAP 9 4 - 13 mmol/L    BUN 25 5 - 25 mg/dL    Creatinine 0.79 0.60 - 1.30 mg/dL    Glucose 123 65 - 140 mg/dL    Calcium 9.6 8.4 - 10.2 mg/dL    eGFR 70 ml/min/1.73sq m   CBC (With Platelets)    Collection Time: 09/06/24  4:55 AM   Result Value Ref Range    WBC 10.13 4.31 - 10.16 Thousand/uL    RBC 4.26 3.81 - 5.12 Million/uL    Hemoglobin 12.5 11.5 - 15.4 g/dL    Hematocrit 38.7 34.8 - 46.1 %    MCV 91 82 - 98 fL    MCH 29.3 26.8 - 34.3 pg    MCHC 32.3 31.4 - 37.4 g/dL    RDW 14.2 11.6 - 15.1 %    Platelets 275 149 - 390 Thousands/uL    MPV 10.5 8.9 - 12.7 fL

## 2024-09-09 NOTE — ASSESSMENT & PLAN NOTE
Well controlled  Continue amlodipine 2.5 mg a day   Orders:    CBC; Future    Comprehensive metabolic panel; Future    Lipid Panel with Direct LDL reflex; Future

## 2024-09-09 NOTE — ASSESSMENT & PLAN NOTE
Stable  Continue levothyroxine 75 mcg a day  Orders:    TSH, 3rd generation; Future    T4, free; Future

## 2024-09-09 NOTE — PROGRESS NOTES
"Ambulatory Visit  Name: Kathryn Drake      : 1940      MRN: [id number]  Encounter Provider: Angelina Magaña DO  Encounter Date: 2024   Encounter department: [facility]      Assessment & Plan  Essential hypertension  Well controlled  Continue amlodipine 2.5 mg a day   Orders:    CBC; Future    Comprehensive metabolic panel; Future    Lipid Panel with Direct LDL reflex; Future    Obesity, morbid (HCC)  Stable        Acquired hypothyroidism  Stable  Continue levothyroxine 75 mcg a day  Orders:    TSH, 3rd generation; Future    T4, free; Future    Right renal mass  Has follow-up with urology scheduled later this month       Need for influenza vaccination    Orders:    influenza vaccine, high-dose, PF 0.7 mL (FLUZONE HIGH-DOSE)    Mild intermittent asthma without complication    Orders:    EPINEPHrine (EPIPEN) 0.3 mg/0.3 mL SOAJ; Inject 0.3 mL (0.3 mg total) into a muscle once for 1 dose      Depression Screening and Follow-up Plan: Patient's depression screening was positive with a PHQ-9 score of 16. Depression likely due to other medical condition. Will treat underlying condition.     Return in about 6 months (around 3/9/2025) for Annual Wellness Visit.    History of Present Illness     She had a hard time since her cryoablation. She still doesn't feel right. She is still in \"a cloud.\" She is taking tylenol and advil for the pain.       PHQ-2/9 Depression Screening    Little interest or pleasure in doing things: 0 - not at all  Feeling down, depressed, or hopeless: 3 - nearly every day  Trouble falling or staying asleep, or sleeping too much: 0 - not at all  Feeling tired or having little energy: 3 - nearly every day  Poor appetite or overeatin - more than half the days  Feeling bad about yourself - or that you are a failure or have let   yourself or your family down: 3 - nearly every day  Trouble concentrating on things, such as reading the newspaper or watching   television: 2 - more " than half the days  Moving or speaking so slowly that other people could have noticed. Or the   opposite - being so fidgety or restless that you have been moving around a   lot more than usual: 3 - nearly every day  Thoughts that you would be better off dead, or of hurting yourself in some   way: 0 - not at all  PHQ-9 Score: 16  PHQ-9 Interpretation: Moderately severe depression           Review of Systems  Objective     /70   Pulse 57   Temp (!) 97.4 °F (36.3 °C)   Resp 18   Ht 5' (1.524 m)   Wt 82.6 kg (182 lb)   BMI 35.54 kg/m²     Physical Exam  Vitals and nursing note reviewed.   Constitutional:       General: She is not in acute distress.     Appearance: She is well-developed.   HENT:      Head: Normocephalic and atraumatic.      Right Ear: Tympanic membrane normal.      Left Ear: Tympanic membrane normal.   Cardiovascular:      Rate and Rhythm: Normal rate and regular rhythm.      Heart sounds: No murmur heard.  Pulmonary:      Effort: Pulmonary effort is normal. No respiratory distress.      Breath sounds: Normal breath sounds.   Musculoskeletal:      Cervical back: Neck supple.   Neurological:      Mental Status: She is alert.         Angelina Magaña,

## 2024-09-24 ENCOUNTER — TELEPHONE (OUTPATIENT)
Dept: UROLOGY | Facility: CLINIC | Age: 84
End: 2024-09-24

## 2024-09-24 ENCOUNTER — TELEPHONE (OUTPATIENT)
Age: 84
End: 2024-09-24

## 2024-09-24 DIAGNOSIS — N28.89 RIGHT KIDNEY MASS: Primary | ICD-10-CM

## 2024-09-24 NOTE — TELEPHONE ENCOUNTER
Patient no showed for urology follow-up today after undergoing percutaneous cryoablation for her renal mass    The interventional radiology attending documented in his note that he did not feel that a biopsy was safe to perform.  Therefore no tissue was obtained.    I have gone ahead and placed the order for CT scan.  This can be scheduled in 3 months and she should follow-up with our advanced practitioner team at that time to review

## 2024-09-24 NOTE — TELEPHONE ENCOUNTER
Pt called .  She called to see if her appt for tomorrow 9/25/24 was still on.  Informed pt that her appt was actually today.  Pt sated she had it on her calendar for 9/24/25.      PT stated that she is sorry she missed the appt.    Informed her that someone from clinical would call her with follow up

## 2024-09-25 ENCOUNTER — TELEPHONE (OUTPATIENT)
Dept: RADIOLOGY | Facility: HOSPITAL | Age: 84
End: 2024-09-25

## 2024-09-25 ENCOUNTER — TELEPHONE (OUTPATIENT)
Age: 84
End: 2024-09-25

## 2024-09-25 NOTE — TELEPHONE ENCOUNTER
Per Dr. Moseley's note, no biopsy was performed as IR did not feel the biopsy was safe. Please see below.     Conservative measures for discomfort.

## 2024-09-25 NOTE — TELEPHONE ENCOUNTER
Pt asking for B/L hip injection and B/L SIJ injection.  Pt wants the hip injections done first.    Pt has B/L hip pain, Rt hip 9, Lt hip 6-7/10. She got 75-80% improvement from last B/L hip inj done 3/5/24. Pain started returning about 4 wks ago. The pain is effecting her ADLs.    Pt has pain across her LB, pain level 8/10. She got 75-80% improvement from last B/L SIJ inj done 4/26/24. Pain started returning about 4 wks ago. Pain is effecting her ADLs.     LOVS was 4/10/24 bud/ Meri. Pt is not diabetic.

## 2024-09-25 NOTE — TELEPHONE ENCOUNTER
Caller: Kathryn     Doctor: Debora    Reason for call: Patient calling stating 8/10 pain level and would kathryn to schedule procedure please advise     Call back#: [phone]

## 2024-09-25 NOTE — TELEPHONE ENCOUNTER
Called pt and spoke to her about how IR did not take a biopsy. Pt was very confused and frustrated by this, stating she wants to know what is wrong with her. Read IR's note for pt, but she still did not understand. Provided the number to the IR office so that they could better clarify this for her. Pt was thankful for call.

## 2024-09-25 NOTE — TELEPHONE ENCOUNTER
Patient has been scheduled for their procedure, please see [employer] message for additional details.

## 2024-09-25 NOTE — TELEPHONE ENCOUNTER
I called and scheduled patient for Kari on 12/17. I also advised her to call CS and get CT scan scheduled 2 weeks prior to appt. She is questioning biopsy results and if someone can go over those with her. She also mentioned she has pain. Please advise on results and what patient can do in the meantime about her pain. Thank you!

## 2024-09-25 NOTE — TELEPHONE ENCOUNTER
83 year old female s/p cryoablation of right renal mass. We had discussed that when this procedure is done we attempt to perform a biopsy during the time of procedure, but at times due to spacing of the cryoablation probes this is not possible because the biopsy needle can damage the ablation probes.    I again discussed this with her over the phone today and explained this to her. She also feels that waiting 3 months for a follow up scan is to long which I assured her it is not, standard of care is initial follow up at 3 months.    She was told to contact me with any other concerns or questions anytime.

## 2024-10-04 ENCOUNTER — HOSPITAL ENCOUNTER (OUTPATIENT)
Dept: RADIOLOGY | Facility: CLINIC | Age: 84
Discharge: HOME | End: 2024-10-04
Payer: MEDICARE

## 2024-10-04 VITALS
SYSTOLIC BLOOD PRESSURE: 178 MMHG | RESPIRATION RATE: 20 BRPM | HEART RATE: 52 BPM | TEMPERATURE: 97.9 F | OXYGEN SATURATION: 95 % | DIASTOLIC BLOOD PRESSURE: 73 MMHG

## 2024-10-04 DIAGNOSIS — M25.551 HIP PAIN, BILATERAL: ICD-10-CM

## 2024-10-04 DIAGNOSIS — M25.552 HIP PAIN, BILATERAL: ICD-10-CM

## 2024-10-04 PROCEDURE — 77002 NEEDLE LOCALIZATION BY XRAY: CPT | Performed by: ANESTHESIOLOGY

## 2024-10-04 PROCEDURE — 77002 NEEDLE LOCALIZATION BY XRAY: CPT

## 2024-10-04 PROCEDURE — 20610 DRAIN/INJ JOINT/BURSA W/O US: CPT | Performed by: ANESTHESIOLOGY

## 2024-10-04 RX ORDER — METHYLPREDNISOLONE ACETATE 80 MG/ML
80 INJECTION, SUSPENSION INTRA-ARTICULAR; INTRALESIONAL; INTRAMUSCULAR; PARENTERAL; SOFT TISSUE ONCE
Status: COMPLETED | OUTPATIENT
Start: 2024-10-04 | End: 2024-10-04

## 2024-10-04 RX ORDER — 0.9 % SODIUM CHLORIDE 0.9 %
2 VIAL (ML) INJECTION ONCE
Status: COMPLETED | OUTPATIENT
Start: 2024-10-04 | End: 2024-10-04

## 2024-10-04 RX ORDER — ROPIVACAINE HYDROCHLORIDE 2 MG/ML
3 INJECTION, SOLUTION EPIDURAL; INFILTRATION; PERINEURAL ONCE
Status: COMPLETED | OUTPATIENT
Start: 2024-10-04 | End: 2024-10-04

## 2024-10-04 RX ADMIN — METHYLPREDNISOLONE ACETATE 80 MG: 80 INJECTION, SUSPENSION INTRA-ARTICULAR; INTRALESIONAL; INTRAMUSCULAR; SOFT TISSUE at 11:29

## 2024-10-04 RX ADMIN — SODIUM CHLORIDE 4 ML: 9 INJECTION INTRAMUSCULAR; INTRAVENOUS; SUBCUTANEOUS at 11:28

## 2024-10-04 RX ADMIN — IOHEXOL 2 ML: 300 INJECTION, SOLUTION INTRAVENOUS at 11:28

## 2024-10-04 RX ADMIN — ROPIVACAINE HYDROCHLORIDE 7 ML: 2 INJECTION, SOLUTION EPIDURAL; INFILTRATION at 11:29

## 2024-10-04 RX ADMIN — LIDOCAINE HYDROCHLORIDE 4 ML: 20 INJECTION, SOLUTION EPIDURAL; INFILTRATION; INTRACAUDAL at 11:28

## 2024-10-04 NOTE — H&P
History of Present Illness: The patient is a 83 y.o. female who presents with complaints of bilateral hip pain and is here today for bilateral hip injections    Past Medical History:   Diagnosis Date    Asthma     Cardiac disease     heart attack    Disease of thyroid gland     Hyperlipidemia        Past Surgical History:   Procedure Laterality Date    APPENDECTOMY      BREAST LUMPECTOMY Bilateral     several    CARDIAC SURGERY      CATARACT EXTRACTION, BILATERAL      CORONARY STENT PLACEMENT      EPIDURAL BLOCK INJECTION Bilateral 10/25/2023    Procedure: bilateral L4-L5  TRANSFORAMINAL epidural steroid injection (44971);  Surgeon: Ariel Heredia DO;  Location: [facility] MAIN OR;  Service: Pain Management     IR CRYOABLATION  9/5/2024    NASAL POLYP SURGERY           Current Outpatient Medications:     acetaminophen (TYLENOL) 325 mg tablet, Take 3 tablets (975 mg total) by mouth every 8 (eight) hours, Disp: , Rfl:     albuterol (2.5 mg/3 mL) 0.083 % nebulizer solution, Take 3 mL (2.5 mg total) by nebulization every 6 (six) hours as needed for wheezing or shortness of breath, Disp: 360 mL, Rfl: 5    albuterol (PROVENTIL HFA,VENTOLIN HFA) 90 mcg/act inhaler, inhale 2 puffs by mouth and INTO THE LUNGS every 4 hours if needed for wheezing, Disp: 8.5 g, Rfl: 8    amLODIPine (NORVASC) 2.5 mg tablet, Take 1 tablet (2.5 mg total) by mouth daily, Disp: 90 tablet, Rfl: 1    ascorbic acid (VITAMIN C) 500 MG tablet, Take 500 mg by mouth daily, Disp: , Rfl:     aspirin 81 MG tablet, Take 81 mg by mouth daily  , Disp: , Rfl:     budesonide (PULMICORT) 0.25 mg/2 mL nebulizer solution, Take 2 mL (0.25 mg total) by nebulization 2 (two) times a day Rinse mouth after use., Disp: , Rfl:     Cholecalciferol (VITAMIN D) 2000 UNITS tablet, Take 4,000 Units by mouth daily, Disp: , Rfl:     EPINEPHrine (EPIPEN) 0.3 mg/0.3 mL SOAJ, Inject 0.3 mL (0.3 mg total) into a muscle once for 1 dose, Disp: 0.6 mL, Rfl: 0    levothyroxine 75 mcg  tablet, Take 1 tablet (75 mcg total) by mouth daily, Disp: 100 tablet, Rfl: 1    pregabalin (LYRICA) 50 mg capsule, Take 1 capsule in the a.m. 2 capsules at bedtime, Disp: 90 capsule, Rfl: 5    simvastatin (ZOCOR) 10 mg tablet, Take 1 tablet (10 mg total) by mouth daily at bedtime, Disp: 90 tablet, Rfl: 1    Symbicort 160-4.5 MCG/ACT inhaler, Inhale 2 puffs 2 (two) times a day, Disp: 10.2 g, Rfl: 3    Current Facility-Administered Medications:     iohexol (OMNIPAQUE) 300 mg/mL injection 1 mL, 1 mL, Intra-articular, Once, Mario Cazares MD    lidocaine (PF) (XYLOCAINE-MPF) 2 % injection 2 mL, 2 mL, Infiltration, Once, Mario Cazares MD    methylPREDNISolone acetate (DEPO-MEDROL) injection 80 mg, 80 mg, Intra-articular, Once, Mario Cazares MD    ropivacaine (NAROPIN) injection 3 mL, 3 mL, Intra-articular, Once, Mario Cazares MD    sodium chloride (PF) 0.9 % injection 2 mL, 2 mL, Infiltration, Once, Mario Cazares MD    Allergies   Allergen Reactions    Latex Rash    Penicillins GI Intolerance       Physical Exam:   Vitals:    10/04/24 1117   BP: (!) 176/77   Pulse: (!) 53   Resp:    Temp:    SpO2:      General: Awake, Alert, Oriented x 3, Mood and affect appropriate  Respiratory: Respirations even and unlabored  Cardiovascular: Peripheral pulses intact; no edema  Musculoskeletal Exam: Bilateral hip pain    ASA Score: 3    Patient/Chart Verification  Patient ID Verified: Verbal  ID Band Applied: No  Consents Confirmed: Procedural, To be obtained in the Pre-Procedure area  Interval H&P(within 24 hr) Complete (required for Outpatients and Surgery Admit only): To be obtained in the Procedural area  Allergies Reviewed: Yes  Anticoag/NSAID held?: NA  Currently on antibiotics?: No    Assessment:   1. Hip pain, bilateral        Plan: B/L hip injection

## 2024-10-04 NOTE — DISCHARGE INSTR - LAB

## 2024-10-18 ENCOUNTER — TELEPHONE (OUTPATIENT)
Dept: PAIN MEDICINE | Facility: CLINIC | Age: 84
End: 2024-10-18

## 2024-10-22 ENCOUNTER — HOSPITAL ENCOUNTER (OUTPATIENT)
Dept: RADIOLOGY | Facility: CLINIC | Age: 84
Discharge: HOME | End: 2024-10-22
Payer: MEDICARE

## 2024-10-22 VITALS
DIASTOLIC BLOOD PRESSURE: 69 MMHG | RESPIRATION RATE: 20 BRPM | HEART RATE: 53 BPM | TEMPERATURE: 97.8 F | SYSTOLIC BLOOD PRESSURE: 169 MMHG | OXYGEN SATURATION: 95 %

## 2024-10-22 DIAGNOSIS — M46.1 SACROILIITIS (HCC): ICD-10-CM

## 2024-10-22 PROCEDURE — 27096 INJECT SACROILIAC JOINT: CPT | Performed by: ANESTHESIOLOGY

## 2024-10-22 RX ORDER — METHYLPREDNISOLONE ACETATE 80 MG/ML
80 INJECTION, SUSPENSION INTRA-ARTICULAR; INTRALESIONAL; INTRAMUSCULAR; PARENTERAL; SOFT TISSUE ONCE
Status: COMPLETED | OUTPATIENT
Start: 2024-10-22 | End: 2024-10-22

## 2024-10-22 RX ORDER — ROPIVACAINE HYDROCHLORIDE 2 MG/ML
7 INJECTION, SOLUTION EPIDURAL; INFILTRATION; PERINEURAL ONCE
Status: COMPLETED | OUTPATIENT
Start: 2024-10-22 | End: 2024-10-22

## 2024-10-22 RX ORDER — 0.9 % SODIUM CHLORIDE 0.9 %
4 VIAL (ML) INJECTION ONCE
Status: COMPLETED | OUTPATIENT
Start: 2024-10-22 | End: 2024-10-22

## 2024-10-22 RX ADMIN — IOHEXOL 2 ML: 300 INJECTION, SOLUTION INTRAVENOUS at 11:37

## 2024-10-22 RX ADMIN — LIDOCAINE HYDROCHLORIDE 4 ML: 20 INJECTION, SOLUTION EPIDURAL; INFILTRATION; INTRACAUDAL at 11:37

## 2024-10-22 RX ADMIN — METHYLPREDNISOLONE ACETATE 80 MG: 80 INJECTION, SUSPENSION INTRA-ARTICULAR; INTRALESIONAL; INTRAMUSCULAR; SOFT TISSUE at 11:37

## 2024-10-22 RX ADMIN — Medication 4 ML: at 11:37

## 2024-10-22 RX ADMIN — ROPIVACAINE HYDROCHLORIDE 7 ML: 2 INJECTION, SOLUTION EPIDURAL; INFILTRATION at 11:37

## 2024-10-22 NOTE — H&P
History of Present Illness: The patient is a 83 y.o. female who presents with complaints of lower back pain and is here today for bilateral sacroiliac injections    Past Medical History:   Diagnosis Date    Asthma     Cardiac disease     heart attack    Disease of thyroid gland     Hyperlipidemia        Past Surgical History:   Procedure Laterality Date    APPENDECTOMY      BREAST LUMPECTOMY Bilateral     several    CARDIAC SURGERY      CATARACT EXTRACTION, BILATERAL      CORONARY STENT PLACEMENT      EPIDURAL BLOCK INJECTION Bilateral 10/25/2023    Procedure: bilateral L4-L5  TRANSFORAMINAL epidural steroid injection (83321);  Surgeon: Ariel Heredia DO;  Location: [facility] MAIN OR;  Service: Pain Management     IR CRYOABLATION  9/5/2024    NASAL POLYP SURGERY           Current Outpatient Medications:     acetaminophen (TYLENOL) 325 mg tablet, Take 3 tablets (975 mg total) by mouth every 8 (eight) hours, Disp: , Rfl:     albuterol (2.5 mg/3 mL) 0.083 % nebulizer solution, Take 3 mL (2.5 mg total) by nebulization every 6 (six) hours as needed for wheezing or shortness of breath, Disp: 360 mL, Rfl: 5    albuterol (PROVENTIL HFA,VENTOLIN HFA) 90 mcg/act inhaler, inhale 2 puffs by mouth and INTO THE LUNGS every 4 hours if needed for wheezing, Disp: 8.5 g, Rfl: 8    amLODIPine (NORVASC) 2.5 mg tablet, Take 1 tablet (2.5 mg total) by mouth daily, Disp: 90 tablet, Rfl: 1    ascorbic acid (VITAMIN C) 500 MG tablet, Take 500 mg by mouth daily, Disp: , Rfl:     aspirin 81 MG tablet, Take 81 mg by mouth daily  , Disp: , Rfl:     budesonide (PULMICORT) 0.25 mg/2 mL nebulizer solution, Take 2 mL (0.25 mg total) by nebulization 2 (two) times a day Rinse mouth after use., Disp: , Rfl:     Cholecalciferol (VITAMIN D) 2000 UNITS tablet, Take 4,000 Units by mouth daily, Disp: , Rfl:     EPINEPHrine (EPIPEN) 0.3 mg/0.3 mL SOAJ, Inject 0.3 mL (0.3 mg total) into a muscle once for 1 dose, Disp: 0.6 mL, Rfl: 0    levothyroxine 75 mcg  tablet, Take 1 tablet (75 mcg total) by mouth daily, Disp: 100 tablet, Rfl: 1    pregabalin (LYRICA) 50 mg capsule, Take 1 capsule in the a.m. 2 capsules at bedtime, Disp: 90 capsule, Rfl: 5    simvastatin (ZOCOR) 10 mg tablet, Take 1 tablet (10 mg total) by mouth daily at bedtime, Disp: 90 tablet, Rfl: 1    Symbicort 160-4.5 MCG/ACT inhaler, Inhale 2 puffs 2 (two) times a day, Disp: 10.2 g, Rfl: 3    Current Facility-Administered Medications:     iohexol (OMNIPAQUE) 300 mg/mL injection 2 mL, 2 mL, Intra-articular, Once, Mario Cazares MD    lidocaine (PF) (XYLOCAINE-MPF) 2 % injection 4 mL, 4 mL, Infiltration, Once, Mario Cazares MD    methylPREDNISolone acetate (DEPO-MEDROL) injection 80 mg, 80 mg, Intra-articular, Once, Mario Cazares MD    ropivacaine (NAROPIN) injection 7 mL, 7 mL, Intra-articular, Once, Mario Cazares MD    sodium chloride (PF) 0.9 % injection 4 mL, 4 mL, Infiltration, Once, Mario Cazares MD    Allergies   Allergen Reactions    Latex Rash    Penicillins GI Intolerance       Physical Exam:   Vitals:    10/22/24 1122   BP: 167/79   Pulse: (!) 47   Resp: 18   Temp: 97.8 °F (36.6 °C)   SpO2: 96%     General: Awake, Alert, Oriented x 3, Mood and affect appropriate  Respiratory: Respirations even and unlabored  Cardiovascular: Peripheral pulses intact; no edema  Musculoskeletal Exam: Back pain    ASA Score: 3    Patient/Chart Verification  Patient ID Verified: Verbal  Consents Confirmed: To be obtained in the Pre-Procedure area  Interval H&P(within 24 hr) Complete (required for Outpatients and Surgery Admit only): To be obtained in the Procedural area  Allergies Reviewed: Yes  Anticoag/NSAID held?: NA  Currently on antibiotics?: No    Assessment:   1. Sacroiliitis (HCC)        Plan: B/L SIJ

## 2024-10-22 NOTE — DISCHARGE INSTR - LAB

## 2024-11-05 ENCOUNTER — OFFICE VISIT (OUTPATIENT)
Dept: FAMILY MEDICINE CLINIC | Facility: CLINIC | Age: 84
End: 2024-11-05
Payer: MEDICARE

## 2024-11-05 ENCOUNTER — TELEPHONE (OUTPATIENT)
Dept: RADIOLOGY | Facility: MEDICAL CENTER | Age: 84
End: 2024-11-05

## 2024-11-05 VITALS
RESPIRATION RATE: 18 BRPM | WEIGHT: 184 LBS | HEART RATE: 50 BPM | SYSTOLIC BLOOD PRESSURE: 144 MMHG | BODY MASS INDEX: 34.74 KG/M2 | OXYGEN SATURATION: 98 % | TEMPERATURE: 97.1 F | HEIGHT: 61 IN | DIASTOLIC BLOOD PRESSURE: 60 MMHG

## 2024-11-05 DIAGNOSIS — J45.30 MILD PERSISTENT ASTHMA WITHOUT COMPLICATION: Primary | ICD-10-CM

## 2024-11-05 PROCEDURE — 99213 OFFICE O/P EST LOW 20 MIN: CPT | Performed by: FAMILY MEDICINE

## 2024-11-05 PROCEDURE — G2211 COMPLEX E/M VISIT ADD ON: HCPCS | Performed by: FAMILY MEDICINE

## 2024-11-05 RX ORDER — PREDNISONE 20 MG/1
40 TABLET ORAL DAILY
Qty: 10 TABLET | Refills: 0 | Status: SHIPPED | OUTPATIENT
Start: 2024-11-05 | End: 2024-11-10

## 2024-11-05 RX ORDER — AZITHROMYCIN 250 MG/1
TABLET, FILM COATED ORAL
Qty: 6 TABLET | Refills: 0 | Status: SHIPPED | OUTPATIENT
Start: 2024-11-05 | End: 2024-11-10

## 2024-11-05 NOTE — PROGRESS NOTES
"Ambulatory Visit  Name: Kathryn Drake      : 1940      MRN: [id number]  Encounter Provider: Angelina Magaña DO  Encounter Date: 2024   Encounter department: [facility]    Assessment & Plan  Mild persistent asthma without complication  Concerned about URI triggering asthma  Orders:    predniSONE 20 mg tablet; Take 2 tablets (40 mg total) by mouth daily for 5 days Take with food    azithromycin (ZITHROMAX) 250 mg tablet; Take 2 the first day, then take 1 daily     Follow-up in March as scheduled  History of Present Illness     She has been sick for about a week.  She is coughing up yellow mucus.  She is feeling dizzy. She has felt like she had a low grade fever, but didn't check her temperature.             Review of Systems   Constitutional:  Positive for fever (low grade).   HENT:  Positive for rhinorrhea and sore throat.    Respiratory:  Positive for cough.            Objective     /60   Pulse (!) 50   Temp (!) 97.1 °F (36.2 °C) (Tympanic)   Resp 18   Ht 5' 1\" (1.549 m)   Wt 83.5 kg (184 lb)   SpO2 98%   BMI 34.77 kg/m²     Physical Exam  Vitals and nursing note reviewed.   Constitutional:       General: She is not in acute distress.     Appearance: She is well-developed.   HENT:      Head: Normocephalic and atraumatic.      Right Ear: Tympanic membrane normal.      Left Ear: Tympanic membrane normal.   Cardiovascular:      Rate and Rhythm: Normal rate and regular rhythm.      Heart sounds: No murmur heard.  Pulmonary:      Effort: Pulmonary effort is normal. No respiratory distress.      Breath sounds: Normal breath sounds.   Musculoskeletal:      Cervical back: Neck supple.   Neurological:      Mental Status: She is alert.      Gait: Gait abnormal (using cane).         "

## 2024-11-05 NOTE — ASSESSMENT & PLAN NOTE
Concerned about URI triggering asthma  Orders:    predniSONE 20 mg tablet; Take 2 tablets (40 mg total) by mouth daily for 5 days Take with food    azithromycin (ZITHROMAX) 250 mg tablet; Take 2 the first day, then take 1 daily

## 2024-11-11 ENCOUNTER — TELEPHONE (OUTPATIENT)
Age: 84
End: 2024-11-11

## 2024-11-11 ENCOUNTER — HOSPITAL ENCOUNTER (OUTPATIENT)
Dept: RADIOLOGY | Facility: HOSPITAL | Age: 84
Discharge: HOME | End: 2024-11-11
Payer: MEDICARE

## 2024-11-11 ENCOUNTER — TELEPHONE (OUTPATIENT)
Dept: FAMILY MEDICINE CLINIC | Facility: CLINIC | Age: 84
End: 2024-11-11

## 2024-11-11 DIAGNOSIS — R05.2 SUBACUTE COUGH: ICD-10-CM

## 2024-11-11 DIAGNOSIS — J45.30 MILD PERSISTENT ASTHMA WITHOUT COMPLICATION: Primary | ICD-10-CM

## 2024-11-11 DIAGNOSIS — R05.2 SUBACUTE COUGH: Primary | ICD-10-CM

## 2024-11-11 PROCEDURE — 71046 X-RAY EXAM CHEST 2 VIEWS: CPT

## 2024-11-11 NOTE — TELEPHONE ENCOUNTER
Pt's  stated today pt was suppose to get Methylprednisolone 4mg for cold. However, pt didn't get it. Please send med to:     [id number] - [address]DO [facility] ([address]       Please contact pt and confirm that it will be done. Thank you.

## 2024-11-12 RX ORDER — METHYLPREDNISOLONE 4 MG/1
TABLET ORAL
Qty: 21 EACH | Refills: 0 | Status: SHIPPED | OUTPATIENT
Start: 2024-11-12

## 2024-11-12 NOTE — TELEPHONE ENCOUNTER
[facility]  Please let patient know that it was done  Thank you,  Dr. Magaña

## 2024-11-22 DIAGNOSIS — I10 ESSENTIAL HYPERTENSION: ICD-10-CM

## 2024-11-22 RX ORDER — AMLODIPINE BESYLATE 2.5 MG/1
2.5 TABLET ORAL DAILY
Qty: 90 TABLET | Refills: 1 | Status: SHIPPED | OUTPATIENT
Start: 2024-11-22

## 2024-11-25 ENCOUNTER — HOSPITAL ENCOUNTER (OUTPATIENT)
Dept: RADIOLOGY | Facility: HOSPITAL | Age: 84
Discharge: HOME | End: 2024-11-25
Payer: MEDICARE

## 2024-11-25 DIAGNOSIS — N28.89 RIGHT KIDNEY MASS: ICD-10-CM

## 2024-11-25 PROCEDURE — 74170 CT ABD WO CNTRST FLWD CNTRST: CPT

## 2024-11-25 RX ADMIN — IOHEXOL 100 ML: 350 INJECTION, SOLUTION INTRAVENOUS at 07:58

## 2024-11-29 ENCOUNTER — TELEPHONE (OUTPATIENT)
Age: 84
End: 2024-11-29

## 2024-12-17 ENCOUNTER — OFFICE VISIT (OUTPATIENT)
Dept: UROLOGY | Facility: CLINIC | Age: 84
End: 2024-12-17
Payer: MEDICARE

## 2024-12-17 VITALS
WEIGHT: 179 LBS | DIASTOLIC BLOOD PRESSURE: 74 MMHG | BODY MASS INDEX: 33.79 KG/M2 | SYSTOLIC BLOOD PRESSURE: 136 MMHG | HEIGHT: 61 IN

## 2024-12-17 DIAGNOSIS — N28.89 RIGHT RENAL MASS: Primary | ICD-10-CM

## 2024-12-17 PROCEDURE — 99214 OFFICE O/P EST MOD 30 MIN: CPT

## 2024-12-17 NOTE — PROGRESS NOTES
12/17/2024      Chief Complaint   Patient presents with    Right Kidney Mass         Assessment and Plan    84 y.o. female managed by Dr. Moseley    Right Renal Mass, concerning for Renal Cell Carcinoma  -s/p IR cryoablation of 2.0 x 1.9 cm right renal mass on 9/05/24  -CT abdomen w wo contrast (11/25/24) showing s/p cryoablation of presumed right renal cell carcinoma with expected posttreatment change. Somewhat higher degree of postcontrast enhancement noted within the treated lesion including some small subcentimeter nodular areas in the deep portion of the lesion which must be regarded as somewhat suspicious for residual/recurrent tumor.   -Patient and spouse would prefer to repeat imaging in 6 months, they do not want to repeat imaging sooner than this.  -Patient denies flank pain, dysuria, and hematuria.  -BMP and CT abdomen w wo contrast ordered to be completed for continued surveillance in 6 months prior to follow-up.         History of Present Illness  Kathryn Drake is a 84 y.o. female here with history of 2 cm exophytic right renal mass thought to be solid and consistent with small renal cell carcinoma presenting today for follow-up.   She had IR cryoablation in September. She denies dysuria, flank pain, and gross hematuria. Denies recurrent UTIs.    Medical comorbidities consist of CAD, CABG, HTN, Class I obesity, chronic back pain, ambulatory dysfunction.           Review of Systems   Constitutional:  Negative for chills and fever.   HENT:  Negative for ear pain and sore throat.    Eyes:  Negative for pain and visual disturbance.   Respiratory:  Negative for cough and shortness of breath.    Cardiovascular:  Negative for chest pain and palpitations.   Gastrointestinal:  Negative for abdominal pain, nausea and vomiting.   Genitourinary:  Negative for difficulty urinating, dysuria, flank pain, frequency, hematuria and urgency.   Musculoskeletal:  Negative for arthralgias and back pain.   Skin:   "Negative for color change and rash.   Neurological:  Negative for seizures and syncope.   All other systems reviewed and are negative.               Vitals  Vitals:    12/17/24 0745   BP: 136/74   BP Location: Left arm   Patient Position: Sitting   Cuff Size: Adult   Weight: 81.2 kg (179 lb)   Height: 5' 1\" (1.549 m)       Physical Exam  Constitutional:       General: She is not in acute distress.     Appearance: Normal appearance. She is not ill-appearing.   HENT:      Head: Normocephalic.   Eyes:      Extraocular Movements: Extraocular movements intact.      Pupils: Pupils are equal, round, and reactive to light.   Pulmonary:      Effort: Pulmonary effort is normal. No respiratory distress.   Musculoskeletal:         General: Normal range of motion.      Cervical back: Normal range of motion.   Neurological:      Mental Status: She is alert and oriented to person, place, and time.   Psychiatric:         Behavior: Behavior normal.           Past History  Past Medical History:   Diagnosis Date    Asthma     Cardiac disease     heart attack    Disease of thyroid gland     Hyperlipidemia      Social History     Socioeconomic History    Marital status: /Civil Union     Spouse name: None    Number of children: None    Years of education: None    Highest education level: None   Occupational History    None   Tobacco Use    Smoking status: Never     Passive exposure: Never    Smokeless tobacco: Never   Vaping Use    Vaping status: Never Used   Substance and Sexual Activity    Alcohol use: Yes     Comment: rare    Drug use: No    Sexual activity: None   Other Topics Concern    None   Social History Narrative    None     Social Drivers of Health     Financial Resource Strain: Low Risk  (3/7/2024)    Overall Financial Resource Strain (CARDIA)     Difficulty of Paying Living Expenses: Not hard at all   Food Insecurity: Patient Unable To Answer (9/5/2024)    Nursing - Inadequate Food Risk Classification     Worried " "About Running Out of Food in the Last Year: Patient unable to answer     Ran Out of Food in the Last Year: Patient unable to answer     Ran Out of Food in the Last Year: Not on file   Transportation Needs: No Transportation Needs (3/7/2024)    PRAPARE - Transportation     Lack of Transportation (Medical): No     Lack of Transportation (Non-Medical): No   Physical Activity: Not on file   Stress: Not on file   Social Connections: Not on file   Intimate Partner Violence: Not on file   Housing Stability: High Risk (9/5/2024)    Housing Stability Vital Sign     Unable to Pay for Housing in the Last Year: Yes     Number of Times Moved in the Last Year: 0     Homeless in the Last Year: No     Social History     Tobacco Use   Smoking Status Never    Passive exposure: Never   Smokeless Tobacco Never     Family History   Problem Relation Age of Onset    No Known Problems Mother     ALS Father     Cancer Brother     Heart disease Family     Asthma Family        The following portions of the patient's history were reviewed and updated as appropriate: allergies, current medications, past medical history, past social history, past surgical history and problem list.    Results  No results found for this or any previous visit (from the past hour).]  No results found for: \"PSA\"  Lab Results   Component Value Date    CALCIUM 9.6 09/06/2024    K 3.9 09/06/2024    CO2 25 09/06/2024     09/06/2024    BUN 25 09/06/2024    CREATININE 0.79 09/06/2024     Lab Results   Component Value Date    WBC 10.13 09/06/2024    HGB 12.5 09/06/2024    HCT 38.7 09/06/2024    MCV 91 09/06/2024     09/06/2024       NA Regan  "

## 2024-12-30 DIAGNOSIS — J45.41 MODERATE PERSISTENT ASTHMA WITH EXACERBATION: ICD-10-CM

## 2024-12-30 RX ORDER — BUDESONIDE 0.25 MG/2ML
0.25 INHALANT ORAL 2 TIMES DAILY
Qty: 120 ML | Refills: 11 | Status: SHIPPED | OUTPATIENT
Start: 2024-12-30

## 2024-12-31 LAB
DME PARACHUTE DELIVERY DATE ACTUAL: NORMAL
DME PARACHUTE DELIVERY DATE REQUESTED: NORMAL
DME PARACHUTE ITEM DESCRIPTION: NORMAL
DME PARACHUTE ITEM DESCRIPTION: NORMAL
DME PARACHUTE ORDER STATUS: NORMAL
DME PARACHUTE SUPPLIER NAME: NORMAL
DME PARACHUTE SUPPLIER PHONE: NORMAL

## 2025-01-15 LAB

## 2025-01-16 ENCOUNTER — VBI (OUTPATIENT)
Dept: ADMINISTRATIVE | Facility: OTHER | Age: 85
End: 2025-01-16

## 2025-01-22 ENCOUNTER — VBI (OUTPATIENT)
Dept: ADMINISTRATIVE | Facility: OTHER | Age: 85
End: 2025-01-22

## 2025-01-22 ENCOUNTER — OFFICE VISIT (OUTPATIENT)
Dept: PULMONOLOGY | Facility: MEDICAL CENTER | Age: 85
End: 2025-01-22
Payer: MEDICARE

## 2025-01-22 VITALS
SYSTOLIC BLOOD PRESSURE: 158 MMHG | HEIGHT: 61 IN | RESPIRATION RATE: 16 BRPM | WEIGHT: 174 LBS | DIASTOLIC BLOOD PRESSURE: 70 MMHG | OXYGEN SATURATION: 96 % | TEMPERATURE: 98.9 F | BODY MASS INDEX: 32.85 KG/M2 | HEART RATE: 63 BPM

## 2025-01-22 DIAGNOSIS — J45.31 MILD PERSISTENT ASTHMA WITH ACUTE EXACERBATION: ICD-10-CM

## 2025-01-22 DIAGNOSIS — J40 BRONCHITIS WITH ACUTE WHEEZING: Primary | ICD-10-CM

## 2025-01-22 DIAGNOSIS — Z95.1 HX OF CABG: ICD-10-CM

## 2025-01-22 DIAGNOSIS — J45.30 MILD PERSISTENT ASTHMA WITHOUT COMPLICATION: ICD-10-CM

## 2025-01-22 PROCEDURE — 99214 OFFICE O/P EST MOD 30 MIN: CPT | Performed by: INTERNAL MEDICINE

## 2025-01-22 RX ORDER — CEFUROXIME AXETIL 500 MG/1
500 TABLET ORAL EVERY 12 HOURS SCHEDULED
Qty: 14 TABLET | Refills: 0 | Status: SHIPPED | OUTPATIENT
Start: 2025-01-22 | End: 2025-01-29

## 2025-01-22 RX ORDER — PREDNISONE 20 MG/1
TABLET ORAL
Qty: 15 TABLET | Refills: 0 | Status: SHIPPED | OUTPATIENT
Start: 2025-01-22

## 2025-01-22 NOTE — TELEPHONE ENCOUNTER
01/22/25 1:36 PM     Chart reviewed for PREV-12: Preventive Care and Screening: Screening for Depression and Follow-Up Plan  was/were submitted to the patient's insurance.     Yonny Healy MA   [facility]

## 2025-01-22 NOTE — PROGRESS NOTES
Assessment & Plan        Problem List Items Addressed This Visit          Respiratory    Mild persistent asthma with acute exacerbation    Been having wheezing and cough, for few weeks as well as some shortness of breath.  I prescribed prednisone 40 mg for 5 days followed by 20 mg for 5 days.  She did have wheezing on examination today    She will continue her neb treatments with DuoNeb up to 4 times a day as needed.  While she is on prednisone I told her she can hold her budesonide 0.25 mg neb treatments that she normally takes twice a day.  When she leaves the house goes away she will take Symbicort on 6 migrant 2 puffs twice daily in place of the nebulized budesonide         Relevant Medications    predniSONE 20 mg tablet    Bronchitis with acute wheezing - Primary    She has cough productive for yellow mucus.  No fever or chills I did prescribe her Ceftin 500 mg twice daily for 7 days.  She also had a cough in November was prescribed a Z-Preston.  Chest x-ray done then on November 11, 2024 showed lung fields to be clear         Relevant Medications    cefuroxime (CEFTIN) 500 mg tablet       Surgery/Wound/Pain    Hx of CABG    He has history of CABG x 3 in 2014 per states she is always had some mild chronic leg edema since then and this is likely where she had her vein harvest for her CABG surgery.  Not having any chest pain              Cc: Cough and wheeze      DAKOTA Peralta presents for follow-up of her mild persistent asthma.  Has been having persistent cough and wheeze since November.  Did receive Z-Preston and prednisone with only partial improvement.  Had chest x-ray done 11/11/24  showed no infiltrates.  Sometimes expectorate some yellow mucus.  Some shortness of breath at times    She presently is on nebulizer treatments with budesonide 0.25 mg twice daily and nebulizer with DuoNeb usually twice a day.  DME [facility] she does have a Symbicort 160 mcg  inhaler but only uses when she leaves the house goes away for several days and will use 2 puffs of this twice a day instead of the nebulized budesonide    She was diagnosed with a kidney tumor and had 2 cm solid mass in upper pole right kidney.  She did undergo cryoablation of this 2.0 x 1.9 cm right renal mass on 9/5/2024.  Biopsy could not be done at that time but this was suspicious for kidney cancer.  She follows with urologist Dr. Moseley.    She does have history of coronary artery disease and CABG x 32 surgery on 2/9/2021.  Prior to that open heart surgery she did have PCI of LAD and RCA in setting of cardiogenic shock in 2009.  She does have chronic lower back pain.  She follows with cardiologist Dr. Consuelo Gr[facility].  She always has some mild leg swelling since she had vein graft taken for her BiPAP surgery.  She does have history of left bundle branch block hypertension    She never smoked    Last echocardiogram done August 2024 showed LV systolic function to be normal with left ventricular ejection fraction of 61 to 65%.  There was some septal hypokinesis and normal diastolic dysfunction.  There was mildly calcified aortic valve.  There was trace mitral and trace tricuspid valve regurgitation      Past Medical History:   Diagnosis Date    Asthma     Cardiac disease     heart attack    Disease of thyroid gland     Hyperlipidemia        Past Surgical History:   Procedure Laterality Date    APPENDECTOMY      BREAST LUMPECTOMY Bilateral     several    CARDIAC SURGERY      CATARACT EXTRACTION, BILATERAL      CORONARY STENT PLACEMENT      EPIDURAL BLOCK INJECTION Bilateral 10/25/2023    Procedure: bilateral L4-L5  TRANSFORAMINAL epidural steroid injection (37805);  Surgeon: Ariel Heredia DO;  Location: [facility] MAIN OR;  Service: Pain Management     IR CRYOABLATION  9/5/2024    NASAL POLYP SURGERY           Current Outpatient Medications:     acetaminophen (TYLENOL) 325 mg tablet, Take 3  tablets (975 mg total) by mouth every 8 (eight) hours, Disp: , Rfl:     albuterol (2.5 mg/3 mL) 0.083 % nebulizer solution, Take 3 mL (2.5 mg total) by nebulization every 6 (six) hours as needed for wheezing or shortness of breath, Disp: 360 mL, Rfl: 5    albuterol (PROVENTIL HFA,VENTOLIN HFA) 90 mcg/act inhaler, inhale 2 puffs by mouth and INTO THE LUNGS every 4 hours if needed for wheezing, Disp: 8.5 g, Rfl: 8    amLODIPine (NORVASC) 2.5 mg tablet, take 1 tablet by mouth once daily, Disp: 90 tablet, Rfl: 1    ascorbic acid (VITAMIN C) 500 MG tablet, Take 500 mg by mouth daily, Disp: , Rfl:     aspirin 81 MG tablet, Take 81 mg by mouth daily  , Disp: , Rfl:     budesonide (PULMICORT) 0.25 mg/2 mL nebulizer solution, Take 2 mL (0.25 mg total) by nebulization 2 (two) times a day Rinse mouth after use., Disp: 120 mL, Rfl: 11    cefuroxime (CEFTIN) 500 mg tablet, Take 1 tablet (500 mg total) by mouth every 12 (twelve) hours for 7 days, Disp: 14 tablet, Rfl: 0    Cholecalciferol (VITAMIN D) 2000 UNITS tablet, Take 4,000 Units by mouth daily, Disp: , Rfl:     levothyroxine 75 mcg tablet, Take 1 tablet (75 mcg total) by mouth daily, Disp: 100 tablet, Rfl: 1    predniSONE 20 mg tablet, Take 2 tablets for 5 days then 1 tablet for 5 days, Disp: 15 tablet, Rfl: 0    pregabalin (LYRICA) 50 mg capsule, Take 1 capsule in the a.m. 2 capsules at bedtime, Disp: 90 capsule, Rfl: 5    simvastatin (ZOCOR) 10 mg tablet, Take 1 tablet (10 mg total) by mouth daily at bedtime, Disp: 90 tablet, Rfl: 1    Symbicort 160-4.5 MCG/ACT inhaler, Inhale 2 puffs 2 (two) times a day, Disp: 10.2 g, Rfl: 3    EPINEPHrine (EPIPEN) 0.3 mg/0.3 mL SOAJ, Inject 0.3 mL (0.3 mg total) into a muscle once for 1 dose, Disp: 0.6 mL, Rfl: 0    methylPREDNISolone 4 MG tablet therapy pack, Use as directed on package (Patient not taking: Reported on 1/22/2025), Disp: 21 each, Rfl: 0    Allergies   Allergen Reactions    Latex Rash    Penicillins GI Intolerance  "      Social History     Tobacco Use    Smoking status: Never     Passive exposure: Never    Smokeless tobacco: Never   Substance Use Topics    Alcohol use: Yes     Comment: rare         Family History   Problem Relation Age of Onset    No Known Problems Mother     ALS Father     Cancer Brother     Heart disease Family     Asthma Family        Review of Systems   Constitutional:  Negative for chills, fever and unexpected weight change.   HENT:  Negative for congestion, rhinorrhea and sore throat.    Eyes:  Negative for discharge and redness.   Respiratory:  Positive for cough, shortness of breath and wheezing.    Cardiovascular:  Negative for chest pain and palpitations.        Has chronic leg edema on the left extremity only   Gastrointestinal:  Negative for abdominal distention, abdominal pain and nausea.   Endocrine: Negative for polydipsia and polyphagia.   Genitourinary:  Negative for dysuria.   Musculoskeletal:  Negative for joint swelling and myalgias.   Skin:  Negative for rash.   Neurological:  Negative for light-headedness.   Psychiatric/Behavioral:  Negative for decreased concentration.            Vitals:    01/22/25 1034   BP: 158/70   Pulse: 63   Resp: 16   Temp: 98.9 °F (37.2 °C)   SpO2: 96%     Height: 5' 1\" (154.9 cm)  IBW (Ideal Body Weight): 47.8 kg  Body mass index is 32.88 kg/m².  Weight (last 2 days)       Date/Time Weight    01/22/25 1034 78.9 (174)                Physical Exam  Vitals reviewed.   Constitutional:       General: She is not in acute distress.     Appearance: Normal appearance. She is well-developed. She is obese.   HENT:      Head: Normocephalic.      Right Ear: External ear normal.      Left Ear: External ear normal.      Nose: Nose normal.      Mouth/Throat:      Mouth: Mucous membranes are moist.      Pharynx: No oropharyngeal exudate.      Comments: Mallampati score is 1  Eyes:      Conjunctiva/sclera: Conjunctivae normal.      Pupils: Pupils are equal, round, and reactive to " light.   Neck:      Vascular: No JVD.      Trachea: No tracheal deviation.   Cardiovascular:      Rate and Rhythm: Normal rate and regular rhythm.      Heart sounds: Normal heart sounds.   Pulmonary:      Effort: Pulmonary effort is normal.      Comments: Him expiratory wheezes in mid to lower lung zones bilaterally.  No rhonchi  Abdominal:      General: There is no distension.      Palpations: Abdomen is soft.      Tenderness: There is no abdominal tenderness. There is no guarding.   Musculoskeletal:      Cervical back: Neck supple.      Comments: +1 edema left lower extremity.  No edema right lower extremity   Lymphadenopathy:      Cervical: No cervical adenopathy.   Skin:     General: Skin is warm and dry.      Findings: No rash.   Neurological:      General: No focal deficit present.      Mental Status: She is alert and oriented to person, place, and time.   Psychiatric:         Behavior: Behavior normal.         Thought Content: Thought content normal.

## 2025-01-22 NOTE — PATIENT INSTRUCTIONS
Try using your nebulizer with albuterol 2.5 mg 2-3 times a day and use flutter valve to help clear mucus    Can hold budesonide 0.25 mg twice a day until you finish your prednisone then can restart it    If your cough and wheezing do not improve contact my office    I did prescribe you prednisone 20 mg 2 tablets for 5 days then 1 tablet for 5 days    I also prescribed you Ceftin 500 mg 1 tablet twice a day for 7 days.  If any side effects contact my office

## 2025-01-23 ENCOUNTER — VBI (OUTPATIENT)
Dept: ADMINISTRATIVE | Facility: OTHER | Age: 85
End: 2025-01-23

## 2025-01-23 NOTE — TELEPHONE ENCOUNTER
01/23/25 7:40 AM     Chart reviewed for   Care-2 Fall Risk     was/were submitted to the patient's insurance.     MAGDY DAVIS MA   [facility]

## 2025-01-23 NOTE — ASSESSMENT & PLAN NOTE
He has history of CABG x 3 in 2014 per states she is always had some mild chronic leg edema since then and this is likely where she had her vein harvest for her CABG surgery.  Not having any chest pain

## 2025-01-23 NOTE — ASSESSMENT & PLAN NOTE
She has cough productive for yellow mucus.  No fever or chills I did prescribe her Ceftin 500 mg twice daily for 7 days.  She also had a cough in November was prescribed a Z-Preston.  Chest x-ray done then on November 11, 2024 showed lung fields to be clear

## 2025-01-23 NOTE — ASSESSMENT & PLAN NOTE
Been having wheezing and cough, for few weeks as well as some shortness of breath.  I prescribed prednisone 40 mg for 5 days followed by 20 mg for 5 days.  She did have wheezing on examination today    She will continue her neb treatments with DuoNeb up to 4 times a day as needed.  While she is on prednisone I told her she can hold her budesonide 0.25 mg neb treatments that she normally takes twice a day.  When she leaves the house goes away she will take Symbicort on 6 migrant 2 puffs twice daily in place of the nebulized budesonide

## 2025-02-04 ENCOUNTER — VBI (OUTPATIENT)
Dept: ADMINISTRATIVE | Facility: OTHER | Age: 85
End: 2025-02-04

## 2025-02-04 NOTE — TELEPHONE ENCOUNTER
02/04/25 1:39 PM     Chart reviewed for Immunization(s) Influenza  was/were submitted to the patient's insurance.     Malina Villa MA   [facility]

## 2025-02-05 ENCOUNTER — VBI (OUTPATIENT)
Dept: ADMINISTRATIVE | Facility: OTHER | Age: 85
End: 2025-02-05

## 2025-02-05 NOTE — TELEPHONE ENCOUNTER
02/05/25 1:46 PM     Chart reviewed for Blood Pressure was/were submitted to the patient's insurance.     Madhuri Hughes MA   [facility]

## 2025-02-06 ENCOUNTER — VBI (OUTPATIENT)
Dept: ADMINISTRATIVE | Facility: OTHER | Age: 85
End: 2025-02-06

## 2025-02-07 ENCOUNTER — VBI (OUTPATIENT)
Dept: ADMINISTRATIVE | Facility: OTHER | Age: 85
End: 2025-02-07

## 2025-02-07 NOTE — TELEPHONE ENCOUNTER
02/07/25 3:01 PM     Chart reviewed for PREV-13: Statin Therapy for the Prevention and Treatment ofCardiovascular  Disease was/were submitted to the patient's insurance.     Umm Mosley MA   [facility]

## 2025-02-17 ENCOUNTER — TELEPHONE (OUTPATIENT)
Age: 85
End: 2025-02-17

## 2025-02-17 ENCOUNTER — APPOINTMENT (OUTPATIENT)
Dept: LAB | Facility: HOSPITAL | Age: 85
End: 2025-02-17
Payer: MEDICARE

## 2025-02-17 DIAGNOSIS — I10 ESSENTIAL HYPERTENSION: ICD-10-CM

## 2025-02-17 DIAGNOSIS — E03.9 ACQUIRED HYPOTHYROIDISM: ICD-10-CM

## 2025-02-17 LAB
ALBUMIN SERPL BCG-MCNC: 4 G/DL (ref 3.5–5)
ALP SERPL-CCNC: 77 U/L (ref 34–104)
ALT SERPL W P-5'-P-CCNC: 8 U/L (ref 7–52)
ANION GAP SERPL CALCULATED.3IONS-SCNC: 9 MMOL/L (ref 4–13)
AST SERPL W P-5'-P-CCNC: 19 U/L (ref 13–39)
BILIRUB SERPL-MCNC: 0.51 MG/DL (ref 0.2–1)
BUN SERPL-MCNC: 8 MG/DL (ref 5–25)
CALCIUM SERPL-MCNC: 9.1 MG/DL (ref 8.4–10.2)
CHLORIDE SERPL-SCNC: 108 MMOL/L (ref 96–108)
CHOLEST SERPL-MCNC: 137 MG/DL (ref ?–200)
CO2 SERPL-SCNC: 22 MMOL/L (ref 21–32)
CREAT SERPL-MCNC: 0.48 MG/DL (ref 0.6–1.3)
ERYTHROCYTE [DISTWIDTH] IN BLOOD BY AUTOMATED COUNT: 14.7 % (ref 11.6–15.1)
GFR SERPL CREATININE-BSD FRML MDRD: 91 ML/MIN/1.73SQ M
GLUCOSE P FAST SERPL-MCNC: 91 MG/DL (ref 65–99)
HCT VFR BLD AUTO: 42.3 % (ref 34.8–46.1)
HDLC SERPL-MCNC: 60 MG/DL
HGB BLD-MCNC: 13.5 G/DL (ref 11.5–15.4)
LDLC SERPL CALC-MCNC: 57 MG/DL (ref 0–100)
MCH RBC QN AUTO: 29.8 PG (ref 26.8–34.3)
MCHC RBC AUTO-ENTMCNC: 31.9 G/DL (ref 31.4–37.4)
MCV RBC AUTO: 93 FL (ref 82–98)
PLATELET # BLD AUTO: 251 THOUSANDS/UL (ref 149–390)
PMV BLD AUTO: 9.8 FL (ref 8.9–12.7)
POTASSIUM SERPL-SCNC: 3.8 MMOL/L (ref 3.5–5.3)
PROT SERPL-MCNC: 7.1 G/DL (ref 6.4–8.4)
RBC # BLD AUTO: 4.53 MILLION/UL (ref 3.81–5.12)
SODIUM SERPL-SCNC: 139 MMOL/L (ref 135–147)
T4 FREE SERPL-MCNC: 0.93 NG/DL (ref 0.61–1.12)
TRIGL SERPL-MCNC: 102 MG/DL (ref ?–150)
TSH SERPL DL<=0.05 MIU/L-ACNC: 5.23 UIU/ML (ref 0.45–4.5)
WBC # BLD AUTO: 5.51 THOUSAND/UL (ref 4.31–10.16)

## 2025-02-17 PROCEDURE — 80053 COMPREHEN METABOLIC PANEL: CPT

## 2025-02-17 PROCEDURE — 84443 ASSAY THYROID STIM HORMONE: CPT

## 2025-02-17 PROCEDURE — 80061 LIPID PANEL: CPT

## 2025-02-17 PROCEDURE — 84439 ASSAY OF FREE THYROXINE: CPT

## 2025-02-17 PROCEDURE — 85027 COMPLETE CBC AUTOMATED: CPT

## 2025-02-17 PROCEDURE — 36415 COLL VENOUS BLD VENIPUNCTURE: CPT

## 2025-02-17 NOTE — TELEPHONE ENCOUNTER
Pt asking for repeat inj for her back and B/L hips.    She wants the back inj done first.   Back pain worse with sitting, pain is in the B/L buttocks and goes up to her LB. Pain is 8/10. Last B/L SIJ inj was done 10/22/24 which provided 80% improvement for 3 months and then has slowly been increasing. This month it has been the worst since she has been more active with shoveling.     Pt's B/L hip pain is 10/10. Pain becomes more painful going up and down steps. Her last B/L hip inj was 10/4/24 which provided 70% improvement. Pain started to get bad 1.5-2 wks ago.    Pt denies being diabetic.  LOVS was 4/10/24 bud/ Meri.

## 2025-02-17 NOTE — TELEPHONE ENCOUNTER
Caller: Kathryn THAPA    Doctor: Dr. Cazares    Reason for call: Pt called in to schedule an hip and back injection . Please advise last OVS 04/2024    Call back#: [phone]

## 2025-02-19 NOTE — TELEPHONE ENCOUNTER
Patient scheduled for procedures with  on 2/28/25 and 3/14/25.  Patient does not have mychart, so the following instructions were given to patient verbally, no vaccines 14 days prior or after procedure, nothing to eat or drink 1 hr prior to procedure, wear something loose and comfortable, no jewelry, if ill, infection or antibiotics, must call office to reschedule procedure.  Take prescription medications as normal and you will need a  18 yrs or older.

## 2025-02-24 DIAGNOSIS — M48.062 LUMBAR STENOSIS WITH NEUROGENIC CLAUDICATION: ICD-10-CM

## 2025-02-24 DIAGNOSIS — E03.9 ACQUIRED HYPOTHYROIDISM: ICD-10-CM

## 2025-02-24 RX ORDER — LEVOTHYROXINE SODIUM 75 UG/1
75 TABLET ORAL DAILY
Qty: 90 TABLET | Refills: 1 | Status: SHIPPED | OUTPATIENT
Start: 2025-02-24

## 2025-02-24 RX ORDER — PREGABALIN 50 MG/1
CAPSULE ORAL
Qty: 90 CAPSULE | Refills: 5 | Status: SHIPPED | OUTPATIENT
Start: 2025-02-24

## 2025-02-24 NOTE — TELEPHONE ENCOUNTER
RN s/w pt regarding previous.  Pt not seen in the office with AS since April 2024.  Pt scheduled for B/L SIJ on 2/28 and then hip injections on 3/14, make f/u with AS in April 2025?    Pt can make that appt when comes to have B/L SIJ on 2/28.    Refill Lyrica 50mg one tab in am and 2 in PM for now?  Pt stated has relief with this medication and no side effects noted   None

## 2025-02-24 NOTE — TELEPHONE ENCOUNTER
Reason for call:   [x] Refill   [] Prior Auth  [] Other:     Office:   [x] PCP/Provider - [facility] / [place]    Medication: levothyroxine    Dose/Frequency: 75mcg qd    Quantity: 90    Pharmacy: [id number] - [address] ( [address])     Does the patient have enough for 3 days?   [] Yes   [x] No - Send as HP to POD

## 2025-02-24 NOTE — TELEPHONE ENCOUNTER
RN attempted to reach pt phone would not allow to leave a VMM.    If pt calls back let her know the refill was sent to her pharmacy and she can make a f/u appt with AS or KW after her procedures.

## 2025-02-24 NOTE — TELEPHONE ENCOUNTER
RN attempted to reach pt on home phone, phone answered but would not allow message to be recorded.  Will try again later, pt stated earlier that she will be out at appts

## 2025-02-24 NOTE — TELEPHONE ENCOUNTER
Patient would like a 30-day supply + 5 refills again.    Reason for call:   [x] Refill   [] Prior Auth  [] Other:     Office:   [x] Specialty/Provider - [facility] / [facility]    Medication: pregabalin    Dose/Frequency: 50mg; 1 cap qam + 2 caps qhs    Quantity: 90    Pharmacy: [id number] - [address] ( [address])     Does the patient have enough for 3 days?   [] Yes   [x] No - Send as HP to POD

## 2025-02-24 NOTE — TELEPHONE ENCOUNTER
Caller: Pt    Doctor: Dr. KABA    Reason for call: Pt calling back to speak to nurse. Please assist    Call back#: [phone]

## 2025-02-26 NOTE — TELEPHONE ENCOUNTER
Attempted to reach pt and unable to leave a message.    If pt calls back let her know the refill was sent to her pharmacy and she can make a f/u appt after her procedures.

## 2025-02-28 ENCOUNTER — HOSPITAL ENCOUNTER (OUTPATIENT)
Dept: RADIOLOGY | Facility: CLINIC | Age: 85
End: 2025-02-28
Payer: MEDICARE

## 2025-02-28 VITALS
HEART RATE: 48 BPM | TEMPERATURE: 98.4 F | SYSTOLIC BLOOD PRESSURE: 175 MMHG | DIASTOLIC BLOOD PRESSURE: 81 MMHG | OXYGEN SATURATION: 94 % | RESPIRATION RATE: 20 BRPM

## 2025-02-28 DIAGNOSIS — M46.1 SACROILIITIS (HCC): ICD-10-CM

## 2025-02-28 PROCEDURE — 27096 INJECT SACROILIAC JOINT: CPT | Performed by: ANESTHESIOLOGY

## 2025-02-28 RX ORDER — 0.9 % SODIUM CHLORIDE 0.9 %
4 VIAL (ML) INJECTION ONCE
Status: COMPLETED | OUTPATIENT
Start: 2025-02-28 | End: 2025-02-28

## 2025-02-28 RX ORDER — METHYLPREDNISOLONE ACETATE 80 MG/ML
80 INJECTION, SUSPENSION INTRA-ARTICULAR; INTRALESIONAL; INTRAMUSCULAR; PARENTERAL; SOFT TISSUE ONCE
Status: COMPLETED | OUTPATIENT
Start: 2025-02-28 | End: 2025-02-28

## 2025-02-28 RX ORDER — ROPIVACAINE HYDROCHLORIDE 2 MG/ML
7 INJECTION, SOLUTION EPIDURAL; INFILTRATION; PERINEURAL ONCE
Status: COMPLETED | OUTPATIENT
Start: 2025-02-28 | End: 2025-02-28

## 2025-02-28 RX ADMIN — IOHEXOL 2 ML: 300 INJECTION, SOLUTION INTRAVENOUS at 13:12

## 2025-02-28 RX ADMIN — Medication 4 ML: at 13:11

## 2025-02-28 RX ADMIN — LIDOCAINE HYDROCHLORIDE 4 ML: 20 INJECTION, SOLUTION EPIDURAL; INFILTRATION; INTRACAUDAL at 13:11

## 2025-02-28 RX ADMIN — METHYLPREDNISOLONE ACETATE 80 MG: 80 INJECTION, SUSPENSION INTRA-ARTICULAR; INTRALESIONAL; INTRAMUSCULAR; SOFT TISSUE at 13:12

## 2025-02-28 RX ADMIN — ROPIVACAINE HYDROCHLORIDE 7 ML: 2 INJECTION, SOLUTION EPIDURAL; INFILTRATION at 13:12

## 2025-02-28 NOTE — DISCHARGE INSTR - LAB

## 2025-02-28 NOTE — H&P
History of Present Illness: The patient is a 84 y.o. female who presents with complaints of lower back pain is here today for bilateral sacroiliac joint injections    Past Medical History:   Diagnosis Date    Asthma     Cardiac disease     heart attack    Disease of thyroid gland     Hyperlipidemia        Past Surgical History:   Procedure Laterality Date    APPENDECTOMY      BREAST LUMPECTOMY Bilateral     several    CARDIAC SURGERY      CATARACT EXTRACTION, BILATERAL      CORONARY STENT PLACEMENT      EPIDURAL BLOCK INJECTION Bilateral 10/25/2023    Procedure: bilateral L4-L5  TRANSFORAMINAL epidural steroid injection (97121);  Surgeon: Ariel Heredia DO;  Location: [facility] MAIN OR;  Service: Pain Management     IR CRYOABLATION  9/5/2024    NASAL POLYP SURGERY           Current Outpatient Medications:     acetaminophen (TYLENOL) 325 mg tablet, Take 3 tablets (975 mg total) by mouth every 8 (eight) hours, Disp: , Rfl:     albuterol (2.5 mg/3 mL) 0.083 % nebulizer solution, Take 3 mL (2.5 mg total) by nebulization every 6 (six) hours as needed for wheezing or shortness of breath, Disp: 360 mL, Rfl: 5    albuterol (PROVENTIL HFA,VENTOLIN HFA) 90 mcg/act inhaler, inhale 2 puffs by mouth and INTO THE LUNGS every 4 hours if needed for wheezing, Disp: 8.5 g, Rfl: 8    amLODIPine (NORVASC) 2.5 mg tablet, take 1 tablet by mouth once daily, Disp: 90 tablet, Rfl: 1    ascorbic acid (VITAMIN C) 500 MG tablet, Take 500 mg by mouth daily, Disp: , Rfl:     aspirin 81 MG tablet, Take 81 mg by mouth daily  , Disp: , Rfl:     budesonide (PULMICORT) 0.25 mg/2 mL nebulizer solution, Take 2 mL (0.25 mg total) by nebulization 2 (two) times a day Rinse mouth after use., Disp: 120 mL, Rfl: 11    Cholecalciferol (VITAMIN D) 2000 UNITS tablet, Take 4,000 Units by mouth daily, Disp: , Rfl:     EPINEPHrine (EPIPEN) 0.3 mg/0.3 mL SOAJ, Inject 0.3 mL (0.3 mg total) into a muscle once for 1 dose, Disp: 0.6 mL, Rfl: 0    levothyroxine 75 mcg  tablet, Take 1 tablet (75 mcg total) by mouth daily, Disp: 90 tablet, Rfl: 1    predniSONE 20 mg tablet, Take 2 tablets for 5 days then 1 tablet for 5 days, Disp: 15 tablet, Rfl: 0    pregabalin (LYRICA) 50 mg capsule, Take 1 capsule in the a.m. 2 capsules at bedtime, Disp: 90 capsule, Rfl: 5    simvastatin (ZOCOR) 10 mg tablet, Take 1 tablet (10 mg total) by mouth daily at bedtime, Disp: 90 tablet, Rfl: 1    Symbicort 160-4.5 MCG/ACT inhaler, Inhale 2 puffs 2 (two) times a day, Disp: 10.2 g, Rfl: 3  No current facility-administered medications for this encounter.    Allergies   Allergen Reactions    Latex Rash    Penicillins GI Intolerance       Physical Exam:   Vitals:    02/28/25 1304   BP: (!) 182/76   Pulse: 55   Resp: 20   Temp: 98.4 °F (36.9 °C)   SpO2: 94%     General: Awake, Alert, Oriented x 3, Mood and affect appropriate  Respiratory: Respirations even and unlabored  Cardiovascular: Peripheral pulses intact; no edema  Musculoskeletal Exam: Lower back pain    ASA Score: 3    Patient/Chart Verification  Patient ID Verified: Verbal  Consents Confirmed: To be obtained in the Procedural area  Interval H&P(within 24 hr) Complete (required for Outpatients and Surgery Admit only): To be obtained in the Procedural area  Allergies Reviewed: Yes  Anticoag/NSAID held?: NA  Currently on antibiotics?: No    Assessment:   1. Sacroiliitis (HCC)        Plan: B/L SIJ

## 2025-03-06 DIAGNOSIS — I25.10 CORONARY ARTERY DISEASE INVOLVING NATIVE CORONARY ARTERY OF NATIVE HEART WITHOUT ANGINA PECTORIS: ICD-10-CM

## 2025-03-06 NOTE — TELEPHONE ENCOUNTER
Reason for call:   [x] Refill   [] Prior Auth  [] Other:     Office:   [x] PCP/Provider -  Angelina Magaña,    [] Specialty/Provider -     Medication:  simvastatin (ZOCOR) 10 mg tablet    Dose/Frequency: Take 1 tablet (10 mg total) by mouth daily at bedtime     Quantity: 90    Pharmacy: [id number] [address] ([facility]) [phone]    Local Pharmacy   Does the patient have enough for 3 days?   [x] Yes   [] No - Send as HP to POD    Mail Away Pharmacy   Does the patient have enough for 10 days?   [] Yes   [] No - Send as HP to POD

## 2025-03-07 RX ORDER — SIMVASTATIN 10 MG
10 TABLET ORAL
Qty: 90 TABLET | Refills: 1 | Status: SHIPPED | OUTPATIENT
Start: 2025-03-07

## 2025-03-10 ENCOUNTER — OFFICE VISIT (OUTPATIENT)
Dept: PAIN MEDICINE | Facility: CLINIC | Age: 85
End: 2025-03-10
Payer: MEDICARE

## 2025-03-10 DIAGNOSIS — M25.551 BILATERAL HIP PAIN: Primary | ICD-10-CM

## 2025-03-10 DIAGNOSIS — M46.1 SACROILIITIS (HCC): ICD-10-CM

## 2025-03-10 DIAGNOSIS — M51.16 INTERVERTEBRAL DISC DISORDER WITH RADICULOPATHY OF LUMBAR REGION: ICD-10-CM

## 2025-03-10 DIAGNOSIS — M48.062 LUMBAR STENOSIS WITH NEUROGENIC CLAUDICATION: ICD-10-CM

## 2025-03-10 DIAGNOSIS — G89.4 CHRONIC PAIN SYNDROME: ICD-10-CM

## 2025-03-10 DIAGNOSIS — M25.552 BILATERAL HIP PAIN: Primary | ICD-10-CM

## 2025-03-10 PROCEDURE — 99214 OFFICE O/P EST MOD 30 MIN: CPT

## 2025-03-10 RX ORDER — PREGABALIN 50 MG/1
CAPSULE ORAL
Qty: 120 CAPSULE | Refills: 5 | Status: SHIPPED | OUTPATIENT
Start: 2025-03-10

## 2025-03-10 NOTE — PROGRESS NOTES
Assessment:  1. Bilateral hip pain    2. Sacroiliitis (HCC)    3. Chronic pain syndrome    4. Lumbar stenosis with neurogenic claudication    5. Intervertebral disc disorder with radiculopathy of lumbar region        Plan:  The patient is an 84-year-old female with a history of chronic pain secondary to low back pain, lumbar intervertebral disorder with radiculopathy, lumbar stenosis with neurogenic claudication, sacroiliitis and bilateral hip pain who presents to the office with worsening bilateral low back pain and bilateral hip pain that started worsening 3 days ago when she was picking up heavy items at the grocery store.    Overall her pain continues to be managed with taking Lyrica, therefore I will continue her on this medication as prescribed, however I will increase her to 2 capsules in the morning and 2 capsules before bed.  An updated prescription was sent to the patient's pharmacy.    She will proceed with bilateral hip injections which are scheduled for 3/14/2025.    My impressions and treatment recommendations were discussed in detail with the patient who verbalized understanding and had no further questions.  Discharge instructions were provided. I personally saw and examined the patient and I agree with the above discussed plan of care.    No orders of the defined types were placed in this encounter.    New Medications Ordered This Visit   Medications    pregabalin (LYRICA) 50 mg capsule     Sig: Take 2 capsules in the a.m. 2 capsules at bedtime     Dispense:  120 capsule     Refill:  5       History of Present Illness:  Kathryn Drake is a 84 y.o. female with a history of chronic pain secondary to low back pain, lumbar intervertebral disc disorder with radiculopathy, lumbar stenosis with neurogenic claudication, sacroiliitis and bilateral hip pain.  She was last seen on 2/28/2025 where she underwent bilateral SI joint injections.  She presents to the office with worsening bilateral low back pain  and bilateral hip pain that started worsening 3 days ago when she was at the grocery store and picked up heavy items.  She states the low back pain is slowly improving, as she no longer needs a wheelchair to get around she has been using a cane.  She is scheduled for bilateral hip injections on 3/14/2025.    She states her pain is worse since the last office visit and constant.  Rates quality of her pain as pressure-like and is currently rating her pain a 9/10 on a numeric scale.    Current pain medications include Lyrica 50 mg 1 capsule in the morning and 2 capsules before bed which is helpful.  She is asking to increase this medication.    I have personally reviewed and/or updated the patient's past medical history, past surgical history, family history, social history, current medications, allergies, and vital signs today.     Review of Systems   Respiratory:  Negative for shortness of breath.    Cardiovascular:  Negative for chest pain.   Gastrointestinal:  Negative for constipation, diarrhea, nausea and vomiting.   Musculoskeletal:  Positive for back pain and gait problem. Negative for arthralgias, joint swelling and myalgias.   Skin:  Negative for rash.   Neurological:  Negative for dizziness, seizures and weakness.   All other systems reviewed and are negative.      Patient Active Problem List   Diagnosis    Mild persistent asthma with acute exacerbation    Chronic allergic rhinitis    Coronary artery disease involving native coronary artery of native heart without angina pectoris    Class 1 obesity due to excess calories without serious comorbidity with body mass index (BMI) of 33.0 to 33.9 in adult    Acquired hypothyroidism    Hx of CABG    Essential hypertension    Recurrent depression (HCC)    Low bone mass    Lumbar radiculopathy    Ambulatory dysfunction    Lumbar pain    Shortness of breath    Right renal mass    Hip pain, bilateral    Lymphedema    Sacroiliitis (HCC)    Acute pain of right thigh     Obesity, morbid (HCC)    Bronchitis with acute wheezing       Past Medical History:   Diagnosis Date    Asthma     Cardiac disease     heart attack    Disease of thyroid gland     Hyperlipidemia        Past Surgical History:   Procedure Laterality Date    APPENDECTOMY      BREAST LUMPECTOMY Bilateral     several    CARDIAC SURGERY      CATARACT EXTRACTION, BILATERAL      CORONARY STENT PLACEMENT      EPIDURAL BLOCK INJECTION Bilateral 10/25/2023    Procedure: bilateral L4-L5  TRANSFORAMINAL epidural steroid injection (70722);  Surgeon: Ariel Heredia DO;  Location: [facility] MAIN OR;  Service: Pain Management     IR CRYOABLATION  9/5/2024    NASAL POLYP SURGERY         Family History   Problem Relation Age of Onset    No Known Problems Mother     ALS Father     Cancer Brother     Heart disease Family     Asthma Family        Social History     Occupational History    Not on file   Tobacco Use    Smoking status: Never     Passive exposure: Never    Smokeless tobacco: Never   Vaping Use    Vaping status: Never Used   Substance and Sexual Activity    Alcohol use: Yes     Comment: rare    Drug use: No    Sexual activity: Not on file       Current Outpatient Medications on File Prior to Visit   Medication Sig    acetaminophen (TYLENOL) 325 mg tablet Take 3 tablets (975 mg total) by mouth every 8 (eight) hours    albuterol (2.5 mg/3 mL) 0.083 % nebulizer solution Take 3 mL (2.5 mg total) by nebulization every 6 (six) hours as needed for wheezing or shortness of breath    albuterol (PROVENTIL HFA,VENTOLIN HFA) 90 mcg/act inhaler inhale 2 puffs by mouth and INTO THE LUNGS every 4 hours if needed for wheezing    amLODIPine (NORVASC) 2.5 mg tablet take 1 tablet by mouth once daily    ascorbic acid (VITAMIN C) 500 MG tablet Take 500 mg by mouth daily    aspirin 81 MG tablet Take 81 mg by mouth daily      budesonide (PULMICORT) 0.25 mg/2 mL nebulizer solution Take 2 mL (0.25 mg total) by nebulization 2 (two) times a day Rinse  mouth after use.    Cholecalciferol (VITAMIN D) 2000 UNITS tablet Take 4,000 Units by mouth daily    levothyroxine 75 mcg tablet Take 1 tablet (75 mcg total) by mouth daily    predniSONE 20 mg tablet Take 2 tablets for 5 days then 1 tablet for 5 days    simvastatin (ZOCOR) 10 mg tablet Take 1 tablet (10 mg total) by mouth daily at bedtime    Symbicort 160-4.5 MCG/ACT inhaler Inhale 2 puffs 2 (two) times a day    [DISCONTINUED] pregabalin (LYRICA) 50 mg capsule Take 1 capsule in the a.m. 2 capsules at bedtime    EPINEPHrine (EPIPEN) 0.3 mg/0.3 mL SOAJ Inject 0.3 mL (0.3 mg total) into a muscle once for 1 dose     No current facility-administered medications on file prior to visit.       Allergies   Allergen Reactions    Latex Rash    Penicillins GI Intolerance       Physical Exam:    There were no vitals taken for this visit.    Constitutional:normal, well developed, well nourished, alert, in no distress and non-toxic and no overt pain behavior.  Eyes:anicteric  HEENT:grossly intact  Neck:supple, symmetric, trachea midline and no masses   Pulmonary:even and unlabored  Cardiovascular:No edema or pitting edema present  Skin:Normal without rashes or lesions and well hydrated  Psychiatric:Mood and affect appropriate  Neurologic:Cranial Nerves II-XII grossly intact  Musculoskeletal:antalgic and ambulates with cane    Imaging

## 2025-03-12 ENCOUNTER — RA CDI HCC (OUTPATIENT)
Dept: OTHER | Facility: HOSPITAL | Age: 85
End: 2025-03-12

## 2025-03-12 NOTE — PROGRESS NOTES
It is noted in the patient record that the patient has F33.9 depression, recurrent - found on active problem list.    If appropriate, can the depression be further specified as:     F33.0 major depressive disorder, recurrent, mild  OR   F33.1 major depressive disorder, recurrent, moderate  OR   F33.2 major depressive disorder, recurrent, severe without psychotic features OR   F33.41 major depressive disorder, recurrent, in partial remission OR   F33.42 major depressive disorder, recurrent, in full remission  HCC coding opportunities          Chart Reviewed number of suggestions sent to Provider: 1     Patients Insurance     Medicare Insurance: Medicare

## 2025-03-14 ENCOUNTER — HOSPITAL ENCOUNTER (OUTPATIENT)
Dept: RADIOLOGY | Facility: CLINIC | Age: 85
End: 2025-03-14
Payer: MEDICARE

## 2025-03-14 ENCOUNTER — TELEPHONE (OUTPATIENT)
Dept: RADIOLOGY | Facility: MEDICAL CENTER | Age: 85
End: 2025-03-14

## 2025-03-14 VITALS
RESPIRATION RATE: 18 BRPM | HEART RATE: 46 BPM | OXYGEN SATURATION: 98 % | DIASTOLIC BLOOD PRESSURE: 77 MMHG | SYSTOLIC BLOOD PRESSURE: 155 MMHG | TEMPERATURE: 98.3 F

## 2025-03-14 DIAGNOSIS — M25.552 BILATERAL HIP PAIN: ICD-10-CM

## 2025-03-14 DIAGNOSIS — M25.551 BILATERAL HIP PAIN: ICD-10-CM

## 2025-03-14 PROCEDURE — 77002 NEEDLE LOCALIZATION BY XRAY: CPT | Performed by: ANESTHESIOLOGY

## 2025-03-14 PROCEDURE — 20610 DRAIN/INJ JOINT/BURSA W/O US: CPT | Performed by: ANESTHESIOLOGY

## 2025-03-14 PROCEDURE — 77002 NEEDLE LOCALIZATION BY XRAY: CPT

## 2025-03-14 RX ORDER — METHYLPREDNISOLONE ACETATE 80 MG/ML
80 INJECTION, SUSPENSION INTRA-ARTICULAR; INTRALESIONAL; INTRAMUSCULAR; PARENTERAL; SOFT TISSUE ONCE
Status: COMPLETED | OUTPATIENT
Start: 2025-03-14 | End: 2025-03-14

## 2025-03-14 RX ORDER — 0.9 % SODIUM CHLORIDE 0.9 %
4 VIAL (ML) INJECTION ONCE
Status: COMPLETED | OUTPATIENT
Start: 2025-03-14 | End: 2025-03-14

## 2025-03-14 RX ORDER — ROPIVACAINE HYDROCHLORIDE 2 MG/ML
7 INJECTION, SOLUTION EPIDURAL; INFILTRATION; PERINEURAL ONCE
Status: COMPLETED | OUTPATIENT
Start: 2025-03-14 | End: 2025-03-14

## 2025-03-14 RX ADMIN — Medication 4 ML: at 09:30

## 2025-03-14 RX ADMIN — ROPIVACAINE HYDROCHLORIDE 7 ML: 2 INJECTION, SOLUTION EPIDURAL; INFILTRATION at 09:31

## 2025-03-14 RX ADMIN — LIDOCAINE HYDROCHLORIDE 4 ML: 20 INJECTION, SOLUTION EPIDURAL; INFILTRATION; INTRACAUDAL at 09:30

## 2025-03-14 RX ADMIN — IOHEXOL 2 ML: 300 INJECTION, SOLUTION INTRAVENOUS at 09:31

## 2025-03-14 RX ADMIN — METHYLPREDNISOLONE ACETATE 80 MG: 80 INJECTION, SUSPENSION INTRA-ARTICULAR; INTRALESIONAL; INTRAMUSCULAR; SOFT TISSUE at 09:31

## 2025-03-14 NOTE — H&P
History of Present Illness: The patient is a 84 y.o. female who presents with complaints of bilateral hip pain and is here today for bilateral hip injections    Past Medical History:   Diagnosis Date    Asthma     Cardiac disease     heart attack    Disease of thyroid gland     Hyperlipidemia        Past Surgical History:   Procedure Laterality Date    APPENDECTOMY      BREAST LUMPECTOMY Bilateral     several    CARDIAC SURGERY      CATARACT EXTRACTION, BILATERAL      CORONARY STENT PLACEMENT      EPIDURAL BLOCK INJECTION Bilateral 10/25/2023    Procedure: bilateral L4-L5  TRANSFORAMINAL epidural steroid injection (54091);  Surgeon: Ariel Heredia DO;  Location: [facility] MAIN OR;  Service: Pain Management     IR CRYOABLATION  9/5/2024    NASAL POLYP SURGERY           Current Outpatient Medications:     acetaminophen (TYLENOL) 325 mg tablet, Take 3 tablets (975 mg total) by mouth every 8 (eight) hours, Disp: , Rfl:     albuterol (2.5 mg/3 mL) 0.083 % nebulizer solution, Take 3 mL (2.5 mg total) by nebulization every 6 (six) hours as needed for wheezing or shortness of breath, Disp: 360 mL, Rfl: 5    albuterol (PROVENTIL HFA,VENTOLIN HFA) 90 mcg/act inhaler, inhale 2 puffs by mouth and INTO THE LUNGS every 4 hours if needed for wheezing, Disp: 8.5 g, Rfl: 8    amLODIPine (NORVASC) 2.5 mg tablet, take 1 tablet by mouth once daily, Disp: 90 tablet, Rfl: 1    ascorbic acid (VITAMIN C) 500 MG tablet, Take 500 mg by mouth daily, Disp: , Rfl:     aspirin 81 MG tablet, Take 81 mg by mouth daily  , Disp: , Rfl:     budesonide (PULMICORT) 0.25 mg/2 mL nebulizer solution, Take 2 mL (0.25 mg total) by nebulization 2 (two) times a day Rinse mouth after use., Disp: 120 mL, Rfl: 11    Cholecalciferol (VITAMIN D) 2000 UNITS tablet, Take 4,000 Units by mouth daily, Disp: , Rfl:     EPINEPHrine (EPIPEN) 0.3 mg/0.3 mL SOAJ, Inject 0.3 mL (0.3 mg total) into a muscle once for 1 dose, Disp: 0.6 mL, Rfl: 0    levothyroxine 75 mcg  tablet, Take 1 tablet (75 mcg total) by mouth daily, Disp: 90 tablet, Rfl: 1    predniSONE 20 mg tablet, Take 2 tablets for 5 days then 1 tablet for 5 days, Disp: 15 tablet, Rfl: 0    pregabalin (LYRICA) 50 mg capsule, Take 2 capsules in the a.m. 2 capsules at bedtime, Disp: 120 capsule, Rfl: 5    simvastatin (ZOCOR) 10 mg tablet, Take 1 tablet (10 mg total) by mouth daily at bedtime, Disp: 90 tablet, Rfl: 1    Symbicort 160-4.5 MCG/ACT inhaler, Inhale 2 puffs 2 (two) times a day, Disp: 10.2 g, Rfl: 3  No current facility-administered medications for this encounter.    Allergies   Allergen Reactions    Latex Rash    Penicillins GI Intolerance       Physical Exam:   Vitals:    03/14/25 0924   BP: 161/65   Pulse: (!) 48   Resp: 20   Temp: 98.3 °F (36.8 °C)   SpO2: 97%     General: Awake, Alert, Oriented x 3, Mood and affect appropriate  Respiratory: Respirations even and unlabored  Cardiovascular: Peripheral pulses intact; no edema  Musculoskeletal Exam: Bilateral hip pain    ASA Score: 3    Patient/Chart Verification  Patient ID Verified: Verbal  ID Band Applied: No  Consents Confirmed: To be obtained in the Procedural area  Interval H&P(within 24 hr) Complete (required for Outpatients and Surgery Admit only): To be obtained in the Procedural area  Allergies Reviewed: Yes  Anticoag/NSAID held?: NA  Currently on antibiotics?: No    Assessment:   1. Bilateral hip pain        Plan: B/L hip injections

## 2025-03-14 NOTE — DISCHARGE INSTR - LAB

## 2025-03-17 ENCOUNTER — OFFICE VISIT (OUTPATIENT)
Dept: FAMILY MEDICINE CLINIC | Facility: CLINIC | Age: 85
End: 2025-03-17
Payer: MEDICARE

## 2025-03-17 VITALS
HEIGHT: 61 IN | RESPIRATION RATE: 18 BRPM | SYSTOLIC BLOOD PRESSURE: 126 MMHG | DIASTOLIC BLOOD PRESSURE: 68 MMHG | BODY MASS INDEX: 32.1 KG/M2 | TEMPERATURE: 97.8 F | WEIGHT: 170 LBS | HEART RATE: 52 BPM

## 2025-03-17 DIAGNOSIS — E03.9 ACQUIRED HYPOTHYROIDISM: ICD-10-CM

## 2025-03-17 DIAGNOSIS — Z00.00 MEDICARE ANNUAL WELLNESS VISIT, SUBSEQUENT: Primary | ICD-10-CM

## 2025-03-17 DIAGNOSIS — E66.01 OBESITY, MORBID (HCC): ICD-10-CM

## 2025-03-17 DIAGNOSIS — I10 ESSENTIAL HYPERTENSION: ICD-10-CM

## 2025-03-17 DIAGNOSIS — Z99.89 DEPENDENCE ON CANE: ICD-10-CM

## 2025-03-17 PROCEDURE — G2211 COMPLEX E/M VISIT ADD ON: HCPCS | Performed by: FAMILY MEDICINE

## 2025-03-17 PROCEDURE — 99214 OFFICE O/P EST MOD 30 MIN: CPT | Performed by: FAMILY MEDICINE

## 2025-03-17 PROCEDURE — G0439 PPPS, SUBSEQ VISIT: HCPCS | Performed by: FAMILY MEDICINE

## 2025-03-17 NOTE — PATIENT INSTRUCTIONS
Medicare Preventive Visit Patient Instructions  Thank you for completing your Welcome to Medicare Visit or Medicare Annual Wellness Visit today. Your next wellness visit will be due in one year (3/18/2026).  The screening/preventive services that you may require over the next 5-10 years are detailed below. Some tests may not apply to you based off risk factors and/or age. Screening tests ordered at today's visit but not completed yet may show as past due. Also, please note that scanned in results may not display below.  Preventive Screenings:  Service Recommendations Previous Testing/Comments   Colorectal Cancer Screening  * Colonoscopy    * Fecal Occult Blood Test (FOBT)/Fecal Immunochemical Test (FIT)  * Fecal DNA/Cologuard Test  * Flexible Sigmoidoscopy Age: 45-75 years old   Colonoscopy: every 10 years (may be performed more frequently if at higher risk)  OR  FOBT/FIT: every 1 year  OR  Cologuard: every 3 years  OR  Sigmoidoscopy: every 5 years  Screening may be recommended earlier than age 45 if at higher risk for colorectal cancer. Also, an individualized decision between you and your healthcare provider will decide whether screening between the ages of 76-85 would be appropriate. Colonoscopy: Not on file  FOBT/FIT: Not on file  Cologuard: Not on file  Sigmoidoscopy: Not on file          Breast Cancer Screening Age: 40+ years old  Frequency: every 1-2 years  Not required if history of left and right mastectomy Mammogram: 03/21/2023    Screening Current   Cervical Cancer Screening Between the ages of 21-29, pap smear recommended once every 3 years.   Between the ages of 30-65, can perform pap smear with HPV co-testing every 5 years.   Recommendations may differ for women with a history of total hysterectomy, cervical cancer, or abnormal pap smears in past. Pap Smear: Not on file    Screening Not Indicated   Hepatitis C Screening Once for adults born between 1945 and 1965  More frequently in patients at high  risk for Hepatitis C Hep C Antibody: 03/08/2022    Screening Current   Diabetes Screening 1-2 times per year if you're at risk for diabetes or have pre-diabetes Fasting glucose: 91 mg/dL (2/17/2025)  A1C: No results in last 5 years (No results in last 5 years)  Screening Current   Cholesterol Screening Once every 5 years if you don't have a lipid disorder. May order more often based on risk factors. Lipid panel: 02/17/2025    Screening Current     Other Preventive Screenings Covered by Medicare:  Abdominal Aortic Aneurysm (AAA) Screening: covered once if your at risk. You're considered to be at risk if you have a family history of AAA.  Lung Cancer Screening: covers low dose CT scan once per year if you meet all of the following conditions: (1) Age 55-77; (2) No signs or symptoms of lung cancer; (3) Current smoker or have quit smoking within the last 15 years; (4) You have a tobacco smoking history of at least 20 pack years (packs per day multiplied by number of years you smoked); (5) You get a written order from a healthcare provider.  Glaucoma Screening: covered annually if you're considered high risk: (1) You have diabetes OR (2) Family history of glaucoma OR (3)  aged 50 and older OR (4)  American aged 65 and older  Osteoporosis Screening: covered every 2 years if you meet one of the following conditions: (1) You're estrogen deficient and at risk for osteoporosis based off medical history and other findings; (2) Have a vertebral abnormality; (3) On glucocorticoid therapy for more than 3 months; (4) Have primary hyperparathyroidism; (5) On osteoporosis medications and need to assess response to drug therapy.   Last bone density test (DXA Scan): 03/21/2023.  HIV Screening: covered annually if you're between the age of 15-65. Also covered annually if you are younger than 15 and older than 65 with risk factors for HIV infection. For pregnant patients, it is covered up to 3 times per  pregnancy.    Immunizations:  Immunization Recommendations   Influenza Vaccine Annual influenza vaccination during flu season is recommended for all persons aged >= 6 months who do not have contraindications   Pneumococcal Vaccine   * Pneumococcal conjugate vaccine = PCV13 (Prevnar 13), PCV15 (Vaxneuvance), PCV20 (Prevnar 20)  * Pneumococcal polysaccharide vaccine = PPSV23 (Pneumovax) Adults 19-65 yo with certain risk factors or if 65+ yo  If never received any pneumonia vaccine: recommend Prevnar 20 (PCV20)  Give PCV20 if previously received 1 dose of PCV13 or PPSV23   Hepatitis B Vaccine 3 dose series if at intermediate or high risk (ex: diabetes, end stage renal disease, liver disease)   Respiratory syncytial virus (RSV) Vaccine - COVERED BY MEDICARE PART D  * RSVPreF3 (Arexvy) CDC recommends that adults 60 years of age and older may receive a single dose of RSV vaccine using shared clinical decision-making (SCDM)   Tetanus (Td) Vaccine - COST NOT COVERED BY MEDICARE PART B Following completion of primary series, a booster dose should be given every 10 years to maintain immunity against tetanus. Td may also be given as tetanus wound prophylaxis.   Tdap Vaccine - COST NOT COVERED BY MEDICARE PART B Recommended at least once for all adults. For pregnant patients, recommended with each pregnancy.   Shingles Vaccine (Shingrix) - COST NOT COVERED BY MEDICARE PART B  2 shot series recommended in those 19 years and older who have or will have weakened immune systems or those 50 years and older     Health Maintenance Due:      Topic Date Due   • DXA SCAN  03/21/2028   • Hepatitis C Screening  Completed     Immunizations Due:      Topic Date Due   • COVID-19 Vaccine (4 - 2024-25 season) 09/01/2024     Advance Directives   What are advance directives?  Advance directives are legal documents that state your wishes and plans for medical care. These plans are made ahead of time in case you lose your ability to make decisions  for yourself. Advance directives can apply to any medical decision, such as the treatments you want, and if you want to donate organs.   What are the types of advance directives?  There are many types of advance directives, and each state has rules about how to use them. You may choose a combination of any of the following:  Living will:  This is a written record of the treatment you want. You can also choose which treatments you do not want, which to limit, and which to stop at a certain time. This includes surgery, medicine, IV fluid, and tube feedings.   Durable power of  for healthcare (DPAHC):  This is a written record that states who you want to make healthcare choices for you when you are unable to make them for yourself. This person, called a proxy, is usually a family member or a friend. You may choose more than 1 proxy.  Do not resuscitate (DNR) order:  A DNR order is used in case your heart stops beating or you stop breathing. It is a request not to have certain forms of treatment, such as CPR. A DNR order may be included in other types of advance directives.  Medical directive:  This covers the care that you want if you are in a coma, near death, or unable to make decisions for yourself. You can list the treatments you want for each condition. Treatment may include pain medicine, surgery, blood transfusions, dialysis, IV or tube feedings, and a ventilator (breathing machine).  Values history:  This document has questions about your views, beliefs, and how you feel and think about life. This information can help others choose the care that you would choose.  Why are advance directives important?  An advance directive helps you control your care. Although spoken wishes may be used, it is better to have your wishes written down. Spoken wishes can be misunderstood, or not followed. Treatments may be given even if you do not want them. An advance directive may make it easier for your family to make  difficult choices about your care.   Weight Management   Why it is important to manage your weight:  Being overweight increases your risk of health conditions such as heart disease, high blood pressure, type 2 diabetes, and certain types of cancer. It can also increase your risk for osteoarthritis, sleep apnea, and other respiratory problems. Aim for a slow, steady weight loss. Even a small amount of weight loss can lower your risk of health problems.  How to lose weight safely:  A safe and healthy way to lose weight is to eat fewer calories and get regular exercise. You can lose up about 1 pound a week by decreasing the number of calories you eat by 500 calories each day.   Healthy meal plan for weight management:  A healthy meal plan includes a variety of foods, contains fewer calories, and helps you stay healthy. A healthy meal plan includes the following:  Eat whole-grain foods more often.  A healthy meal plan should contain fiber. Fiber is the part of grains, fruits, and vegetables that is not broken down by your body. Whole-grain foods are healthy and provide extra fiber in your diet. Some examples of whole-grain foods are whole-wheat breads and pastas, oatmeal, brown rice, and bulgur.  Eat a variety of vegetables every day.  Include dark, leafy greens such as spinach, kale, dyan greens, and mustard greens. Eat yellow and orange vegetables such as carrots, sweet potatoes, and winter squash.   Eat a variety of fruits every day.  Choose fresh or canned fruit (canned in its own juice or light syrup) instead of juice. Fruit juice has very little or no fiber.  Eat low-fat dairy foods.  Drink fat-free (skim) milk or 1% milk. Eat fat-free yogurt and low-fat cottage cheese. Try low-fat cheeses such as mozzarella and other reduced-fat cheeses.  Choose meat and other protein foods that are low in fat.  Choose beans or other legumes such as split peas or lentils. Choose fish, skinless poultry (chicken or turkey), or  lean cuts of red meat (beef or pork). Before you cook meat or poultry, cut off any visible fat.   Use less fat and oil.  Try baking foods instead of frying them. Add less fat, such as margarine, sour cream, regular salad dressing and mayonnaise to foods. Eat fewer high-fat foods. Some examples of high-fat foods include french fries, doughnuts, ice cream, and cakes.  Eat fewer sweets.  Limit foods and drinks that are high in sugar. This includes candy, cookies, regular soda, and sweetened drinks.  Exercise:  Exercise at least 30 minutes per day on most days of the week. Some examples of exercise include walking, biking, dancing, and swimming. You can also fit in more physical activity by taking the stairs instead of the elevator or parking farther away from stores. Ask your healthcare provider about the best exercise plan for you.      © Copyright [employer] 2018 Information is for End User's use only and may not be sold, redistributed or otherwise used for commercial purposes. All illustrations and images included in CareNotes® are the copyrighted property of [employer] or [employer]

## 2025-03-17 NOTE — PROGRESS NOTES
Name: Kathryn Drake      : 1940      MRN: [id number]  Encounter Provider: Angelina Magaña DO  Encounter Date: 3/17/2025   Encounter department: [facility]    Assessment & Plan  Medicare annual wellness visit, subsequent         Obesity, morbid (HCC)  Unchanged       Acquired hypothyroidism  TSH is slightly elevated but free T4 is normal.  Continue levothyroxine 7 5 mcg daily  Orders:  •  TSH, 3rd generation; Future  •  T4, free; Future    Essential hypertension  Stable  Continue amlodipine 2.5 mg daily  Orders:  •  CBC; Future  •  Comprehensive metabolic panel; Future  •  Lipid Panel with Direct LDL reflex; Future    Dependence on cane  Using cane at all times          Depression Screening and Follow-up Plan: Patient's depression screening was positive with a PHQ-9 score of 20.   Clincally patient does not have depression. No treatment is required.       Her depression screen is positive due to her 's advanced illness and his statement that he is ready to die    Preventive health issues were discussed with patient, and age appropriate screening tests were ordered as noted in patient's After Visit Summary. Personalized health advice and appropriate referrals for health education or preventive services given if needed, as noted in patient's After Visit Summary.    Return in about 6 months (around 2025) for Next scheduled follow up.    History of Present Illness     She is very worried about her .  They are thinking about hospice.        Patient Care Team:  Angelina Magaña DO as PCP - General (Family Medicine)  DO Rony Duarte MD    Review of Systems  Medical History Reviewed by provider this encounter:  Tobacco  Allergies  Meds  Problems  Med Hx  Surg Hx  Fam Hx       Annual Wellness Visit Questionnaire   Kathryn is here for her Subsequent Wellness visit.     Health Risk Assessment:   Patient rates overall health as good. Patient feels that their  physical health rating is same. Patient is dissatisfied with their life. Eyesight was rated as same. Hearing was rated as same. Patient feels that their emotional and mental health rating is slightly worse. Patients states they are never, rarely angry. Patient states they are always unusually tired/fatigued. Pain experienced in the last 7 days has been none. Patient states that she has experienced no weight loss or gain in last 6 months.     Depression Screening:   PHQ-9 Score: 20      Fall Risk Screening:   In the past year, patient has experienced: no history of falling in past year      Urinary Incontinence Screening:   Patient has not leaked urine accidently in the last six months.     Home Safety:  Patient has trouble with stairs inside or outside of their home. Patient has working smoke alarms and has working carbon monoxide detector.     Medications:   Patient is currently taking over-the-counter supplements. OTC medications include: see medication list. Patient is able to manage medications.     Activities of Daily Living (ADLs)/Instrumental Activities of Daily Living (IADLs):   Walk and transfer into and out of bed and chair?: Yes  Dress and groom yourself?: Yes    Bathe or shower yourself?: Yes    Feed yourself? Yes  Do your laundry/housekeeping?: Yes  Manage your money, pay your bills and track your expenses?: Yes  Make your own meals?: Yes    Do your own shopping?: Yes    Advance Care Planning:   Living will: Yes    Advanced directive: Yes    Advanced directive counseling given: Yes    End of Life Decisions reviewed with patient: Yes    Provider agrees with end of life decisions: Yes      Cognitive Screening:   Provider or family/friend/caregiver concerned regarding cognition?: No    PREVENTIVE SCREENINGS      Cardiovascular Screening:    General: Screening Current      Diabetes Screening:     General: Screening Current      Colorectal Cancer Screening:     General: Screening Not Indicated      Breast  "Cancer Screening:     General: Screening Current      Cervical Cancer Screening:    General: Screening Not Indicated      Osteoporosis Screening:    General: Screening Current      Abdominal Aortic Aneurysm (AAA) Screening:        General: Screening Not Indicated      Lung Cancer Screening:     General: Screening Not Indicated      Hepatitis C Screening:    General: Screening Current    Screening, Brief Intervention, and Referral to Treatment (SBIRT)     Screening      AUDIT-C Screenin) How often did you have a drink containing alcohol in the past year? never  2) How many drinks did you have on a typical day when you were drinking in the past year? 0  3) How often did you have 6 or more drinks on one occasion in the past year? never    AUDIT-C Score: 0  Interpretation: Score 0-2 (female): Negative screen for alcohol misuse    Brief Intervention  Alcohol & drug use screenings were reviewed. No concerns regarding substance use disorder identified.     Social Drivers of Health     Financial Resource Strain: Low Risk  (3/7/2024)    Overall Financial Resource Strain (CARDIA)    • Difficulty of Paying Living Expenses: Not hard at all   Food Insecurity: No Food Insecurity (3/17/2025)    Hunger Vital Sign    • Worried About Running Out of Food in the Last Year: Never true    • Ran Out of Food in the Last Year: Never true   Transportation Needs: No Transportation Needs (3/17/2025)    PRAPARE - Transportation    • Lack of Transportation (Medical): No    • Lack of Transportation (Non-Medical): No   Housing Stability: Low Risk  (3/17/2025)    Housing Stability Vital Sign    • Unable to Pay for Housing in the Last Year: No    • Number of Times Moved in the Last Year: 0    • Homeless in the Last Year: No   Utilities: Not At Risk (3/17/2025)    [facility] Utilities    • Threatened with loss of utilities: No     No results found.    Objective   /68   Pulse (!) 52   Temp 97.8 °F (36.6 °C)   Resp 18   Ht 5' 1\" (1.549 m)   Wt " 77.1 kg (170 lb)   BMI 32.12 kg/m²     Physical Exam  Vitals and nursing note reviewed.   Constitutional:       General: She is not in acute distress.     Appearance: She is well-developed.   HENT:      Head: Normocephalic and atraumatic.      Right Ear: Tympanic membrane normal.      Left Ear: Tympanic membrane normal.   Cardiovascular:      Rate and Rhythm: Normal rate and regular rhythm.      Heart sounds: No murmur heard.  Pulmonary:      Effort: Pulmonary effort is normal. No respiratory distress.      Breath sounds: Normal breath sounds.   Musculoskeletal:      Cervical back: Neck supple.   Neurological:      Mental Status: She is alert.      Gait: Gait abnormal (using cane).

## 2025-03-17 NOTE — ASSESSMENT & PLAN NOTE
TSH is slightly elevated but free T4 is normal.  Continue levothyroxine 7 5 mcg daily  Orders:  •  TSH, 3rd generation; Future  •  T4, free; Future

## 2025-03-17 NOTE — ASSESSMENT & PLAN NOTE
Stable  Continue amlodipine 2.5 mg daily  Orders:  •  CBC; Future  •  Comprehensive metabolic panel; Future  •  Lipid Panel with Direct LDL reflex; Future

## 2025-03-18 NOTE — TELEPHONE ENCOUNTER
Called and spoke with patient-scheduled procedure. Reviewed all instructions by phone upon scheduling.   Mailed copy to home   
4 = No assist / stand by assistance

## 2025-03-28 ENCOUNTER — TELEPHONE (OUTPATIENT)
Dept: RADIOLOGY | Facility: MEDICAL CENTER | Age: 85
End: 2025-03-28

## 2025-04-01 NOTE — TELEPHONE ENCOUNTER
S/w pt, states she is having pain in lower back and right hip, states she has been overdoing it recently as her  was put on hospice and she is aware that this extra activity is exacerbating her symptoms. Currently taking advil, tylenol, and lyrica as prescribed.

## 2025-04-02 NOTE — TELEPHONE ENCOUNTER
Probably her spinal stenosis which is contributing to her ongoing symptoms.  If she would like we can perform a right-sided L3-4 transforaminal epidural injection to help reduce swelling/inflammation from the stenosis.  If amenable, please send to scheduling team

## 2025-04-02 NOTE — TELEPHONE ENCOUNTER
Pt informed of FQ recommendation for Rt [id number]. Pt said she is caring for her  who was put on hospice and she would need to discuss with her family.  She would need to figure out who would care for him that day and she would need a .  She will call Kerwin when she is ready to schedule. Pt given Kerwin's direct #.

## 2025-05-01 ENCOUNTER — OFFICE VISIT (OUTPATIENT)
Dept: PULMONOLOGY | Facility: MEDICAL CENTER | Age: 85
End: 2025-05-01
Payer: MEDICARE

## 2025-05-01 VITALS
RESPIRATION RATE: 12 BRPM | DIASTOLIC BLOOD PRESSURE: 74 MMHG | WEIGHT: 174 LBS | OXYGEN SATURATION: 96 % | HEIGHT: 61 IN | BODY MASS INDEX: 32.85 KG/M2 | HEART RATE: 74 BPM | TEMPERATURE: 97.4 F | SYSTOLIC BLOOD PRESSURE: 140 MMHG

## 2025-05-01 DIAGNOSIS — N28.89 RIGHT RENAL MASS: ICD-10-CM

## 2025-05-01 DIAGNOSIS — J45.30 MILD PERSISTENT ASTHMA WITHOUT COMPLICATION: Primary | ICD-10-CM

## 2025-05-01 DIAGNOSIS — R06.09 DYSPNEA ON EXERTION: ICD-10-CM

## 2025-05-01 PROCEDURE — 94010 BREATHING CAPACITY TEST: CPT | Performed by: INTERNAL MEDICINE

## 2025-05-01 PROCEDURE — 99214 OFFICE O/P EST MOD 30 MIN: CPT | Performed by: INTERNAL MEDICINE

## 2025-05-01 RX ORDER — ALBUTEROL SULFATE 90 UG/1
2 INHALANT RESPIRATORY (INHALATION) EVERY 4 HOURS PRN
Qty: 8.5 G | Refills: 9 | Status: SHIPPED | OUTPATIENT
Start: 2025-05-01

## 2025-05-01 NOTE — ASSESSMENT & PLAN NOTE
Has had some mild increase shortness of breath with exertion recently.  Not have any wheezing.  Used her albuterol inhaler with relief.  Has used to use Symbicort when she would leave her house instead of bringing her nebulizer.  No longer uses Symbicort anymore.  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  Her daughter accompanied today and does keep an eye on her.  Room air O2 saturation today was normal.

## 2025-05-01 NOTE — ASSESSMENT & PLAN NOTE
Spirometry today shows only minimal restrictive impairment which could be due to to her being overweight.  No evidence of any airflow obstruction.  Lung sounds were clear.  She is using nebulized budesonide 0.25 mg twice daily and this is working well for her.  Does use her albuterol inhaler and nebulizer with albuterol periodically.      Orders:    POCT spirometry    albuterol (PROVENTIL HFA,VENTOLIN HFA) 90 mcg/act inhaler; Inhale 2 puffs every 4 (four) hours as needed for wheezing

## 2025-05-01 NOTE — ASSESSMENT & PLAN NOTE
She did have cryoablation done of a 2.0 x 1.9 cm right upper lobe kidney mass on 9/5/2024.  Because of placement probes it was not feasible for them to do a attempted biopsy as this would have damage probes.  She did have a follow-up CT scan done 11/25/2024 which suggest there may be some residual recurrent tumor.  She is for a follow-up CT of her abdomen around June or so of this year.  She is not having any hematuria.

## 2025-05-01 NOTE — PROGRESS NOTES
Follow-up  Visit - Pulmonary Medicine   Name: Kathryn Drake      : 1940      MRN: [id number]  Encounter Provider: Librado Long DO  Encounter Date: 2025   Encounter department: [facility]  :  Assessment & Plan  Mild persistent asthma without complication  Spirometry today shows only minimal restrictive impairment which could be due to to her being overweight.  No evidence of any airflow obstruction.  Lung sounds were clear.  She is using nebulized budesonide 0.25 mg twice daily and this is working well for her.  Does use her albuterol inhaler and nebulizer with albuterol periodically.      Orders:    POCT spirometry    albuterol (PROVENTIL HFA,VENTOLIN HFA) 90 mcg/act inhaler; Inhale 2 puffs every 4 (four) hours as needed for wheezing    Dyspnea on exertion  Has had some mild increase shortness of breath with exertion recently.  Not have any wheezing.  Used her albuterol inhaler with relief.  Has used to use Symbicort when she would leave her house instead of bringing her nebulizer.  No longer uses Symbicort anymore.  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  Her daughter accompanied today and does keep an eye on her.  Room air O2 saturation today was normal.         Right renal mass  She did have cryoablation done of a 2.0 x 1.9 cm right upper lobe kidney mass on 2024.  Because of placement probes it was not feasible for them to do a attempted biopsy as this would have damage probes.  She did have a follow-up CT scan done 2024 which suggest there may be some residual recurrent tumor.  She is for a follow-up CT of her abdomen around  or so of this year.  She is not having any hematuria.           Return in about 6 months (around 2025).    History of Present Illness   Kathryn KAUR  Vidal is a 84 y.o. female who presents for follow-up of her mild persistent asthma.  She had been taking care of her ill  at home and he passed away  at home.  She has been cleaning her house and removing boxes of stuff since her  has .  With that she has been having having some increased shortness of breath.  She has been using her albuterol a little more frequently.  Does use nebulized budesonide twice a day.  She says she has been having some anxiety to which may be contributory shortness of breath and has been having dizziness for couple months but does not feel like she will pass out.  No chest pain.  No new cough.  She no longer is using Symbicort and only uses albuterol inhaler or nebulizer.  Not have any chronic cough or hematuria. She does admit to being anxious since her   and thinks some of her symptoms could be related to this          Review of Systems   Constitutional:  Negative for chills, fever and unexpected weight change.   HENT:  Negative for congestion, rhinorrhea and sore throat.    Eyes:  Negative for discharge and redness.   Respiratory:          Some shortness of breath with activity.  Not having any chronic cough   Cardiovascular:  Negative for chest pain, palpitations and leg swelling.   Gastrointestinal:  Negative for abdominal distention, abdominal pain and nausea.   Endocrine: Negative for polydipsia and polyphagia.   Genitourinary:  Negative for hematuria.   Musculoskeletal:  Negative for joint swelling and myalgias.   Skin:  Negative for rash.   Neurological:         Sometimes having some dizziness but does not feel like she will pass out   Psychiatric/Behavioral:  Negative for confusion and decreased concentration.        Aside from what is mentioned in the HPI, ROS is otherwise negative       Medical History Reviewed by provider this encounter:     .  She does have hypertension, hypothyroidism and is on thyroid supplement.  Also has coronary  "artery disease and renal cell carcinoma.  On 9/5/2024 she did undergo CT-guided cryoablation of a right kidney mass.  Because of the narrow window between the cryoablation probes and attempted biopsy the mass was not able to be performed.  It was felt the biopsy attempt would damage the cryoablation probes.  This was done by interventional radiologist.  She had a 2.0 x 1.9 cm right upper lobe kidney mass.  Follow-up CT scan of abdomen done on 11/25/2024 showed posttreatment changes in the area of ablation but there was concern he may be some residual or recurrent tumor.  She is scheduled to have a follow-up CT scan around June of this year.    She had CABG x 3 in 2014.  She has a chronic left leg edema due to previous vein harvest for CABG surgery.  Not having any chest pain with activity.    Objective   /74 (BP Location: Left arm, Patient Position: Sitting, Cuff Size: Standard)   Pulse 74   Temp (!) 97.4 °F (36.3 °C) (Temporal)   Resp 12   Ht 5' 1\" (1.549 m)   Wt 78.9 kg (174 lb)   SpO2 96%   BMI 32.88 kg/m²     Physical Exam  Vitals reviewed.   Constitutional:       General: She is not in acute distress.     Appearance: Normal appearance. She is well-developed.   HENT:      Head: Normocephalic.      Right Ear: External ear normal.      Left Ear: External ear normal.      Nose: Nose normal.      Mouth/Throat:      Mouth: Mucous membranes are moist.      Pharynx: Oropharynx is clear. No oropharyngeal exudate.   Eyes:      Conjunctiva/sclera: Conjunctivae normal.      Pupils: Pupils are equal, round, and reactive to light.   Cardiovascular:      Rate and Rhythm: Normal rate and regular rhythm.      Heart sounds: Normal heart sounds.   Pulmonary:      Effort: Pulmonary effort is normal.      Comments: Sounds are clear.  No wheezes, crackles or rhonchi  Abdominal:      General: There is no distension.      Palpations: Abdomen is soft.      Tenderness: There is no abdominal tenderness.   Musculoskeletal:    "   Cervical back: Neck supple.      Comments: Mild edema left lower extremity.  No edema right lower extremity.  No cyanosis   Lymphadenopathy:      Cervical: No cervical adenopathy.   Skin:     General: Skin is warm and dry.   Neurological:      General: No focal deficit present.      Mental Status: She is alert and oriented to person, place, and time.   Psychiatric:         Mood and Affect: Mood normal.         Behavior: Behavior normal.         Thought Content: Thought content normal.           Diagnostic Data:  Labs: I personally reviewed the most recent laboratory data pertinent to today's visit.  2/17/2025 white blood cell count 5.5 hemoglobin 13.5 and platelet count 251.  Sodium 139, potassium 3.8 and creatinine 0.48    Radiology results:  Radiology Results Review: I personally reviewed the following image studies in PACS and associated radiology reports: chest xray. My interpretation of the radiology images/reports is: Done 11/11/2024 shows lung fields to be clear.  No pleural effusions.  Sternotomy wires present.      PFT/spirometry results: done today    FVC - 1.72 L     79%  FEV! - 1.36 L     86%  FEV1/FVC% - 79%    Minimal restrictive impairment.  No airflow obstruction        Librado Long DO

## 2025-05-15 ENCOUNTER — TELEPHONE (OUTPATIENT)
Age: 85
End: 2025-05-15

## 2025-05-15 DIAGNOSIS — Z12.31 SCREENING MAMMOGRAM, ENCOUNTER FOR: Primary | ICD-10-CM

## 2025-05-15 NOTE — TELEPHONE ENCOUNTER
Pt called in stating she needs a hard copy of her mammo script in order to make her appt. She would like a call back when it's ready for .

## 2025-05-19 ENCOUNTER — TELEPHONE (OUTPATIENT)
Dept: PAIN MEDICINE | Facility: CLINIC | Age: 85
End: 2025-05-19

## 2025-05-19 ENCOUNTER — TELEPHONE (OUTPATIENT)
Age: 85
End: 2025-05-19

## 2025-05-19 NOTE — TELEPHONE ENCOUNTER
Caller: Kathryn THAPA    Doctor: Dr. Cazares    Reason for call: Pt was calling to schedule for an injection. While talking to the pt , she was not responding to me and the pt was unable to hear me . Then the pt disconnected the call     Call back#: [phone]

## 2025-05-19 NOTE — TELEPHONE ENCOUNTER
Patient phoned, would like to schedule repeat B/L SIJ, last done 2/28 and repeat B/L hip injections, last done 3/14/24.  Patient reports 75% relief for 2 months on each and has a current pain score of 9/10 for both.  Please advise.

## 2025-05-20 NOTE — TELEPHONE ENCOUNTER
Caller: Patient    Doctor: Dr. GIBSON    Reason for call: Calling to schedule procedure    Call back#:

## 2025-05-20 NOTE — TELEPHONE ENCOUNTER
Patient scheduled for procedure with  on 5/29/25 and 7/2/25.  Patient does not have mychart, so the following instructions were given to patient verbally, no vaccines 14 days prior or after procedure, nothing to eat or drink 1 hr prior to procedure, wear something loose and comfortable, no jewelry, if ill, infection or antibiotics, must call office to reschedule procedure.  Take prescription medications as normal and you will need a  18 yrs or older.

## 2025-05-21 NOTE — TELEPHONE ENCOUNTER
RN s/w pt and she said she got scheduled yest for her two upcoming injections, 1 inj for 5/29 and the other 7/2. Pt needs no further assistance at this time.

## 2025-05-26 ENCOUNTER — RESULTS FOLLOW-UP (OUTPATIENT)
Dept: FAMILY MEDICINE CLINIC | Facility: CLINIC | Age: 85
End: 2025-05-26

## 2025-05-26 NOTE — LETTER
May 27, 2025    Kathryn Drake  [address] [place] [id number]      Dear MsNicole Vidal:    Your mammogram is normal.     If you have any questions or concerns, please don't hesitate to call.    Sincerely,                Angelina Magaña, DO        CC: No Recipients

## 2025-05-29 ENCOUNTER — APPOINTMENT (OUTPATIENT)
Dept: LAB | Facility: HOSPITAL | Age: 85
End: 2025-05-29
Payer: MEDICARE

## 2025-05-29 ENCOUNTER — HOSPITAL ENCOUNTER (OUTPATIENT)
Dept: RADIOLOGY | Facility: CLINIC | Age: 85
Discharge: HOME | End: 2025-05-29
Attending: ANESTHESIOLOGY
Payer: MEDICARE

## 2025-05-29 VITALS
TEMPERATURE: 97.6 F | HEART RATE: 54 BPM | SYSTOLIC BLOOD PRESSURE: 170 MMHG | OXYGEN SATURATION: 97 % | DIASTOLIC BLOOD PRESSURE: 81 MMHG | RESPIRATION RATE: 18 BRPM

## 2025-05-29 DIAGNOSIS — M25.551 HIP PAIN, BILATERAL: ICD-10-CM

## 2025-05-29 DIAGNOSIS — M25.552 HIP PAIN, BILATERAL: ICD-10-CM

## 2025-05-29 DIAGNOSIS — N28.89 RIGHT RENAL MASS: ICD-10-CM

## 2025-05-29 LAB
ANION GAP SERPL CALCULATED.3IONS-SCNC: 15 MMOL/L (ref 4–13)
BUN SERPL-MCNC: 11 MG/DL (ref 5–25)
CALCIUM SERPL-MCNC: 9.4 MG/DL (ref 8.4–10.2)
CHLORIDE SERPL-SCNC: 104 MMOL/L (ref 96–108)
CO2 SERPL-SCNC: 20 MMOL/L (ref 21–32)
CREAT SERPL-MCNC: 0.46 MG/DL (ref 0.6–1.3)
GFR SERPL CREATININE-BSD FRML MDRD: 92 ML/MIN/1.73SQ M
GLUCOSE P FAST SERPL-MCNC: 91 MG/DL (ref 65–99)
POTASSIUM SERPL-SCNC: 3.8 MMOL/L (ref 3.5–5.3)
SODIUM SERPL-SCNC: 139 MMOL/L (ref 135–147)

## 2025-05-29 PROCEDURE — 36415 COLL VENOUS BLD VENIPUNCTURE: CPT

## 2025-05-29 PROCEDURE — 20610 DRAIN/INJ JOINT/BURSA W/O US: CPT | Performed by: ANESTHESIOLOGY

## 2025-05-29 PROCEDURE — 77002 NEEDLE LOCALIZATION BY XRAY: CPT | Performed by: ANESTHESIOLOGY

## 2025-05-29 PROCEDURE — 80048 BASIC METABOLIC PNL TOTAL CA: CPT

## 2025-05-29 RX ORDER — 0.9 % SODIUM CHLORIDE 0.9 %
4 VIAL (ML) INJECTION ONCE
Status: COMPLETED | OUTPATIENT
Start: 2025-05-29 | End: 2025-05-29

## 2025-05-29 RX ORDER — ROPIVACAINE HYDROCHLORIDE 2 MG/ML
7 INJECTION, SOLUTION EPIDURAL; INFILTRATION; PERINEURAL ONCE
Status: COMPLETED | OUTPATIENT
Start: 2025-05-29 | End: 2025-05-29

## 2025-05-29 RX ORDER — METHYLPREDNISOLONE ACETATE 80 MG/ML
80 INJECTION, SUSPENSION INTRA-ARTICULAR; INTRALESIONAL; INTRAMUSCULAR; PARENTERAL; SOFT TISSUE ONCE
Status: COMPLETED | OUTPATIENT
Start: 2025-05-29 | End: 2025-05-29

## 2025-05-29 RX ADMIN — IOHEXOL 2 ML: 300 INJECTION, SOLUTION INTRAVENOUS at 15:52

## 2025-05-29 RX ADMIN — ROPIVACAINE HYDROCHLORIDE 7 ML: 2 INJECTION, SOLUTION EPIDURAL; INFILTRATION at 15:52

## 2025-05-29 RX ADMIN — Medication 4 ML: at 15:51

## 2025-05-29 RX ADMIN — METHYLPREDNISOLONE ACETATE 80 MG: 80 INJECTION, SUSPENSION INTRA-ARTICULAR; INTRALESIONAL; INTRAMUSCULAR; SOFT TISSUE at 15:52

## 2025-05-29 RX ADMIN — LIDOCAINE HYDROCHLORIDE 4 ML: 20 INJECTION, SOLUTION EPIDURAL; INFILTRATION; INTRACAUDAL at 15:51

## 2025-05-29 NOTE — H&P
History of Present Illness: The patient is a 84 y.o. female who presents with complaints of bilateral hip pain is here today for bilateral hip injections    Past Medical History[1]    Past Surgical History[2]    Current Medications[3]    Allergies[4]    Physical Exam:   Vitals:    05/29/25 1540   BP: 164/83   Pulse: 63   Resp: 18   Temp: 97.6 °F (36.4 °C)   SpO2: 96%     General: Awake, Alert, Oriented x 3, Mood and affect appropriate  Respiratory: Respirations even and unlabored  Cardiovascular: Peripheral pulses intact; no edema  Musculoskeletal Exam: Bilateral hip pain    ASA Score: 3    Patient/Chart Verification  Patient ID Verified: Verbal  ID Band Applied: No  H&P( within 30 days) Verified: To be obtained in the Procedural area  Allergies Reviewed: Yes  Anticoag/NSAID held?: No  Currently on antibiotics?: No    Assessment:   1. Hip pain, bilateral        Plan: B/L hip injections         [1]   Past Medical History:  Diagnosis Date    Asthma     Cardiac disease     heart attack    Disease of thyroid gland     Hyperlipidemia    [2]   Past Surgical History:  Procedure Laterality Date    APPENDECTOMY      BREAST LUMPECTOMY Bilateral     several    CARDIAC SURGERY      CATARACT EXTRACTION, BILATERAL      CORONARY STENT PLACEMENT      EPIDURAL BLOCK INJECTION Bilateral 10/25/2023    Procedure: bilateral L4-L5  TRANSFORAMINAL epidural steroid injection (60609);  Surgeon: Ariel Heredia DO;  Location: [facility] MAIN OR;  Service: Pain Management     IR CRYOABLATION  9/5/2024    NASAL POLYP SURGERY     [3]   Current Outpatient Medications:     acetaminophen (TYLENOL) 325 mg tablet, Take 3 tablets (975 mg total) by mouth every 8 (eight) hours, Disp: , Rfl:     albuterol (2.5 mg/3 mL) 0.083 % nebulizer solution, Take 3 mL (2.5 mg total) by nebulization every 6 (six) hours as needed for wheezing or shortness of breath, Disp: 360 mL, Rfl: 5    albuterol (PROVENTIL HFA,VENTOLIN HFA) 90 mcg/act inhaler, Inhale 2 puffs  every 4 (four) hours as needed for wheezing, Disp: 8.5 g, Rfl: 9    amLODIPine (NORVASC) 2.5 mg tablet, take 1 tablet by mouth once daily, Disp: 90 tablet, Rfl: 1    ascorbic acid (VITAMIN C) 500 MG tablet, Take 500 mg by mouth daily, Disp: , Rfl:     aspirin 81 MG tablet, Take 81 mg by mouth daily  , Disp: , Rfl:     budesonide (PULMICORT) 0.25 mg/2 mL nebulizer solution, Take 2 mL (0.25 mg total) by nebulization 2 (two) times a day Rinse mouth after use., Disp: 120 mL, Rfl: 11    Cholecalciferol (VITAMIN D) 2000 UNITS tablet, Take 4,000 Units by mouth daily, Disp: , Rfl:     EPINEPHrine (EPIPEN) 0.3 mg/0.3 mL SOAJ, Inject 0.3 mL (0.3 mg total) into a muscle once for 1 dose, Disp: 0.6 mL, Rfl: 0    levothyroxine 75 mcg tablet, Take 1 tablet (75 mcg total) by mouth daily, Disp: 90 tablet, Rfl: 1    predniSONE 20 mg tablet, Take 2 tablets for 5 days then 1 tablet for 5 days (Patient not taking: Reported on 5/1/2025), Disp: 15 tablet, Rfl: 0    pregabalin (LYRICA) 50 mg capsule, Take 2 capsules in the a.m. 2 capsules at bedtime, Disp: 120 capsule, Rfl: 5    simvastatin (ZOCOR) 10 mg tablet, Take 1 tablet (10 mg total) by mouth daily at bedtime, Disp: 90 tablet, Rfl: 1    Symbicort 160-4.5 MCG/ACT inhaler, Inhale 2 puffs 2 (two) times a day (Patient not taking: Reported on 5/1/2025), Disp: 10.2 g, Rfl: 3    Current Facility-Administered Medications:     iohexol (OMNIPAQUE) 300 mg/mL injection 2 mL, 2 mL, Intra-articular, Once, Mario Cazares MD    lidocaine (PF) (XYLOCAINE-MPF) 2 % injection 4 mL, 4 mL, Infiltration, Once, Mario Cazares MD    methylPREDNISolone acetate (DEPO-MEDROL) injection 80 mg, 80 mg, Intra-articular, Once, Mario Cazares MD    ropivacaine (NAROPIN) injection 7 mL, 7 mL, Intra-articular, Once, Mario Cazares MD    sodium chloride (PF) 0.9 % injection 4 mL, 4 mL, Infiltration, Once, Mario Cazares MD  [4]   Allergies  Allergen Reactions    Latex Rash    Penicillins GI Intolerance

## 2025-05-29 NOTE — DISCHARGE INSTR - LAB
Do not apply heat to any area that is numb. If you have discomfort or soreness at the injection site, you may apply ice today, 20 minutes on and 20 minutes off. Tomorrow you may use ice or warm, moist heat. Do not apply ice or heat directly to the skin.  If you experience severe shortness of breath, go to the Emergency Room.  You may have numbness for several hours from the local anesthetic. Please use caution and common sense, especially with weight-bearing activities.  You may have an increase or change in the discomfort for 36-48 hours after your treatment. Apply ice and continue with any pain medicine you have been prescribed.  Do not do anything strenuous today. You may shower, but no tub baths or hot tubs today. You may resume your normal activities tomorrow, but do not “overdo it”. Resume normal activities slowly when you are feeling better.  If you experience redness, drainage or swelling at the injection site, or if you develop a fever above 100 degrees, please call [facility] at [phone] or go to the Emergency Room.  Continue to take all routine medicines prescribed by your primary care physician unless otherwise instructed by our staff. Most blood thinners should be started again according to your regularly scheduled dosing. If you have any questions, please give our office a call.    As no general anesthesia was used in today's procedure, you should not experience any side effects related to anesthesia.       If you have a problem specifically related to your procedure, please call our office at [phone].  Problems not related to your procedure should be directed to your primary care physician.

## 2025-06-02 DIAGNOSIS — I10 ESSENTIAL HYPERTENSION: ICD-10-CM

## 2025-06-02 RX ORDER — AMLODIPINE BESYLATE 2.5 MG/1
2.5 TABLET ORAL DAILY
Qty: 90 TABLET | Refills: 1 | Status: SHIPPED | OUTPATIENT
Start: 2025-06-02

## 2025-06-02 NOTE — TELEPHONE ENCOUNTER
Reason for call:   [x] Refill   [] Prior Auth  [] Other:     Office:   [x] PCP/Provider -   Ordering Department: [facility]  Authorized By: Angelina Magaña DO  [] Specialty/Provider -     Medication: amLODIPine (NORVASC) 2.5 mg tablet    Dose/Frequency: irene 1 tablet by mouth once daily     Quantity: 90    Pharmacy: [id number] [address] [facility] [phone]    Local Pharmacy   Does the patient have enough for 3 days?   [x] Yes   [] No - Send as HP to POD    Mail Away Pharmacy   Does the patient have enough for 10 days?   [x] Yes   [] No - Send as HP to POD

## 2025-06-03 ENCOUNTER — HOSPITAL ENCOUNTER (OUTPATIENT)
Dept: RADIOLOGY | Facility: HOSPITAL | Age: 85
Discharge: HOME | End: 2025-06-03
Payer: MEDICARE

## 2025-06-03 DIAGNOSIS — N28.89 RIGHT RENAL MASS: ICD-10-CM

## 2025-06-03 PROCEDURE — 74170 CT ABD WO CNTRST FLWD CNTRST: CPT

## 2025-06-03 RX ADMIN — IOHEXOL 100 ML: 350 INJECTION, SOLUTION INTRAVENOUS at 07:56

## 2025-06-12 ENCOUNTER — TELEPHONE (OUTPATIENT)
Dept: RADIOLOGY | Facility: MEDICAL CENTER | Age: 85
End: 2025-06-12

## 2025-06-12 NOTE — TELEPHONE ENCOUNTER
Patient Reports     Rt no improvement                                Lt better than rt      %     improvement post injection    Pain Level    Rt  9                       Lt 5 /10       She made an appointment for a possible hip replacement in  a few months    She wants to continue with injections in her back

## 2025-06-16 DIAGNOSIS — I10 ESSENTIAL HYPERTENSION: ICD-10-CM

## 2025-06-16 RX ORDER — AMLODIPINE BESYLATE 2.5 MG/1
2.5 TABLET ORAL DAILY
Qty: 90 TABLET | Refills: 1 | Status: SHIPPED | OUTPATIENT
Start: 2025-06-16

## 2025-06-16 NOTE — TELEPHONE ENCOUNTER
Reason for call:   [x] Refill   [] Prior Auth  [x] Other: Not a duplicate. Sent to wrong pharmacy.    Office:   [] PCP/Provider -   [] Specialty/Provider -     Medication: amLODIPine (NORVASC) 2.5 mg tablet     Dose/Frequency: Take 1 tablet (2.5 mg total) by mouth daily     Quantity: 90    Pharmacy: [place], [place]     Local Pharmacy   Does the patient have enough for 3 days?   [] Yes   [x] No - Send as HP to POD

## 2025-06-17 ENCOUNTER — OFFICE VISIT (OUTPATIENT)
Dept: UROLOGY | Facility: CLINIC | Age: 85
End: 2025-06-17
Payer: MEDICARE

## 2025-06-17 VITALS — OXYGEN SATURATION: 98 % | DIASTOLIC BLOOD PRESSURE: 70 MMHG | HEART RATE: 74 BPM | SYSTOLIC BLOOD PRESSURE: 118 MMHG

## 2025-06-17 DIAGNOSIS — N28.89 RIGHT RENAL MASS: Primary | ICD-10-CM

## 2025-06-17 PROCEDURE — 99213 OFFICE O/P EST LOW 20 MIN: CPT

## 2025-06-17 NOTE — PROGRESS NOTES
Name: Kathryn Drake      : 1940      MRN: [id number]  Encounter Provider: NA Regan  Encounter Date: 2025   Encounter department: [facility]  :  Assessment & Plan  Right renal mass  -Right Renal Mass, concerning for Renal Cell Carcinoma  -s/p IR cryoablation of 2.0 x 1.9 cm right renal mass on 24  -CT abdomen with without contrast completed on 6/3/25 showing interval decrease in size of previously cryoablated medial superior pole right renal mass without contrast enhancement suggestive of treatment response without recurrent or residual disease.   - Previous CT abdomen w wo contrast (24) showing s/p cryoablation of presumed right renal cell carcinoma with expected posttreatment change. Somewhat higher degree of postcontrast enhancement noted within the treated lesion including some small subcentimeter nodular areas in the deep portion of the lesion which must be regarded as somewhat suspicious for residual/recurrent tumor.   -We discussed that her current CT shows excellent treatment response.  -Patient denies flank pain, dysuria, and hematuria.  - Return in 1 year with BMP and CT abdomen w wo contrast.  - All questions addressed.  Please do not hesitate to reach out with further questions or concerns.  Orders:    CT abdomen w wo contrast; Future    Basic metabolic panel; Future        History of Present Illness   Kathryn Drake is a 84 y.o. female who presents here with history of 2 cm exophytic right renal mass thought to be solid and consistent with small renal cell carcinoma presenting today for follow-up. She was last seen by me in 2024. She had IR cryoablation in 2024.   Current CT shows excellent treatment response.  She also has a tiny 3 mm nonobstructing left renal pole stone. She is asymptomatic. She denies dysuria, flank pain, and gross hematuria. Denies recurrent UTIs.     Medical comorbidities consist of CAD, CABG,  "HTN, Class I obesity, chronic back pain, ambulatory dysfunction.                Review of Systems   Constitutional:  Negative for chills and fever.   HENT:  Negative for ear pain and sore throat.    Eyes:  Negative for pain and visual disturbance.   Respiratory:  Negative for cough and shortness of breath.    Cardiovascular:  Negative for chest pain and palpitations.   Gastrointestinal:  Negative for abdominal pain, nausea and vomiting.   Genitourinary:  Negative for difficulty urinating, dysuria, flank pain, frequency, hematuria and urgency.   Musculoskeletal:  Negative for arthralgias and back pain.   Skin:  Negative for color change and rash.   Neurological:  Negative for seizures and syncope.   All other systems reviewed and are negative.         Objective   There were no vitals taken for this visit.    Physical Exam  Vitals reviewed.   Constitutional:       General: She is not in acute distress.     Appearance: She is well-developed.   HENT:      Head: Normocephalic and atraumatic.     Eyes:      Conjunctiva/sclera: Conjunctivae normal.       Cardiovascular:      Heart sounds: No murmur heard.  Pulmonary:      Effort: Pulmonary effort is normal. No respiratory distress.     Skin:     General: Skin is warm and dry.     Neurological:      Mental Status: She is alert and oriented to person, place, and time.      Gait: Gait abnormal.      Comments: In wheelchair   Psychiatric:         Mood and Affect: Mood normal.          Results   No results found for: \"PSA\"  Lab Results   Component Value Date    CALCIUM 9.4 05/29/2025    K 3.8 05/29/2025    CO2 20 (L) 05/29/2025     05/29/2025    BUN 11 05/29/2025    CREATININE 0.46 (L) 05/29/2025     Lab Results   Component Value Date    WBC 5.51 02/17/2025    HGB 13.5 02/17/2025    HCT 42.3 02/17/2025    MCV 93 02/17/2025     02/17/2025       Office Urine Dip  No results found for this or any previous visit (from the past hour).      "

## 2025-06-17 NOTE — ASSESSMENT & PLAN NOTE
-Right Renal Mass, concerning for Renal Cell Carcinoma  -s/p IR cryoablation of 2.0 x 1.9 cm right renal mass on 9/05/24  -CT abdomen with without contrast completed on 6/3/25 showing interval decrease in size of previously cryoablated medial superior pole right renal mass without contrast enhancement suggestive of treatment response without recurrent or residual disease.   - Previous CT abdomen w wo contrast (11/25/24) showing s/p cryoablation of presumed right renal cell carcinoma with expected posttreatment change. Somewhat higher degree of postcontrast enhancement noted within the treated lesion including some small subcentimeter nodular areas in the deep portion of the lesion which must be regarded as somewhat suspicious for residual/recurrent tumor.   -We discussed that her current CT shows excellent treatment response.  -Patient denies flank pain, dysuria, and hematuria.  - Return in 1 year with BMP and CT abdomen w wo contrast.  - All questions addressed.  Please do not hesitate to reach out with further questions or concerns.  Orders:    CT abdomen w wo contrast; Future    Basic metabolic panel; Future

## 2025-06-27 ENCOUNTER — APPOINTMENT (OUTPATIENT)
Dept: RADIOLOGY | Facility: AMBULARY SURGERY CENTER | Age: 85
End: 2025-06-27
Attending: ORTHOPAEDIC SURGERY
Payer: MEDICARE

## 2025-06-27 ENCOUNTER — PREP FOR PROCEDURE (OUTPATIENT)
Dept: OBGYN CLINIC | Facility: CLINIC | Age: 85
End: 2025-06-27

## 2025-06-27 ENCOUNTER — OFFICE VISIT (OUTPATIENT)
Dept: OBGYN CLINIC | Facility: CLINIC | Age: 85
End: 2025-06-27
Payer: MEDICARE

## 2025-06-27 VITALS — WEIGHT: 162 LBS | BODY MASS INDEX: 30.58 KG/M2 | HEIGHT: 61 IN

## 2025-06-27 DIAGNOSIS — Z01.818 PREOPERATIVE TESTING: Primary | ICD-10-CM

## 2025-06-27 DIAGNOSIS — M25.551 RIGHT HIP PAIN: ICD-10-CM

## 2025-06-27 DIAGNOSIS — M16.11 PRIMARY OSTEOARTHRITIS OF ONE HIP, RIGHT: ICD-10-CM

## 2025-06-27 PROCEDURE — 99205 OFFICE O/P NEW HI 60 MIN: CPT | Performed by: ORTHOPAEDIC SURGERY

## 2025-06-27 PROCEDURE — 73502 X-RAY EXAM HIP UNI 2-3 VIEWS: CPT

## 2025-06-27 RX ORDER — CHLORHEXIDINE GLUCONATE 40 MG/ML
SOLUTION TOPICAL DAILY PRN
OUTPATIENT
Start: 2025-06-27

## 2025-06-27 RX ORDER — SODIUM CHLORIDE, SODIUM LACTATE, POTASSIUM CHLORIDE, CALCIUM CHLORIDE 600; 310; 30; 20 MG/100ML; MG/100ML; MG/100ML; MG/100ML
20 INJECTION, SOLUTION INTRAVENOUS CONTINUOUS
OUTPATIENT
Start: 2025-06-27

## 2025-06-27 RX ORDER — CHLORHEXIDINE GLUCONATE ORAL RINSE 1.2 MG/ML
15 SOLUTION DENTAL ONCE
OUTPATIENT
Start: 2025-06-27 | End: 2025-06-27

## 2025-06-27 RX ORDER — IBUPROFEN 200 MG
TABLET ORAL EVERY 6 HOURS PRN
COMMUNITY

## 2025-06-27 RX ORDER — ACETAMINOPHEN 325 MG/1
975 TABLET ORAL ONCE
OUTPATIENT
Start: 2025-06-27 | End: 2025-06-27

## 2025-06-27 RX ORDER — FERROUS SULFATE 324(65)MG
324 TABLET, DELAYED RELEASE (ENTERIC COATED) ORAL
Qty: 60 TABLET | Refills: 1 | Status: SHIPPED | OUTPATIENT
Start: 2025-06-27

## 2025-06-27 RX ORDER — SODIUM CHLORIDE, SODIUM LACTATE, POTASSIUM CHLORIDE, CALCIUM CHLORIDE 600; 310; 30; 20 MG/100ML; MG/100ML; MG/100ML; MG/100ML
125 INJECTION, SOLUTION INTRAVENOUS CONTINUOUS
OUTPATIENT
Start: 2025-06-27

## 2025-06-27 RX ORDER — ASCORBIC ACID 500 MG
500 TABLET ORAL 2 TIMES DAILY
Qty: 60 TABLET | Refills: 1 | Status: SHIPPED | OUTPATIENT
Start: 2025-06-27

## 2025-06-27 RX ORDER — FOLIC ACID 1 MG/1
1 TABLET ORAL DAILY
Qty: 30 TABLET | Refills: 1 | Status: SHIPPED | OUTPATIENT
Start: 2025-06-27

## 2025-06-27 NOTE — PROGRESS NOTES
Name: Kathryn Drake      : 1940      MRN: [id number]  Encounter Provider: Corrina Perry MD  Encounter Date: 2025   Encounter department: [facility]  :  Assessment & Plan  Primary osteoarthritis of one hip, right  Patient with severe right hip osteoarthritis   Weightbearing as tolerated   X-ray right hip was obtained and reviewed in the office today demonstrate severe joint space narrowing bilaterally, with osteophyte formation and subchondral cysts and subchondral sclerosis, severe scoliosis curve    Patient has tired steroid injection and medications with no relief  Discussed with patient surgery for Right anterior total hip arthroplasty. Patient agrees and would like to proceed forward scheduling for surgery   The benefits and purpose of the operation and or procedure have been explained to me in language I understand by Dr. Perry as well the risks, alternatives and complications pertaining to the above procedure/surgery which include but is not limited to : infections, deep vein thrombosis, blood clots to the lungs, wound healing problems, complications, for extensive blood loss, including shock, possible death, leg length discrepancy, limp hip dislocations, fracture, loss of limb, persistent pain, failure of hardware/implant, damage of blood vessels and or nerves, heart attack, stroke and potential need for further surgery/revision surgery.    Potential same day surgery   She will need medical clearance from her PCP and cardiologist prior to surgery   Will be on vitamins 30 day prior to surgery   Patient has no history of DVT/PE and will be on Aspirin 81 mg twice daily for DVT prophylaxis postop when discharged from the hospital for 5 weeks     Case management:   Patient lives alone, she notes her daughter will be able to drive her home after the surgery but wont have anyone at home to be with her. Please discuss postop recommendations with the  patient   Patient will be assessed by physical therapy prior to discharge and will continue outpatient physical therapy  Patient's extremity will be wrapped in an Ace wrap/sleeve from the toes up to the knee down with postoperative swelling.  Patient will then be assessed 2-3 weeks postoperatively with incision check, new x-rays by either Mickey Amaral PA-C or Dr. Perry.  At that time it is reasonable for the patient to be on an ambulatory device such as a walker or cane, but at the 3-month janki these ambulatory devices should be discontinued.        Right hip pain    Orders:    XR hip/pelv 2-3 vws right if performed; Future    Preoperative testing             Follow up visit :  PO 3 weeks Right anterior LELE         The above stated was discussed in layman's terms and the patient expressed understanding.  All questions were answered to the patient's satisfaction.       Subjective:   Kathryn Drake is a 84 y.o. female who presents today for evaluation for her right hip.  Patient was referred by her PCP Dr. Magaña .  Patient has been having right hip pain for about fours years, denies any injuries. .  Patient has been treating Dr. Cazares for her spine and bilateral hips.  Patient did receive bilateral hip steroid injections on 3/14/2025 and states she had relief for 6 weeks.  She states she is recently has been doing increased activities due to her hospice her  being on hospice has been doing a lot of heavy lifting.  She is having pain in the right groin rating to the lateral hip bilaterally worse on the right.  She does state occasional radiating pain down to the knee.  Notes her pain is worse with weightbearing and increased activities.  She has been taking Tylenol and Motrin for pain relief.  Patient is also on Lyrica 50 mg 3 times a day per Dr. Cazares.  Patient does have history of quadruple bypass and had her left leg stripped.She denies numbness or tingling.       Review of systems negative  unless otherwise specified in HPI    Past Medical History[1]    Past Surgical History[2]    Family History[3]    Social History     Occupational History    Not on file   Tobacco Use    Smoking status: Never     Passive exposure: Never    Smokeless tobacco: Never   Vaping Use    Vaping status: Never Used   Substance and Sexual Activity    Alcohol use: Yes     Comment: rare    Drug use: No    Sexual activity: Not on file       Current Medications[4]    Allergies[5]       There were no vitals filed for this visit.  Body mass index is 30.61 kg/m².  Wt Readings from Last 3 Encounters:   06/27/25 73.5 kg (162 lb)   05/01/25 78.9 kg (174 lb)   03/17/25 77.1 kg (170 lb)       Objective:            Physical Exam  Physical Exam:      General Appearance:    Alert, cooperative, no distress, appears stated age   Head:    Normocephalic, without obvious abnormality, atraumatic   Eyes:    conjunctiva/corneas clear, both eyes         Nose:   Nares normal, septum midline, no drainage    Throat:   Lips normal; teeth and gums normal   Neck:    symmetrical, trachea midline, ;     thyroid:  no enlargement/   Back:     Symmetric, no curvature, ROM normal   Lungs:   No audible wheezing or labored breathing   Chest Wall:    No tenderness or deformity    Heart:    Regular rate and rhythm                         Pulses:   2+ and symmetric all extremities   Skin:   Skin color, texture, turgor normal, no rashes or lesions   Neurologic:   normal strength, sensation and reflexes     throughout                       Ortho Exam  Right hip  Skin intact  No erythema or open wounds  +  Stinchfield  +  SREEKANTH test  +  FADDIR   No tenderness to palpation over the greater trochanteric region  Neurovascularly Intact Distally        Diagnostics, reviewed and taken today if performed as documented:    The attending physician has personally reviewed the pertinent films in sectra and interpretation is as follows: X-ray right hip demonstrates severe joint space  "narrowing bilaterally, with osteophyte formation and subchondral cysts and subchondral sclerosis, severe scoliosis curve        Procedures, if performed today:    Procedures    None performed      Scribe Attestation      I,:  Umer Martin MA am acting as a scribe while in the presence of the attending physician.:       I,:  Corrina Perry MD personally performed the services described in this documentation    as scribed in my presence.:               Portions of the record may have been created with voice recognition software.  Occasional wrong word or \"sound a like\" substitutions may have occurred due to the inherent limitations of voice recognition software.  Read the chart carefully and recognize, using context, where substitutions have occurred.         [1]   Past Medical History:  Diagnosis Date    Asthma     Cardiac disease     heart attack    Disease of thyroid gland     Hyperlipidemia    [2]   Past Surgical History:  Procedure Laterality Date    APPENDECTOMY      BREAST LUMPECTOMY Bilateral     several    CARDIAC SURGERY      CATARACT EXTRACTION, BILATERAL      CORONARY STENT PLACEMENT      EPIDURAL BLOCK INJECTION Bilateral 10/25/2023    Procedure: bilateral L4-L5  TRANSFORAMINAL epidural steroid injection (48303);  Surgeon: Ariel Heredia DO;  Location: [facility] MAIN OR;  Service: Pain Management     IR CRYOABLATION  9/5/2024    NASAL POLYP SURGERY     [3]   Family History  Problem Relation Name Age of Onset    No Known Problems Mother      ALS Father      Cancer Brother      Heart disease Family      Asthma Family     [4]   Current Outpatient Medications:     acetaminophen (TYLENOL) 325 mg tablet, Take 3 tablets (975 mg total) by mouth every 8 (eight) hours, Disp: , Rfl:     albuterol (2.5 mg/3 mL) 0.083 % nebulizer solution, Take 3 mL (2.5 mg total) by nebulization every 6 (six) hours as needed for wheezing or shortness of breath, Disp: 360 mL, Rfl: 5    albuterol (PROVENTIL HFA,VENTOLIN " HFA) 90 mcg/act inhaler, Inhale 2 puffs every 4 (four) hours as needed for wheezing, Disp: 8.5 g, Rfl: 9    amLODIPine (NORVASC) 2.5 mg tablet, Take 1 tablet (2.5 mg total) by mouth daily, Disp: 90 tablet, Rfl: 1    ascorbic acid (VITAMIN C) 500 MG tablet, Take 500 mg by mouth in the morning., Disp: , Rfl:     aspirin 81 MG tablet, Take 81 mg by mouth in the morning., Disp: , Rfl:     budesonide (PULMICORT) 0.25 mg/2 mL nebulizer solution, Take 2 mL (0.25 mg total) by nebulization 2 (two) times a day Rinse mouth after use., Disp: 120 mL, Rfl: 11    Cholecalciferol (VITAMIN D) 2000 UNITS tablet, Take 4,000 Units by mouth in the morning., Disp: , Rfl:     ibuprofen (MOTRIN) 200 mg tablet, Take by mouth every 6 (six) hours as needed for mild pain, Disp: , Rfl:     levothyroxine 75 mcg tablet, Take 1 tablet (75 mcg total) by mouth daily, Disp: 90 tablet, Rfl: 1    pregabalin (LYRICA) 50 mg capsule, Take 2 capsules in the a.m. 2 capsules at bedtime, Disp: 120 capsule, Rfl: 5    simvastatin (ZOCOR) 10 mg tablet, Take 1 tablet (10 mg total) by mouth daily at bedtime, Disp: 90 tablet, Rfl: 1    EPINEPHrine (EPIPEN) 0.3 mg/0.3 mL SOAJ, Inject 0.3 mL (0.3 mg total) into a muscle once for 1 dose, Disp: 0.6 mL, Rfl: 0    predniSONE 20 mg tablet, Take 2 tablets for 5 days then 1 tablet for 5 days (Patient not taking: Reported on 5/1/2025), Disp: 15 tablet, Rfl: 0    Symbicort 160-4.5 MCG/ACT inhaler, Inhale 2 puffs 2 (two) times a day (Patient not taking: Reported on 5/1/2025), Disp: 10.2 g, Rfl: 3  [5]   Allergies  Allergen Reactions    Latex Rash    Penicillins GI Intolerance

## 2025-06-30 ENCOUNTER — TELEPHONE (OUTPATIENT)
Age: 85
End: 2025-06-30

## 2025-07-01 DIAGNOSIS — M16.11 PRIMARY OSTEOARTHRITIS OF ONE HIP, RIGHT: Primary | ICD-10-CM

## 2025-07-01 RX ORDER — MUPIROCIN 2 %
OINTMENT (GRAM) TOPICAL 2 TIMES DAILY
Qty: 15 G | Refills: 0 | Status: SHIPPED | OUTPATIENT
Start: 2025-07-01 | End: 2025-07-06

## 2025-07-02 ENCOUNTER — HOSPITAL ENCOUNTER (OUTPATIENT)
Dept: RADIOLOGY | Facility: CLINIC | Age: 85
Discharge: HOME | End: 2025-07-02
Attending: ANESTHESIOLOGY
Payer: MEDICARE

## 2025-07-02 VITALS
RESPIRATION RATE: 20 BRPM | OXYGEN SATURATION: 94 % | HEART RATE: 47 BPM | TEMPERATURE: 97.9 F | DIASTOLIC BLOOD PRESSURE: 75 MMHG | SYSTOLIC BLOOD PRESSURE: 171 MMHG

## 2025-07-02 DIAGNOSIS — M46.1 SACROILIITIS (HCC): ICD-10-CM

## 2025-07-02 PROCEDURE — 27096 INJECT SACROILIAC JOINT: CPT | Performed by: ANESTHESIOLOGY

## 2025-07-02 RX ORDER — ROPIVACAINE HYDROCHLORIDE 2 MG/ML
7 INJECTION, SOLUTION EPIDURAL; INFILTRATION; PERINEURAL ONCE
Status: COMPLETED | OUTPATIENT
Start: 2025-07-02 | End: 2025-07-02

## 2025-07-02 RX ORDER — METHYLPREDNISOLONE ACETATE 80 MG/ML
80 INJECTION, SUSPENSION INTRA-ARTICULAR; INTRALESIONAL; INTRAMUSCULAR; PARENTERAL; SOFT TISSUE ONCE
Status: COMPLETED | OUTPATIENT
Start: 2025-07-02 | End: 2025-07-02

## 2025-07-02 RX ORDER — 0.9 % SODIUM CHLORIDE 0.9 %
4 VIAL (ML) INJECTION ONCE
Status: COMPLETED | OUTPATIENT
Start: 2025-07-02 | End: 2025-07-02

## 2025-07-02 RX ADMIN — Medication 4 ML: at 09:53

## 2025-07-02 RX ADMIN — ROPIVACAINE HYDROCHLORIDE 7 ML: 2 INJECTION, SOLUTION EPIDURAL; INFILTRATION at 09:54

## 2025-07-02 RX ADMIN — IOHEXOL 2 ML: 300 INJECTION, SOLUTION INTRAVENOUS at 09:54

## 2025-07-02 RX ADMIN — LIDOCAINE HYDROCHLORIDE 4 ML: 20 INJECTION, SOLUTION EPIDURAL; INFILTRATION; INTRACAUDAL at 09:53

## 2025-07-02 RX ADMIN — METHYLPREDNISOLONE ACETATE 80 MG: 80 INJECTION, SUSPENSION INTRA-ARTICULAR; INTRALESIONAL; INTRAMUSCULAR; SOFT TISSUE at 09:54

## 2025-07-02 NOTE — H&P
History of Present Illness: The patient is a 84 y.o. female who presents with complaints of lower back pain and is here today for bilateral sacroiliac joint injections    Past Medical History[1]    Past Surgical History[2]    Current Medications[3]    Allergies[4]    Physical Exam:   Vitals:    07/02/25 0930   BP: (!) 157/21   Pulse: (!) 52   Resp: 17   Temp: 97.9 °F (36.6 °C)   SpO2: 96%     General: Awake, Alert, Oriented x 3, Mood and affect appropriate  Respiratory: Respirations even and unlabored  Cardiovascular: Peripheral pulses intact; no edema  Musculoskeletal Exam: Lower back pain    ASA Score: 3    Patient/Chart Verification  Patient ID Verified: Verbal  ID Band Applied: No  H&P( within 30 days) Verified: To be obtained in the Procedural area  Allergies Reviewed: Yes  Anticoag/NSAID held?: NA  Currently on antibiotics?: No    Assessment:   1. Sacroiliitis (HCC)        Plan: B/L SIJ         [1]   Past Medical History:  Diagnosis Date    Asthma     Cardiac disease     heart attack    Disease of thyroid gland     Hyperlipidemia    [2]   Past Surgical History:  Procedure Laterality Date    APPENDECTOMY      BREAST LUMPECTOMY Bilateral     several    CARDIAC SURGERY      CATARACT EXTRACTION, BILATERAL      CORONARY STENT PLACEMENT      EPIDURAL BLOCK INJECTION Bilateral 10/25/2023    Procedure: bilateral L4-L5  TRANSFORAMINAL epidural steroid injection (00071);  Surgeon: Ariel Heredia DO;  Location: [facility] MAIN OR;  Service: Pain Management     IR CRYOABLATION  9/5/2024    NASAL POLYP SURGERY     [3]   Current Outpatient Medications:     mupirocin (BACTROBAN) 2 % ointment, Apply topically 2 (two) times a day for 5 days, Disp: 15 g, Rfl: 0    acetaminophen (TYLENOL) 325 mg tablet, Take 3 tablets (975 mg total) by mouth every 8 (eight) hours, Disp: , Rfl:     albuterol (2.5 mg/3 mL) 0.083 % nebulizer solution, Take 3 mL (2.5 mg total) by nebulization every 6 (six) hours as needed for wheezing or shortness  of breath, Disp: 360 mL, Rfl: 5    albuterol (PROVENTIL HFA,VENTOLIN HFA) 90 mcg/act inhaler, Inhale 2 puffs every 4 (four) hours as needed for wheezing, Disp: 8.5 g, Rfl: 9    amLODIPine (NORVASC) 2.5 mg tablet, Take 1 tablet (2.5 mg total) by mouth daily, Disp: 90 tablet, Rfl: 1    ascorbic acid (VITAMIN C) 500 MG tablet, Take 500 mg by mouth in the morning., Disp: , Rfl:     ascorbic acid (VITAMIN C) 500 MG tablet, Take 1 tablet (500 mg total) by mouth 2 (two) times a day, Disp: 60 tablet, Rfl: 1    aspirin 81 MG tablet, Take 81 mg by mouth in the morning., Disp: , Rfl:     budesonide (PULMICORT) 0.25 mg/2 mL nebulizer solution, Take 2 mL (0.25 mg total) by nebulization 2 (two) times a day Rinse mouth after use., Disp: 120 mL, Rfl: 11    Cholecalciferol (VITAMIN D) 2000 UNITS tablet, Take 4,000 Units by mouth in the morning., Disp: , Rfl:     EPINEPHrine (EPIPEN) 0.3 mg/0.3 mL SOAJ, Inject 0.3 mL (0.3 mg total) into a muscle once for 1 dose, Disp: 0.6 mL, Rfl: 0    ferrous sulfate 324 (65 Fe) mg, Take 1 tablet (324 mg total) by mouth daily before breakfast, Disp: 60 tablet, Rfl: 1    folic acid (FOLVITE) 1 mg tablet, Take 1 tablet (1 mg total) by mouth daily, Disp: 30 tablet, Rfl: 1    ibuprofen (MOTRIN) 200 mg tablet, Take by mouth every 6 (six) hours as needed for mild pain, Disp: , Rfl:     levothyroxine 75 mcg tablet, Take 1 tablet (75 mcg total) by mouth daily, Disp: 90 tablet, Rfl: 1    predniSONE 20 mg tablet, Take 2 tablets for 5 days then 1 tablet for 5 days (Patient not taking: Reported on 5/1/2025), Disp: 15 tablet, Rfl: 0    pregabalin (LYRICA) 50 mg capsule, Take 2 capsules in the a.m. 2 capsules at bedtime, Disp: 120 capsule, Rfl: 5    simvastatin (ZOCOR) 10 mg tablet, Take 1 tablet (10 mg total) by mouth daily at bedtime, Disp: 90 tablet, Rfl: 1    Symbicort 160-4.5 MCG/ACT inhaler, Inhale 2 puffs 2 (two) times a day (Patient not taking: Reported on 5/1/2025), Disp: 10.2 g, Rfl: 3    Current  Facility-Administered Medications:     iohexol (OMNIPAQUE) 300 mg/mL injection 2 mL, 2 mL, Intra-articular, Once, Mario Cazares MD    lidocaine (PF) (XYLOCAINE-MPF) 2 % injection 4 mL, 4 mL, Infiltration, Once, Mario Cazares MD    methylPREDNISolone acetate (DEPO-MEDROL) injection 80 mg, 80 mg, Intra-articular, Once, Mario Cazares MD    ropivacaine (NAROPIN) injection 7 mL, 7 mL, Intra-articular, Once, Mario Cazares MD    sodium chloride (PF) 0.9 % injection 4 mL, 4 mL, Infiltration, Once, Mario Cazares MD  [4]   Allergies  Allergen Reactions    Latex Rash    Penicillins GI Intolerance

## 2025-07-02 NOTE — DISCHARGE INSTR - LAB

## 2025-07-14 ENCOUNTER — TELEPHONE (OUTPATIENT)
Dept: ANESTHESIOLOGY | Facility: CLINIC | Age: 85
End: 2025-07-14

## 2025-07-16 ENCOUNTER — TELEPHONE (OUTPATIENT)
Dept: PAIN MEDICINE | Facility: CLINIC | Age: 85
End: 2025-07-16

## 2025-07-29 DIAGNOSIS — M48.062 LUMBAR STENOSIS WITH NEUROGENIC CLAUDICATION: ICD-10-CM

## 2025-07-29 RX ORDER — PREGABALIN 50 MG/1
CAPSULE ORAL
Qty: 120 CAPSULE | Refills: 5 | Status: SHIPPED | OUTPATIENT
Start: 2025-07-29

## (undated) DEVICE — SYRINGE 5ML LL

## (undated) DEVICE — RADIOLOGY STERILE LABELS: Brand: CENTURION

## (undated) DEVICE — SYRINGE 3ML LL

## (undated) DEVICE — NEEDLE SPINAL 22G X 3.5 IN PLST HUB

## (undated) DEVICE — IV SET EXT SM BORE CARESITE 8IN

## (undated) DEVICE — TOWEL SET X-RAY

## (undated) DEVICE — PLASTIC ADHESIVE BANDAGE: Brand: CURITY

## (undated) DEVICE — SKIN MARKER DUAL TIP WITH RULER CAP, FLEXIBLE RULER AND LABELS: Brand: DEVON

## (undated) DEVICE — GLOVE SRG BIOGEL 7.5

## (undated) DEVICE — NEEDLE SPINAL 22G X 5IN QUINCKE

## (undated) DEVICE — WIPES BABY PAMPERS SENSITIVE 36/PK

## (undated) DEVICE — SYRINGE 10ML LL

## (undated) DEVICE — CHLORAPREP APPLICATOR TINTED 10.5ML ONE-STEP